# Patient Record
Sex: MALE | Race: BLACK OR AFRICAN AMERICAN | NOT HISPANIC OR LATINO | URBAN - METROPOLITAN AREA
[De-identification: names, ages, dates, MRNs, and addresses within clinical notes are randomized per-mention and may not be internally consistent; named-entity substitution may affect disease eponyms.]

---

## 2018-07-20 ENCOUNTER — INPATIENT (INPATIENT)
Facility: HOSPITAL | Age: 68
LOS: 25 days | Discharge: NURSING, REHAB OR LONG-TERM CARE | DRG: 64 | End: 2018-08-15
Attending: INTERNAL MEDICINE | Admitting: INTERNAL MEDICINE
Payer: MEDICARE

## 2018-07-20 VITALS
SYSTOLIC BLOOD PRESSURE: 132 MMHG | DIASTOLIC BLOOD PRESSURE: 84 MMHG | HEART RATE: 72 BPM | WEIGHT: 160.06 LBS | TEMPERATURE: 98 F | OXYGEN SATURATION: 98 % | RESPIRATION RATE: 18 BRPM

## 2018-07-20 DIAGNOSIS — R74.0 NONSPECIFIC ELEVATION OF LEVELS OF TRANSAMINASE AND LACTIC ACID DEHYDROGENASE [LDH]: ICD-10-CM

## 2018-07-20 DIAGNOSIS — R63.8 OTHER SYMPTOMS AND SIGNS CONCERNING FOOD AND FLUID INTAKE: ICD-10-CM

## 2018-07-20 DIAGNOSIS — Z98.49 CATARACT EXTRACTION STATUS, UNSPECIFIED EYE: Chronic | ICD-10-CM

## 2018-07-20 DIAGNOSIS — E11.9 TYPE 2 DIABETES MELLITUS WITHOUT COMPLICATIONS: ICD-10-CM

## 2018-07-20 DIAGNOSIS — Z91.89 OTHER SPECIFIED PERSONAL RISK FACTORS, NOT ELSEWHERE CLASSIFIED: ICD-10-CM

## 2018-07-20 DIAGNOSIS — I50.9 HEART FAILURE, UNSPECIFIED: ICD-10-CM

## 2018-07-20 DIAGNOSIS — I25.10 ATHEROSCLEROTIC HEART DISEASE OF NATIVE CORONARY ARTERY WITHOUT ANGINA PECTORIS: Chronic | ICD-10-CM

## 2018-07-20 DIAGNOSIS — R41.82 ALTERED MENTAL STATUS, UNSPECIFIED: ICD-10-CM

## 2018-07-20 DIAGNOSIS — N17.9 ACUTE KIDNEY FAILURE, UNSPECIFIED: ICD-10-CM

## 2018-07-20 DIAGNOSIS — I50.23 ACUTE ON CHRONIC SYSTOLIC (CONGESTIVE) HEART FAILURE: ICD-10-CM

## 2018-07-20 DIAGNOSIS — Z29.9 ENCOUNTER FOR PROPHYLACTIC MEASURES, UNSPECIFIED: ICD-10-CM

## 2018-07-20 DIAGNOSIS — E87.2 ACIDOSIS: ICD-10-CM

## 2018-07-20 DIAGNOSIS — I24.9 ACUTE ISCHEMIC HEART DISEASE, UNSPECIFIED: ICD-10-CM

## 2018-07-20 LAB
ALBUMIN SERPL ELPH-MCNC: 3.6 G/DL — SIGNIFICANT CHANGE UP (ref 3.3–5)
ALP SERPL-CCNC: 158 U/L — HIGH (ref 40–120)
ALT FLD-CCNC: 1421 U/L — HIGH (ref 10–45)
AMMONIA BLD-MCNC: 31 UMOL/L — SIGNIFICANT CHANGE UP (ref 11–55)
ANION GAP SERPL CALC-SCNC: 25 MMOL/L — HIGH (ref 5–17)
APPEARANCE UR: CLEAR — SIGNIFICANT CHANGE UP
APTT BLD: 34.1 SEC — SIGNIFICANT CHANGE UP (ref 27.5–37.4)
AST SERPL-CCNC: 164 U/L — HIGH (ref 10–40)
BACTERIA # UR AUTO: PRESENT /HPF
BILIRUB DIRECT SERPL-MCNC: 0.8 MG/DL — HIGH (ref 0–0.2)
BILIRUB INDIRECT FLD-MCNC: 1 MG/DL — SIGNIFICANT CHANGE UP (ref 0.2–1)
BILIRUB SERPL-MCNC: 1.6 MG/DL — HIGH (ref 0.2–1.2)
BILIRUB SERPL-MCNC: 1.8 MG/DL — HIGH (ref 0.2–1.2)
BILIRUB UR-MCNC: NEGATIVE — SIGNIFICANT CHANGE UP
BUN SERPL-MCNC: 145 MG/DL — HIGH (ref 7–23)
CALCIUM SERPL-MCNC: 8.6 MG/DL — SIGNIFICANT CHANGE UP (ref 8.4–10.5)
CHLORIDE SERPL-SCNC: 87 MMOL/L — LOW (ref 96–108)
CHLORIDE UR-SCNC: 52 MMOL/L — SIGNIFICANT CHANGE UP
CHOLEST SERPL-MCNC: 186 MG/DL — SIGNIFICANT CHANGE UP (ref 10–199)
CO2 SERPL-SCNC: 20 MMOL/L — LOW (ref 22–31)
COLOR SPEC: YELLOW — SIGNIFICANT CHANGE UP
COMMENT - URINE: SIGNIFICANT CHANGE UP
CREAT ?TM UR-MCNC: 58 MG/DL — SIGNIFICANT CHANGE UP
CREAT SERPL-MCNC: 9.41 MG/DL — HIGH (ref 0.5–1.3)
DIFF PNL FLD: ABNORMAL
EOSINOPHIL NFR BLD AUTO: 0.5 % — SIGNIFICANT CHANGE UP (ref 0–6)
EPI CELLS # UR: SIGNIFICANT CHANGE UP /HPF (ref 0–5)
EXTRA SST TUBE: SIGNIFICANT CHANGE UP
GAS PNL BLDV: SIGNIFICANT CHANGE UP
GGT SERPL-CCNC: 202 U/L — HIGH (ref 9–50)
GLUCOSE SERPL-MCNC: 165 MG/DL — HIGH (ref 70–99)
GLUCOSE UR QL: NEGATIVE — SIGNIFICANT CHANGE UP
HBA1C BLD-MCNC: 8 % — HIGH (ref 4–5.6)
HCT VFR BLD CALC: 35.2 % — LOW (ref 39–50)
HDLC SERPL-MCNC: 18 MG/DL — LOW (ref 40–125)
HGB BLD-MCNC: 12.3 G/DL — LOW (ref 13–17)
INR BLD: 1.33 — HIGH (ref 0.88–1.16)
KETONES UR-MCNC: NEGATIVE — SIGNIFICANT CHANGE UP
LACTATE SERPL-SCNC: 2.1 MMOL/L — HIGH (ref 0.5–2)
LEUKOCYTE ESTERASE UR-ACNC: NEGATIVE — SIGNIFICANT CHANGE UP
LIPID PNL WITH DIRECT LDL SERPL: 135 MG/DL — HIGH
LYMPHOCYTES # BLD AUTO: 8.3 % — LOW (ref 13–44)
MCHC RBC-ENTMCNC: 28.6 PG — SIGNIFICANT CHANGE UP (ref 27–34)
MCHC RBC-ENTMCNC: 34.9 G/DL — SIGNIFICANT CHANGE UP (ref 32–36)
MCV RBC AUTO: 81.9 FL — SIGNIFICANT CHANGE UP (ref 80–100)
MONOCYTES NFR BLD AUTO: 14.2 % — HIGH (ref 2–14)
NEUTROPHILS NFR BLD AUTO: 77 % — SIGNIFICANT CHANGE UP (ref 43–77)
NITRITE UR-MCNC: NEGATIVE — SIGNIFICANT CHANGE UP
PH UR: 5.5 — SIGNIFICANT CHANGE UP (ref 5–8)
PLATELET # BLD AUTO: 101 K/UL — LOW (ref 150–400)
POTASSIUM SERPL-MCNC: 3.8 MMOL/L — SIGNIFICANT CHANGE UP (ref 3.5–5.3)
POTASSIUM SERPL-SCNC: 3.8 MMOL/L — SIGNIFICANT CHANGE UP (ref 3.5–5.3)
POTASSIUM UR-SCNC: 13 MMOL/L — SIGNIFICANT CHANGE UP
PROT SERPL-MCNC: 7.2 G/DL — SIGNIFICANT CHANGE UP (ref 6–8.3)
PROT UR-MCNC: ABNORMAL MG/DL
PROTHROM AB SERPL-ACNC: 14.9 SEC — HIGH (ref 9.8–12.7)
RBC # BLD: 4.3 M/UL — SIGNIFICANT CHANGE UP (ref 4.2–5.8)
RBC # FLD: 14.1 % — SIGNIFICANT CHANGE UP (ref 10.3–16.9)
RBC CASTS # UR COMP ASSIST: < 5 /HPF — SIGNIFICANT CHANGE UP
SODIUM SERPL-SCNC: 132 MMOL/L — LOW (ref 135–145)
SODIUM UR-SCNC: 74 MMOL/L — SIGNIFICANT CHANGE UP
SP GR SPEC: 1.01 — SIGNIFICANT CHANGE UP (ref 1–1.03)
TOTAL CHOLESTEROL/HDL RATIO MEASUREMENT: 10.3 RATIO — HIGH (ref 3.4–9.6)
TRIGL SERPL-MCNC: 165 MG/DL — HIGH (ref 10–149)
TROPONIN T SERPL-MCNC: 17.33 NG/ML — CRITICAL HIGH (ref 0–0.01)
TROPONIN T SERPL-MCNC: 17.5 NG/ML — CRITICAL HIGH (ref 0–0.01)
UROBILINOGEN FLD QL: 0.2 E.U./DL — SIGNIFICANT CHANGE UP
UUN UR-MCNC: 482 MG/DL — SIGNIFICANT CHANGE UP
WBC # BLD: 4.2 K/UL — SIGNIFICANT CHANGE UP (ref 3.8–10.5)
WBC # FLD AUTO: 4.2 K/UL — SIGNIFICANT CHANGE UP (ref 3.8–10.5)
WBC UR QL: ABNORMAL /HPF

## 2018-07-20 PROCEDURE — 71045 X-RAY EXAM CHEST 1 VIEW: CPT | Mod: 26

## 2018-07-20 PROCEDURE — 99223 1ST HOSP IP/OBS HIGH 75: CPT

## 2018-07-20 PROCEDURE — 93306 TTE W/DOPPLER COMPLETE: CPT | Mod: 26

## 2018-07-20 PROCEDURE — 93010 ELECTROCARDIOGRAM REPORT: CPT

## 2018-07-20 PROCEDURE — 99291 CRITICAL CARE FIRST HOUR: CPT

## 2018-07-20 RX ORDER — DEXTROSE 50 % IN WATER 50 %
12.5 SYRINGE (ML) INTRAVENOUS ONCE
Qty: 0 | Refills: 0 | Status: DISCONTINUED | OUTPATIENT
Start: 2018-07-20 | End: 2018-08-15

## 2018-07-20 RX ORDER — TICAGRELOR 90 MG/1
180 TABLET ORAL ONCE
Qty: 0 | Refills: 0 | Status: COMPLETED | OUTPATIENT
Start: 2018-07-20 | End: 2018-07-20

## 2018-07-20 RX ORDER — BUMETANIDE 0.25 MG/ML
2 INJECTION INTRAMUSCULAR; INTRAVENOUS ONCE
Qty: 0 | Refills: 0 | Status: COMPLETED | OUTPATIENT
Start: 2018-07-20 | End: 2018-07-20

## 2018-07-20 RX ORDER — HEPARIN SODIUM 5000 [USP'U]/ML
850 INJECTION INTRAVENOUS; SUBCUTANEOUS
Qty: 25000 | Refills: 0 | Status: DISCONTINUED | OUTPATIENT
Start: 2018-07-20 | End: 2018-07-21

## 2018-07-20 RX ORDER — TICAGRELOR 90 MG/1
90 TABLET ORAL
Qty: 0 | Refills: 0 | Status: DISCONTINUED | OUTPATIENT
Start: 2018-07-21 | End: 2018-07-22

## 2018-07-20 RX ORDER — ASPIRIN/CALCIUM CARB/MAGNESIUM 324 MG
81 TABLET ORAL DAILY
Qty: 0 | Refills: 0 | Status: DISCONTINUED | OUTPATIENT
Start: 2018-07-21 | End: 2018-07-23

## 2018-07-20 RX ORDER — ASPIRIN/CALCIUM CARB/MAGNESIUM 324 MG
325 TABLET ORAL ONCE
Qty: 0 | Refills: 0 | Status: COMPLETED | OUTPATIENT
Start: 2018-07-20 | End: 2018-07-20

## 2018-07-20 RX ORDER — DEXTROSE 50 % IN WATER 50 %
25 SYRINGE (ML) INTRAVENOUS ONCE
Qty: 0 | Refills: 0 | Status: DISCONTINUED | OUTPATIENT
Start: 2018-07-20 | End: 2018-08-15

## 2018-07-20 RX ORDER — INSULIN LISPRO 100/ML
VIAL (ML) SUBCUTANEOUS AT BEDTIME
Qty: 0 | Refills: 0 | Status: DISCONTINUED | OUTPATIENT
Start: 2018-07-20 | End: 2018-08-15

## 2018-07-20 RX ORDER — BUMETANIDE 0.25 MG/ML
1 INJECTION INTRAMUSCULAR; INTRAVENOUS
Qty: 10 | Refills: 0 | Status: DISCONTINUED | OUTPATIENT
Start: 2018-07-20 | End: 2018-07-22

## 2018-07-20 RX ORDER — SODIUM CHLORIDE 9 MG/ML
1000 INJECTION INTRAMUSCULAR; INTRAVENOUS; SUBCUTANEOUS
Qty: 0 | Refills: 0 | Status: DISCONTINUED | OUTPATIENT
Start: 2018-07-20 | End: 2018-07-20

## 2018-07-20 RX ORDER — GLUCAGON INJECTION, SOLUTION 0.5 MG/.1ML
1 INJECTION, SOLUTION SUBCUTANEOUS ONCE
Qty: 0 | Refills: 0 | Status: DISCONTINUED | OUTPATIENT
Start: 2018-07-20 | End: 2018-08-15

## 2018-07-20 RX ORDER — BUMETANIDE 0.25 MG/ML
2 INJECTION INTRAMUSCULAR; INTRAVENOUS ONCE
Qty: 0 | Refills: 0 | Status: DISCONTINUED | OUTPATIENT
Start: 2018-07-20 | End: 2018-07-20

## 2018-07-20 RX ORDER — DEXTROSE 50 % IN WATER 50 %
15 SYRINGE (ML) INTRAVENOUS ONCE
Qty: 0 | Refills: 0 | Status: DISCONTINUED | OUTPATIENT
Start: 2018-07-20 | End: 2018-08-15

## 2018-07-20 RX ORDER — SODIUM CHLORIDE 9 MG/ML
1000 INJECTION, SOLUTION INTRAVENOUS
Qty: 0 | Refills: 0 | Status: DISCONTINUED | OUTPATIENT
Start: 2018-07-20 | End: 2018-08-15

## 2018-07-20 RX ORDER — INSULIN LISPRO 100/ML
VIAL (ML) SUBCUTANEOUS
Qty: 0 | Refills: 0 | Status: DISCONTINUED | OUTPATIENT
Start: 2018-07-20 | End: 2018-08-15

## 2018-07-20 RX ADMIN — TICAGRELOR 180 MILLIGRAM(S): 90 TABLET ORAL at 15:27

## 2018-07-20 RX ADMIN — BUMETANIDE 2 MILLIGRAM(S): 0.25 INJECTION INTRAMUSCULAR; INTRAVENOUS at 18:58

## 2018-07-20 RX ADMIN — Medication 325 MILLIGRAM(S): at 15:27

## 2018-07-20 RX ADMIN — HEPARIN SODIUM 8.5 UNIT(S)/HR: 5000 INJECTION INTRAVENOUS; SUBCUTANEOUS at 18:59

## 2018-07-20 RX ADMIN — BUMETANIDE 10 MG/HR: 0.25 INJECTION INTRAMUSCULAR; INTRAVENOUS at 18:59

## 2018-07-20 NOTE — H&P ADULT - PROBLEM SELECTOR PLAN 5
Patient's EF was reportedly 25% in the past according to ED report. On echo today, EF is 15-20%. Appears volume overloaded on exam with increased JVP, b/l LE edema. On CXR, there's cardiomegaly and significant pulmonary vascular congestion throughout bilateral lung fields.  - Diuresis with bumex 2mg IVP, followed by bumex gtt

## 2018-07-20 NOTE — H&P ADULT - PROBLEM SELECTOR PLAN 2
Patient has been weak, unsteady, and confused. Earlier today during examination by ED providers, he was reportedly very drowsy. There are no focal neurological deficits on exam.  - Will f/u CT Head for stroke or bleed  - Possibly related to   - Patient has been weak, unsteady, and confused. Earlier today during examination by ED providers, he was reportedly very drowsy. There are no focal neurological deficits on exam. Differential includes toxic-metabolic vs hypoperfusion vs hypercapnia vs stroke/hemorrhage.   - Will f/u CT Head for stroke or bleed  - f/u VBG for ? hypercapnia  - f/u lactate

## 2018-07-20 NOTE — H&P ADULT - NSHPPHYSICALEXAM_GEN_ALL_CORE
.  VITAL SIGNS:  T(C): 37 (07-20-18 @ 16:22), Max: 37 (07-20-18 @ 16:22)  T(F): 98.6 (07-20-18 @ 16:22), Max: 98.6 (07-20-18 @ 16:22)  HR: 71 (07-20-18 @ 16:22) (71 - 72)  BP: 149/85 (07-20-18 @ 16:22) (132/84 - 149/85)  BP(mean): --  RR: 18 (07-20-18 @ 16:22) (18 - 18)  SpO2: 96% (07-20-18 @ 16:22) (96% - 98%)  Wt(kg): --    PHYSICAL EXAM:    Constitutional: WDWN resting comfortably in bed; NAD  Head: NC/AT  Eyes: PERRL, EOMI, anicteric sclera  ENT: no nasal discharge; uvula midline, no oropharyngeal erythema or exudates; MMM  Neck: supple; no JVD or thyromegaly  Respiratory: CTA B/L; no W/R/R, no retractions  Cardiac: +S1/S2; RRR; no M/R/G; PMI non-displaced  Gastrointestinal: abdomen soft, NT/ND; no rebound or guarding; +BSx4  Genitourinary: normal external genitalia  Back: spine midline, no bony tenderness or step-offs; no CVAT B/L  Extremities: WWP, no clubbing or cyanosis; no peripheral edema  Musculoskeletal: NROM x4; no joint swelling, tenderness or erythema  Vascular: 2+ radial, femoral, DP/PT pulses B/L  Dermatologic: skin warm, dry and intact; no rashes, wounds, or scars  Lymphatic: no submandibular or cervical LAD  Neurologic: AAOx3; CNII-XII grossly intact; no focal deficits  Psychiatric: affect and characteristics of appearance, verbalizations, behaviors are appropriate .  VITAL SIGNS:  T(C): 37 (07-20-18 @ 16:22), Max: 37 (07-20-18 @ 16:22)  T(F): 98.6 (07-20-18 @ 16:22), Max: 98.6 (07-20-18 @ 16:22)  HR: 71 (07-20-18 @ 16:22) (71 - 72)  BP: 149/85 (07-20-18 @ 16:22) (132/84 - 149/85)  BP(mean): --  RR: 18 (07-20-18 @ 16:22) (18 - 18)  SpO2: 96% (07-20-18 @ 16:22) (96% - 98%)  Wt(kg): --    PHYSICAL EXAM:    Constitutional: WDWN resting comfortably in bed; NAD  Head: NC/AT  Eyes: PERRL, EOMI, anicteric sclera  ENT: no nasal discharge; uvula midline, no oropharyngeal erythema or exudates; MMM  Neck: supple; no JVD or thyromegaly  Respiratory: CTA B/L; no W/R/R, no retractions  Cardiac: 3/6 WANDER at LUSB, Regular rate, rhythm  Gastrointestinal: abdomen soft, NT, mildly distended; no rebound or guarding; +BSx4  Extremities: WWP, no clubbing or cyanosis; no peripheral edema  Musculoskeletal: NROM x4; no joint swelling, tenderness or erythema  Vascular: 2+ radial, femoral, DP/PT pulses B/L  Lymphatic: no submandibular or cervical LAD  Neurologic: AAOx3; CNII-XII grossly intact; no focal deficits  Psychiatric: Flat affect .  VITAL SIGNS:  T(C): 37 (07-20-18 @ 16:22), Max: 37 (07-20-18 @ 16:22)  T(F): 98.6 (07-20-18 @ 16:22), Max: 98.6 (07-20-18 @ 16:22)  HR: 71 (07-20-18 @ 16:22) (71 - 72)  BP: 149/85 (07-20-18 @ 16:22) (132/84 - 149/85)  BP(mean): --  RR: 18 (07-20-18 @ 16:22) (18 - 18)  SpO2: 96% (07-20-18 @ 16:22) (96% - 98%)  Wt(kg): --    PHYSICAL EXAM:    Constitutional: WDWN resting comfortably in bed; NAD  Head: NC/AT  Eyes: PERRL, EOMI, anicteric sclera  ENT: no nasal discharge; uvula midline, no oropharyngeal erythema or exudates; MMM  Neck: supple; JVP noted at his ear 11cm  Respiratory: CTA B/L; no W/R/R, no retractions  Cardiac: 3/6 WANDER at LUSB, Regular rate, rhythm  Gastrointestinal: abdomen soft, NT, mildly distended; no rebound or guarding; +BSx4  Extremities: 1+ edema at b/l ankles. Feet are cool to the touch. Otherwise WWP.   Vascular: 2+ radial. DP/PT pulses nonpalpable  Neurologic: AAOx3; CNII-XII grossly intact; no focal deficits  Psychiatric: Flat affect

## 2018-07-20 NOTE — CONSULT NOTE ADULT - ATTENDING COMMENTS
RONNIE, ATN or CRS, non oliguric w acute HF EF 20%, no prior h/o renal disease or HF per pt's wife. Responding to diuresis, no urgent req for RRT

## 2018-07-20 NOTE — ED PROVIDER NOTE - MEDICAL DECISION MAKING DETAILS
Dr. Abi Lo MD  [address] [place], [place] [address] [phone]  Phone: (   )    -  Fax: (   )    - MI  renal failure  CHF  echo 20% EF  [facility]  cath monday tues

## 2018-07-20 NOTE — CONSULT NOTE ADULT - PROBLEM SELECTOR RECOMMENDATION 9
- unknown baseline, lst known creatinine a week ago in a hospital at [place] 6.6   - we will try to get more info for the baseline renal function from his PCP dr. Cunningham [phone] or [phone]  - now with creatinine 9.45/ , no urgent indications for HD - most likley worsening of the renal function in the setting of cvardiorenal syndrome   - please obtain UA - has a cherry catheter - not oliguric  - electrolytes acceptable   - has a metabolic acidosis - high anion gap - please obtain lactic acid and beta hydrohybuterate   - please obtain - urine sodium, urine creatinine, urine chloride, urine potasssium, urine urea   - volume status - on the wet side - please start the diuretic therapy  - LAsix 80 mg ivp twice a day   - obtain official renal u/s and bladder  - in and outs   - daily weight   - renal diet   - please obtain quantification of the proteinuria

## 2018-07-20 NOTE — H&P ADULT - ASSESSMENT
RB is a 67M w PMH of dilated cardiomyopathy, HFrEF of 25%, CAD s/p PCI 7 years ago, NIDDM, HTN, and HLD, with altered mental status in the setting of one week of dyspnea and weakness admitted to the CCU for management of ACS, CHF, and RONNIE.

## 2018-07-20 NOTE — H&P ADULT - PROBLEM SELECTOR PLAN 4
Patient's ALT today is >1400, however AST is 164. Alk phos is elevated to 158. Unclear pattern of liver enzymes.   - f/u repeat CMP  - f/u lactate, GGT, bilirubins

## 2018-07-20 NOTE — CONSULT NOTE ADULT - SUBJECTIVE AND OBJECTIVE BOX
This is 67 year old male with PMH for HTN, HLD, Diabetes mellitus type 2. He was  sent to the ED from his doctor today, Prior to that was in [place] since  where a couple of weeks ago had nausea, vomiting and diarrhea attributed to viral infection. Last week on Friday had an episode of acute shortness of breath and went to the hospital - where been told that he had a heart attack and been treated not clear whether he had a cardiac cath was discharge on Wednesday this week and came back to [place] on Thursday night. From the work up in the hospital in [place] creatinine of 6.6. The patient denies any prior h/o of renal failure - never been told for kidney problems. Denies any burning sensation, no heamturia, nocturia 2 times overnight. The renal service is called for creatinine of 9.45 and  today in the Ed. The patient on the stretcher - no shortness of breath, no chest pain, no fever, no nausea, no vomiting, no diarrhea, no urinary symptoms. reports decreased appetite. No recent usage of NSAIDs or herbal medicine   Medsications: Losartan, Glipized and metfromin - but has not beentaking them recently   SH: no alcohol, no drugs, no smoking       PAST MEDICAL & SURGICAL HISTORY:  Coronary artery disease involving native heart without angina pectoris, unspecified vessel or lesion type  Hyperlipidemia, unspecified hyperlipidemia type  Hypertension, unspecified type  Type 2 diabetes mellitus without complication, without long-term current use of insulin  History of cataract extraction, unspecified laterality  CAD S/P percutaneous coronary angioplasty      MEDICATIONS  (STANDING):  buMETAnide Infusion 1 mG/Hr (10 mL/Hr) IV Continuous <Continuous>  buMETAnide Injectable 2 milliGRAM(s) IV Push once  heparin  Infusion 850 Unit(s)/Hr (8.5 mL/Hr) IV Continuous <Continuous>    MEDICATIONS  (PRN):      Allergies    No Known Allergies    Intolerances        SOCIAL HISTORY:    FAMILY HISTORY:  Family history of hypertension in mother (Mother)  Family history of diabetes mellitus in mother (Mother)      T(C): , Max: 37 (18 @ 16:22)  T(F): , Max: 98.6 (18 @ 16:22)  HR: 75 (18 @ 17:42)  BP: 137/79 (18 @ 17:42)  BP(mean): 96 (18 @ 17:42)  RR: 18 (18 @ 17:42)  SpO2: 98% (18 @ 17:42)  Wt(kg): --    Height (cm): 167.64 ( @ 17:42)  Weight (kg): 70.7 ( @ 17:42)  BMI (kg/m2): 25.2 ( @ 17:42)  BSA (m2): 1.8 ( @ 17:42)      Physical exam:   Alert and oriented, responds to all questions properly, does not want to talk much  JVD   Normal air entry into the lungs, no wheezing, no crackles   RRR, normal s1/s2, no murmurs, rubs or gallops   Abdomen - soft, not tender, not distended   Extremities: 1+ edema   No rash   No astertix     LABS:                        12.3   4.2   )-----------( 101      ( 2018 13:49 )             35.2         132<L>  |  87<L>  |  145<H>  ----------------------------<  165<H>  3.8   |  20<L>  |  9.41<H>    Ca    8.6      2018 13:49    TPro  7.2  /  Alb  3.6  /  TBili  1.6<H>  /  DBili  x   /  AST  164<H>  /  ALT  1421<H>  /  AlkPhos  158<H>      Creatine Kinase, Serum: 596 U/L <H> [30 - 200] ( @ 13:49)    PT/INR - ( 2018 13:48 )   PT: 14.9 sec;   INR: 1.33          PTT - ( 2018 13:48 )  PTT:34.1 sec  Urinalysis Basic - ( 2018 17:25 )    Color: Yellow / Appearance: Clear / S.015 / pH: x  Gluc: x / Ketone: NEGATIVE  / Bili: Negative / Urobili: 0.2 E.U./dL   Blood: x / Protein: Trace mg/dL / Nitrite: NEGATIVE   Leuk Esterase: NEGATIVE / RBC: < 5 /HPF / WBC 5-10 /HPF   Sq Epi: x / Non Sq Epi: 0-5 /HPF / Bacteria: Present /HPF            RADIOLOGY & ADDITIONAL STUDIES:

## 2018-07-20 NOTE — H&P ADULT - NSHPLABSRESULTS_GEN_ALL_CORE
.  LABS:                         12.3   4.2   )-----------( 101      ( 20 Jul 2018 13:49 )             35.2     07-20    132<L>  |  87<L>  |  145<H>  ----------------------------<  165<H>  3.8   |  20<L>  |  9.41<H>    Ca    8.6      20 Jul 2018 13:49    TPro  7.2  /  Alb  3.6  /  TBili  1.6<H>  /  DBili  x   /  AST  164<H>  /  ALT  1421<H>  /  AlkPhos  158<H>  07-20    PT/INR - ( 20 Jul 2018 13:48 )   PT: 14.9 sec;   INR: 1.33          PTT - ( 20 Jul 2018 13:48 )  PTT:34.1 sec    CARDIAC MARKERS ( 20 Jul 2018 13:49 )  x     / 17.50 ng/mL / 596 U/L / x     / 5.4 ng/mL      Serum Pro-Brain Natriuretic Peptide: 33092 pg/mL (07-20 @ 13:49)        RADIOLOGY, EKG & ADDITIONAL TESTS: Reviewed.

## 2018-07-20 NOTE — H&P ADULT - PMH
Coronary artery disease involving native heart without angina pectoris, unspecified vessel or lesion type    Hyperlipidemia, unspecified hyperlipidemia type    Hypertension, unspecified type    Type 2 diabetes mellitus without complication, without long-term current use of insulin

## 2018-07-20 NOTE — H&P ADULT - HISTORY OF PRESENT ILLNESS
RB is a 67M w PMH of dilated cardiomyopathy, CAD s/p PCI 7 years ago, NIDDM, HTN, and HLD, who presented to the [facility] ED with altered mental status in the setting of one week of dyspnea and weakness. Patient was in his usual state of health up until last Thursday when, while on a trip to [place], he developed dyspnea, nausea, and vomiting. He was seen at a clinic in [place], who prescribed bactrim and metronidazole to treat his GI symptoms. He continued to feel dyspneic and RB is a 67M w PMH of dilated cardiomyopathy, CAD s/p PCI 7 years ago, NIDDM, HTN, and HLD, who presented to the [facility] ED with altered mental status in the setting of one week of dyspnea and weakness. Patient was in his usual state of health up until last Thursday when, while on a trip to [place], he developed dyspnea, nausea, and vomiting. He was seen at a clinic in [place], who prescribed bactrim and metronidazole to treat his GI symptoms. He continued to feel dyspneic and weakness, which became severe enough to make ambulation difficult. Due to this weakness, he went to a hospital in [place] who noticed a   . His family states that the did not have dyspnea prior to one week prior, RB is a 67M w PMH of dilated cardiomyopathy, CAD s/p PCI 7 years ago, NIDDM, HTN, and HLD, who presented to the [facility] ED with altered mental status in the setting of one week of dyspnea and weakness. Patient was in his usual state of health up until last Thursday when, while on a trip to [place], he developed dyspnea, nausea, and vomiting. He was seen at a clinic in [place], who prescribed bactrim and metronidazole to treat his GI symptoms. He continued to feel dyspneic and weakness, which became severe enough to make ambulation difficult. Due to this weakness, he went to a hospital in [place], where they reportedly noticed an STEMI and a troponin of >2000. They did not send the patient to the cath lab at that time. He continued to feel weak after that admission and returned to the [place] the evening prior to admission. He started becoming confused and drowsy this AM and went to an appointment with his cardiologist, Dr. Haley, who directed him to the ER.     In the ED, his CBC, BMP, . He was loaded with ASA 325mg and Brilinta 180mg. A CXR, CT of the head,   At bedside today, he did not complain of any issues, but his family states that he is still altered from his mental baseline and provided his medical history. They state that he is still weak and unsteady on his feet, drowsy and confused. He is complaining    Patient has not had headache, fevers, chills, chest pain, cough, abdominal pain, MSK pain. He has been making good urine  His family states that he did not have dyspnea prior to one week prior and deny baseline orthopnea, PND,  . RB is a 67M w PMH of dilated cardiomyopathy, CAD s/p PCI 7 years ago, NIDDM, HTN, and HLD, who presented to the [facility] ED with altered mental status in the setting of one week of dyspnea and weakness. Patient was in his usual state of health up until last Thursday when, while on a trip to [place], he developed dyspnea, nausea, and vomiting. He was seen at a clinic in [place], who prescribed bactrim and metronidazole to treat his GI symptoms. He continued to feel dyspneic and weakness, which became severe enough to make ambulation difficult. Due to this weakness, he went to a hospital in [place], where they reportedly noticed an STEMI and a troponin of >2000. They treated the patient with IV fluids, anti-hypertensives, and insulin. They did not send the patient for cardiac catherization at that time and did not initiate standard ACS treatment. He continued to feel weak after that admission and returned to the [place] the evening prior to admission. He started becoming confused and drowsy this AM and went to an appointment with his cardiologist who directed him to the ER.     In the ED, his CBC, CMP, coags, Troponin, BNP. He was loaded with ASA 325mg and Brilinta 180mg. A CXR, CT of the head, and TTE were completed. The echocardiogram showed that the left ventricular ejection fraction was severely reduced. The left ventricular ejection fraction is estimated to be 15-20%. At bedside today, he did not complain of any issues, but his family states that he is still altered from his mental baseline and provided his medical history. They state that he is still weak and unsteady on his feet, drowsy and confused.   Patient has not had headache, fevers, chills, chest pain, cough, abdominal pain, MSK pain. He has been making good urine  His family states that he did not have dyspnea prior to one week prior and deny baseline orthopnea, PND, RB is a 67M w PMH of dilated cardiomyopathy, HFrEF of 25%, CAD s/p PCI 7 years ago, NIDDM, HTN, and HLD, who presented to the [facility] ED with altered mental status in the setting of one week of dyspnea and weakness. Patient was in his usual state of health up until last Thursday when, while on a trip to [place], he developed dyspnea, nausea, and vomiting. He was seen at a clinic in [place], who prescribed bactrim and metronidazole to treat his GI symptoms. He continued to feel dyspneic and weakness, which became severe enough to make ambulation difficult. Due to this weakness, he went to a hospital in [place], where they reportedly noticed an STEMI and a troponin of >2000. They treated the patient with IV fluids, anti-hypertensives, and insulin. They did not send the patient for cardiac catherization at that time and did not initiate standard ACS treatment. He continued to feel weak after that admission and returned to the [place] the evening prior to admission. He started becoming confused and drowsy this AM and went to an appointment with his cardiologist who directed him to the ER. Patient was a poor historian and history was obtained from wife and sons. At bedside today, he did not complain of any issues, but his family states that he is still altered from his mental baseline and provided his medical history. They state that he is still weak and unsteady on his feet. Patient has not had headache, fevers, chills, chest pain, cough, abdominal pain, MSK pain, changes to his speech. He has been making good urine at home, denies urinary symptoms. His family states that he did not have dyspnea prior to this current episode and deny baseline orthopnea, PND, palpitations, peripheral edema.     In the ED, vital signs were T 97.6, HR 72, /84, RR 18, 98% O2 on RA. CBC, CMP, coags, Troponin, BNP were drawn. Labs were most significant for Hgb 12.3, Plt 101, INR 1.3, Na 132, K 3.8, HCO3 20, /Cr 9.41, ALT 1421. Trop was elevated to 17.5 and BNP was >81397.  He was loaded with ASA 325mg and Brilinta 180mg. A CXR and TTE were completed. The echocardiogram showed that the left ventricular ejection fraction was severely reduced. The left ventricular ejection fraction is estimated to be 15-20%. The patient was admitted to the CCU for further management.

## 2018-07-20 NOTE — CONSULT NOTE ADULT - PROBLEM SELECTOR RECOMMENDATION 2
- EF 15-20%   - under treatment for STEMI/NSTEMI - as per primary team   - please continue with Lasix 80 mg twice a day ivp   - I will hold for now losaratan   - rest as per cardiology team   - according to them no urgent cardiac cath

## 2018-07-20 NOTE — ED PROVIDER NOTE - CRITICAL CARE PROVIDED
direct patient care (not related to procedure)/documentation/additional history taking/interpretation of diagnostic studies/consultation with other physicians/conducted a detailed discussion of DNR status

## 2018-07-20 NOTE — ED PROVIDER NOTE - OBJECTIVE STATEMENT
68 yo male with hx of dilated cardiomyopathy CAd  s/p PCI with stents 5 years ago  non compliant with meds lost to follow up x 5 years  prior EF 25 % on losartan and metformin -refused cath at that time- was in [place] over the pat 10 days had JEAN CARLOS  1 week ago STEMI  was not cathed  noted elevated creatinine of 6.0 / trop> 2000  flew back last nite  -  has been sleepy and weak x 1 day ? making urine-   no prior hx of renal failure or uremia

## 2018-07-20 NOTE — H&P ADULT - PROBLEM SELECTOR PLAN 1
Patient reportedly had an acute MI earlier this week in [place] with EKG showing ST elevations in the lateral leads and troponins in excess of 2000. EKG on admission shows q-waves in leads I and aVL with poor R wave progression consistent with a previous lateral wall MI.   - Downtrending troponins  - Patient has hx of PCI w stents placed 7 years prior. Patient reportedly had an acute MI earlier this week in [place] with EKG showing ST elevations in the lateral leads and troponins in excess of 2000. EKG on admission shows q-waves in leads I and aVL with poor R wave progression consistent with a previous lateral wall MI. Outside therapeutic window for PCI.  - Downtrending troponins, will no longer follow  - Loaded with Brilinta, will c/w 90mg q12  - Loaded with ASA, will c/w 81mg qD  - Starting heparin gtt, titrating to aPTT of 60-80  - Holding statin therapy due to transaminitis  - Holding beta blockade due to decompensated heart failure  - Holding ACE/ARB due to severe RONNIE  - Will need diagnostic cath/stress test for ischemic workup  - f/u a1c/Lipids for risk stratification

## 2018-07-20 NOTE — H&P ADULT - NSHPSOCIALHISTORY_GEN_ALL_CORE
.  Tobacco use: None  EtOH use: Social drinker. Heavy use prior to PCI 7 years ago.  Illicit drug use: None    Living situation: With wife and .  Tobacco use: None  EtOH use: Social drinker. Heavy use prior to PCI 7 years ago.  Illicit drug use: None    Living situation: With wife and 2 sons in [place]

## 2018-07-20 NOTE — H&P ADULT - PROBLEM SELECTOR PLAN 3
- Patient's Cr is now 9.4 from unclear baseline. Will need collateral from outpatient providers to ascertain whether CKD component is present. Possible 2/2 to cardiorenal syndrome as cardiac output is poor.   - Renal on board, appreciate recs  - Per renal, no urgent need for dialysis at this time  - f/u urine lytes, UA  - f/u renal US    #Hyponatremia: Likely hypervolemic hyponatremia given volume overload on clinical exam.  - f/u urine lytes, serum osm  - Wooten in place

## 2018-07-20 NOTE — CONSULT NOTE ADULT - PROBLEM SELECTOR RECOMMENDATION 3
- high anion metabolic acidosis (could be from the renal failure)- please obtain lactic acid and beta hydroxybuterate   - metabolic alkalosis along with metabic acidosis   -

## 2018-07-20 NOTE — H&P ADULT - PROBLEM SELECTOR PLAN 9
Will contact outpatient cardiologist Dr. Octavio Haley for collateral information.    Dispo: admit to CCU  Code: Full

## 2018-07-20 NOTE — ED ADULT NURSE NOTE - OBJECTIVE STATEMENT
66 yo male with hx of dilated cardiomyopathy CAd  s/p PCI with stents 5 years ago  non compliant with meds lost to follow up x 5 years  prior EF 25 % on losartan and metformin -refused cath at that time- was in [place] over the past 10 days had JEAN CARLOS  1 week ago STEMI  was not cathed  noted elevated creatinine of 6.0 / trop> 2000  flew back last nite  -  has been sleepy and weak x 1 day ? making urine-   no prior hx of renal failure or uremia 66 yo male with hx of dilated cardiomyopathy CAd  s/p PCI with stents 5 years ago  non compliant with meds lost to follow up x 5 years  prior EF 25 % on losartan and metformin -refused cath at that time- was in [place] over the past 10 days had JEAN CARLOS  1 week ago STEMI  was not cathed  noted elevated creatinine of 6.0 / trop> 2000  flew back last nite  -  has been sleepy and weak x 1 day ? making urine-   no prior hx of renal failure or uremia. pt lethargic and falling asleep on exam.

## 2018-07-20 NOTE — H&P ADULT - PROBLEM SELECTOR PLAN 8
F: None  E: Replete K>4, Mg>2. Will need to be careful with repletion given poor renal function  N: DASH/TLC/CC/Renal/Fluid restricted diet

## 2018-07-20 NOTE — ED PROVIDER NOTE - CARE PLAN
Principal Discharge DX:	Myocardial infarction  Secondary Diagnosis:	Renal failure  Secondary Diagnosis:	CHF (congestive heart failure)

## 2018-07-20 NOTE — ED PROVIDER NOTE - CRANIAL NERVE AND PUPILLARY EXAM
cough reflex intact/corneal reflex intact/cranial nerves 2-12 intact/central vision intact/extra-ocular movements intact/tongue is midline

## 2018-07-21 DIAGNOSIS — I63.9 CEREBRAL INFARCTION, UNSPECIFIED: ICD-10-CM

## 2018-07-21 DIAGNOSIS — N25.0 RENAL OSTEODYSTROPHY: ICD-10-CM

## 2018-07-21 LAB
ALBUMIN SERPL ELPH-MCNC: 3.4 G/DL — SIGNIFICANT CHANGE UP (ref 3.3–5)
ALBUMIN SERPL ELPH-MCNC: 3.5 G/DL — SIGNIFICANT CHANGE UP (ref 3.3–5)
ALBUMIN SERPL ELPH-MCNC: 3.7 G/DL — SIGNIFICANT CHANGE UP (ref 3.3–5)
ALP SERPL-CCNC: 149 U/L — HIGH (ref 40–120)
ALP SERPL-CCNC: 150 U/L — HIGH (ref 40–120)
ALP SERPL-CCNC: 152 U/L — HIGH (ref 40–120)
ALT FLD-CCNC: 1145 U/L — HIGH (ref 10–45)
ALT FLD-CCNC: 1177 U/L — HIGH (ref 10–45)
ALT FLD-CCNC: 1307 U/L — HIGH (ref 10–45)
ANION GAP SERPL CALC-SCNC: 23 MMOL/L — HIGH (ref 5–17)
ANION GAP SERPL CALC-SCNC: 26 MMOL/L — HIGH (ref 5–17)
ANION GAP SERPL CALC-SCNC: 27 MMOL/L — HIGH (ref 5–17)
APTT BLD: 52.4 SEC — HIGH (ref 27.5–37.4)
APTT BLD: 54.4 SEC — HIGH (ref 27.5–37.4)
AST SERPL-CCNC: 105 U/L — HIGH (ref 10–40)
AST SERPL-CCNC: 106 U/L — HIGH (ref 10–40)
AST SERPL-CCNC: 123 U/L — HIGH (ref 10–40)
BASOPHILS NFR BLD AUTO: 0.2 % — SIGNIFICANT CHANGE UP (ref 0–2)
BILIRUB SERPL-MCNC: 1.8 MG/DL — HIGH (ref 0.2–1.2)
BILIRUB SERPL-MCNC: 2 MG/DL — HIGH (ref 0.2–1.2)
BILIRUB SERPL-MCNC: 2 MG/DL — HIGH (ref 0.2–1.2)
BUN SERPL-MCNC: 139 MG/DL — HIGH (ref 7–23)
BUN SERPL-MCNC: 140 MG/DL — HIGH (ref 7–23)
BUN SERPL-MCNC: 142 MG/DL — HIGH (ref 7–23)
CALCIUM SERPL-MCNC: 8.9 MG/DL — SIGNIFICANT CHANGE UP (ref 8.4–10.5)
CALCIUM SERPL-MCNC: 8.9 MG/DL — SIGNIFICANT CHANGE UP (ref 8.4–10.5)
CALCIUM SERPL-MCNC: 9.2 MG/DL — SIGNIFICANT CHANGE UP (ref 8.4–10.5)
CHLORIDE SERPL-SCNC: 88 MMOL/L — LOW (ref 96–108)
CHOLEST SERPL-MCNC: 178 MG/DL — SIGNIFICANT CHANGE UP (ref 10–199)
CO2 SERPL-SCNC: 21 MMOL/L — LOW (ref 22–31)
CO2 SERPL-SCNC: 22 MMOL/L — SIGNIFICANT CHANGE UP (ref 22–31)
CO2 SERPL-SCNC: 25 MMOL/L — SIGNIFICANT CHANGE UP (ref 22–31)
CREAT SERPL-MCNC: 7.54 MG/DL — HIGH (ref 0.5–1.3)
CREAT SERPL-MCNC: 8.32 MG/DL — HIGH (ref 0.5–1.3)
CREAT SERPL-MCNC: 8.83 MG/DL — HIGH (ref 0.5–1.3)
EOSINOPHIL NFR BLD AUTO: 0.2 % — SIGNIFICANT CHANGE UP (ref 0–6)
GAS PNL BLDV: SIGNIFICANT CHANGE UP
GAS PNL BLDV: SIGNIFICANT CHANGE UP
GLUCOSE SERPL-MCNC: 134 MG/DL — HIGH (ref 70–99)
GLUCOSE SERPL-MCNC: 162 MG/DL — HIGH (ref 70–99)
GLUCOSE SERPL-MCNC: 163 MG/DL — HIGH (ref 70–99)
HBA1C BLD-MCNC: 8 % — HIGH (ref 4–5.6)
HCT VFR BLD CALC: 38.2 % — LOW (ref 39–50)
HDLC SERPL-MCNC: 21 MG/DL — LOW (ref 40–125)
HGB BLD-MCNC: 13.2 G/DL — SIGNIFICANT CHANGE UP (ref 13–17)
LACTATE SERPL-SCNC: 1.8 MMOL/L — SIGNIFICANT CHANGE UP (ref 0.5–2)
LACTATE SERPL-SCNC: 1.9 MMOL/L — SIGNIFICANT CHANGE UP (ref 0.5–2)
LIPID PNL WITH DIRECT LDL SERPL: 128 MG/DL — SIGNIFICANT CHANGE UP
LYMPHOCYTES # BLD AUTO: 14.2 % — SIGNIFICANT CHANGE UP (ref 13–44)
MAGNESIUM SERPL-MCNC: 2.9 MG/DL — HIGH (ref 1.6–2.6)
MCHC RBC-ENTMCNC: 29.1 PG — SIGNIFICANT CHANGE UP (ref 27–34)
MCHC RBC-ENTMCNC: 34.6 G/DL — SIGNIFICANT CHANGE UP (ref 32–36)
MCV RBC AUTO: 84.1 FL — SIGNIFICANT CHANGE UP (ref 80–100)
MONOCYTES NFR BLD AUTO: 1.9 % — LOW (ref 2–14)
NEUTROPHILS NFR BLD AUTO: 83.5 % — HIGH (ref 43–77)
PHOSPHATE SERPL-MCNC: 7.4 MG/DL — HIGH (ref 2.5–4.5)
PLATELET # BLD AUTO: 121 K/UL — LOW (ref 150–400)
POTASSIUM SERPL-MCNC: 3.4 MMOL/L — LOW (ref 3.5–5.3)
POTASSIUM SERPL-MCNC: 3.6 MMOL/L — SIGNIFICANT CHANGE UP (ref 3.5–5.3)
POTASSIUM SERPL-MCNC: 3.9 MMOL/L — SIGNIFICANT CHANGE UP (ref 3.5–5.3)
POTASSIUM SERPL-SCNC: 3.4 MMOL/L — LOW (ref 3.5–5.3)
POTASSIUM SERPL-SCNC: 3.6 MMOL/L — SIGNIFICANT CHANGE UP (ref 3.5–5.3)
POTASSIUM SERPL-SCNC: 3.9 MMOL/L — SIGNIFICANT CHANGE UP (ref 3.5–5.3)
PROT SERPL-MCNC: 7.7 G/DL — SIGNIFICANT CHANGE UP (ref 6–8.3)
PROT SERPL-MCNC: 7.7 G/DL — SIGNIFICANT CHANGE UP (ref 6–8.3)
PROT SERPL-MCNC: 8.1 G/DL — SIGNIFICANT CHANGE UP (ref 6–8.3)
RBC # BLD: 4.54 M/UL — SIGNIFICANT CHANGE UP (ref 4.2–5.8)
RBC # FLD: 14.4 % — SIGNIFICANT CHANGE UP (ref 10.3–16.9)
SODIUM SERPL-SCNC: 136 MMOL/L — SIGNIFICANT CHANGE UP (ref 135–145)
TOTAL CHOLESTEROL/HDL RATIO MEASUREMENT: 8.5 RATIO — SIGNIFICANT CHANGE UP (ref 3.4–9.6)
TRIGL SERPL-MCNC: 146 MG/DL — SIGNIFICANT CHANGE UP (ref 10–149)
WBC # BLD: 6.2 K/UL — SIGNIFICANT CHANGE UP (ref 3.8–10.5)
WBC # FLD AUTO: 6.2 K/UL — SIGNIFICANT CHANGE UP (ref 3.8–10.5)

## 2018-07-21 PROCEDURE — 71045 X-RAY EXAM CHEST 1 VIEW: CPT | Mod: 26

## 2018-07-21 PROCEDURE — 70450 CT HEAD/BRAIN W/O DYE: CPT | Mod: 26

## 2018-07-21 PROCEDURE — 70551 MRI BRAIN STEM W/O DYE: CPT | Mod: 26

## 2018-07-21 PROCEDURE — 99291 CRITICAL CARE FIRST HOUR: CPT

## 2018-07-21 PROCEDURE — 99232 SBSQ HOSP IP/OBS MODERATE 35: CPT

## 2018-07-21 PROCEDURE — 70544 MR ANGIOGRAPHY HEAD W/O DYE: CPT | Mod: 26,59

## 2018-07-21 PROCEDURE — 93010 ELECTROCARDIOGRAM REPORT: CPT

## 2018-07-21 RX ORDER — POTASSIUM CHLORIDE 20 MEQ
10 PACKET (EA) ORAL
Qty: 0 | Refills: 0 | Status: COMPLETED | OUTPATIENT
Start: 2018-07-21 | End: 2018-07-21

## 2018-07-21 RX ORDER — SEVELAMER CARBONATE 2400 MG/1
800 POWDER, FOR SUSPENSION ORAL THREE TIMES A DAY
Qty: 0 | Refills: 0 | Status: DISCONTINUED | OUTPATIENT
Start: 2018-07-22 | End: 2018-07-23

## 2018-07-21 RX ORDER — HEPARIN SODIUM 5000 [USP'U]/ML
950 INJECTION INTRAVENOUS; SUBCUTANEOUS
Qty: 25000 | Refills: 0 | Status: DISCONTINUED | OUTPATIENT
Start: 2018-07-21 | End: 2018-07-21

## 2018-07-21 RX ORDER — HEPARIN SODIUM 5000 [USP'U]/ML
5000 INJECTION INTRAVENOUS; SUBCUTANEOUS EVERY 8 HOURS
Qty: 0 | Refills: 0 | Status: DISCONTINUED | OUTPATIENT
Start: 2018-07-21 | End: 2018-07-21

## 2018-07-21 RX ORDER — HEPARIN SODIUM 5000 [USP'U]/ML
9 INJECTION INTRAVENOUS; SUBCUTANEOUS
Qty: 25000 | Refills: 0 | Status: DISCONTINUED | OUTPATIENT
Start: 2018-07-21 | End: 2018-07-21

## 2018-07-21 RX ORDER — HEPARIN SODIUM 5000 [USP'U]/ML
900 INJECTION INTRAVENOUS; SUBCUTANEOUS
Qty: 25000 | Refills: 0 | Status: DISCONTINUED | OUTPATIENT
Start: 2018-07-21 | End: 2018-07-22

## 2018-07-21 RX ORDER — POTASSIUM CHLORIDE 20 MEQ
40 PACKET (EA) ORAL ONCE
Qty: 0 | Refills: 0 | Status: COMPLETED | OUTPATIENT
Start: 2018-07-21 | End: 2018-07-21

## 2018-07-21 RX ADMIN — BUMETANIDE 10 MG/HR: 0.25 INJECTION INTRAMUSCULAR; INTRAVENOUS at 16:44

## 2018-07-21 RX ADMIN — BUMETANIDE 10 MG/HR: 0.25 INJECTION INTRAMUSCULAR; INTRAVENOUS at 04:36

## 2018-07-21 RX ADMIN — Medication 81 MILLIGRAM(S): at 11:53

## 2018-07-21 RX ADMIN — Medication 2: at 07:49

## 2018-07-21 RX ADMIN — Medication 100 MILLIEQUIVALENT(S): at 05:19

## 2018-07-21 RX ADMIN — Medication 100 MILLIEQUIVALENT(S): at 06:19

## 2018-07-21 RX ADMIN — TICAGRELOR 90 MILLIGRAM(S): 90 TABLET ORAL at 07:47

## 2018-07-21 RX ADMIN — Medication 100 MILLIEQUIVALENT(S): at 04:00

## 2018-07-21 RX ADMIN — TICAGRELOR 90 MILLIGRAM(S): 90 TABLET ORAL at 18:48

## 2018-07-21 RX ADMIN — HEPARIN SODIUM 9 UNIT(S)/HR: 5000 INJECTION INTRAVENOUS; SUBCUTANEOUS at 11:37

## 2018-07-21 RX ADMIN — Medication 40 MILLIEQUIVALENT(S): at 22:14

## 2018-07-21 RX ADMIN — BUMETANIDE 10 MG/HR: 0.25 INJECTION INTRAMUSCULAR; INTRAVENOUS at 22:15

## 2018-07-21 RX ADMIN — Medication 40 MILLIEQUIVALENT(S): at 18:56

## 2018-07-21 NOTE — PROGRESS NOTE ADULT - ASSESSMENT
This is 67 year old male without any prior h/o kidney disease. Now admitted for NSTEMI/STEMI - under treatment for that, no cardiac cath planned for now according to the team. Not known renal function. Now with creatinine of 9.45, , fluid status on the wet side -but accepatble, electrolytes wnl, making urine - cherry placed around 250 to 300 ml of urine. No need of urgent indications for HD   We will continue to monitor, most likely worsening of the renal function in the setting of cardiorenal syndrome, EF - 15 to 20 %.   Started on diuretic therapy with good response - slightly improvement of the renal function - creatinine trending down   continue with diuretic therpay for now

## 2018-07-21 NOTE — PROGRESS NOTE ADULT - PROBLEM SELECTOR PLAN 5
Patient's EF was reportedly 25% in the past according to ED report. On echo today, EF is 15-20%. Appears volume overloaded on exam with increased JVP, b/l LE edema. On CXR, there's cardiomegaly and significant pulmonary vascular congestion throughout bilateral lung fields.  - Diuresis with bumex 2mg IVP, followed by bumex gtt Patient's ALT today is >1100, however AST is 105. Alk phos is elevated to 150. Unclear pattern of liver enzymes.   - f/u repeat CMP  - f/u lactate, GGT, bilirubins

## 2018-07-21 NOTE — PROGRESS NOTE ADULT - PROBLEM SELECTOR PLAN 2
- EF 15-20%   - under treatment for STEMI/NSTEMI - as per primary team   - please continue with Bumex 1 mg/h   - I will hold for now losaratan   - rest as per cardiology team   - according to them no urgent cardiac cath.  - please inform the renal team when the cardiac cath is planned

## 2018-07-21 NOTE — PROGRESS NOTE ADULT - PROBLEM SELECTOR PROBLEM 6
Type 2 diabetes mellitus without complication, without long-term current use of insulin Acute on chronic systolic congestive heart failure

## 2018-07-21 NOTE — CONSULT NOTE ADULT - SUBJECTIVE AND OBJECTIVE BOX
Vascular Neurology Consultation    Reason for consult:    HPI:  RB is a 67M w PMH of dilated cardiomyopathy, HFrEF of 25%, CAD s/p PCI 7 years ago, NIDDM, HTN, and HLD, who presented to the [facility] ED with altered mental status in the setting of one week of dyspnea and weakness. Patient was in his usual state of health up until last Thursday when, while on a trip to [place], he developed dyspnea, nausea, and vomiting. He was seen at a clinic in [place], who prescribed bactrim and metronidazole to treat his GI symptoms. He continued to feel dyspneic and weakness, which became severe enough to make ambulation difficult. Due to this weakness, he went to a hospital in [place], where they reportedly noticed an STEMI and a troponin of >2000. They treated the patient with IV fluids, anti-hypertensives, and insulin. They did not send the patient for cardiac catherization at that time and did not initiate standard ACS treatment. He continued to feel weak after that admission and returned to the [place] the evening prior to admission. He started becoming confused and drowsy this AM and went to an appointment with his cardiologist who directed him to the ER. Patient was a poor historian and history was obtained from wife and sons. At bedside today, he did not complain of any issues, but his family states that he is still altered from his mental baseline and provided his medical history. They state that he is still weak and unsteady on his feet. Patient has not had headache, fevers, chills, chest pain, cough, abdominal pain, MSK pain, changes to his speech. He has been making good urine at home, denies urinary symptoms. His family states that he did not have dyspnea prior to this current episode and deny baseline orthopnea, PND, palpitations, peripheral edema.     In the ED, vital signs were T 97.6, HR 72, /84, RR 18, 98% O2 on RA. CBC, CMP, coags, Troponin, BNP were drawn. Labs were most significant for Hgb 12.3, Plt 101, INR 1.3, Na 132, K 3.8, HCO3 20, /Cr 9.41, ALT 1421. Trop was elevated to 17.5 and BNP was >11555.  He was loaded with ASA 325mg and Brilinta 180mg. A CXR and TTE were completed. The echocardiogram showed that the left ventricular ejection fraction was severely reduced. The left ventricular ejection fraction is estimated to be 15-20%. The patient was admitted to the CCU for further management. (20 Jul 2018 16:48)      PAST MEDICAL & SURGICAL HISTORY:  Coronary artery disease involving native heart without angina pectoris, unspecified vessel or lesion type  Hyperlipidemia, unspecified hyperlipidemia type  Hypertension, unspecified type  Type 2 diabetes mellitus without complication, without long-term current use of insulin  History of cataract extraction, unspecified laterality  CAD S/P percutaneous coronary angioplasty      FAMILY HISTORY:  Family history of hypertension in mother (Mother)  Family history of diabetes mellitus in mother (Mother)      Social History:    Review of Systems:  Constitutional: No fever, weight loss or fatigue  Eyes: No eye pain, visual disturbances, or discharge  ENMT:  No difficulty hearing, tinnitus, vertigo; No sinus or throat pain  Neck: No pain or stiffness  Respiratory: No cough, wheezing, chills or hemoptysis  Cardiovascular: No chest pain, palpitations, shortness of breath, dizziness or leg swelling  Gastrointestinal: No abdominal pain. No nausea, vomiting or hematemesis; No diarrhea or constipation. Nohematochezia.  Genitourinary: No dysuria, frequency, hematuria or incontinence  Neurological: As per HPI  Skin: No itching, burning, rashes or lesions   Endocrine: No heat or cold intolerance; No hair loss  Musculoskeletal: No joint pain or swelling; No muscle, back or extremity pain  Psychiatric: No depression, anxiety, mood swings or difficulty sleeping  Heme/Lymph: No easy bruising or bleeding gums    MEDICATIONS  (STANDING):  aspirin  chewable 81 milliGRAM(s) Oral daily  buMETAnide Infusion 1 mG/Hr (10 mL/Hr) IV Continuous <Continuous>  dextrose 5%. 1000 milliLiter(s) (50 mL/Hr) IV Continuous <Continuous>  dextrose 50% Injectable 12.5 Gram(s) IV Push once  dextrose 50% Injectable 25 Gram(s) IV Push once  dextrose 50% Injectable 25 Gram(s) IV Push once  heparin  Infusion 900 Unit(s)/Hr (9 mL/Hr) IV Continuous <Continuous>  insulin lispro (HumaLOG) corrective regimen sliding scale   SubCutaneous three times a day before meals  insulin lispro (HumaLOG) corrective regimen sliding scale   SubCutaneous at bedtime  ticagrelor 90 milliGRAM(s) Oral two times a day    MEDICATIONS  (PRN):  dextrose 40% Gel 15 Gram(s) Oral once PRN Blood Glucose LESS THAN 70 milliGRAM(s)/deciliter  glucagon  Injectable 1 milliGRAM(s) IntraMuscular once PRN Glucose LESS THAN 70 milligrams/deciliter      Allergies    No Known Allergies    Intolerances        Vital Signs Last 24 Hrs  T(C): 36.6 (21 Jul 2018 20:48), Max: 37.1 (21 Jul 2018 06:28)  T(F): 97.9 (21 Jul 2018 20:48), Max: 98.7 (21 Jul 2018 06:28)  HR: 82 (21 Jul 2018 23:00) (72 - 96)  BP: 139/82 (21 Jul 2018 23:00) (111/62 - 142/81)  BP(mean): 106 (21 Jul 2018 23:00) (83 - 119)  RR: 19 (21 Jul 2018 23:00) (13 - 99)  SpO2: 97% (21 Jul 2018 23:00) (95% - 100%)    Neurologic Exam:  Gen: No acute distress, well-nourished  CV: carotid arteries- no bruits, reg rate and rhythm, no murmurs  Neuro:  Mental status: Awake, alert and oriented x3.  slow affect ? abulia. speech slurred . difficulty following commands  left parietal drift   Motor 4/5 bilaterally  NIHSS:    Labs:                        13.2   6.2   )-----------( 121      ( 21 Jul 2018 08:05 )             38.2     07-21    136  |  88<L>  |  140<H>  ----------------------------<  163<H>  3.4<L>   |  25  |  7.54<H>    Ca    9.2      21 Jul 2018 17:43  Phos  7.4     07-21  Mg     2.7     07-21    TPro  8.1  /  Alb  3.7  /  TBili  1.8<H>  /  DBili  x   /  AST  105<H>  /  ALT  1145<H>  /  AlkPhos  150<H>  07-21    PT/INR - ( 20 Jul 2018 13:48 )   PT: 14.9 sec;   INR: 1.33          PTT - ( 21 Jul 2018 17:43 )  PTT:52.4 sec  Cholesterol, Serum: 178 mg/dL (07-21 @ 08:05)  HDL Cholesterol, Serum: 21 mg/dL (07-21 @ 08:05)  Triglycerides, Serum: 146 mg/dL (07-21 @ 08:05)    Hemoglobin A1C, Whole Blood: 8.0 % (07-21 @ 08:05)        Radiology and Additional Studies:      Assessment & Plan:  Multple embolic strokes - in presence of aflutter accountring for strange affect and depressed mood  cont IV heparin with ptt 50-70  considwer ssri

## 2018-07-21 NOTE — PROGRESS NOTE ADULT - PROBLEM SELECTOR PLAN 6
Holding home medications.  - f/u a1c  - c/w ISS, will dose basal insulin based on requirements Patient's EF was reportedly 25% in the past according to ED report. On echo 7/20, EF is 15-20%. Was volume overloaded on exam with increased JVP, b/l LE edema. On CXR, evidence of cardiomegaly and significant pulmonary vascular congestion throughout bilateral lung fields.  - Continue bumex gtt

## 2018-07-21 NOTE — PROGRESS NOTE ADULT - PROBLEM SELECTOR PLAN 4
Patient's ALT today is >1400, however AST is 164. Alk phos is elevated to 158. Unclear pattern of liver enzymes.   - f/u repeat CMP  - f/u lactate, GGT, bilirubins Patient's Cr is now 7.54 from unclear baseline. Will need collateral from outpatient providers to ascertain whether CKD component is present. Possible 2/2 to cardiorenal syndrome as cardiac output is poor.   - Renal on board, appreciate recs  - Per renal, no urgent need for dialysis at this time  - Urine lytes with intrinsic renal disease, UA neg  - f/u renal US    #Hyponatremia: Resolved   - Wooten in place

## 2018-07-21 NOTE — PROGRESS NOTE ADULT - PROBLEM SELECTOR PLAN 8
F: None  E: Replete K>4, Mg>2. Will need to be careful with repletion given poor renal function  N: DASH/TLC/CC/Renal/Fluid restricted diet DVT: On Heparin gtt  GI: None  Activity: OOBAT

## 2018-07-21 NOTE — PROGRESS NOTE ADULT - PROBLEM SELECTOR PLAN 3
Patient's Cr is now 9.4 from unclear baseline. Will need collateral from outpatient providers to ascertain whether CKD component is present. Possible 2/2 to cardiorenal syndrome as cardiac output is poor.   - Renal on board, appreciate recs  - Per renal, no urgent need for dialysis at this time  - f/u urine lytes, UA  - f/u renal US    #Hyponatremia: Likely hypervolemic hyponatremia given volume overload on clinical exam.  - f/u urine lytes, serum osm  - Wooten in place Patient has been weak, unsteady, and confused.   There is ? facial droop on exam. Differential includes toxic-metabolic vs hypoperfusion vs hypercapnia vs stroke/hemorrhage.   - Lactate 1.8 this PM

## 2018-07-21 NOTE — PROGRESS NOTE ADULT - SUBJECTIVE AND OBJECTIVE BOX
OVERNIGHT EVENTS:    SUBJECTIVE / INTERVAL HPI: Patient seen and examined at bedside.     VITAL SIGNS:  Vital Signs Last 24 Hrs  T(C): 37.1 (2018 06:28), Max: 37.1 (2018 06:28)  T(F): 98.7 (2018 06:28), Max: 98.7 (2018 06:28)  HR: 74 (2018 09:00) (71 - 92)  BP: 133/66 (2018 09:00) (111/62 - 149/85)  BP(mean): 95 (2018 09:00) (83 - 119)  RR: 13 (2018 09:00) (13 - 99)  SpO2: 100% (2018 09:00) (96% - 100%)    PHYSICAL EXAM:    General: WDWN  HEENT: NC/AT; PERRL, anicteric sclera; MMM  Neck: supple  Cardiovascular: +S1/S2; RRR  Respiratory: CTA B/L; no W/R/R  Gastrointestinal: soft, NT/ND; +BSx4  Extremities: WWP; no edema, clubbing or cyanosis  Vascular: 2+ radial, DP/PT pulses B/L  Neurological: AAOx3; no focal deficits    MEDICATIONS:  MEDICATIONS  (STANDING):  aspirin  chewable 81 milliGRAM(s) Oral daily  buMETAnide Infusion 1 mG/Hr (10 mL/Hr) IV Continuous <Continuous>  dextrose 5%. 1000 milliLiter(s) (50 mL/Hr) IV Continuous <Continuous>  dextrose 50% Injectable 12.5 Gram(s) IV Push once  dextrose 50% Injectable 25 Gram(s) IV Push once  dextrose 50% Injectable 25 Gram(s) IV Push once  insulin lispro (HumaLOG) corrective regimen sliding scale   SubCutaneous three times a day before meals  insulin lispro (HumaLOG) corrective regimen sliding scale   SubCutaneous at bedtime  ticagrelor 90 milliGRAM(s) Oral two times a day    MEDICATIONS  (PRN):  dextrose 40% Gel 15 Gram(s) Oral once PRN Blood Glucose LESS THAN 70 milliGRAM(s)/deciliter  glucagon  Injectable 1 milliGRAM(s) IntraMuscular once PRN Glucose LESS THAN 70 milligrams/deciliter      ALLERGIES:  Allergies    No Known Allergies    Intolerances        LABS:                        13.2   6.2   )-----------( 121      ( 2018 08:05 )             38.2         136  |  88<L>  |  142<H>  ----------------------------<  162<H>  3.9   |  22  |  8.32<H>    Ca    8.9      2018 08:05  Phos  7.4       Mg     2.9         TPro  7.7  /  Alb  3.5  /  TBili  2.0<H>  /  DBili  x   /  AST  106<H>  /  ALT  1177<H>  /  AlkPhos  152<H>      PT/INR - ( 2018 13:48 )   PT: 14.9 sec;   INR: 1.33          PTT - ( 2018 01:36 )  PTT:54.4 sec  Urinalysis Basic - ( 2018 17:25 )    Color: Yellow / Appearance: Clear / S.015 / pH: x  Gluc: x / Ketone: NEGATIVE  / Bili: Negative / Urobili: 0.2 E.U./dL   Blood: x / Protein: Trace mg/dL / Nitrite: NEGATIVE   Leuk Esterase: NEGATIVE / RBC: < 5 /HPF / WBC 5-10 /HPF   Sq Epi: x / Non Sq Epi: 0-5 /HPF / Bacteria: Present /HPF      CAPILLARY BLOOD GLUCOSE      POCT Blood Glucose.: 153 mg/dL (2018 07:05)      RADIOLOGY & ADDITIONAL TESTS: Reviewed.    ASSESSMENT:    PLAN: OVERNIGHT EVENTS:  Neuro reviewed CT scan and said CTH findings likely due to stroke a couple of days ago.  Recommended MRI brain, MRA head and neck.  patient went for study, but only able to stay still for part of the study.    Did not eat much today.    Had an episode of AFlutter; thus heparin drip was resumed.    SUBJECTIVE / INTERVAL HPI: Patient seen and examined at bedside.     VITAL SIGNS:  Vital Signs Last 24 Hrs  T(C): 37.1 (2018 06:28), Max: 37.1 (2018 06:28)  T(F): 98.7 (2018 06:28), Max: 98.7 (2018 06:28)  HR: 74 (2018 09:00) (71 - 92)  BP: 133/66 (2018 09:00) (111/62 - 149/85)  BP(mean): 95 (2018 09:00) (83 - 119)  RR: 13 (2018 09:00) (13 - 99)  SpO2: 100% (2018 09:00) (96% - 100%)    PHYSICAL EXAM:    Constitutional: WDWN resting comfortably in bed; NAD  Head: NC/AT  Eyes: PERRL, EOMI, anicteric sclera  ENT: no nasal discharge; uvula midline, no oropharyngeal erythema or exudates; MMM  Respiratory: CTA B/L; no W/R/R, no retractions  Cardiac: 3/6 WANDER at LUSB, Regular rate, rhythm  Gastrointestinal: abdomen soft, NT, mildly distended; no rebound or guarding; +BSx4  Extremities: 1+ edema at b/l ankles. Feet are cool to the touch. Otherwise WWP.   Vascular: 2+ radial. DP/PT pulses nonpalpable  Neurologic: AAOx3; slight right facial droop  Psychiatric: Flat affect; limited responses to interviewer/clinician    MEDICATIONS:  MEDICATIONS  (STANDING):  aspirin  chewable 81 milliGRAM(s) Oral daily  buMETAnide Infusion 1 mG/Hr (10 mL/Hr) IV Continuous <Continuous>  dextrose 5%. 1000 milliLiter(s) (50 mL/Hr) IV Continuous <Continuous>  dextrose 50% Injectable 12.5 Gram(s) IV Push once  dextrose 50% Injectable 25 Gram(s) IV Push once  dextrose 50% Injectable 25 Gram(s) IV Push once  insulin lispro (HumaLOG) corrective regimen sliding scale   SubCutaneous three times a day before meals  insulin lispro (HumaLOG) corrective regimen sliding scale   SubCutaneous at bedtime  ticagrelor 90 milliGRAM(s) Oral two times a day    MEDICATIONS  (PRN):  dextrose 40% Gel 15 Gram(s) Oral once PRN Blood Glucose LESS THAN 70 milliGRAM(s)/deciliter  glucagon  Injectable 1 milliGRAM(s) IntraMuscular once PRN Glucose LESS THAN 70 milligrams/deciliter      ALLERGIES:  Allergies    No Known Allergies    Intolerances        LABS:                        13.2   6.2   )-----------( 121      ( 2018 08:05 )             38.2         136  |  88<L>  |  142<H>  ----------------------------<  162<H>  3.9   |  22  |  8.32<H>    Ca    8.9      2018 08:05  Phos  7.4       Mg     2.9         TPro  7.7  /  Alb  3.5  /  TBili  2.0<H>  /  DBili  x   /  AST  106<H>  /  ALT  1177<H>  /  AlkPhos  152<H>      PT/INR - ( 2018 13:48 )   PT: 14.9 sec;   INR: 1.33          PTT - ( 2018 01:36 )  PTT:54.4 sec  Urinalysis Basic - ( 2018 17:25 )    Color: Yellow / Appearance: Clear / S.015 / pH: x  Gluc: x / Ketone: NEGATIVE  / Bili: Negative / Urobili: 0.2 E.U./dL   Blood: x / Protein: Trace mg/dL / Nitrite: NEGATIVE   Leuk Esterase: NEGATIVE / RBC: < 5 /HPF / WBC 5-10 /HPF   Sq Epi: x / Non Sq Epi: 0-5 /HPF / Bacteria: Present /HPF      CAPILLARY BLOOD GLUCOSE      POCT Blood Glucose.: 153 mg/dL (2018 07:05)      RADIOLOGY & ADDITIONAL TESTS: Reviewed.    ASSESSMENT:    PLAN:

## 2018-07-21 NOTE — PROGRESS NOTE ADULT - PROBLEM SELECTOR PLAN 7
DVT: On Heparin gtt  GI: None  Activity: OOBAT Holding home medications.  - Hb A1c was 8  - c/w ISS, will dose basal insulin based on requirements

## 2018-07-21 NOTE — PROGRESS NOTE ADULT - PROBLEM SELECTOR PLAN 4
- please start on sevelamer 800 mg three times a day   - please obtain PTH, vitamin d,25, vitamin d,1,25

## 2018-07-21 NOTE — PROGRESS NOTE ADULT - PROBLEM SELECTOR PLAN 9
Will contact outpatient cardiologist Dr. Octavio Haley for collateral information.    Dispo: admit to CCU  Code: Full F: None  E: Replete K>4, Mg>2. Will need to be careful with repletion given poor renal function  N: DASH/TLC/CC/Renal/Fluid restricted diet

## 2018-07-21 NOTE — PROGRESS NOTE ADULT - PROBLEM SELECTOR PLAN 2
Patient has been weak, unsteady, and confused. Earlier today during examination by ED providers, he was reportedly very drowsy. There are no focal neurological deficits on exam. Differential includes toxic-metabolic vs hypoperfusion vs hypercapnia vs stroke/hemorrhage.   - Will f/u CT Head for stroke or bleed  - f/u VBG for ? hypercapnia  - f/u lactate MRI significant for subacute ischemia involving right parietal area   with numerous scattered areas of acute ischemia within the basal ganglia   and frontal and parietal lobes bilaterally some of which are in a   watershed distribution.  - F/U neuro recs  - Needs complete MRI and MRA study for full neuro workup.  - Will need to be on AC, ASA, antithrombotic therapy, statin (when liver enzymes improve), have good management of BP

## 2018-07-21 NOTE — PROGRESS NOTE ADULT - PROBLEM SELECTOR PROBLEM 2
Altered mental status, unspecified altered mental status type Cerebrovascular accident (CVA), unspecified mechanism

## 2018-07-21 NOTE — PROGRESS NOTE ADULT - PROBLEM SELECTOR PLAN 1
Patient has hx of PCI w stents placed 7 years prior. Patient reportedly had an acute MI earlier this week in [place] with EKG showing ST elevations in the lateral leads and troponins in excess of 2000. EKG on admission shows q-waves in leads I and aVL with poor R wave progression consistent with a previous lateral wall MI. Outside therapeutic window for PCI.  - Downtrending troponins, will no longer follow  - Loaded with Brilinta, will c/w 90mg q12  - Loaded with ASA, will c/w 81mg qD  - Starting heparin gtt, titrating to aPTT of 60-80  - Holding statin therapy due to transaminitis  - Holding beta blockade due to decompensated heart failure  - Holding ACE/ARB due to severe RONNIE  - Will need diagnostic cath/stress test for ischemic workup  - f/u a1c/Lipids for risk stratification Patient has hx of PCI w stents placed 7 years prior. Patient reportedly had an acute MI earlier this week in [place] with EKG showing ST elevations in the lateral leads and troponins in excess of 2000. EKG on admission shows q-waves in leads I and aVL with poor R wave progression consistent with a previous lateral wall MI. Outside therapeutic window for PCI.  - Downtrending troponins, will no longer follow  - Loaded with Brilinta, will c/w 90mg q12  - Loaded with ASA, will c/w 81mg qD  - Starting heparin gtt, titrating to aPTT of 40-60  - Holding statin therapy due to transaminitis  - Holding beta blockade due to decompensated heart failure  - Holding ACE/ARB due to severe RONNIE  - Will need diagnostic cath/stress test for ischemic workup; holding off due to nephro recs and RONNIE  - f/u a1c/Lipids for risk stratification

## 2018-07-21 NOTE — PROGRESS NOTE ADULT - ATTENDING COMMENTS
Please see housestaff note for full details.  I have reviewed the case, examined the patient and agree with plan.  67 M HTN DCM CAD s/p PCI HTN hyperlipidemia. CHF LVEF 15% with renal failure and change in MS.  1. Continue ASA.  2. Heparin held.  3. F/u Renal recs.  4. Continue diuresis.  5. Will discuss with patient goals of care once MS improves.

## 2018-07-21 NOTE — PROGRESS NOTE ADULT - PROBLEM SELECTOR PLAN 1
- unknown baseline, lst known creatinine a week ago in a hospital at [place] 6.6   - now with RONNIE due to cradiorenal syndrome   - on diurertic therapy with good response so far - on bumex 1 mg/h   - creatinine to 8.3   - please obtain UA - noted   - electrolytes acceptable   - has a metabolic acidosis - high anion gap - no lactic acidosis, (most likely from the renal failure)  - has a metabolic alkalosis   - please obtain - urine sodium, urine creatinine, urine chloride, urine potasssium, urine urea - noted   - volume status - on the wet side - please start the diuretic therapy - bumex 1 mg/h   - obtain official renal u/s and bladder  - in and outs   - daily weight   - renal diet

## 2018-07-21 NOTE — CHART NOTE - NSCHARTNOTEFT_GEN_A_CORE
Patient presented to [facility] after STEMI in [place] 1 week ago treated with medical management. Also presented with AMS. Neuro exam difficult given encephalopathy, limited ability to determine focal weakness. Labs notable for significant renal failure w/uremia , thought to be the cause of toxic metabolic encephalopathy.     CT head done, preliminary read showing subacute left parietal infarct.     Patient had been on hep gtt for treatment of STEMI >1 week ago. Stroke consult (Dr. Black) contacted to determine whether heparin gtt safe to continue in the setting of recent stroke. Also to determine whether left parietal infarct could be causing current symptoms.    Per Dr. Black, left parietal infarct not likely to be the cause of current symptoms. Heparin gtt safe if absolutely necessary and PTT kept on low range of therapeutic.     Heparin gtt was discontinued given patient's STEMI was >1 week ago, no new EKG changes, no chest pain, downtrending troponin. Stroke neuro to see patient in AM.

## 2018-07-21 NOTE — PROGRESS NOTE ADULT - SUBJECTIVE AND OBJECTIVE BOX
Seen in the morning, still does not have nay complains, no shorntess of breath, no fever. Started on diuretics - bumex - bolus and drip - now with good response     The renal service is called for worsening of the kidney function, not known baseline, slightly improvement of the renal function   Patient seen and examined at bedside.     aspirin  chewable 81 milliGRAM(s) daily  buMETAnide Infusion 1 mG/Hr <Continuous>  dextrose 40% Gel 15 Gram(s) once PRN  dextrose 5%. 1000 milliLiter(s) <Continuous>  dextrose 50% Injectable 12.5 Gram(s) once  dextrose 50% Injectable 25 Gram(s) once  dextrose 50% Injectable 25 Gram(s) once  glucagon  Injectable 1 milliGRAM(s) once PRN  heparin  Infusion 900 Unit(s)/Hr <Continuous>  insulin lispro (HumaLOG) corrective regimen sliding scale   three times a day before meals  insulin lispro (HumaLOG) corrective regimen sliding scale   at bedtime  ticagrelor 90 milliGRAM(s) two times a day      Allergies    No Known Allergies    Intolerances        T(C): , Max: 37.1 (18 @ 06:28)  T(F): , Max: 98.7 (18 @ 06:28)  HR: 78 (18 @ 14:00)  BP: 126/73 (18 @ 14:00)  BP(mean): 96 (18 @ 14:00)  RR: 19 (18 @ 14:00)  SpO2: 99% (18 @ 14:00)  Wt(kg): --     @ 07:  -   @ 07:00  --------------------------------------------------------  IN:    bumetanide Infusion: 130 mL    heparin Infusion: 69 mL  Total IN: 199 mL    OUT:    Intermittent Catheterization - Urethral: 2450 mL  Total OUT: 2450 mL    Total NET: -2251 mL       @ 07:  -   @ 15:19  --------------------------------------------------------  IN:    bumetanide Infusion: 80 mL    heparin Infusion: 36 mL  Total IN: 116 mL    OUT:    Intermittent Catheterization - Urethral: 1720 mL  Total OUT: 1720 mL    Total NET: -1604 mL    Physical exam:   Alert and oriented  JVD   Normal air entry into the lungs, no wheezing, no crackles   RRR, normal s1/s2, no murmurs, rubs or gallops   Abdomen - soft, not tender, not distended   Extremities: 1+ edema   No rash   No astertix         Height (cm): 167.64 ( @ 17:42)  Weight (kg): 70.7 ( @ 17:42)  BMI (kg/m2): 25.2 ( @ 17:42)  BSA (m2): 1.8 ( @ 17:42)      LABS:                        13.2   6.2   )-----------( 121      ( 2018 08:05 )             38.2         136  |  88<L>  |  142<H>  ----------------------------<  162<H>  3.9   |  22  |  8.32<H>    Ca    8.9      2018 08:05  Phos  7.4       Mg     2.9         TPro  7.7  /  Alb  3.5  /  TBili  2.0<H>  /  DBili  x   /  AST  106<H>  /  ALT  1177<H>  /  AlkPhos  152<H>      Cholesterol, Serum: 178 mg/dL [10 - 199] ( @ 08:05)  Hemoglobin A1C, Whole Blood: 8.0 % <H> [4.0 - 5.6] ( @ 08:05)    PT/INR - ( 2018 13:48 )   PT: 14.9 sec;   INR: 1.33          PTT - ( 2018 01:36 )  PTT:54.4 sec  Urinalysis Basic - ( 2018 17:25 )    Color: Yellow / Appearance: Clear / S.015 / pH: x  Gluc: x / Ketone: NEGATIVE  / Bili: Negative / Urobili: 0.2 E.U./dL   Blood: x / Protein: Trace mg/dL / Nitrite: NEGATIVE   Leuk Esterase: NEGATIVE / RBC: < 5 /HPF / WBC 5-10 /HPF   Sq Epi: x / Non Sq Epi: 0-5 /HPF / Bacteria: Present /HPF      Chloride, Random Urine: 52 mmol/L ( @ 18:48)  Creatinine, Random Urine: 58 mg/dL ( @ 18:48)        RADIOLOGY & ADDITIONAL STUDIES:

## 2018-07-22 LAB
ALBUMIN SERPL ELPH-MCNC: 3.8 G/DL — SIGNIFICANT CHANGE UP (ref 3.3–5)
ALBUMIN SERPL ELPH-MCNC: 3.9 G/DL — SIGNIFICANT CHANGE UP (ref 3.3–5)
ALP SERPL-CCNC: 158 U/L — HIGH (ref 40–120)
ALP SERPL-CCNC: 160 U/L — HIGH (ref 40–120)
ALT FLD-CCNC: 1033 U/L — HIGH (ref 10–45)
ALT FLD-CCNC: 1065 U/L — HIGH (ref 10–45)
ANION GAP SERPL CALC-SCNC: 21 MMOL/L — HIGH (ref 5–17)
ANION GAP SERPL CALC-SCNC: 21 MMOL/L — HIGH (ref 5–17)
ANION GAP SERPL CALC-SCNC: 22 MMOL/L — HIGH (ref 5–17)
APTT BLD: 57.3 SEC — HIGH (ref 27.5–37.4)
APTT BLD: 63.2 SEC — HIGH (ref 27.5–37.4)
APTT BLD: 70.7 SEC — HIGH (ref 27.5–37.4)
AST SERPL-CCNC: 108 U/L — HIGH (ref 10–40)
AST SERPL-CCNC: 109 U/L — HIGH (ref 10–40)
BILIRUB SERPL-MCNC: 1.4 MG/DL — HIGH (ref 0.2–1.2)
BILIRUB SERPL-MCNC: 1.8 MG/DL — HIGH (ref 0.2–1.2)
BUN SERPL-MCNC: 142 MG/DL — HIGH (ref 7–23)
BUN SERPL-MCNC: 147 MG/DL — HIGH (ref 7–23)
BUN SERPL-MCNC: 148 MG/DL — HIGH (ref 7–23)
CALCIUM SERPL-MCNC: 10 MG/DL — SIGNIFICANT CHANGE UP (ref 8.4–10.5)
CALCIUM SERPL-MCNC: 10.7 MG/DL — HIGH (ref 8.4–10.5)
CALCIUM SERPL-MCNC: 9.4 MG/DL — SIGNIFICANT CHANGE UP (ref 8.4–10.5)
CHLORIDE SERPL-SCNC: 86 MMOL/L — LOW (ref 96–108)
CHLORIDE SERPL-SCNC: 89 MMOL/L — LOW (ref 96–108)
CHLORIDE SERPL-SCNC: 91 MMOL/L — LOW (ref 96–108)
CO2 SERPL-SCNC: 27 MMOL/L — SIGNIFICANT CHANGE UP (ref 22–31)
CO2 SERPL-SCNC: 27 MMOL/L — SIGNIFICANT CHANGE UP (ref 22–31)
CO2 SERPL-SCNC: 28 MMOL/L — SIGNIFICANT CHANGE UP (ref 22–31)
CREAT ?TM UR-MCNC: 51 MG/DL — SIGNIFICANT CHANGE UP
CREAT SERPL-MCNC: 6.11 MG/DL — HIGH (ref 0.5–1.3)
CREAT SERPL-MCNC: 7.05 MG/DL — HIGH (ref 0.5–1.3)
CREAT SERPL-MCNC: 7.15 MG/DL — HIGH (ref 0.5–1.3)
CULTURE RESULTS: NO GROWTH — SIGNIFICANT CHANGE UP
GLUCOSE SERPL-MCNC: 192 MG/DL — HIGH (ref 70–99)
GLUCOSE SERPL-MCNC: 207 MG/DL — HIGH (ref 70–99)
GLUCOSE SERPL-MCNC: 241 MG/DL — HIGH (ref 70–99)
HCT VFR BLD CALC: 42.1 % — SIGNIFICANT CHANGE UP (ref 39–50)
HGB BLD-MCNC: 14.2 G/DL — SIGNIFICANT CHANGE UP (ref 13–17)
LACTATE SERPL-SCNC: 1.4 MMOL/L — SIGNIFICANT CHANGE UP (ref 0.5–2)
MAGNESIUM SERPL-MCNC: 2.7 MG/DL — HIGH (ref 1.6–2.6)
MCHC RBC-ENTMCNC: 28.9 PG — SIGNIFICANT CHANGE UP (ref 27–34)
MCHC RBC-ENTMCNC: 33.7 G/DL — SIGNIFICANT CHANGE UP (ref 32–36)
MCV RBC AUTO: 85.7 FL — SIGNIFICANT CHANGE UP (ref 80–100)
PHOSPHATE SERPL-MCNC: 5.7 MG/DL — HIGH (ref 2.5–4.5)
PLATELET # BLD AUTO: 159 K/UL — SIGNIFICANT CHANGE UP (ref 150–400)
POTASSIUM SERPL-MCNC: 3.5 MMOL/L — SIGNIFICANT CHANGE UP (ref 3.5–5.3)
POTASSIUM SERPL-MCNC: 4.1 MMOL/L — SIGNIFICANT CHANGE UP (ref 3.5–5.3)
POTASSIUM SERPL-MCNC: 4.9 MMOL/L — SIGNIFICANT CHANGE UP (ref 3.5–5.3)
POTASSIUM SERPL-SCNC: 3.5 MMOL/L — SIGNIFICANT CHANGE UP (ref 3.5–5.3)
POTASSIUM SERPL-SCNC: 4.1 MMOL/L — SIGNIFICANT CHANGE UP (ref 3.5–5.3)
POTASSIUM SERPL-SCNC: 4.9 MMOL/L — SIGNIFICANT CHANGE UP (ref 3.5–5.3)
PROT ?TM UR-MCNC: 16 MG/DL — HIGH (ref 0–12)
PROT SERPL-MCNC: 8.2 G/DL — SIGNIFICANT CHANGE UP (ref 6–8.3)
PROT SERPL-MCNC: 8.6 G/DL — HIGH (ref 6–8.3)
PROT/CREAT UR-RTO: 0.3 RATIO — HIGH (ref 0–0.2)
RBC # BLD: 4.91 M/UL — SIGNIFICANT CHANGE UP (ref 4.2–5.8)
RBC # FLD: 14.7 % — SIGNIFICANT CHANGE UP (ref 10.3–16.9)
SODIUM SERPL-SCNC: 136 MMOL/L — SIGNIFICANT CHANGE UP (ref 135–145)
SODIUM SERPL-SCNC: 137 MMOL/L — SIGNIFICANT CHANGE UP (ref 135–145)
SODIUM SERPL-SCNC: 139 MMOL/L — SIGNIFICANT CHANGE UP (ref 135–145)
SPECIMEN SOURCE: SIGNIFICANT CHANGE UP
WBC # BLD: 6.9 K/UL — SIGNIFICANT CHANGE UP (ref 3.8–10.5)
WBC # FLD AUTO: 6.9 K/UL — SIGNIFICANT CHANGE UP (ref 3.8–10.5)

## 2018-07-22 PROCEDURE — 99291 CRITICAL CARE FIRST HOUR: CPT

## 2018-07-22 PROCEDURE — 93010 ELECTROCARDIOGRAM REPORT: CPT

## 2018-07-22 PROCEDURE — 99232 SBSQ HOSP IP/OBS MODERATE 35: CPT

## 2018-07-22 PROCEDURE — 71045 X-RAY EXAM CHEST 1 VIEW: CPT | Mod: 26

## 2018-07-22 RX ORDER — ESCITALOPRAM OXALATE 10 MG/1
5 TABLET, FILM COATED ORAL DAILY
Qty: 0 | Refills: 0 | Status: DISCONTINUED | OUTPATIENT
Start: 2018-07-22 | End: 2018-08-15

## 2018-07-22 RX ORDER — POTASSIUM CHLORIDE 20 MEQ
40 PACKET (EA) ORAL ONCE
Qty: 0 | Refills: 0 | Status: COMPLETED | OUTPATIENT
Start: 2018-07-22 | End: 2018-07-22

## 2018-07-22 RX ORDER — BUMETANIDE 0.25 MG/ML
1 INJECTION INTRAMUSCULAR; INTRAVENOUS
Qty: 0 | Refills: 0 | Status: DISCONTINUED | OUTPATIENT
Start: 2018-07-22 | End: 2018-07-23

## 2018-07-22 RX ORDER — HEPARIN SODIUM 5000 [USP'U]/ML
800 INJECTION INTRAVENOUS; SUBCUTANEOUS
Qty: 25000 | Refills: 0 | Status: DISCONTINUED | OUTPATIENT
Start: 2018-07-22 | End: 2018-07-24

## 2018-07-22 RX ORDER — BUMETANIDE 0.25 MG/ML
0.5 INJECTION INTRAMUSCULAR; INTRAVENOUS
Qty: 10 | Refills: 0 | Status: DISCONTINUED | OUTPATIENT
Start: 2018-07-22 | End: 2018-07-22

## 2018-07-22 RX ADMIN — BUMETANIDE 5 MG/HR: 0.25 INJECTION INTRAMUSCULAR; INTRAVENOUS at 01:10

## 2018-07-22 RX ADMIN — BUMETANIDE 1 MILLIGRAM(S): 0.25 INJECTION INTRAMUSCULAR; INTRAVENOUS at 17:12

## 2018-07-22 RX ADMIN — Medication 2: at 11:03

## 2018-07-22 RX ADMIN — SEVELAMER CARBONATE 800 MILLIGRAM(S): 2400 POWDER, FOR SUSPENSION ORAL at 06:46

## 2018-07-22 RX ADMIN — HEPARIN SODIUM 8 UNIT(S)/HR: 5000 INJECTION INTRAVENOUS; SUBCUTANEOUS at 18:23

## 2018-07-22 RX ADMIN — Medication 4: at 16:26

## 2018-07-22 RX ADMIN — Medication 81 MILLIGRAM(S): at 11:03

## 2018-07-22 RX ADMIN — TICAGRELOR 90 MILLIGRAM(S): 90 TABLET ORAL at 06:46

## 2018-07-22 RX ADMIN — Medication 40 MILLIEQUIVALENT(S): at 01:57

## 2018-07-22 RX ADMIN — HEPARIN SODIUM 9 UNIT(S)/HR: 5000 INJECTION INTRAVENOUS; SUBCUTANEOUS at 11:48

## 2018-07-22 RX ADMIN — SEVELAMER CARBONATE 800 MILLIGRAM(S): 2400 POWDER, FOR SUSPENSION ORAL at 21:46

## 2018-07-22 RX ADMIN — SEVELAMER CARBONATE 800 MILLIGRAM(S): 2400 POWDER, FOR SUSPENSION ORAL at 13:08

## 2018-07-22 RX ADMIN — ESCITALOPRAM OXALATE 5 MILLIGRAM(S): 10 TABLET, FILM COATED ORAL at 13:08

## 2018-07-22 NOTE — PROGRESS NOTE ADULT - PROBLEM SELECTOR PLAN 3
Patient has been weak, unsteady, and confused.   There is ? facial droop on exam. Likely toxic-metabolic 2/2 uremia but could have some component of hypoperfusion vs stroke/hemorrhage.   - Lactate 1.8  -

## 2018-07-22 NOTE — PROGRESS NOTE ADULT - PROBLEM SELECTOR PLAN 6
Patient's EF was reportedly 25% in the past according to ED report. On echo 7/20, EF is 15-20%. Was volume overloaded on exam with increased JVP, b/l LE edema. On CXR, evidence of cardiomegaly and significant pulmonary vascular congestion throughout bilateral lung fields.  - d/c bumex gtt, now on IV 1mg Bumex BID

## 2018-07-22 NOTE — PROGRESS NOTE ADULT - PROBLEM SELECTOR PLAN 7
Holding home medications.  - Hb A1c was 8  - c/w ISS, will dose basal insulin based on requirements    #Aflutter: On telemetry last night  - Will need chronic anticoagulation

## 2018-07-22 NOTE — PROGRESS NOTE ADULT - ATTENDING COMMENTS
Please see housestaff note for full details.  I have reviewed the case, examined the patient and agree with plan.  67 M HTN DCM CAD s/p PCI HTN hyperlipidemia. CHF LVEF 15% with renal failure and change in MS, also new AFlutter.  1. Continue ASA. Continue heparin. Hold effient due to risk of bleeding with triple therapy.  2. F/u Renal recs.  3. Continue diuresis.  4. CHECK RUQ US due to elev LFTs.

## 2018-07-22 NOTE — PROGRESS NOTE ADULT - PROBLEM SELECTOR PLAN 1
- unknown baseline, last known creatinine a week ago in a hospital at [place] 6.6   - now with RONNIE due to cardiorenal syndrome   - on diurertic therapy with good response so far - 5 liters diuresis, was on drip in the morning switch to iv pushes in the afternoon   - creatinine trending down with relative high BUN   - please obtain UA - noted  - THE PATIENT NEEDS quantification of the proteinuria, please obtain UPCR   - also need renal u/s and bladder one   - electrolytes acceptable - noted   - has a metabolic hign anion metabolic acidosis and alkalosis   - please obtain - urine sodium, urine creatinine, urine chloride, urine potasssium, urine urea - noted   - volume status - on the wet side - please start the diuretic therapy - bumex 1 mg ivp twice a day   - obtain official renal u/s and bladder  - in and outs   - daily weight   - renal diet

## 2018-07-22 NOTE — PROGRESS NOTE ADULT - PROBLEM SELECTOR PLAN 4
Patient's Cr is now improving to 7 from 9 on admission, unclear baseline. Will need collateral from outpatient providers to ascertain whether CKD component is present. Possible 2/2 to cardiorenal syndrome as cardiac output is poor.   - Renal on board, appreciate recs  - Per renal, no urgent need for dialysis at this time  - Urine lytes with intrinsic renal disease, UA neg  - f/u renal US    #Hyponatremia: Resolved   - Wooten in place

## 2018-07-22 NOTE — PROGRESS NOTE ADULT - SUBJECTIVE AND OBJECTIVE BOX
OVERNIGHT EVENTS: Overnight, Dr. Black called in light of MRI findings showing subacute R parietal infarct as well as scatter basal ganglia acute infarcts. Per Dr. Black ok to continue heparin drip but likely Eliquis is better. UOP high, about 250-300/hr. AM EKG with new ST elevation in V4-V5. Per interventional cardiology, no plans for catheterization. New aFlutter on telemetry.    SUBJECTIVE / INTERVAL HPI: Patient seen and examined at bedside. Patient withdrawn to interview this AM. Denied all symptoms and nodded in affirmation when asked if he felt improved. Responds only in whisper.    VITAL SIGNS:  Vital Signs Last 24 Hrs  T(C): 36.1 (2018 13:00), Max: 37.1 (2018 07:00)  T(F): 97 (2018 13:00), Max: 98.8 (2018 07:00)  HR: 90 (2018 14:00) (80 - 90)  BP: 133/85 (2018 14:00) (109/60 - 145/84)  BP(mean): 104 (2018 14:00) (82 - 116)  RR: 17 (2018 14:00) (10 - 25)  SpO2: 98% (2018 14:00) (95% - 98%)    PHYSICAL EXAM:    Constitutional: WDWN resting comfortably in bed; NAD  Head: NC/AT  Eyes: PERRL, EOMI, anicteric sclera  ENT: no nasal discharge; uvula midline, no oropharyngeal erythema or exudates; MMM.   Neck: JVD appreciated  Respiratory: CTA B/L; no W/R/R, no retractions  Cardiac: 3/6 WANDER at LUSB, Regular rate, rhythm  Gastrointestinal: abdomen soft, NT, mildly distended; no rebound or guarding; +BSx4  Extremities: 1+ edema at b/l ankles. Feet are cool to the touch. Otherwise WWP.   Vascular: 2+ radial. DP/PT pulses nonpalpable. Appreciated on doppler bilaterally.  Neurologic: AAOx3; slight right facial droop  Psychiatric: Flat affect; limited responses to interviewer/clinician    MEDICATIONS:  MEDICATIONS  (STANDING):  aspirin  chewable 81 milliGRAM(s) Oral daily  buMETAnide Injectable 1 milliGRAM(s) IV Push two times a day  dextrose 5%. 1000 milliLiter(s) (50 mL/Hr) IV Continuous <Continuous>  dextrose 50% Injectable 12.5 Gram(s) IV Push once  dextrose 50% Injectable 25 Gram(s) IV Push once  dextrose 50% Injectable 25 Gram(s) IV Push once  escitalopram 5 milliGRAM(s) Oral daily  heparin  Infusion 900 Unit(s)/Hr (9 mL/Hr) IV Continuous <Continuous>  insulin lispro (HumaLOG) corrective regimen sliding scale   SubCutaneous three times a day before meals  insulin lispro (HumaLOG) corrective regimen sliding scale   SubCutaneous at bedtime  sevelamer hydrochloride 800 milliGRAM(s) Oral three times a day    MEDICATIONS  (PRN):  dextrose 40% Gel 15 Gram(s) Oral once PRN Blood Glucose LESS THAN 70 milliGRAM(s)/deciliter  glucagon  Injectable 1 milliGRAM(s) IntraMuscular once PRN Glucose LESS THAN 70 milligrams/deciliter      ALLERGIES:  Allergies    No Known Allergies    Intolerances        LABS:                        14.2   6.9   )-----------( 159      ( 2018 05:58 )             42.1         139  |  91<L>  |  147<H>  ----------------------------<  207<H>  4.9   |  27  |  7.05<H>    Ca    10.0      2018 05:58  Phos  5.7       Mg     2.7         TPro  8.6<H>  /  Alb  3.9  /  TBili  1.4<H>  /  DBili  x   /  AST  109<H>  /  ALT  1033<H>  /  AlkPhos  158<H>      PTT - ( 2018 11:32 )  PTT:70.7 sec  Urinalysis Basic - ( 2018 17:25 )    Color: Yellow / Appearance: Clear / S.015 / pH: x  Gluc: x / Ketone: NEGATIVE  / Bili: Negative / Urobili: 0.2 E.U./dL   Blood: x / Protein: Trace mg/dL / Nitrite: NEGATIVE   Leuk Esterase: NEGATIVE / RBC: < 5 /HPF / WBC 5-10 /HPF   Sq Epi: x / Non Sq Epi: 0-5 /HPF / Bacteria: Present /HPF      CAPILLARY BLOOD GLUCOSE      POCT Blood Glucose.: 200 mg/dL (2018 10:55)      RADIOLOGY & ADDITIONAL TESTS: Reviewed.

## 2018-07-22 NOTE — PROGRESS NOTE ADULT - PROBLEM SELECTOR PLAN 2
- EF 15-20%   - under treatment for STEMI/NSTEMI - as per primary team   - please continue with Bumex 1 mg ivp twice a day   - I will hold for now losaratan   - rest as per cardiology team   - according to them no urgent cardiac cath.  - please inform the renal team when the cardiac cath is planned

## 2018-07-22 NOTE — PROGRESS NOTE ADULT - ASSESSMENT
This is 67 year old male without any prior h/o kidney disease. Now admitted for NSTEMI/STEMI - under treatment for that, no cardiac cath planned for now according to the team. Not known renal function. Now with creatinine of 9.45, , fluid status on the wet side -but accepatble, electrolytes wnl, making urine - cherry placed around 250 to 300 ml of urine. No need of urgent indications for HD   We will continue to monitor, most likely worsening of the renal function in the setting of cardiorenal syndrome, EF - 15 to 20 %.   Started on diuretic therapy with good response - 5.4 liter from the diuretic therapy in the past 24 hours. Renal function with slightly improvement still high BUN, no need for urgent dialysis

## 2018-07-22 NOTE — PROGRESS NOTE ADULT - PROBLEM SELECTOR PLAN 2
MRI significant for subacute ischemia involving right parietal area   with numerous scattered areas of acute ischemia within the basal ganglia   and frontal and parietal lobes bilaterally some of which are in a   watershed distribution.  - F/U neuro recs  - Needs completed MRA study for full neuro workup.  - Will need to be on AC, ASA, antithrombotic therapy, statin (when liver enzymes improve), have good management of BP  - Discontinued brillinta due to risks of triple therapy

## 2018-07-22 NOTE — PROGRESS NOTE ADULT - SUBJECTIVE AND OBJECTIVE BOX
Seen in the morning, still does not have nay complains, no shorntess of breath, no fever. Conitued on Bumex drip with good response in the past 24 hours     The renal service is called for worsening of the kidney function, not known baseline, slightly improvement of the renal function           Patient seen and examined at bedside.     aspirin  chewable 81 milliGRAM(s) daily  buMETAnide Injectable 1 milliGRAM(s) two times a day  dextrose 40% Gel 15 Gram(s) once PRN  dextrose 5%. 1000 milliLiter(s) <Continuous>  dextrose 50% Injectable 12.5 Gram(s) once  dextrose 50% Injectable 25 Gram(s) once  dextrose 50% Injectable 25 Gram(s) once  escitalopram 5 milliGRAM(s) daily  glucagon  Injectable 1 milliGRAM(s) once PRN  heparin  Infusion 900 Unit(s)/Hr <Continuous>  insulin lispro (HumaLOG) corrective regimen sliding scale   three times a day before meals  insulin lispro (HumaLOG) corrective regimen sliding scale   at bedtime  sevelamer hydrochloride 800 milliGRAM(s) three times a day      Allergies    No Known Allergies    Intolerances        T(C): , Max: 37.1 (18 @ 07:00)  T(F): , Max: 98.8 (18 @ 07:00)  HR: 88 (18 @ 16:00)  BP: 148/90 (18 @ 16:00)  BP(mean): 110 (18 @ 16:00)  RR: 16 (18 @ 16:00)  SpO2: 97% (18 @ 16:00)  Wt(kg): --     @ 07:  -   @ 07:00  --------------------------------------------------------  IN:    bumetanide Infusion: 160 mL    bumetanide Infusion: 30 mL    heparin Infusion: 162 mL    Oral Fluid: 500 mL  Total IN: 852 mL    OUT:    Intermittent Catheterization - Urethral: 5450 mL  Total OUT: 5450 mL    Total NET: -4598 mL       @ 07:01  -   @ 16:08  --------------------------------------------------------  IN:    bumetanide Infusion: 15 mL    heparin Infusion: 81 mL  Total IN: 96 mL    OUT:    Intermittent Catheterization - Urethral: 1365 mL  Total OUT: 1365 mL    Total NET: -1269 mL    Physical exam:   Alert and oriented  JVD   Normal air entry into the lungs, no wheezing, no crackles   RRR, normal s1/s2, no murmurs, rubs or gallops   Abdomen - soft, not tender, not distended   Extremities: 1+ edema   No rash   No astertix         LABS:                        14.2   6.9   )-----------( 159      ( 2018 05:58 )             42.1         139  |  91<L>  |  147<H>  ----------------------------<  207<H>  4.9   |  27  |  7.05<H>    Ca    10.0      2018 05:58  Phos  5.7       Mg     2.7         TPro  8.6<H>  /  Alb  3.9  /  TBili  1.4<H>  /  DBili  x   /  AST  109<H>  /  ALT  1033<H>  /  AlkPhos  158<H>        PTT - ( 2018 11:32 )  PTT:70.7 sec  Urinalysis Basic - ( 2018 17:25 )    Color: Yellow / Appearance: Clear / S.015 / pH: x  Gluc: x / Ketone: NEGATIVE  / Bili: Negative / Urobili: 0.2 E.U./dL   Blood: x / Protein: Trace mg/dL / Nitrite: NEGATIVE   Leuk Esterase: NEGATIVE / RBC: < 5 /HPF / WBC 5-10 /HPF   Sq Epi: x / Non Sq Epi: 0-5 /HPF / Bacteria: Present /HPF      Chloride, Random Urine: 52 mmol/L ( @ 18:48)  Creatinine, Random Urine: 58 mg/dL ( @ 18:48)        RADIOLOGY & ADDITIONAL STUDIES:

## 2018-07-22 NOTE — PROGRESS NOTE ADULT - PROBLEM SELECTOR PLAN 5
Patient's ALT today is >1100, however AST is 105. Alk phos is elevated to 150. Unclear pattern of liver enzymes.   - f/u RUQ US  - f/u HIV/Hep panel

## 2018-07-22 NOTE — PROGRESS NOTE ADULT - PROBLEM SELECTOR PLAN 1
Patient has hx of PCI w stents placed 7 years prior. Patient reportedly had an acute MI earlier this week in [place] with EKG showing ST elevations in the lateral leads and troponins in excess of 2000. EKG on admission shows q-waves in leads I and aVL with poor R wave progression consistent with a previous lateral wall MI. Outside therapeutic window for PCI.  - Repeated troponin and CKMB today in setting of new ST changes in 2 precordial leads, both lower, will no longer follow  - Will hold brillinta given risks of hemorrhagic conversion in setting of stroke with triple therapy  - Loaded with ASA, will c/w 81mg qD  - Starting heparin gtt, titrating to aPTT of 40-60  - Holding statin therapy due to transaminitis  - Holding beta blockade due to decompensated heart failure  - Holding ACE/ARB due to severe RONNIE  - Will need diagnostic cath/stress test for ischemic workup; holding off due to renal failure

## 2018-07-23 LAB
24R-OH-CALCIDIOL SERPL-MCNC: 19 NG/ML — LOW (ref 30–80)
24R-OH-CALCIDIOL SERPL-MCNC: 38.9 NG/ML — SIGNIFICANT CHANGE UP (ref 30–80)
ALBUMIN SERPL ELPH-MCNC: 3.8 G/DL — SIGNIFICANT CHANGE UP (ref 3.3–5)
ALP SERPL-CCNC: 144 U/L — HIGH (ref 40–120)
ALT FLD-CCNC: 778 U/L — HIGH (ref 10–45)
ANION GAP SERPL CALC-SCNC: 21 MMOL/L — HIGH (ref 5–17)
ANION GAP SERPL CALC-SCNC: 21 MMOL/L — HIGH (ref 5–17)
APTT BLD: 66.6 SEC — HIGH (ref 27.5–37.4)
APTT BLD: 69.4 SEC — HIGH (ref 27.5–37.4)
APTT BLD: 73.9 SEC — HIGH (ref 27.5–37.4)
APTT BLD: 78.1 SEC — HIGH (ref 27.5–37.4)
AST SERPL-CCNC: 112 U/L — HIGH (ref 10–40)
BILIRUB SERPL-MCNC: 0.9 MG/DL — SIGNIFICANT CHANGE UP (ref 0.2–1.2)
BUN SERPL-MCNC: 146 MG/DL — HIGH (ref 7–23)
BUN SERPL-MCNC: 147 MG/DL — HIGH (ref 7–23)
CALCIUM SERPL-MCNC: 10.3 MG/DL — SIGNIFICANT CHANGE UP (ref 8.4–10.5)
CALCIUM SERPL-MCNC: 10.4 MG/DL — SIGNIFICANT CHANGE UP (ref 8.4–10.5)
CALCIUM SERPL-MCNC: 9.5 MG/DL — SIGNIFICANT CHANGE UP (ref 8.4–10.5)
CALCIUM SERPL-MCNC: 9.5 MG/DL — SIGNIFICANT CHANGE UP (ref 8.4–10.5)
CHLORIDE SERPL-SCNC: 88 MMOL/L — LOW (ref 96–108)
CHLORIDE SERPL-SCNC: 92 MMOL/L — LOW (ref 96–108)
CO2 SERPL-SCNC: 27 MMOL/L — SIGNIFICANT CHANGE UP (ref 22–31)
CO2 SERPL-SCNC: 29 MMOL/L — SIGNIFICANT CHANGE UP (ref 22–31)
CREAT SERPL-MCNC: 5.56 MG/DL — HIGH (ref 0.5–1.3)
CREAT SERPL-MCNC: 6.03 MG/DL — HIGH (ref 0.5–1.3)
GLUCOSE SERPL-MCNC: 209 MG/DL — HIGH (ref 70–99)
GLUCOSE SERPL-MCNC: 232 MG/DL — HIGH (ref 70–99)
HBV SURFACE AB SER-ACNC: SIGNIFICANT CHANGE UP
HBV SURFACE AG SER-ACNC: SIGNIFICANT CHANGE UP
HCT VFR BLD CALC: 45.3 % — SIGNIFICANT CHANGE UP (ref 39–50)
HCV AB S/CO SERPL IA: 0.09 S/CO — SIGNIFICANT CHANGE UP
HCV AB SERPL-IMP: SIGNIFICANT CHANGE UP
HGB BLD-MCNC: 15.2 G/DL — SIGNIFICANT CHANGE UP (ref 13–17)
HIV 1+2 AB+HIV1 P24 AG SERPL QL IA: SIGNIFICANT CHANGE UP
INR BLD: 1.3 — HIGH (ref 0.88–1.16)
MAGNESIUM SERPL-MCNC: 2.4 MG/DL — SIGNIFICANT CHANGE UP (ref 1.6–2.6)
MCHC RBC-ENTMCNC: 29.5 PG — SIGNIFICANT CHANGE UP (ref 27–34)
MCHC RBC-ENTMCNC: 33.6 G/DL — SIGNIFICANT CHANGE UP (ref 32–36)
MCV RBC AUTO: 87.8 FL — SIGNIFICANT CHANGE UP (ref 80–100)
PHOSPHATE SERPL-MCNC: 6.5 MG/DL — HIGH (ref 2.5–4.5)
PLATELET # BLD AUTO: 184 K/UL — SIGNIFICANT CHANGE UP (ref 150–400)
POTASSIUM SERPL-MCNC: 4.3 MMOL/L — SIGNIFICANT CHANGE UP (ref 3.5–5.3)
POTASSIUM SERPL-MCNC: 4.3 MMOL/L — SIGNIFICANT CHANGE UP (ref 3.5–5.3)
POTASSIUM SERPL-SCNC: 4.3 MMOL/L — SIGNIFICANT CHANGE UP (ref 3.5–5.3)
POTASSIUM SERPL-SCNC: 4.3 MMOL/L — SIGNIFICANT CHANGE UP (ref 3.5–5.3)
PROT SERPL-MCNC: 8.8 G/DL — HIGH (ref 6–8.3)
PROTHROM AB SERPL-ACNC: 14.5 SEC — HIGH (ref 9.8–12.7)
PTH-INTACT FLD-MCNC: 396 PG/ML — HIGH (ref 15–65)
PTH-INTACT FLD-MCNC: 503 PG/ML — HIGH (ref 15–65)
RBC # BLD: 5.16 M/UL — SIGNIFICANT CHANGE UP (ref 4.2–5.8)
RBC # FLD: 15 % — SIGNIFICANT CHANGE UP (ref 10.3–16.9)
SODIUM SERPL-SCNC: 138 MMOL/L — SIGNIFICANT CHANGE UP (ref 135–145)
SODIUM SERPL-SCNC: 140 MMOL/L — SIGNIFICANT CHANGE UP (ref 135–145)
VIT D25+D1,25 OH+D1,25 PNL SERPL-MCNC: 34 PG/ML — SIGNIFICANT CHANGE UP (ref 19.9–79.3)
VIT D25+D1,25 OH+D1,25 PNL SERPL-MCNC: 50.1 PG/ML — SIGNIFICANT CHANGE UP (ref 19.9–79.3)
WBC # BLD: 6.9 K/UL — SIGNIFICANT CHANGE UP (ref 3.8–10.5)
WBC # FLD AUTO: 6.9 K/UL — SIGNIFICANT CHANGE UP (ref 3.8–10.5)

## 2018-07-23 PROCEDURE — 99233 SBSQ HOSP IP/OBS HIGH 50: CPT

## 2018-07-23 PROCEDURE — 99291 CRITICAL CARE FIRST HOUR: CPT

## 2018-07-23 PROCEDURE — 93010 ELECTROCARDIOGRAM REPORT: CPT

## 2018-07-23 PROCEDURE — 71045 X-RAY EXAM CHEST 1 VIEW: CPT | Mod: 26

## 2018-07-23 RX ORDER — HYDRALAZINE HCL 50 MG
10 TABLET ORAL ONCE
Qty: 0 | Refills: 0 | Status: COMPLETED | OUTPATIENT
Start: 2018-07-23 | End: 2018-07-23

## 2018-07-23 RX ORDER — CALCIUM ACETATE 667 MG
667 TABLET ORAL
Qty: 0 | Refills: 0 | Status: DISCONTINUED | OUTPATIENT
Start: 2018-07-23 | End: 2018-07-24

## 2018-07-23 RX ORDER — INSULIN GLARGINE 100 [IU]/ML
7 INJECTION, SOLUTION SUBCUTANEOUS AT BEDTIME
Qty: 0 | Refills: 0 | Status: DISCONTINUED | OUTPATIENT
Start: 2018-07-23 | End: 2018-07-24

## 2018-07-23 RX ORDER — TICAGRELOR 90 MG/1
90 TABLET ORAL ONCE
Qty: 0 | Refills: 0 | Status: COMPLETED | OUTPATIENT
Start: 2018-07-23 | End: 2018-07-23

## 2018-07-23 RX ORDER — HYDRALAZINE HCL 50 MG
10 TABLET ORAL THREE TIMES A DAY
Qty: 0 | Refills: 0 | Status: DISCONTINUED | OUTPATIENT
Start: 2018-07-23 | End: 2018-07-26

## 2018-07-23 RX ORDER — TICAGRELOR 90 MG/1
90 TABLET ORAL
Qty: 0 | Refills: 0 | Status: DISCONTINUED | OUTPATIENT
Start: 2018-07-24 | End: 2018-07-26

## 2018-07-23 RX ADMIN — SEVELAMER CARBONATE 800 MILLIGRAM(S): 2400 POWDER, FOR SUSPENSION ORAL at 05:57

## 2018-07-23 RX ADMIN — HEPARIN SODIUM 8 UNIT(S)/HR: 5000 INJECTION INTRAVENOUS; SUBCUTANEOUS at 07:16

## 2018-07-23 RX ADMIN — Medication 2: at 07:16

## 2018-07-23 RX ADMIN — Medication 2: at 16:50

## 2018-07-23 RX ADMIN — Medication 10 MILLIGRAM(S): at 22:12

## 2018-07-23 RX ADMIN — Medication 81 MILLIGRAM(S): at 11:11

## 2018-07-23 RX ADMIN — HEPARIN SODIUM 8 UNIT(S)/HR: 5000 INJECTION INTRAVENOUS; SUBCUTANEOUS at 17:08

## 2018-07-23 RX ADMIN — Medication 10 MILLIGRAM(S): at 11:44

## 2018-07-23 RX ADMIN — INSULIN GLARGINE 7 UNIT(S): 100 INJECTION, SOLUTION SUBCUTANEOUS at 22:12

## 2018-07-23 RX ADMIN — Medication 667 MILLIGRAM(S): at 17:35

## 2018-07-23 RX ADMIN — BUMETANIDE 1 MILLIGRAM(S): 0.25 INJECTION INTRAMUSCULAR; INTRAVENOUS at 05:57

## 2018-07-23 RX ADMIN — TICAGRELOR 90 MILLIGRAM(S): 90 TABLET ORAL at 17:30

## 2018-07-23 RX ADMIN — Medication 6: at 11:11

## 2018-07-23 RX ADMIN — ESCITALOPRAM OXALATE 5 MILLIGRAM(S): 10 TABLET, FILM COATED ORAL at 11:11

## 2018-07-23 RX ADMIN — SEVELAMER CARBONATE 800 MILLIGRAM(S): 2400 POWDER, FOR SUSPENSION ORAL at 13:18

## 2018-07-23 NOTE — PROGRESS NOTE ADULT - PROBLEM SELECTOR PLAN 3
Patient has been weak, unsteady, and confused.   There is ? facial droop on exam. Likely toxic-metabolic 2/2 uremia but could have some component of hypoperfusion vs stroke/hemorrhage.   - Lactate 1.8  - Patient has been weak, unsteady, and confused. There is ? facial droop on exam. Likely toxic-metabolic 2/2 uremia but could have some component of hypoperfusion, stroke/hemorrhage, and depression.   - Lactate 1.8  - On lexapro

## 2018-07-23 NOTE — PROGRESS NOTE ADULT - PROBLEM SELECTOR PLAN 4
Patient's Cr is now improving to 7 from 9 on admission, unclear baseline. Will need collateral from outpatient providers to ascertain whether CKD component is present. Possible 2/2 to cardiorenal syndrome as cardiac output is poor.   - Renal on board, appreciate recs  - Per renal, no urgent need for dialysis at this time  - Urine lytes with intrinsic renal disease, UA neg  - f/u renal US    #Hyponatremia: Resolved   - Wooten in place Patient's Cr is now improving to 5 from 9 on admission, unclear baseline. Will need collateral from outpatient providers to ascertain whether CKD component is present. Possible 2/2 to cardiorenal syndrome as cardiac output is poor.   - Renal on board, appreciate recs  - Per renal, no urgent need for dialysis at this time  - Urine lytes with intrinsic renal disease, UA neg  - f/u renal US    #Hyponatremia: Resolved   - Wooten in place

## 2018-07-23 NOTE — PROGRESS NOTE ADULT - PROBLEM SELECTOR PLAN 5
Patient's ALT today is >1100, however AST is 105. Alk phos is elevated to 150. Unclear pattern of liver enzymes.   - f/u RUQ US  - f/u HIV/Hep panel Patient's ALT over 1000 on admission, however AST is around 100. Alk phos is elevated to 150. Unclear pattern of liver enzymes. HIV/HepC/HepB negative. Likely shock liver.  - f/u RUQ US

## 2018-07-23 NOTE — PROGRESS NOTE ADULT - ATTENDING COMMENTS
66yo M w/ h/o Dilated cardiomyopathy(baseline EF 25%), CAD s/p PCI(5-7yrs ago), lost in f/u, and historically non-compliant w/ meds - was recently on Vacation in [place] where he presented to a hospital after feeling weak and short of breath - there he was found to have had a Lateral wall MI in addition to profound renal failure - Cardiac Cath was deferred in [place] and pt. returned to [place]. He saw his outpatient Cardiologist upon return who sent him to [facility] ED as patient still w/ persistent fatigue and more lethargic - Upon admission he was noted to have persistent ST elevations w/ Q waves(I & AVL; unchanged from EKG in [place]) though w/o complaints of SOB/CP and HD stable. Additionally noted to have significant renal failure(BUN/Cr: 145/9), but still making urine. Lastly, neuroimaging revealed subacute/acute ischemia 2/2 embolic phenomenon, pt. noted to have intermittent Aflutter on Tele likely as the source - Pt. admitted to CCU for further management    -CHF - acute on chronic systolic CHF 2/2 recent MI; Initially, pt. significantly volume up on admission however w/o signs of low output;   -Started on Bumex gtt @ 1mg/hr w/ significant diuresis(Net Negative [id number]) w/ improving Serum Cr. 9->6, but still with elevated BUN(140s); Bumex now tapered down to 1mg IV BID to maintain net negative 1.5-2L daily; Started Hydralazine 25TID for Afterload reduction and will uptitrate as tolerated; Will initiate Beta Blocker once pt. euvolemic  -STEMI - Pt. initially ASA/Brillinta loaded - no plan for PCI at the moment given renal function and late presentation. Currently pt. is HD and Electrically stable, No CP and not in refractory HF. Ultimately, pt. will likely need a viability study prior to determining a strategy for revascularization. Given intermittent AFlutter w/ embolic CVA - pt. will need triple therapy, but d/t risk of hemorrhagic conversion will dc ASA and proceed with Brillinta/Heparin gtt; Will eventually transition Heparin gtt to Eliquis 2.5 BID; Statin to be started once LFTs improve  -CVA - embolic in nature, likely Cardiac source in the setting of intermittent Aflutter; Currently on Brillinta and Heparin gtt which will be transitioned to Eliquis 2.5 BID; c/w Lexapro as per Stroke Team  -Renal - likely acute on chronic renal failure(unclear baseline); Will c/w diuresis, improving Serum Cr. since admission, though persistently elevated BUN; At present, no indication for urgent HD; Renal following  -Transaminitis - likely 2/2 hepatic congestion; Currently improving w/ diuresis  -DM - currently on Lantus and Lispro SS  -Diet - evaluated by Speech & Swallow today - rec'd Puree/Nectar thic diet; Pt. remains high aspiration risk   -DVT PPx - Heparin gtt  -Full code

## 2018-07-23 NOTE — PROGRESS NOTE ADULT - PROBLEM SELECTOR PLAN 7
Holding home medications.  - Hb A1c was 8  - c/w ISS, will dose basal insulin based on requirements    #Aflutter: On telemetry last night  - Will need chronic anticoagulation Holding home medications. Will need closer f/u as outpt.  - Hb A1c was 8  - c/w ISS, 7u lantus qHS    #Aflutter: On telemetry last night  - Will need chronic anticoagulation

## 2018-07-23 NOTE — PHYSICAL THERAPY INITIAL EVALUATION ADULT - MANUAL MUSCLE TESTING RESULTS, REHAB EVAL
B UE and B LE >3+/5 upon functional assessment against gravity. Full MMT assessment limited by impaired command following. Patient demo'd active movement of all 4 extremities throughout session.

## 2018-07-23 NOTE — PROGRESS NOTE ADULT - SUBJECTIVE AND OBJECTIVE BOX
Patient seen and examined  /BN/cr @1  aspirin  chewable 81 milliGRAM(s) daily  buMETAnide Infusion 1 mG/Hr <Continuous>  dextrose 40% Gel 15 Gram(s) once PRN  dextrose 5%. 1000 milliLiter(s) <Continuous>  dextrose 50% Injectable 12.5 Gram(s) once  dextrose 50% Injectable 25 Gram(s) once  dextrose 50% Injectable 25 Gram(s) once  glucagon  Injectable 1 milliGRAM(s) once PRN  heparin  Infusion 900 Unit(s)/Hr <Continuous>  insulin lispro (HumaLOG) corrective regimen sliding scale   three times a day before meals  insulin lispro (HumaLOG) corrective regimen sliding scale   at bedtime  ticagrelor 90 milliGRAM(s) two times a day      Allergies    No Known Allergies    Intolerances        T(C): , Max: 37.1 (18 @ 06:28)  T(F): , Max: 98.7 (18 @ 06:28)  HR: 78 (18 @ 14:00)  BP: 126/73 (18 @ 14:00)  BP(mean): 96 (18 @ 14:00)  RR: 19 (18 @ 14:00)  SpO2: 99% (18 @ 14:00)  Wt(kg): --     @ :  -   @ 07:00  --  on Bumex  1 mg IV q 12hrs. urine output @ 2400 cc    IN:    bumetanide Infusion: 130 mL    heparin Infusion: 69 mL  Total IN: 199 mL    OUT:    Intermittent Catheterization - Urethral: 2450 mL  Total OUT: 2450 mL    Total NET: -2251 mL       @ 07:  -   @ 15:19  --------------------------------------------------------  IN:    bumetanide Infusion: 80 mL    heparin Infusion: 36 mL  Total IN: 116 mL    OUT:    Intermittent Catheterization - Urethral: 1720 mL  Total OUT: 1720 mL    Total NET: -1604 mL    Physical exam:   Alert and oriented  JVD   Normal air entry into the lungs, no wheezing, no crackles   RRR, normal s1/s2, no murmurs, rubs or gallops   Abdomen - soft, not tender, not distended   Extremities: 1+ edema   No rash   No astertix         Height (cm): 167.64 ( @ 17:42)  Weight (kg): 70.7 ( @ 17:42)  BMI (kg/m2): 25.2 ( @ 17:42)  BSA (m2): 1.8 ( @ 17:42)      LABS:                        13.2   6.2   )-----------( 121      ( 2018 08:05 )             38.2         136  |  88<L>  |  142<H>  ----------------------------<  162<H>  3.9   |  22  |  8.32<H>    Ca    8.9      2018 08:05  Phos  7.4       Mg     2.9         TPro  7.7  /  Alb  3.5  /  TBili  2.0<H>  /  DBili  x   /  AST  106<H>  /  ALT  1177<H>  /  AlkPhos  152<H>      Cholesterol, Serum: 178 mg/dL [10 - 199] ( @ 08:05)  Hemoglobin A1C, Whole Blood: 8.0 % <H> [4.0 - 5.6] ( @ 08:05)    PT/INR - ( 2018 13:48 )   PT: 14.9 sec;   INR: 1.33          PTT - ( 2018 01:36 )  PTT:54.4 sec  Urinalysis Basic - ( 2018 17:25 )    Color: Yellow / Appearance: Clear / S.015 / pH: x  Gluc: x / Ketone: NEGATIVE  / Bili: Negative / Urobili: 0.2 E.U./dL   Blood: x / Protein: Trace mg/dL / Nitrite: NEGATIVE   Leuk Esterase: NEGATIVE / RBC: < 5 /HPF / WBC 5-10 /HPF   Sq Epi: x / Non Sq Epi: 0-5 /HPF / Bacteria: Present /HPF      Chloride, Random Urine: 52 mmol/L ( @ 18:48)  Creatinine, Random Urine: 58 mg/dL ( @ 18:48)        RADIOLOGY & ADDITIONAL STUDIES:

## 2018-07-23 NOTE — PROGRESS NOTE ADULT - PROBLEM SELECTOR PLAN 9
F: None  E: Replete K>4, Mg>2. Will need to be careful with repletion given poor renal function  N: DASH/TLC/CC/Renal/Fluid restricted diet F: None  E: Replete K>4, Mg>2. Will need to be careful with repletion given poor renal function  N: DASH/TLC/CC/Renal/Fluid restricted pureed nectar thick diet as per speech

## 2018-07-23 NOTE — PHYSICAL THERAPY INITIAL EVALUATION ADULT - GENERAL OBSERVATIONS, REHAB EVAL
Patient received supine in bed with + IV, tele, cherry catheter, wife at bedside. Patient in no apparent distress.

## 2018-07-23 NOTE — PROGRESS NOTE ADULT - PROBLEM SELECTOR PLAN 1
Patient has hx of PCI w stents placed 7 years prior. Patient reportedly had an acute MI earlier this week in [place] with EKG showing ST elevations in the lateral leads and troponins in excess of 2000. EKG on admission shows q-waves in leads I and aVL with poor R wave progression consistent with a previous lateral wall MI. Outside therapeutic window for PCI.  - Repeated troponin and CKMB today in setting of new ST changes in 2 precordial leads, both lower, will no longer follow  - Will hold brillinta given risks of hemorrhagic conversion in setting of stroke with triple therapy  - Loaded with ASA, will c/w 81mg qD  - Starting heparin gtt, titrating to aPTT of 40-60  - Holding statin therapy due to transaminitis  - Holding beta blockade due to decompensated heart failure  - Holding ACE/ARB due to severe RONNIE  - Will need diagnostic cath/stress test for ischemic workup; holding off due to renal failure Patient has hx of PCI w stents placed 7 years prior. Patient reportedly had an acute MI earlier this week in [place] with EKG showing ST elevations in the lateral leads and troponins in excess of 2000. EKG on admission shows q-waves in leads I and aVL with poor R wave progression consistent with a previous lateral wall MI. Outside therapeutic window for PCI. Troponin and CKMB done 7/22 in setting of new ST changes in 2 precordial leads, both lower, will no longer follow.  - Will hold ASA given risks of hemorrhagic conversion in setting of stroke with triple therapy  - resuming brillinta 90 BID  - On heparin gtt for aFlutter, titrating to aPTT of 40-60  - Holding statin therapy due to transaminitis  - Holding beta blockade due to decompensated heart failure  - Holding ACE/ARB due to severe RONNIE  - Will need diagnostic cath/stress test for ischemic workup; holding off due to renal failure  - On hydralazine 10 TID for afterload reduction

## 2018-07-23 NOTE — PROGRESS NOTE ADULT - ATTENDING COMMENTS
have reviewed status with cardiol and reviewed pt status and labs.  creat decreasing slowly, will start on afterload reduction with hydralazine .    will first attempt to control phosphate with binders, then consider sensipar pending progression of pth levels.

## 2018-07-23 NOTE — PHYSICAL THERAPY INITIAL EVALUATION ADULT - CRITERIA FOR SKILLED THERAPEUTIC INTERVENTIONS
impairments found/rehab potential/therapy frequency/anticipated equipment needs at discharge/anticipated discharge recommendation

## 2018-07-23 NOTE — PROGRESS NOTE ADULT - PROBLEM SELECTOR PLAN 8
DVT: On Heparin gtt  GI: None  Activity: OOBAT DVT: On Heparin gtt  GI: None  Activity: OOBAT, PT following

## 2018-07-23 NOTE — PROGRESS NOTE ADULT - SUBJECTIVE AND OBJECTIVE BOX
OVERNIGHT EVENTS:    SUBJECTIVE / INTERVAL HPI: Patient seen and examined at bedside.     VITAL SIGNS:  Vital Signs Last 24 Hrs  T(C): 36.5 (23 Jul 2018 09:00), Max: 36.5 (23 Jul 2018 09:00)  T(F): 97.7 (23 Jul 2018 09:00), Max: 97.7 (23 Jul 2018 09:00)  HR: 90 (23 Jul 2018 12:00) (84 - 100)  BP: 122/80 (23 Jul 2018 12:00) (95/69 - 150/93)  BP(mean): 97 (23 Jul 2018 12:00) (77 - 115)  RR: 13 (23 Jul 2018 12:00) (10 - 22)  SpO2: 95% (23 Jul 2018 12:00) (93% - 98%)    PHYSICAL EXAM:    General: WDWN  HEENT: NC/AT; PERRL, anicteric sclera; MMM  Neck: supple  Cardiovascular: +S1/S2, RRR  Respiratory: CTA B/L; no W/R/R  Gastrointestinal: soft, NT/ND; +BSx4  Extremities: WWP; no edema, clubbing or cyanosis  Vascular: 2+ radial, DP/PT pulses B/L  Neurological: AAOx3; no focal deficits    MEDICATIONS:  MEDICATIONS  (STANDING):  buMETAnide Injectable 1 milliGRAM(s) IV Push two times a day  dextrose 5%. 1000 milliLiter(s) (50 mL/Hr) IV Continuous <Continuous>  dextrose 50% Injectable 12.5 Gram(s) IV Push once  dextrose 50% Injectable 25 Gram(s) IV Push once  dextrose 50% Injectable 25 Gram(s) IV Push once  escitalopram 5 milliGRAM(s) Oral daily  heparin  Infusion 800 Unit(s)/Hr (8 mL/Hr) IV Continuous <Continuous>  insulin lispro (HumaLOG) corrective regimen sliding scale   SubCutaneous three times a day before meals  insulin lispro (HumaLOG) corrective regimen sliding scale   SubCutaneous at bedtime  sevelamer hydrochloride 800 milliGRAM(s) Oral three times a day  ticagrelor 90 milliGRAM(s) Oral once    MEDICATIONS  (PRN):  dextrose 40% Gel 15 Gram(s) Oral once PRN Blood Glucose LESS THAN 70 milliGRAM(s)/deciliter  glucagon  Injectable 1 milliGRAM(s) IntraMuscular once PRN Glucose LESS THAN 70 milligrams/deciliter      ALLERGIES:  Allergies    No Known Allergies    Intolerances        LABS:                        15.2   6.9   )-----------( 184      ( 23 Jul 2018 05:49 )             45.3     07-23    140  |  92<L>  |  146<H>  ----------------------------<  232<H>  4.3   |  27  |  5.56<H>    Ca    10.4      23 Jul 2018 07:57  Phos  6.5     07-23  Mg     2.4     07-23    TPro  8.8<H>  /  Alb  3.8  /  TBili  0.9  /  DBili  x   /  AST  112<H>  /  ALT  778<H>  /  AlkPhos  144<H>  07-23    PT/INR - ( 23 Jul 2018 05:49 )   PT: 14.5 sec;   INR: 1.30          PTT - ( 23 Jul 2018 05:49 )  PTT:69.4 sec    CAPILLARY BLOOD GLUCOSE      POCT Blood Glucose.: 284 mg/dL (23 Jul 2018 10:50)      RADIOLOGY & ADDITIONAL TESTS: Reviewed. OVERNIGHT EVENTS: Patient had run of NSVT at midnight, asymptomatic.     SUBJECTIVE / INTERVAL HPI: Patient remained withdrawn to interview today, but had no complaints. Nods when asked if he was feeling better.     VITAL SIGNS:  Vital Signs Last 24 Hrs  T(C): 36.5 (23 Jul 2018 09:00), Max: 36.5 (23 Jul 2018 09:00)  T(F): 97.7 (23 Jul 2018 09:00), Max: 97.7 (23 Jul 2018 09:00)  HR: 90 (23 Jul 2018 12:00) (84 - 100)  BP: 122/80 (23 Jul 2018 12:00) (95/69 - 150/93)  BP(mean): 97 (23 Jul 2018 12:00) (77 - 115)  RR: 13 (23 Jul 2018 12:00) (10 - 22)  SpO2: 95% (23 Jul 2018 12:00) (93% - 98%)    PHYSICAL EXAM:    Constitutional: WDWN resting comfortably in bed; NAD  Head: NC/AT  Eyes: PERRL, EOMI, anicteric sclera  ENT: no nasal discharge; uvula midline, no oropharyngeal erythema or exudates; MMM.   Neck: JVD appreciated, HJR  Respiratory: CTA B/L; no W/R/R, no retractions  Cardiac: 3/6 WANDER at LUSB, Regular rate, rhythm  Gastrointestinal: abdomen soft, NT, mildly distended; no rebound or guarding; +BSx4  Extremities: 1+ edema at b/l ankles. Feet are cool to the touch. Otherwise WWP.   Vascular: 2+ radial. DP/PT pulses 1+.   Neurologic: AAOx3; slight right facial droop  Psychiatric: Flat affect; limited responses to interviewer/clinician    MEDICATIONS:  MEDICATIONS  (STANDING):  buMETAnide Injectable 1 milliGRAM(s) IV Push two times a day  dextrose 5%. 1000 milliLiter(s) (50 mL/Hr) IV Continuous <Continuous>  dextrose 50% Injectable 12.5 Gram(s) IV Push once  dextrose 50% Injectable 25 Gram(s) IV Push once  dextrose 50% Injectable 25 Gram(s) IV Push once  escitalopram 5 milliGRAM(s) Oral daily  heparin  Infusion 800 Unit(s)/Hr (8 mL/Hr) IV Continuous <Continuous>  insulin lispro (HumaLOG) corrective regimen sliding scale   SubCutaneous three times a day before meals  insulin lispro (HumaLOG) corrective regimen sliding scale   SubCutaneous at bedtime  sevelamer hydrochloride 800 milliGRAM(s) Oral three times a day  ticagrelor 90 milliGRAM(s) Oral once    MEDICATIONS  (PRN):  dextrose 40% Gel 15 Gram(s) Oral once PRN Blood Glucose LESS THAN 70 milliGRAM(s)/deciliter  glucagon  Injectable 1 milliGRAM(s) IntraMuscular once PRN Glucose LESS THAN 70 milligrams/deciliter      ALLERGIES:  Allergies    No Known Allergies    Intolerances        LABS:                        15.2   6.9   )-----------( 184      ( 23 Jul 2018 05:49 )             45.3     07-23    140  |  92<L>  |  146<H>  ----------------------------<  232<H>  4.3   |  27  |  5.56<H>    Ca    10.4      23 Jul 2018 07:57  Phos  6.5     07-23  Mg     2.4     07-23    TPro  8.8<H>  /  Alb  3.8  /  TBili  0.9  /  DBili  x   /  AST  112<H>  /  ALT  778<H>  /  AlkPhos  144<H>  07-23    PT/INR - ( 23 Jul 2018 05:49 )   PT: 14.5 sec;   INR: 1.30          PTT - ( 23 Jul 2018 05:49 )  PTT:69.4 sec    CAPILLARY BLOOD GLUCOSE      POCT Blood Glucose.: 284 mg/dL (23 Jul 2018 10:50)      RADIOLOGY & ADDITIONAL TESTS: Reviewed.

## 2018-07-23 NOTE — PROGRESS NOTE ADULT - PROBLEM SELECTOR PLAN 2
- please start on sevelamer 800 mg three times a day   pth @ 503, VIT d 25- 19, vIT d 1, 25 @ 50.1, pHOS @5.7 ON 7/22, CALCIYUM @ 9.5---> CANDIDATE FOR SENSISPAR.

## 2018-07-23 NOTE — PROGRESS NOTE ADULT - PROBLEM SELECTOR PLAN 1
- NON OLIGURIC Ronnie-   unknown baseline, lst known creatinine a week ago in a hospital at [place] 6.6   - now with RONNIE due to cradiorenal syndrome   - on diurertic therapy with good response so far - on bumex 1 mg/h   creatinine 6.03  MONITOR i/o  - daily weight   - renal diet

## 2018-07-23 NOTE — PHYSICAL THERAPY INITIAL EVALUATION ADULT - ORIENTATION, REHAB EVAL
Soft spoken. Correctly stated name, year. Nodded yes "yes" when asked about location, however unable to vocalize a location.

## 2018-07-23 NOTE — PHYSICAL THERAPY INITIAL EVALUATION ADULT - ADDITIONAL COMMENTS
Patient's wife at bedside reports that prior to the hospitalization, patient was fully independent with all mobility, no use of assistive device. Reports that they live together in a 2 level house.

## 2018-07-23 NOTE — PROGRESS NOTE ADULT - PROBLEM SELECTOR PLAN 2
MRI significant for subacute ischemia involving right parietal area   with numerous scattered areas of acute ischemia within the basal ganglia   and frontal and parietal lobes bilaterally some of which are in a   watershed distribution.  - F/U neuro recs  - Needs completed MRA study for full neuro workup.  - Will need to be on AC, ASA, antithrombotic therapy, statin (when liver enzymes improve), have good management of BP  - Discontinued brillinta due to risks of triple therapy MRI significant for subacute ischemia involving right parietal area   with numerous scattered areas of acute ischemia within the basal ganglia   and frontal and parietal lobes bilaterally some of which are in a   watershed distribution.  - F/U neuro recs  - Needs completed MRA study for full neuro workup.   - Ordered carotid u/s  - Will need to be on AC, ASA, antithrombotic therapy, statin (when liver enzymes improve), have good management of BP  - Discontinued ASA due to risks of triple therapy

## 2018-07-23 NOTE — PHYSICAL THERAPY INITIAL EVALUATION ADULT - PERTINENT HX OF CURRENT PROBLEM, REHAB EVAL
Patient is a 67 year old male who presented to the [facility] ED with altered mental status in the setting of one week of dyspnea and weakness. Patient was in [place] and found to have a STEMI. Returned to the [place] and went to see his cardiologist who referred him to the ED; admitted to the CCU for management of ACS, CHF, and RONNIE. Also found to have CVA (multiple locations. See MRI report).

## 2018-07-24 LAB
ALBUMIN SERPL ELPH-MCNC: 3.7 G/DL — SIGNIFICANT CHANGE UP (ref 3.3–5)
ALP SERPL-CCNC: 131 U/L — HIGH (ref 40–120)
ALT FLD-CCNC: 554 U/L — HIGH (ref 10–45)
ANION GAP SERPL CALC-SCNC: 18 MMOL/L — HIGH (ref 5–17)
APTT BLD: 30 SEC — SIGNIFICANT CHANGE UP (ref 27.5–37.4)
APTT BLD: 72.4 SEC — HIGH (ref 27.5–37.4)
APTT BLD: 83.2 SEC — HIGH (ref 27.5–37.4)
APTT BLD: 95 SEC — HIGH (ref 27.5–37.4)
AST SERPL-CCNC: 78 U/L — HIGH (ref 10–40)
BILIRUB SERPL-MCNC: 0.8 MG/DL — SIGNIFICANT CHANGE UP (ref 0.2–1.2)
BUN SERPL-MCNC: 160 MG/DL — HIGH (ref 7–23)
CALCIUM SERPL-MCNC: 10.5 MG/DL — SIGNIFICANT CHANGE UP (ref 8.4–10.5)
CHLORIDE SERPL-SCNC: 92 MMOL/L — LOW (ref 96–108)
CO2 SERPL-SCNC: 29 MMOL/L — SIGNIFICANT CHANGE UP (ref 22–31)
CREAT SERPL-MCNC: 5.06 MG/DL — HIGH (ref 0.5–1.3)
GLUCOSE SERPL-MCNC: 197 MG/DL — HIGH (ref 70–99)
HCT VFR BLD CALC: 39 % — SIGNIFICANT CHANGE UP (ref 39–50)
HCT VFR BLD CALC: 40.2 % — SIGNIFICANT CHANGE UP (ref 39–50)
HGB BLD-MCNC: 13 G/DL — SIGNIFICANT CHANGE UP (ref 13–17)
HGB BLD-MCNC: 13.6 G/DL — SIGNIFICANT CHANGE UP (ref 13–17)
MAGNESIUM SERPL-MCNC: 2.4 MG/DL — SIGNIFICANT CHANGE UP (ref 1.6–2.6)
MCHC RBC-ENTMCNC: 28.8 PG — SIGNIFICANT CHANGE UP (ref 27–34)
MCHC RBC-ENTMCNC: 28.8 PG — SIGNIFICANT CHANGE UP (ref 27–34)
MCHC RBC-ENTMCNC: 33.3 G/DL — SIGNIFICANT CHANGE UP (ref 32–36)
MCHC RBC-ENTMCNC: 33.8 G/DL — SIGNIFICANT CHANGE UP (ref 32–36)
MCV RBC AUTO: 85.2 FL — SIGNIFICANT CHANGE UP (ref 80–100)
MCV RBC AUTO: 86.3 FL — SIGNIFICANT CHANGE UP (ref 80–100)
PHOSPHATE SERPL-MCNC: 6.7 MG/DL — HIGH (ref 2.5–4.5)
PLATELET # BLD AUTO: 190 K/UL — SIGNIFICANT CHANGE UP (ref 150–400)
PLATELET # BLD AUTO: 209 K/UL — SIGNIFICANT CHANGE UP (ref 150–400)
POTASSIUM SERPL-MCNC: 4 MMOL/L — SIGNIFICANT CHANGE UP (ref 3.5–5.3)
POTASSIUM SERPL-SCNC: 4 MMOL/L — SIGNIFICANT CHANGE UP (ref 3.5–5.3)
PROT SERPL-MCNC: 8.1 G/DL — SIGNIFICANT CHANGE UP (ref 6–8.3)
RBC # BLD: 4.52 M/UL — SIGNIFICANT CHANGE UP (ref 4.2–5.8)
RBC # BLD: 4.72 M/UL — SIGNIFICANT CHANGE UP (ref 4.2–5.8)
RBC # FLD: 14.8 % — SIGNIFICANT CHANGE UP (ref 10.3–16.9)
RBC # FLD: 14.9 % — SIGNIFICANT CHANGE UP (ref 10.3–16.9)
SODIUM SERPL-SCNC: 139 MMOL/L — SIGNIFICANT CHANGE UP (ref 135–145)
WBC # BLD: 8 K/UL — SIGNIFICANT CHANGE UP (ref 3.8–10.5)
WBC # BLD: 8.1 K/UL — SIGNIFICANT CHANGE UP (ref 3.8–10.5)
WBC # FLD AUTO: 8 K/UL — SIGNIFICANT CHANGE UP (ref 3.8–10.5)
WBC # FLD AUTO: 8.1 K/UL — SIGNIFICANT CHANGE UP (ref 3.8–10.5)

## 2018-07-24 PROCEDURE — 99233 SBSQ HOSP IP/OBS HIGH 50: CPT

## 2018-07-24 PROCEDURE — 76700 US EXAM ABDOM COMPLETE: CPT | Mod: 26

## 2018-07-24 PROCEDURE — 93880 EXTRACRANIAL BILAT STUDY: CPT | Mod: 26

## 2018-07-24 PROCEDURE — 99291 CRITICAL CARE FIRST HOUR: CPT

## 2018-07-24 PROCEDURE — 71045 X-RAY EXAM CHEST 1 VIEW: CPT | Mod: 26

## 2018-07-24 PROCEDURE — 93010 ELECTROCARDIOGRAM REPORT: CPT | Mod: 76

## 2018-07-24 PROCEDURE — 93010 ELECTROCARDIOGRAM REPORT: CPT

## 2018-07-24 RX ORDER — CHLORHEXIDINE GLUCONATE 213 G/1000ML
1 SOLUTION TOPICAL DAILY
Qty: 0 | Refills: 0 | Status: DISCONTINUED | OUTPATIENT
Start: 2018-07-24 | End: 2018-08-07

## 2018-07-24 RX ORDER — APIXABAN 2.5 MG/1
2.5 TABLET, FILM COATED ORAL EVERY 12 HOURS
Qty: 0 | Refills: 0 | Status: DISCONTINUED | OUTPATIENT
Start: 2018-07-24 | End: 2018-07-24

## 2018-07-24 RX ORDER — INSULIN GLARGINE 100 [IU]/ML
10 INJECTION, SOLUTION SUBCUTANEOUS AT BEDTIME
Qty: 0 | Refills: 0 | Status: DISCONTINUED | OUTPATIENT
Start: 2018-07-24 | End: 2018-07-26

## 2018-07-24 RX ORDER — SEVELAMER CARBONATE 2400 MG/1
800 POWDER, FOR SUSPENSION ORAL
Qty: 0 | Refills: 0 | Status: DISCONTINUED | OUTPATIENT
Start: 2018-07-24 | End: 2018-08-04

## 2018-07-24 RX ORDER — APIXABAN 2.5 MG/1
2.5 TABLET, FILM COATED ORAL EVERY 12 HOURS
Qty: 0 | Refills: 0 | Status: DISCONTINUED | OUTPATIENT
Start: 2018-07-24 | End: 2018-07-26

## 2018-07-24 RX ORDER — PANTOPRAZOLE SODIUM 20 MG/1
40 TABLET, DELAYED RELEASE ORAL DAILY
Qty: 0 | Refills: 0 | Status: DISCONTINUED | OUTPATIENT
Start: 2018-07-24 | End: 2018-07-26

## 2018-07-24 RX ORDER — HEPARIN SODIUM 5000 [USP'U]/ML
700 INJECTION INTRAVENOUS; SUBCUTANEOUS
Qty: 25000 | Refills: 0 | Status: DISCONTINUED | OUTPATIENT
Start: 2018-07-24 | End: 2018-07-24

## 2018-07-24 RX ADMIN — TICAGRELOR 90 MILLIGRAM(S): 90 TABLET ORAL at 17:27

## 2018-07-24 RX ADMIN — ESCITALOPRAM OXALATE 5 MILLIGRAM(S): 10 TABLET, FILM COATED ORAL at 11:01

## 2018-07-24 RX ADMIN — SEVELAMER CARBONATE 800 MILLIGRAM(S): 2400 POWDER, FOR SUSPENSION ORAL at 17:27

## 2018-07-24 RX ADMIN — Medication 10 MILLIGRAM(S): at 14:25

## 2018-07-24 RX ADMIN — Medication 6: at 10:58

## 2018-07-24 RX ADMIN — Medication 667 MILLIGRAM(S): at 08:03

## 2018-07-24 RX ADMIN — Medication 10 MILLIGRAM(S): at 22:31

## 2018-07-24 RX ADMIN — CHLORHEXIDINE GLUCONATE 1 APPLICATION(S): 213 SOLUTION TOPICAL at 11:03

## 2018-07-24 RX ADMIN — Medication 4: at 16:29

## 2018-07-24 RX ADMIN — Medication 2: at 08:04

## 2018-07-24 RX ADMIN — INSULIN GLARGINE 10 UNIT(S): 100 INJECTION, SOLUTION SUBCUTANEOUS at 22:31

## 2018-07-24 RX ADMIN — SEVELAMER CARBONATE 800 MILLIGRAM(S): 2400 POWDER, FOR SUSPENSION ORAL at 14:25

## 2018-07-24 RX ADMIN — PANTOPRAZOLE SODIUM 40 MILLIGRAM(S): 20 TABLET, DELAYED RELEASE ORAL at 17:42

## 2018-07-24 RX ADMIN — APIXABAN 2.5 MILLIGRAM(S): 2.5 TABLET, FILM COATED ORAL at 17:27

## 2018-07-24 RX ADMIN — HEPARIN SODIUM 700 UNIT(S)/HR: 5000 INJECTION INTRAVENOUS; SUBCUTANEOUS at 06:15

## 2018-07-24 RX ADMIN — TICAGRELOR 90 MILLIGRAM(S): 90 TABLET ORAL at 06:30

## 2018-07-24 RX ADMIN — Medication 10 MILLIGRAM(S): at 06:30

## 2018-07-24 NOTE — PROGRESS NOTE ADULT - PROBLEM SELECTOR PLAN 8
DVT: On Heparin gtt  GI: None  Activity: OOBAT, PT following DVT: Eliquis  GI: PPI  Activity: KEKE PT following

## 2018-07-24 NOTE — DIETITIAN INITIAL EVALUATION ADULT. - OTHER INFO
67 y.o M from home with PMHx of Dilated Cardiomyopathy, HFrEF of 25%, CAD s/p PCI 7 years ago, DM, HTN, HLD. Pt p/w AMS and admitted for management of ACS and subacute systolic CHF. Wife cooks at home, follows low Na and moderate CHO intake at home, good appetite, NKFA. SLP eval completed 7/23, recommend pureed solids with nectar thick liquids. Pt tolerating current dysphagia 1 pureed with nectar thick liquids (DASH/TLC, CCD no snacks, Renal, FR 1000 mL/d) well, fair (50%) PO intake of meals during lunch meal, feeding assisted by wife. Denies N/V/D/C or pain. Skin intact. F/u SLP for readiness of diet advancement.

## 2018-07-24 NOTE — DIETITIAN INITIAL EVALUATION ADULT. - PERTINENT MEDS FT
Ticagrelor, D5 @ 50 mL/hr (204 kcal from D5), Lantus, Pantoprazole, Sevelamer Carbonate powder, Apresoline, Lexapro, Humalog SS

## 2018-07-24 NOTE — DIETITIAN INITIAL EVALUATION ADULT. - ENERGY NEEDS
Height: 5' 6" Weight: 152 lbs (7/24), 130 lbs (7/23),  lbs+/-10%, %IBW 85%, BMI 25.2  ABW used to calculate EER as per pt's current body weight is within % IBW  Estimated nutrient needs based on [facility] SOC for maintenance in adults

## 2018-07-24 NOTE — DIETITIAN INITIAL EVALUATION ADULT. - PROBLEM SELECTOR PLAN 2
Patient has been weak, unsteady, and confused. Earlier today during examination by ED providers, he was reportedly very drowsy. There are no focal neurological deficits on exam. Differential includes toxic-metabolic vs hypoperfusion vs hypercapnia vs stroke/hemorrhage.   - Will f/u CT Head for stroke or bleed  - f/u VBG for ? hypercapnia  - f/u lactate

## 2018-07-24 NOTE — DIETITIAN INITIAL EVALUATION ADULT. - DIET TYPE
consistent carbohydrate (no snacks)/1000ml/dysphagia 1, pureed, nectar consistency fluid/low sodium/renal replacement pts:no protein restr,no conc K & phos, low sodium/DASH/TLC (sodium and cholesterol restricted diet)

## 2018-07-24 NOTE — PROGRESS NOTE ADULT - PROBLEM SELECTOR PLAN 4
Patient's Cr is now improving to 5 from 9 on admission, unclear baseline. Will need collateral from outpatient providers to ascertain whether CKD component is present. Possible 2/2 to cardiorenal syndrome as cardiac output is poor.   - Renal on board, appreciate recs  - Per renal, no urgent need for dialysis at this time  - Urine lytes with intrinsic renal disease, UA neg  - f/u renal US    #Hyponatremia: Resolved   - Wooten in place Patient's Cr is now improving to 5 from 9 on admission, unclear baseline. Will need collateral from outpatient providers to ascertain whether CKD component is present. Possible 2/2 to cardiorenal syndrome as cardiac output is poor.   - Renal on board, appreciate recs  - Per renal, no urgent need for dialysis at this time  - Urine lytes with intrinsic renal disease, UA neg  - f/u renal US    #Hyponatremia: Resolved   - d/c jo ann, now with condom cath

## 2018-07-24 NOTE — PROGRESS NOTE ADULT - PROBLEM SELECTOR PLAN 1
- NON OLIGURIC Ronnie-   unknown baseline, lst known creatinine a week ago in a hospital at [place] 6.6   - now with RONNIE due to cradiorenal syndrome   off diuretic  creatinine 5.06<-----6.03  MONITOR i/o  - daily weight   - renal diet

## 2018-07-24 NOTE — DIETITIAN INITIAL EVALUATION ADULT. - PROBLEM SELECTOR PLAN 1
Patient has hx of PCI w stents placed 7 years prior. Patient reportedly had an acute MI earlier this week in [place] with EKG showing ST elevations in the lateral leads and troponins in excess of 2000. EKG on admission shows q-waves in leads I and aVL with poor R wave progression consistent with a previous lateral wall MI. Outside therapeutic window for PCI.  - Downtrending troponins, will no longer follow  - Loaded with Brilinta, will c/w 90mg q12  - Loaded with ASA, will c/w 81mg qD  - Starting heparin gtt, titrating to aPTT of 60-80  - Holding statin therapy due to transaminitis  - Holding beta blockade due to decompensated heart failure  - Holding ACE/ARB due to severe RONNIE  - Will need diagnostic cath/stress test for ischemic workup  - f/u a1c/Lipids for risk stratification

## 2018-07-24 NOTE — PROGRESS NOTE ADULT - PROBLEM SELECTOR PLAN 1
Patient has hx of PCI w stents placed 7 years prior. Patient reportedly had an acute MI earlier this week in [place] with EKG showing ST elevations in the lateral leads and troponins in excess of 2000. EKG on admission shows q-waves in leads I and aVL with poor R wave progression consistent with a previous lateral wall MI. Outside therapeutic window for PCI. Troponin and CKMB done 7/22 in setting of new ST changes in 2 precordial leads, both lower, will no longer follow.  - Will hold ASA given risks of hemorrhagic conversion in setting of stroke with triple therapy  - resuming brillinta 90 BID  - On heparin gtt for aFlutter, titrating to aPTT of 40-60  - Holding statin therapy due to transaminitis  - Holding beta blockade due to decompensated heart failure  - Holding ACE/ARB due to severe RONNIE  - Will need diagnostic cath/stress test for ischemic workup; holding off due to renal failure  - On hydralazine 10 TID for afterload reduction Patient has hx of PCI w stents placed 7 years prior. Patient reportedly had an acute MI earlier this week in [place] with EKG showing ST elevations in the lateral leads and troponins in excess of 2000. EKG on admission shows q-waves in leads I and aVL with poor R wave progression consistent with a previous lateral wall MI. Outside therapeutic window for PCI. Troponin and CKMB done 7/22 in setting of new ST changes in 2 precordial leads, both lower, will no longer follow.  - Will hold ASA given risks of hemorrhagic conversion in setting of stroke with triple therapy  - resuming brillinta 90 BID  - Holding statin therapy due to transaminitis  - Holding beta blockade due to decompensated heart failure  - Holding ACE/ARB due to severe RONNIE  - Will need diagnostic cath/stress test for ischemic workup; holding off due to renal failure  - On hydralazine 10 TID for afterload reduction, will not uptitrate as patient was hypotensive

## 2018-07-24 NOTE — PROGRESS NOTE ADULT - PROBLEM SELECTOR PLAN 6
Patient's EF was reportedly 25% in the past according to ED report. On echo 7/20, EF is 15-20%. Was volume overloaded on exam with increased JVP, b/l LE edema. On CXR, evidence of cardiomegaly and significant pulmonary vascular congestion throughout bilateral lung fields.  - d/c bumex gtt, now on IV 1mg Bumex BID Patient's EF was reportedly 25% in the past according to ED report. On echo 7/20, EF is 15-20%. Was volume overloaded on exam with increased JVP, b/l LE edema. On CXR, evidence of cardiomegaly and significant pulmonary vascular congestion throughout bilateral lung fields.  - d/c bumex gtt, holding further diuresis due to orthostasis for now

## 2018-07-24 NOTE — PROGRESS NOTE ADULT - ATTENDING COMMENTS
have reviewed above.  rft's continue to improve slowly.  have reviewed above assessment and agree with plan

## 2018-07-24 NOTE — PROGRESS NOTE ADULT - PROBLEM SELECTOR PLAN 5
Patient's ALT over 1000 on admission, however AST is around 100. Alk phos is elevated to 150. Unclear pattern of liver enzymes. HIV/HepC/HepB negative. Likely shock liver.  - f/u RUQ US Patient's ALT over 1000 on admission, however AST is around 100. Alk phos is elevated to 150. Unclear pattern of liver enzymes. HIV/HepC/HepB negative. Likely shock liver, improving.  - f/u RUQ US

## 2018-07-24 NOTE — PROGRESS NOTE ADULT - PROBLEM SELECTOR PLAN 7
Holding home medications. Will need closer f/u as outpt.  - Hb A1c was 8  - c/w ISS, 7u lantus qHS    #Aflutter: On telemetry last night  - Will need chronic anticoagulation * c/w home meds of singulair, ventolin, flovent Holding home medications. Will need closer f/u as outpt.  - Hb A1c was 8  - c/w ISS, 10u lantus qHS    #Aflutter: On telemetry last night  - Will need chronic anticoagulation, now on eliquis

## 2018-07-24 NOTE — PROGRESS NOTE ADULT - SUBJECTIVE AND OBJECTIVE BOX
OVERNIGHT EVENTS:    SUBJECTIVE / INTERVAL HPI: Patient seen and examined at bedside.     VITAL SIGNS:  Vital Signs Last 24 Hrs  T(C): 36.4 (24 Jul 2018 13:00), Max: 36.9 (24 Jul 2018 01:13)  T(F): 97.6 (24 Jul 2018 13:00), Max: 98.4 (24 Jul 2018 01:13)  HR: 106 (24 Jul 2018 14:50) (86 - 108)  BP: 136/84 (24 Jul 2018 14:50) (86/67 - 136/84)  BP(mean): 107 (24 Jul 2018 14:50) (60 - 107)  RR: 20 (24 Jul 2018 14:50) (10 - 20)  SpO2: 97% (24 Jul 2018 14:50) (93% - 98%)    PHYSICAL EXAM:    General: WDWN  HEENT: NC/AT; PERRL, anicteric sclera; MMM  Neck: supple  Cardiovascular: +S1/S2, RRR  Respiratory: CTA B/L; no W/R/R  Gastrointestinal: soft, NT/ND; +BSx4  Extremities: WWP; no edema, clubbing or cyanosis  Vascular: 2+ radial, DP/PT pulses B/L  Neurological: AAOx3; no focal deficits    MEDICATIONS:  MEDICATIONS  (STANDING):  chlorhexidine 2% Cloths 1 Application(s) Topical daily  dextrose 5%. 1000 milliLiter(s) (50 mL/Hr) IV Continuous <Continuous>  dextrose 50% Injectable 12.5 Gram(s) IV Push once  dextrose 50% Injectable 25 Gram(s) IV Push once  dextrose 50% Injectable 25 Gram(s) IV Push once  escitalopram 5 milliGRAM(s) Oral daily  heparin  Infusion. 700 Unit(s)/Hr (7 mL/Hr) IV Continuous <Continuous>  hydrALAZINE 10 milliGRAM(s) Oral three times a day  insulin glargine Injectable (LANTUS) 10 Unit(s) SubCutaneous at bedtime  insulin lispro (HumaLOG) corrective regimen sliding scale   SubCutaneous three times a day before meals  insulin lispro (HumaLOG) corrective regimen sliding scale   SubCutaneous at bedtime  sevelamer carbonate Powder 800 milliGRAM(s) Oral three times a day with meals  ticagrelor 90 milliGRAM(s) Oral two times a day    MEDICATIONS  (PRN):  dextrose 40% Gel 15 Gram(s) Oral once PRN Blood Glucose LESS THAN 70 milliGRAM(s)/deciliter  glucagon  Injectable 1 milliGRAM(s) IntraMuscular once PRN Glucose LESS THAN 70 milligrams/deciliter      ALLERGIES:  Allergies    No Known Allergies    Intolerances        LABS:                        13.6   8.0   )-----------( 190      ( 24 Jul 2018 05:42 )             40.2     07-24    139  |  92<L>  |  160<H>  ----------------------------<  197<H>  4.0   |  29  |  5.06<H>    Ca    10.5      24 Jul 2018 05:42  Phos  6.7     07-24  Mg     2.4     07-24    TPro  8.1  /  Alb  3.7  /  TBili  0.8  /  DBili  x   /  AST  78<H>  /  ALT  554<H>  /  AlkPhos  131<H>  07-24    PT/INR - ( 23 Jul 2018 05:49 )   PT: 14.5 sec;   INR: 1.30          PTT - ( 24 Jul 2018 11:25 )  PTT:72.4 sec    CAPILLARY BLOOD GLUCOSE      POCT Blood Glucose.: 263 mg/dL (24 Jul 2018 10:45)      RADIOLOGY & ADDITIONAL TESTS: Reviewed. OVERNIGHT EVENTS: No acute overnight events. Heparin titrated to PTT goal    SUBJECTIVE / INTERVAL HPI: Patient seen and examined at bedside. Appears more awake today and lively compared with previous examinations.     VITAL SIGNS:  Vital Signs Last 24 Hrs  T(C): 36.4 (24 Jul 2018 13:00), Max: 36.9 (24 Jul 2018 01:13)  T(F): 97.6 (24 Jul 2018 13:00), Max: 98.4 (24 Jul 2018 01:13)  HR: 106 (24 Jul 2018 14:50) (86 - 108)  BP: 136/84 (24 Jul 2018 14:50) (86/67 - 136/84)  BP(mean): 107 (24 Jul 2018 14:50) (60 - 107)  RR: 20 (24 Jul 2018 14:50) (10 - 20)  SpO2: 97% (24 Jul 2018 14:50) (93% - 98%)    PHYSICAL EXAM:    General: WDWN  HEENT: NC/AT; PERRL, anicteric sclera; MMM  Neck: supple  Cardiovascular: +S1/S2, RRR  Respiratory: CTA B/L; no W/R/R  Gastrointestinal: soft, NT/ND; +BSx4  Extremities: WWP; no edema, clubbing or cyanosis  Vascular: 2+ radial, DP/PT pulses B/L  Neurological: AAOx3; no focal deficits    MEDICATIONS:  MEDICATIONS  (STANDING):  chlorhexidine 2% Cloths 1 Application(s) Topical daily  dextrose 5%. 1000 milliLiter(s) (50 mL/Hr) IV Continuous <Continuous>  dextrose 50% Injectable 12.5 Gram(s) IV Push once  dextrose 50% Injectable 25 Gram(s) IV Push once  dextrose 50% Injectable 25 Gram(s) IV Push once  escitalopram 5 milliGRAM(s) Oral daily  heparin  Infusion. 700 Unit(s)/Hr (7 mL/Hr) IV Continuous <Continuous>  hydrALAZINE 10 milliGRAM(s) Oral three times a day  insulin glargine Injectable (LANTUS) 10 Unit(s) SubCutaneous at bedtime  insulin lispro (HumaLOG) corrective regimen sliding scale   SubCutaneous three times a day before meals  insulin lispro (HumaLOG) corrective regimen sliding scale   SubCutaneous at bedtime  sevelamer carbonate Powder 800 milliGRAM(s) Oral three times a day with meals  ticagrelor 90 milliGRAM(s) Oral two times a day    MEDICATIONS  (PRN):  dextrose 40% Gel 15 Gram(s) Oral once PRN Blood Glucose LESS THAN 70 milliGRAM(s)/deciliter  glucagon  Injectable 1 milliGRAM(s) IntraMuscular once PRN Glucose LESS THAN 70 milligrams/deciliter      ALLERGIES:  Allergies    No Known Allergies    Intolerances        LABS:                        13.6   8.0   )-----------( 190      ( 24 Jul 2018 05:42 )             40.2     07-24    139  |  92<L>  |  160<H>  ----------------------------<  197<H>  4.0   |  29  |  5.06<H>    Ca    10.5      24 Jul 2018 05:42  Phos  6.7     07-24  Mg     2.4     07-24    TPro  8.1  /  Alb  3.7  /  TBili  0.8  /  DBili  x   /  AST  78<H>  /  ALT  554<H>  /  AlkPhos  131<H>  07-24    PT/INR - ( 23 Jul 2018 05:49 )   PT: 14.5 sec;   INR: 1.30          PTT - ( 24 Jul 2018 11:25 )  PTT:72.4 sec    CAPILLARY BLOOD GLUCOSE      POCT Blood Glucose.: 263 mg/dL (24 Jul 2018 10:45)      RADIOLOGY & ADDITIONAL TESTS: Reviewed. OVERNIGHT EVENTS: No acute overnight events. Heparin titrated to PTT goal    SUBJECTIVE / INTERVAL HPI: Patient seen and examined at bedside. Appears more awake today and lively compared with previous examinations.     VITAL SIGNS:  Vital Signs Last 24 Hrs  T(C): 36.4 (24 Jul 2018 13:00), Max: 36.9 (24 Jul 2018 01:13)  T(F): 97.6 (24 Jul 2018 13:00), Max: 98.4 (24 Jul 2018 01:13)  HR: 106 (24 Jul 2018 14:50) (86 - 108)  BP: 136/84 (24 Jul 2018 14:50) (86/67 - 136/84)  BP(mean): 107 (24 Jul 2018 14:50) (60 - 107)  RR: 20 (24 Jul 2018 14:50) (10 - 20)  SpO2: 97% (24 Jul 2018 14:50) (93% - 98%)    PHYSICAL EXAM:    Constitutional: WDWN resting comfortably in bed; NAD  Head: NC/AT  Eyes: PERRL, EOMI, anicteric sclera  ENT: no nasal discharge; uvula midline, no oropharyngeal erythema or exudates; MMM.   Neck: JVD appreciated just above clavicles  Respiratory: CTA B/L; no W/R/R, no retractions  Cardiac: 2/6 WANDER at LUSB, Regular rate, rhythm  Gastrointestinal: abdomen soft, NT, mildly distended; no rebound or guarding; +BSx4  Extremities:  No edema at b/l ankles. Feet are cool to the touch. Otherwise WWP.   Vascular: 2+ radial. DP/PT pulses 1+.   Neurologic: AAOx3; slight right facial droop  Psychiatric: Flat affect; limited responses to interviewer/clinician    MEDICATIONS:  MEDICATIONS  (STANDING):  chlorhexidine 2% Cloths 1 Application(s) Topical daily  dextrose 5%. 1000 milliLiter(s) (50 mL/Hr) IV Continuous <Continuous>  dextrose 50% Injectable 12.5 Gram(s) IV Push once  dextrose 50% Injectable 25 Gram(s) IV Push once  dextrose 50% Injectable 25 Gram(s) IV Push once  escitalopram 5 milliGRAM(s) Oral daily  heparin  Infusion. 700 Unit(s)/Hr (7 mL/Hr) IV Continuous <Continuous>  hydrALAZINE 10 milliGRAM(s) Oral three times a day  insulin glargine Injectable (LANTUS) 10 Unit(s) SubCutaneous at bedtime  insulin lispro (HumaLOG) corrective regimen sliding scale   SubCutaneous three times a day before meals  insulin lispro (HumaLOG) corrective regimen sliding scale   SubCutaneous at bedtime  sevelamer carbonate Powder 800 milliGRAM(s) Oral three times a day with meals  ticagrelor 90 milliGRAM(s) Oral two times a day    MEDICATIONS  (PRN):  dextrose 40% Gel 15 Gram(s) Oral once PRN Blood Glucose LESS THAN 70 milliGRAM(s)/deciliter  glucagon  Injectable 1 milliGRAM(s) IntraMuscular once PRN Glucose LESS THAN 70 milligrams/deciliter      ALLERGIES:  Allergies    No Known Allergies    Intolerances        LABS:                        13.6   8.0   )-----------( 190      ( 24 Jul 2018 05:42 )             40.2     07-24    139  |  92<L>  |  160<H>  ----------------------------<  197<H>  4.0   |  29  |  5.06<H>    Ca    10.5      24 Jul 2018 05:42  Phos  6.7     07-24  Mg     2.4     07-24    TPro  8.1  /  Alb  3.7  /  TBili  0.8  /  DBili  x   /  AST  78<H>  /  ALT  554<H>  /  AlkPhos  131<H>  07-24    PT/INR - ( 23 Jul 2018 05:49 )   PT: 14.5 sec;   INR: 1.30          PTT - ( 24 Jul 2018 11:25 )  PTT:72.4 sec    CAPILLARY BLOOD GLUCOSE      POCT Blood Glucose.: 263 mg/dL (24 Jul 2018 10:45)      RADIOLOGY & ADDITIONAL TESTS: Reviewed.

## 2018-07-24 NOTE — PROGRESS NOTE ADULT - SUBJECTIVE AND OBJECTIVE BOX
Patient seen and examined  no acute events  BUN/cr @ 160/5.06  off diuretic    aspirin  chewable 81 milliGRAM(s) daily  buMETAnide Infusion 1 mG/Hr <Continuous>  dextrose 40% Gel 15 Gram(s) once PRN  dextrose 5%. 1000 milliLiter(s) <Continuous>  dextrose 50% Injectable 12.5 Gram(s) once  dextrose 50% Injectable 25 Gram(s) once  dextrose 50% Injectable 25 Gram(s) once  glucagon  Injectable 1 milliGRAM(s) once PRN  heparin  Infusion 900 Unit(s)/Hr <Continuous>  insulin lispro (HumaLOG) corrective regimen sliding scale   three times a day before meals  insulin lispro (HumaLOG) corrective regimen sliding scale   at bedtime  ticagrelor 90 milliGRAM(s) two times a day      Allergies    No Known Allergies    Intolerances        T(C): , Max: 37.1 (18 @ 06:28)  T(F): , Max: 98.7 (18 @ 06:28)  HR: 78 (18 @ 14:00)  BP: 126/73 (18 @ 14:00)  BP(mean): 96 (18 @ 14:00)  RR: 19 (18 @ 14:00)  SpO2: 99% (18 @ 14:00)  Wt(kg): --     @ 07:  -   @ 07:00  --  on Bumex  1 mg IV q 12hrs. urine output @ 2400 cc    IN:    bumetanide Infusion: 130 mL    heparin Infusion: 69 mL  Total IN: 199 mL    OUT:    Intermittent Catheterization - Urethral: 2450 mL  Total OUT: 2450 mL    Total NET: -2251 mL       @ 07:  -   @ 15:19  --------------------------------------------------------  IN:    bumetanide Infusion: 80 mL    heparin Infusion: 36 mL  Total IN: 116 mL    OUT:    Intermittent Catheterization - Urethral: 1720 mL  Total OUT: 1720 mL    Total NET: -1604 mL    Physical exam:   Alert and oriented  JVD   Normal air entry into the lungs, no wheezing, no crackles   RRR, normal s1/s2, no murmurs, rubs or gallops   Abdomen - soft, not tender, not distended   Extremities: 1+ edema   No rash   No astertix         Height (cm): 167.64 ( @ 17:42)  Weight (kg): 70.7 ( @ 17:42)  BMI (kg/m2): 25.2 ( @ 17:42)  BSA (m2): 1.8 ( @ 17:42)      LABS:                        13.2   6.2   )-----------( 121      ( 2018 08:05 )             38.2         136  |  88<L>  |  142<H>  ----------------------------<  162<H>  3.9   |  22  |  8.32<H>    Ca    8.9      2018 08:05  Phos  7.4       Mg     2.9         TPro  7.7  /  Alb  3.5  /  TBili  2.0<H>  /  DBili  x   /  AST  106<H>  /  ALT  1177<H>  /  AlkPhos  152<H>      Cholesterol, Serum: 178 mg/dL [10 - 199] ( @ 08:05)  Hemoglobin A1C, Whole Blood: 8.0 % <H> [4.0 - 5.6] ( @ 08:05)    PT/INR - ( 2018 13:48 )   PT: 14.9 sec;   INR: 1.33          PTT - ( 2018 01:36 )  PTT:54.4 sec  Urinalysis Basic - ( 2018 17:25 )    Color: Yellow / Appearance: Clear / S.015 / pH: x  Gluc: x / Ketone: NEGATIVE  / Bili: Negative / Urobili: 0.2 E.U./dL   Blood: x / Protein: Trace mg/dL / Nitrite: NEGATIVE   Leuk Esterase: NEGATIVE / RBC: < 5 /HPF / WBC 5-10 /HPF   Sq Epi: x / Non Sq Epi: 0-5 /HPF / Bacteria: Present /HPF      Chloride, Random Urine: 52 mmol/L ( @ 18:48)  Creatinine, Random Urine: 58 mg/dL ( @ 18:48)        RADIOLOGY & ADDITIONAL STUDIES:

## 2018-07-24 NOTE — PROGRESS NOTE ADULT - PROBLEM SELECTOR PLAN 9
F: None  E: Replete K>4, Mg>2. Will need to be careful with repletion given poor renal function  N: DASH/TLC/CC/Renal/Fluid restricted pureed nectar thick diet as per speech

## 2018-07-24 NOTE — PROGRESS NOTE ADULT - PROBLEM SELECTOR PLAN 2
- please start on sevelamer 800 mg three times a day   pth @ 503, VIT d 25- 19, vIT d 1, 25 @ 50.1, pHOS @5.7 ON 7/22, CALCIYUM @ 9.5  currently on sevelamer 800 mg TID

## 2018-07-24 NOTE — PROGRESS NOTE ADULT - ATTENDING COMMENTS
68yo M w/ h/o Dilated cardiomyopathy(baseline EF 25%), CAD s/p PCI(5-7yrs ago), lost in f/u, and historically non-compliant w/ meds - was recently on Vacation in [place] where he presented to a hospital after feeling weak and short of breath - there he was found to have had a Lateral wall MI in addition to profound renal failure - Cardiac Cath was deferred in [place] and pt. returned to [place]. He saw his outpatient Cardiologist upon return who sent him to [facility] ED as patient still w/ persistent fatigue and more lethargic - Upon admission he was noted to have persistent ST elevations w/ Q waves(I & AVL; unchanged from EKG in [place]) though w/o complaints of SOB/CP and HD stable. Additionally noted to have significant renal failure(BUN/Cr: 145/9), but still making urine. Lastly, neuroimaging revealed subacute/acute ischemia 2/2 embolic phenomenon, pt. noted to have intermittent Aflutter on Tele likely as the source - Pt. admitted to CCU for further management    -CHF - acute on chronic systolic CHF 2/2 recent MI; Initially, pt. significantly volume up on admission however w/o signs of low output;   -Bumex 1mg IV held yesterday(night dose) as patient w/ brief episode of lightheadedness/hypotension(80s/50s) while working with PT; Will continue to hold today; Pt. clinically w/ flat neck veins and able to tolerate lying flat; Will likely resume Torsemide PO 10mg daily tmrw for maintenance; Will c/w after load reduction of Hydral 10 PO TID; Likely to start BB tmrw    -STEMI - Pt. initially ASA/Brillinta loaded - no plan for PCI given renal function and late presentation. Pt. remains HD and Electrically stable, No CP and not in refractory HF. Pt. will need a viability study prior to determining a strategy for revascularization. Given intermittent AFlutter w/ embolic CVA - pt. will need triple therapy, but d/t risk of hemorrhagic conversion ASA now dc'd; Currently on Brillinta and Heparin gtt which will now be dc'd and he will be started on Eliquis 2.5 BID starting tonight; Statin to be started once LFTs improve    -CVA - embolic in nature, likely Cardiac source in the setting of intermittent Aflutter; Currently on Brillinta and Heparin gtt which will be transitioned to Eliquis 2.5 BID tonight; c/w Lexapro as per Stroke Team    -Renal - likely acute on chronic renal failure(unclear baseline); Will c/w diuresis, improving Serum Cr(today Cr. is 5). since admission, though BUN remains persistently elevated; At present, no indication for urgent HD; Renal following    -Transaminitis - likely 2/2 hepatic congestion; Currently improving w/ diuresis    -DM - currently on Lantus and Lispro SS    -Diet - Puree/Nectar thick diet; Pt. remains high aspiration risk   -DVT PPx - Heparin gtt; Transition to Eliquis BID  -Full code

## 2018-07-24 NOTE — PROGRESS NOTE ADULT - PROBLEM SELECTOR PLAN 3
Patient has been weak, unsteady, and confused. There is ? facial droop on exam. Likely toxic-metabolic 2/2 uremia but could have some component of hypoperfusion, stroke/hemorrhage, and depression.   - Lactate 1.8  - On lexapro

## 2018-07-24 NOTE — PROGRESS NOTE ADULT - PROBLEM SELECTOR PLAN 2
MRI significant for subacute ischemia involving right parietal area   with numerous scattered areas of acute ischemia within the basal ganglia   and frontal and parietal lobes bilaterally some of which are in a   watershed distribution.  - F/U neuro recs  - Needs completed MRA study for full neuro workup.   - Ordered carotid u/s  - Will need to be on AC, ASA, antithrombotic therapy, statin (when liver enzymes improve), have good management of BP  - Discontinued ASA due to risks of triple therapy MRI significant for subacute ischemia involving right parietal area   with numerous scattered areas of acute ischemia within the basal ganglia   and frontal and parietal lobes bilaterally some of which are in a   watershed distribution.  - F/U neuro recs  - Needs completed MRA study for full neuro workup.   - Ordered carotid u/s  - Will need to be on AC, ASA, antithrombotic therapy, statin (when liver enzymes improve), have good management of BP  - Discontinued ASA due to risks of triple therapy  - d/c heparin gtt, now on eliquis for anticoagulation in setting of aflutter

## 2018-07-25 LAB
ALBUMIN SERPL ELPH-MCNC: 3.6 G/DL — SIGNIFICANT CHANGE UP (ref 3.3–5)
ALP SERPL-CCNC: 124 U/L — HIGH (ref 40–120)
ALT FLD-CCNC: 462 U/L — HIGH (ref 10–45)
ANION GAP SERPL CALC-SCNC: 16 MMOL/L — SIGNIFICANT CHANGE UP (ref 5–17)
AST SERPL-CCNC: 91 U/L — HIGH (ref 10–40)
BILIRUB SERPL-MCNC: 0.8 MG/DL — SIGNIFICANT CHANGE UP (ref 0.2–1.2)
BUN SERPL-MCNC: 162 MG/DL — HIGH (ref 7–23)
CALCIUM SERPL-MCNC: 10.5 MG/DL — SIGNIFICANT CHANGE UP (ref 8.4–10.5)
CHLORIDE SERPL-SCNC: 92 MMOL/L — LOW (ref 96–108)
CO2 SERPL-SCNC: 29 MMOL/L — SIGNIFICANT CHANGE UP (ref 22–31)
CREAT SERPL-MCNC: 4.17 MG/DL — HIGH (ref 0.5–1.3)
GLUCOSE SERPL-MCNC: 166 MG/DL — HIGH (ref 70–99)
HCT VFR BLD CALC: 41.5 % — SIGNIFICANT CHANGE UP (ref 39–50)
HGB BLD-MCNC: 14 G/DL — SIGNIFICANT CHANGE UP (ref 13–17)
MAGNESIUM SERPL-MCNC: 2.4 MG/DL — SIGNIFICANT CHANGE UP (ref 1.6–2.6)
MCHC RBC-ENTMCNC: 28.7 PG — SIGNIFICANT CHANGE UP (ref 27–34)
MCHC RBC-ENTMCNC: 33.7 G/DL — SIGNIFICANT CHANGE UP (ref 32–36)
MCV RBC AUTO: 85 FL — SIGNIFICANT CHANGE UP (ref 80–100)
PLATELET # BLD AUTO: 220 K/UL — SIGNIFICANT CHANGE UP (ref 150–400)
POTASSIUM SERPL-MCNC: 4.3 MMOL/L — SIGNIFICANT CHANGE UP (ref 3.5–5.3)
POTASSIUM SERPL-SCNC: 4.3 MMOL/L — SIGNIFICANT CHANGE UP (ref 3.5–5.3)
PROT SERPL-MCNC: 8 G/DL — SIGNIFICANT CHANGE UP (ref 6–8.3)
RBC # BLD: 4.88 M/UL — SIGNIFICANT CHANGE UP (ref 4.2–5.8)
RBC # FLD: 14.8 % — SIGNIFICANT CHANGE UP (ref 10.3–16.9)
SODIUM SERPL-SCNC: 137 MMOL/L — SIGNIFICANT CHANGE UP (ref 135–145)
WBC # BLD: 8.5 K/UL — SIGNIFICANT CHANGE UP (ref 3.8–10.5)
WBC # FLD AUTO: 8.5 K/UL — SIGNIFICANT CHANGE UP (ref 3.8–10.5)

## 2018-07-25 PROCEDURE — 99233 SBSQ HOSP IP/OBS HIGH 50: CPT

## 2018-07-25 PROCEDURE — 71045 X-RAY EXAM CHEST 1 VIEW: CPT | Mod: 26

## 2018-07-25 PROCEDURE — 93010 ELECTROCARDIOGRAM REPORT: CPT

## 2018-07-25 PROCEDURE — 99291 CRITICAL CARE FIRST HOUR: CPT

## 2018-07-25 RX ORDER — METOPROLOL TARTRATE 50 MG
12.5 TABLET ORAL EVERY 12 HOURS
Qty: 0 | Refills: 0 | Status: DISCONTINUED | OUTPATIENT
Start: 2018-07-25 | End: 2018-07-26

## 2018-07-25 RX ORDER — METOPROLOL TARTRATE 50 MG
12.5 TABLET ORAL ONCE
Qty: 0 | Refills: 0 | Status: COMPLETED | OUTPATIENT
Start: 2018-07-25 | End: 2018-07-25

## 2018-07-25 RX ADMIN — TICAGRELOR 90 MILLIGRAM(S): 90 TABLET ORAL at 17:30

## 2018-07-25 RX ADMIN — ESCITALOPRAM OXALATE 5 MILLIGRAM(S): 10 TABLET, FILM COATED ORAL at 11:42

## 2018-07-25 RX ADMIN — TICAGRELOR 90 MILLIGRAM(S): 90 TABLET ORAL at 06:36

## 2018-07-25 RX ADMIN — SEVELAMER CARBONATE 800 MILLIGRAM(S): 2400 POWDER, FOR SUSPENSION ORAL at 11:41

## 2018-07-25 RX ADMIN — Medication 4: at 11:41

## 2018-07-25 RX ADMIN — APIXABAN 2.5 MILLIGRAM(S): 2.5 TABLET, FILM COATED ORAL at 06:36

## 2018-07-25 RX ADMIN — SEVELAMER CARBONATE 800 MILLIGRAM(S): 2400 POWDER, FOR SUSPENSION ORAL at 08:47

## 2018-07-25 RX ADMIN — PANTOPRAZOLE SODIUM 40 MILLIGRAM(S): 20 TABLET, DELAYED RELEASE ORAL at 11:41

## 2018-07-25 RX ADMIN — Medication 4: at 17:29

## 2018-07-25 RX ADMIN — Medication 10 MILLIGRAM(S): at 15:20

## 2018-07-25 RX ADMIN — Medication 10 MILLIGRAM(S): at 06:36

## 2018-07-25 RX ADMIN — APIXABAN 2.5 MILLIGRAM(S): 2.5 TABLET, FILM COATED ORAL at 17:30

## 2018-07-25 RX ADMIN — Medication 10 MILLIGRAM(S): at 22:42

## 2018-07-25 RX ADMIN — Medication 2: at 22:43

## 2018-07-25 RX ADMIN — Medication 12.5 MILLIGRAM(S): at 15:21

## 2018-07-25 RX ADMIN — Medication 12.5 MILLIGRAM(S): at 01:07

## 2018-07-25 RX ADMIN — INSULIN GLARGINE 10 UNIT(S): 100 INJECTION, SOLUTION SUBCUTANEOUS at 22:42

## 2018-07-25 RX ADMIN — CHLORHEXIDINE GLUCONATE 1 APPLICATION(S): 213 SOLUTION TOPICAL at 11:43

## 2018-07-25 RX ADMIN — SEVELAMER CARBONATE 800 MILLIGRAM(S): 2400 POWDER, FOR SUSPENSION ORAL at 17:29

## 2018-07-25 NOTE — PROGRESS NOTE ADULT - PROBLEM SELECTOR PLAN 4
Patient's Cr is now improving to 5 from 9 on admission, unclear baseline. Will need collateral from outpatient providers to ascertain whether CKD component is present. Possible 2/2 to cardiorenal syndrome as cardiac output is poor.   - Renal on board, appreciate recs  - Per renal, no urgent need for dialysis at this time  - Urine lytes with intrinsic renal disease, UA neg  - f/u renal US    #Hyponatremia: Resolved   - d/c jo ann, now with condom cath Patient's Cr is now improving to 5 from 9 on admission, unclear baseline. Will need collateral from outpatient providers to ascertain whether CKD component is present. Possible 2/2 to cardiorenal syndrome as cardiac output is poor.   - Renal on board, appreciate recs  - Per renal, no urgent need for dialysis at this time  - Urine lytes with intrinsic renal disease, UA neg  - f/u renal US  -Pt dry and euvolemic today; hold further diuretics and reassess starting PO tomorrow  #Hyponatremia: Resolved   - d/c jo ann, now with condom cath

## 2018-07-25 NOTE — PROGRESS NOTE ADULT - PROBLEM SELECTOR PLAN 1
Patient has hx of PCI w stents placed 7 years prior. Patient reportedly had an acute MI earlier this week in [place] with EKG showing ST elevations in the lateral leads and troponins in excess of 2000. EKG on admission shows q-waves in leads I and aVL with poor R wave progression consistent with a previous lateral wall MI. Outside therapeutic window for PCI. Troponin and CKMB done 7/22 in setting of new ST changes in 2 precordial leads, both lower, will no longer follow.  - Will hold ASA given risks of hemorrhagic conversion in setting of stroke with triple therapy  - resuming brillinta 90 BID  - Holding statin therapy due to transaminitis  - Holding beta blockade due to decompensated heart failure  - Holding ACE/ARB due to severe RONNIE  - Will need diagnostic cath/stress test for ischemic workup; holding off due to renal failure  - On hydralazine 10 TID for afterload reduction, will not uptitrate as patient was hypotensive Patient has hx of PCI w stents placed 7 years prior. Patient reportedly had an acute MI earlier this week in [place] with EKG showing ST elevations in the lateral leads and troponins in excess of 2000. EKG on admission shows q-waves in leads I and aVL with poor R wave progression consistent with a previous lateral wall MI. Outside therapeutic window for PCI. Troponin and CKMB done 7/22 in setting of new ST changes in 2 precordial leads, both lower, will no longer follow.  - Will hold ASA given risks of hemorrhagic conversion in setting of stroke with triple therapy  - resuming brillinta 90 BID  - Holding statin therapy due to transaminitis  - Restarted beta blocker (metop 12.5mg q12hr) in setting of new afib/aflutter   - Holding ACE/ARB due to severe RONNIE  - Will need diagnostic cath/stress test for ischemic workup; holding off due to renal failure  - On hydralazine 10 TID for afterload reduction, will monitor and reassess for need to up titrated    #asymptomatic afib/aflutter  Noted with new afib/aflutter overnight,. Started on rate control (goal <110) and will continue to monitor  -metoprolol 12.5 q12hr  -cardiac monitor  -on Eliquis

## 2018-07-25 NOTE — PROGRESS NOTE ADULT - PROBLEM SELECTOR PLAN 3
Patient has been weak, unsteady, and confused. There is ? facial droop on exam. Likely toxic-metabolic 2/2 uremia but could have some component of hypoperfusion, stroke/hemorrhage, and depression.   - Lactate 1.8  - On lexapro Patient has been weak, unsteady, and confused. There is ? facial droop on exam. Likely toxic-metabolic 2/2 uremia but could have some component of hypoperfusion, stroke/hemorrhage, and depression.   - Lactate normalized   - On lexapro

## 2018-07-25 NOTE — OCCUPATIONAL THERAPY INITIAL EVALUATION ADULT - VISUAL ASSESSMENT: TRACKING
unable to fully assess visual tracking 2/2 decreased commands following; noted to track OT in all planes during session

## 2018-07-25 NOTE — PROGRESS NOTE ADULT - ATTENDING COMMENTS
looks clinically improved.   more comfortable   good u.o.      rft's gradually improving.  continue current rx

## 2018-07-25 NOTE — OCCUPATIONAL THERAPY INITIAL EVALUATION ADULT - ADDITIONAL COMMENTS
As per chart, patient was independent with functional mobility and Activities of Daily Living without AD PTA.

## 2018-07-25 NOTE — PROGRESS NOTE ADULT - SUBJECTIVE AND OBJECTIVE BOX
Patient seen and examined  no acute events  BUN/cr @100/59  off diuretic  urine output @ 555    aspirin  chewable 81 milliGRAM(s) daily  buMETAnide Infusion 1 mG/Hr <Continuous>  dextrose 40% Gel 15 Gram(s) once PRN  dextrose 5%. 1000 milliLiter(s) <Continuous>  dextrose 50% Injectable 12.5 Gram(s) once  dextrose 50% Injectable 25 Gram(s) once  dextrose 50% Injectable 25 Gram(s) once  glucagon  Injectable 1 milliGRAM(s) once PRN  heparin  Infusion 900 Unit(s)/Hr <Continuous>  insulin lispro (HumaLOG) corrective regimen sliding scale   three times a day before meals  insulin lispro (HumaLOG) corrective regimen sliding scale   at bedtime  ticagrelor 90 milliGRAM(s) two times a day      Allergies    No Known Allergies    Intolerances        T(C): , Max: 37.1 (18 @ 06:28)  T(F): , Max: 98.7 (18 @ 06:28)  HR: 78 (18 @ 14:00)  BP: 126/73 (18 @ 14:00)  BP(mean): 96 (18 @ 14:00)  RR: 19 (18 @ 14:00)  SpO2: 99% (18 @ 14:00)  Wt(kg): --     @ 07:  -   @ 07:00  --  on Bumex  1 mg IV q 12hrs. urine output @ 2400 cc    IN:    bumetanide Infusion: 130 mL    heparin Infusion: 69 mL  Total IN: 199 mL    OUT:    Intermittent Catheterization - Urethral: 2450 mL  Total OUT: 2450 mL    Total NET: -2251 mL       @ 07:  -   @ 15:19  --------------------------------------------------------  IN:    bumetanide Infusion: 80 mL    heparin Infusion: 36 mL  Total IN: 116 mL    OUT:    Intermittent Catheterization - Urethral: 1720 mL  Total OUT: 1720 mL    Total NET: -1604 mL    Physical exam:   Alert and oriented  JVD   Normal air entry into the lungs, no wheezing, no crackles   RRR, normal s1/s2, no murmurs, rubs or gallops   Abdomen - soft, not tender, not distended   Extremities: 1+ edema   No rash   No astertix         Height (cm): 167.64 ( @ 17:42)  Weight (kg): 70.7 ( @ 17:42)  BMI (kg/m2): 25.2 ( @ 17:42)  BSA (m2): 1.8 ( @ 17:42)      LABS:                        13.2   6.2   )-----------( 121      ( 2018 08:05 )             38.2         136  |  88<L>  |  142<H>  ----------------------------<  162<H>  3.9   |  22  |  8.32<H>    Ca    8.9      2018 08:05  Phos  7.4       Mg     2.9         TPro  7.7  /  Alb  3.5  /  TBili  2.0<H>  /  DBili  x   /  AST  106<H>  /  ALT  1177<H>  /  AlkPhos  152<H>      Cholesterol, Serum: 178 mg/dL [10 - 199] ( @ 08:05)  Hemoglobin A1C, Whole Blood: 8.0 % <H> [4.0 - 5.6] ( @ 08:05)    PT/INR - ( 2018 13:48 )   PT: 14.9 sec;   INR: 1.33          PTT - ( 2018 01:36 )  PTT:54.4 sec  Urinalysis Basic - ( 2018 17:25 )    Color: Yellow / Appearance: Clear / S.015 / pH: x  Gluc: x / Ketone: NEGATIVE  / Bili: Negative / Urobili: 0.2 E.U./dL   Blood: x / Protein: Trace mg/dL / Nitrite: NEGATIVE   Leuk Esterase: NEGATIVE / RBC: < 5 /HPF / WBC 5-10 /HPF   Sq Epi: x / Non Sq Epi: 0-5 /HPF / Bacteria: Present /HPF      Chloride, Random Urine: 52 mmol/L ( @ 18:48)  Creatinine, Random Urine: 58 mg/dL ( @ 18:48)        RADIOLOGY & ADDITIONAL STUDIES:

## 2018-07-25 NOTE — OCCUPATIONAL THERAPY INITIAL EVALUATION ADULT - SITTING BALANCE: STATIC
patient impulsive, moving trunk sideways requiring Bruce, otherwise when sitting with cues to maintain posture patient can hold position with supervision/poor plus

## 2018-07-25 NOTE — OCCUPATIONAL THERAPY INITIAL EVALUATION ADULT - PLANNED THERAPY INTERVENTIONS, OT EVAL
cognitive, visual perceptual/neuromuscular re-education/strengthening/transfer training/IADL retraining/fine motor coordination training/parent/caregiver training.../ADL retraining/balance training/bed mobility training/motor coordination training

## 2018-07-25 NOTE — PROGRESS NOTE ADULT - SUBJECTIVE AND OBJECTIVE BOX
OVERNIGHT EVENTS:    SUBJECTIVE:    Vital Signs Last 12 Hrs  T(F): 97.5 (07-25-18 @ 02:29), Max: 97.5 (07-25-18 @ 02:29)  HR: 92 (07-25-18 @ 06:00) (92 - 124)  BP: 108/69 (07-25-18 @ 06:00) (93/60 - 140/88)  BP(mean): 86 (07-25-18 @ 06:00) (64 - 112)  RR: 11 (07-25-18 @ 06:00) (7 - 18)  SpO2: 98% (07-25-18 @ 06:00) (94% - 99%)  I&O's Summary    23 Jul 2018 07:01  -  24 Jul 2018 07:00  --------------------------------------------------------  IN: 531 mL / OUT: 1375 mL / NET: -844 mL    24 Jul 2018 07:01  -  25 Jul 2018 06:40  --------------------------------------------------------  IN: 733 mL / OUT: 680 mL / NET: 53 mL        PHYSICAL EXAM:  Constitutional: NAD, comfortable in bed.  HEENT: NC/AT, PERRLA, EOMI, no conjunctival pallor or scleral icterus, MMM  Neck: Supple, no JVD  Respiratory: Normal rate, rhythm, depth, effort. CTAB. No w/r/r.   Cardiovascular: RRR, normal S1 and S2, no m/r/g.   Gastrointestinal: +BS, soft NTND, no guarding or rebound tenderness, no palpable masses   Extremities: wwp; no cyanosis, clubbing or edema.   Vascular: Pulses equal and strong throughout.   Neurological: AAOx3, no CN deficits, strength and sensation intact throughout.   Skin: No gross skin abnormalities or rashes        LABS:                        14.0   8.5   )-----------( 220      ( 25 Jul 2018 05:44 )             41.5     07-25    137  |  92<L>  |  162<H>  ----------------------------<  166<H>  4.3   |  29  |  4.17<H>    Ca    10.5      25 Jul 2018 05:44  Phos  6.7     07-24  Mg     2.4     07-25    TPro  8.0  /  Alb  3.6  /  TBili  0.8  /  DBili  x   /  AST  91<H>  /  ALT  462<H>  /  AlkPhos  124<H>  07-25    PTT - ( 24 Jul 2018 18:36 )  PTT:30.0 sec      RADIOLOGY & ADDITIONAL TESTS:    MEDICATIONS  (STANDING):  apixaban 2.5 milliGRAM(s) Oral every 12 hours  chlorhexidine 2% Cloths 1 Application(s) Topical daily  dextrose 5%. 1000 milliLiter(s) (50 mL/Hr) IV Continuous <Continuous>  dextrose 50% Injectable 12.5 Gram(s) IV Push once  dextrose 50% Injectable 25 Gram(s) IV Push once  dextrose 50% Injectable 25 Gram(s) IV Push once  escitalopram 5 milliGRAM(s) Oral daily  hydrALAZINE 10 milliGRAM(s) Oral three times a day  insulin glargine Injectable (LANTUS) 10 Unit(s) SubCutaneous at bedtime  insulin lispro (HumaLOG) corrective regimen sliding scale   SubCutaneous three times a day before meals  insulin lispro (HumaLOG) corrective regimen sliding scale   SubCutaneous at bedtime  metoprolol tartrate 12.5 milliGRAM(s) Oral every 12 hours  pantoprazole   Suspension 40 milliGRAM(s) Oral daily  sevelamer carbonate Powder 800 milliGRAM(s) Oral three times a day with meals  ticagrelor 90 milliGRAM(s) Oral two times a day    MEDICATIONS  (PRN):  dextrose 40% Gel 15 Gram(s) Oral once PRN Blood Glucose LESS THAN 70 milliGRAM(s)/deciliter  glucagon  Injectable 1 milliGRAM(s) IntraMuscular once PRN Glucose LESS THAN 70 milligrams/deciliter OVERNIGHT EVENTS:  runs of afib/aflutter ON---> started on 12.5mg metopx1, it continued   SUBJECTIVE:    Vital Signs Last 12 Hrs  T(F): 97.5 (07-25-18 @ 02:29), Max: 97.5 (07-25-18 @ 02:29)  HR: 92 (07-25-18 @ 06:00) (92 - 124)  BP: 108/69 (07-25-18 @ 06:00) (93/60 - 140/88)  BP(mean): 86 (07-25-18 @ 06:00) (64 - 112)  RR: 11 (07-25-18 @ 06:00) (7 - 18)  SpO2: 98% (07-25-18 @ 06:00) (94% - 99%)  I&O's Summary    23 Jul 2018 07:01  -  24 Jul 2018 07:00  --------------------------------------------------------  IN: 531 mL / OUT: 1375 mL / NET: -844 mL    24 Jul 2018 07:01  -  25 Jul 2018 06:40  --------------------------------------------------------  IN: 733 mL / OUT: 680 mL / NET: 53 mL        PHYSICAL EXAM:  Constitutional: NAD, comfortable in bed.  HEENT: NC/AT, PERRLA, EOMI, no conjunctival pallor or scleral icterus, MMM  Neck: Supple, no JVD  Respiratory: Normal rate, rhythm, depth, effort. CTAB. No w/r/r.   Cardiovascular: RRR, normal S1 and S2, no m/r/g.   Gastrointestinal: +BS, soft NTND, no guarding or rebound tenderness, no palpable masses   Extremities: wwp; no cyanosis, clubbing or edema.   Vascular: Pulses equal and strong throughout.   Neurological: AAOx3, no CN deficits, strength and sensation intact throughout.   Skin: No gross skin abnormalities or rashes        LABS:                        14.0   8.5   )-----------( 220      ( 25 Jul 2018 05:44 )             41.5     07-25    137  |  92<L>  |  162<H>  ----------------------------<  166<H>  4.3   |  29  |  4.17<H>    Ca    10.5      25 Jul 2018 05:44  Phos  6.7     07-24  Mg     2.4     07-25    TPro  8.0  /  Alb  3.6  /  TBili  0.8  /  DBili  x   /  AST  91<H>  /  ALT  462<H>  /  AlkPhos  124<H>  07-25    PTT - ( 24 Jul 2018 18:36 )  PTT:30.0 sec      RADIOLOGY & ADDITIONAL TESTS:    MEDICATIONS  (STANDING):  apixaban 2.5 milliGRAM(s) Oral every 12 hours  chlorhexidine 2% Cloths 1 Application(s) Topical daily  dextrose 5%. 1000 milliLiter(s) (50 mL/Hr) IV Continuous <Continuous>  dextrose 50% Injectable 12.5 Gram(s) IV Push once  dextrose 50% Injectable 25 Gram(s) IV Push once  dextrose 50% Injectable 25 Gram(s) IV Push once  escitalopram 5 milliGRAM(s) Oral daily  hydrALAZINE 10 milliGRAM(s) Oral three times a day  insulin glargine Injectable (LANTUS) 10 Unit(s) SubCutaneous at bedtime  insulin lispro (HumaLOG) corrective regimen sliding scale   SubCutaneous three times a day before meals  insulin lispro (HumaLOG) corrective regimen sliding scale   SubCutaneous at bedtime  metoprolol tartrate 12.5 milliGRAM(s) Oral every 12 hours  pantoprazole   Suspension 40 milliGRAM(s) Oral daily  sevelamer carbonate Powder 800 milliGRAM(s) Oral three times a day with meals  ticagrelor 90 milliGRAM(s) Oral two times a day    MEDICATIONS  (PRN):  dextrose 40% Gel 15 Gram(s) Oral once PRN Blood Glucose LESS THAN 70 milliGRAM(s)/deciliter  glucagon  Injectable 1 milliGRAM(s) IntraMuscular once PRN Glucose LESS THAN 70 milligrams/deciliter OVERNIGHT EVENTS:  runs of afib/aflutter ON---> started on 12.5mg metopx1, it continued   SUBJECTIVE:    Vital Signs Last 12 Hrs  T(F): 97.5 (07-25-18 @ 02:29), Max: 97.5 (07-25-18 @ 02:29)  HR: 92 (07-25-18 @ 06:00) (92 - 124)  BP: 108/69 (07-25-18 @ 06:00) (93/60 - 140/88)  BP(mean): 86 (07-25-18 @ 06:00) (64 - 112)  RR: 11 (07-25-18 @ 06:00) (7 - 18)  SpO2: 98% (07-25-18 @ 06:00) (94% - 99%)  I&O's Summary    23 Jul 2018 07:01  -  24 Jul 2018 07:00  --------------------------------------------------------  IN: 531 mL / OUT: 1375 mL / NET: -844 mL    24 Jul 2018 07:01  -  25 Jul 2018 06:40  --------------------------------------------------------  IN: 733 mL / OUT: 680 mL / NET: 53 mL    Constitutional: WDWN resting comfortably in bed; NAD  Head: NC/AT  Eyes: PERRL, EOMI, anicteric sclera  ENT: no nasal discharge; uvula midline, no oropharyngeal erythema or exudates; MMM.   Neck: JVD appreciated just above clavicles  Respiratory: CTA B/L; no W/R/R, no retractions  Cardiac: 2/6 WANDER at LUSB, Regular rate, rhythm  Gastrointestinal: abdomen soft, NT, mildly distended; no rebound or guarding; +BSx4  Extremities:  No edema at b/l ankles. Feet are cool to the touch. Otherwise WWP.   Vascular: 2+ radial. DP/PT pulses 1+.   Neurologic: AAOx3; slight right facial droop  Psychiatric: Flat affect; limited responses to interviewer/clinician      LABS:                        14.0   8.5   )-----------( 220      ( 25 Jul 2018 05:44 )             41.5     07-25    137  |  92<L>  |  162<H>  ----------------------------<  166<H>  4.3   |  29  |  4.17<H>    Ca    10.5      25 Jul 2018 05:44  Phos  6.7     07-24  Mg     2.4     07-25    TPro  8.0  /  Alb  3.6  /  TBili  0.8  /  DBili  x   /  AST  91<H>  /  ALT  462<H>  /  AlkPhos  124<H>  07-25    PTT - ( 24 Jul 2018 18:36 )  PTT:30.0 sec      RADIOLOGY & ADDITIONAL TESTS:    MEDICATIONS  (STANDING):  apixaban 2.5 milliGRAM(s) Oral every 12 hours  chlorhexidine 2% Cloths 1 Application(s) Topical daily  dextrose 5%. 1000 milliLiter(s) (50 mL/Hr) IV Continuous <Continuous>  dextrose 50% Injectable 12.5 Gram(s) IV Push once  dextrose 50% Injectable 25 Gram(s) IV Push once  dextrose 50% Injectable 25 Gram(s) IV Push once  escitalopram 5 milliGRAM(s) Oral daily  hydrALAZINE 10 milliGRAM(s) Oral three times a day  insulin glargine Injectable (LANTUS) 10 Unit(s) SubCutaneous at bedtime  insulin lispro (HumaLOG) corrective regimen sliding scale   SubCutaneous three times a day before meals  insulin lispro (HumaLOG) corrective regimen sliding scale   SubCutaneous at bedtime  metoprolol tartrate 12.5 milliGRAM(s) Oral every 12 hours  pantoprazole   Suspension 40 milliGRAM(s) Oral daily  sevelamer carbonate Powder 800 milliGRAM(s) Oral three times a day with meals  ticagrelor 90 milliGRAM(s) Oral two times a day    MEDICATIONS  (PRN):  dextrose 40% Gel 15 Gram(s) Oral once PRN Blood Glucose LESS THAN 70 milliGRAM(s)/deciliter  glucagon  Injectable 1 milliGRAM(s) IntraMuscular once PRN Glucose LESS THAN 70 milligrams/deciliter OVERNIGHT EVENTS:  runs of afib/aflutter ON---> started on 12.5mg metopx1, it continued   SUBJECTIVE:    Vital Signs Last 12 Hrs  T(F): 97.5 (07-25-18 @ 02:29), Max: 97.5 (07-25-18 @ 02:29)  HR: 92 (07-25-18 @ 06:00) (92 - 124)  BP: 108/69 (07-25-18 @ 06:00) (93/60 - 140/88)  BP(mean): 86 (07-25-18 @ 06:00) (64 - 112)  RR: 11 (07-25-18 @ 06:00) (7 - 18)  SpO2: 98% (07-25-18 @ 06:00) (94% - 99%)  I&O's Summary    23 Jul 2018 07:01  -  24 Jul 2018 07:00  --------------------------------------------------------  IN: 531 mL / OUT: 1375 mL / NET: -844 mL    24 Jul 2018 07:01  -  25 Jul 2018 06:40  --------------------------------------------------------  IN: 733 mL / OUT: 680 mL / NET: 53 mL    Constitutional: WDWN resting comfortably in bed; NAD  Head: NC/AT  Eyes: PERRL, EOMI, anicteric sclera  ENT: no nasal discharge; uvula midline, no oropharyngeal erythema or exudates; MMM.   Neck: JVD appreciated just above clavicles  Respiratory: CTA B/L; no W/R/R, no retractions  Cardiac: 2/6 WANDER at LUSB, Regular rate, rhythm  Gastrointestinal: abdomen soft, NT, mildly distended; no rebound or guarding; +BSx4  AYDEE: Dry and moist mixed stool; no overt blood noted or melana; no fissures or hemorrhoids  Extremities:  No edema at b/l ankles. Feet are cool to the touch. Otherwise WWP.   Vascular: 2+ radial. DP/PT pulses 1+.   Neurologic: AAOx3; slight right facial droop  Psychiatric: Flat affect; limited responses to interviewer/clinician      LABS:                        14.0   8.5   )-----------( 220      ( 25 Jul 2018 05:44 )             41.5     07-25    137  |  92<L>  |  162<H>  ----------------------------<  166<H>  4.3   |  29  |  4.17<H>    Ca    10.5      25 Jul 2018 05:44  Phos  6.7     07-24  Mg     2.4     07-25    TPro  8.0  /  Alb  3.6  /  TBili  0.8  /  DBili  x   /  AST  91<H>  /  ALT  462<H>  /  AlkPhos  124<H>  07-25    PTT - ( 24 Jul 2018 18:36 )  PTT:30.0 sec      RADIOLOGY & ADDITIONAL TESTS:    MEDICATIONS  (STANDING):  apixaban 2.5 milliGRAM(s) Oral every 12 hours  chlorhexidine 2% Cloths 1 Application(s) Topical daily  dextrose 5%. 1000 milliLiter(s) (50 mL/Hr) IV Continuous <Continuous>  dextrose 50% Injectable 12.5 Gram(s) IV Push once  dextrose 50% Injectable 25 Gram(s) IV Push once  dextrose 50% Injectable 25 Gram(s) IV Push once  escitalopram 5 milliGRAM(s) Oral daily  hydrALAZINE 10 milliGRAM(s) Oral three times a day  insulin glargine Injectable (LANTUS) 10 Unit(s) SubCutaneous at bedtime  insulin lispro (HumaLOG) corrective regimen sliding scale   SubCutaneous three times a day before meals  insulin lispro (HumaLOG) corrective regimen sliding scale   SubCutaneous at bedtime  metoprolol tartrate 12.5 milliGRAM(s) Oral every 12 hours  pantoprazole   Suspension 40 milliGRAM(s) Oral daily  sevelamer carbonate Powder 800 milliGRAM(s) Oral three times a day with meals  ticagrelor 90 milliGRAM(s) Oral two times a day    MEDICATIONS  (PRN):  dextrose 40% Gel 15 Gram(s) Oral once PRN Blood Glucose LESS THAN 70 milliGRAM(s)/deciliter  glucagon  Injectable 1 milliGRAM(s) IntraMuscular once PRN Glucose LESS THAN 70 milligrams/deciliter

## 2018-07-25 NOTE — PROGRESS NOTE ADULT - PROBLEM SELECTOR PLAN 6
Patient's EF was reportedly 25% in the past according to ED report. On echo 7/20, EF is 15-20%. Was volume overloaded on exam with increased JVP, b/l LE edema. On CXR, evidence of cardiomegaly and significant pulmonary vascular congestion throughout bilateral lung fields.  - d/c bumex gtt, holding further diuresis due to orthostasis for now

## 2018-07-25 NOTE — OCCUPATIONAL THERAPY INITIAL EVALUATION ADULT - PERTINENT HX OF CURRENT PROBLEM, REHAB EVAL
7M w PMH of dilated cardiomyopathy, HFrEF of 25%, CAD s/p PCI 7 years ago, NIDDM, HTN, and HLD, with altered mental status in the setting of one week of dyspnea and weakness admitted to the CCU for management of ACS, CHF, and RONNIE. MRI significant for subacute ischemia involving right parietal area with numerous scattered areas of acute ischemia within the basal ganglia and frontal and parietal lobes bilaterally some of which are in a watershed distribution.

## 2018-07-25 NOTE — OCCUPATIONAL THERAPY INITIAL EVALUATION ADULT - COORDINATION ASSESSED, REHAB EVAL
finger to nose/*difficulty following commands; otherwise no dysmetria noted, +difficulty left upper extremity 2/2 weakness *difficulty following commands; otherwise no dysmetria noted, +difficulty left upper extremity 2/2 weakness/finger to nose

## 2018-07-25 NOTE — PROGRESS NOTE ADULT - PROBLEM SELECTOR PLAN 2
MRI significant for subacute ischemia involving right parietal area   with numerous scattered areas of acute ischemia within the basal ganglia   and frontal and parietal lobes bilaterally some of which are in a   watershed distribution.  - F/U neuro recs  - Needs completed MRA study for full neuro workup.   - Ordered carotid u/s  - Will need to be on AC, ASA, antithrombotic therapy, statin (when liver enzymes improve), have good management of BP  - Discontinued ASA due to risks of triple therapy  - d/c heparin gtt, now on eliquis for anticoagulation in setting of aflutter MRI significant for subacute ischemia involving right parietal area   with numerous scattered areas of acute ischemia within the basal ganglia   and frontal and parietal lobes bilaterally some of which are in a   watershed distribution.  - F/U neuro recs  - Needs completed MRA study for full neuro workup.   - carotid u/s negative   - Will need to be on ASA, antithrombotic therapy, statin (when liver enzymes improve), have good management of BP  - Discontinued ASA due to risks of triple therapy  - d/c heparin gtt, now on eliquis for anticoagulation in setting of aflutter

## 2018-07-25 NOTE — OCCUPATIONAL THERAPY INITIAL EVALUATION ADULT - MANUAL MUSCLE TESTING RESULTS, REHAB EVAL
decreased capacity to follow MMT commands; right upper extremity >4+/5 throughout; left upper extremity >4-/5 throughout/grossly assessed due to

## 2018-07-25 NOTE — PROGRESS NOTE ADULT - PROBLEM SELECTOR PLAN 5
Patient's ALT over 1000 on admission, however AST is around 100. Alk phos is elevated to 150. Unclear pattern of liver enzymes. HIV/HepC/HepB negative. Likely shock liver, improving.  - f/u RUQ US

## 2018-07-25 NOTE — PROGRESS NOTE ADULT - PROBLEM SELECTOR PLAN 1
- NON OLIGURIC Ronnie-   unknown baseline, lst known creatinine a week ago in a hospital at [place] 6.6   - now with RONNIE due to cradiorenal syndrome   off diuretic  creatinine improving @ 4.17<---5.06<-----6.03  MONITOR i/o  - daily weight   - renal diet

## 2018-07-25 NOTE — OCCUPATIONAL THERAPY INITIAL EVALUATION ADULT - NS ASR FOLLOW COMMAND OT EVAL
able to follow single-step instructions/difficulty following complex commands; limited attention span affecting commands following; decreased fluency of speech; naming objects 3/4, using objects 4/4/100% of the time

## 2018-07-25 NOTE — PROGRESS NOTE ADULT - PROBLEM SELECTOR PLAN 7
Holding home medications. Will need closer f/u as outpt.  - Hb A1c was 8  - c/w ISS, 10u lantus qHS    #Aflutter: On telemetry last night  - Will need chronic anticoagulation, now on eliquis

## 2018-07-25 NOTE — OCCUPATIONAL THERAPY INITIAL EVALUATION ADULT - GENERAL OBSERVATIONS, REHAB EVAL
Patient cleared for Occupational Therapy by MALCOLM Musa, patient received supine in semi-buckley position in non-acute distress. +Tele, +IV heplock. Patient denies pain throughout, no complaint.

## 2018-07-26 LAB
ALBUMIN SERPL ELPH-MCNC: 2.8 G/DL — LOW (ref 3.3–5)
ALP SERPL-CCNC: 97 U/L — SIGNIFICANT CHANGE UP (ref 40–120)
ALT FLD-CCNC: 349 U/L — HIGH (ref 10–45)
ANION GAP SERPL CALC-SCNC: 20 MMOL/L — HIGH (ref 5–17)
AST SERPL-CCNC: 72 U/L — HIGH (ref 10–40)
BILIRUB SERPL-MCNC: 0.6 MG/DL — SIGNIFICANT CHANGE UP (ref 0.2–1.2)
BLD GP AB SCN SERPL QL: NEGATIVE — SIGNIFICANT CHANGE UP
BUN SERPL-MCNC: 150 MG/DL — HIGH (ref 7–23)
CALCIUM SERPL-MCNC: 9.1 MG/DL — SIGNIFICANT CHANGE UP (ref 8.4–10.5)
CHLORIDE SERPL-SCNC: 90 MMOL/L — LOW (ref 96–108)
CO2 SERPL-SCNC: 25 MMOL/L — SIGNIFICANT CHANGE UP (ref 22–31)
CREAT SERPL-MCNC: 3.64 MG/DL — HIGH (ref 0.5–1.3)
EOSINOPHIL NFR BLD AUTO: 0.1 % — SIGNIFICANT CHANGE UP (ref 0–6)
GLUCOSE SERPL-MCNC: 357 MG/DL — HIGH (ref 70–99)
HAPTOGLOB SERPL-MCNC: 237 MG/DL — HIGH (ref 34–200)
HCT VFR BLD CALC: 29.3 % — LOW (ref 39–50)
HCT VFR BLD CALC: 31.5 % — LOW (ref 39–50)
HGB BLD-MCNC: 10.2 G/DL — LOW (ref 13–17)
HGB BLD-MCNC: 9.9 G/DL — LOW (ref 13–17)
LDH SERPL L TO P-CCNC: 420 U/L — HIGH (ref 50–242)
LYMPHOCYTES # BLD AUTO: 5.3 % — LOW (ref 13–44)
MAGNESIUM SERPL-MCNC: 2.3 MG/DL — SIGNIFICANT CHANGE UP (ref 1.6–2.6)
MCHC RBC-ENTMCNC: 29 PG — SIGNIFICANT CHANGE UP (ref 27–34)
MCHC RBC-ENTMCNC: 29 PG — SIGNIFICANT CHANGE UP (ref 27–34)
MCHC RBC-ENTMCNC: 32.4 G/DL — SIGNIFICANT CHANGE UP (ref 32–36)
MCHC RBC-ENTMCNC: 33.8 G/DL — SIGNIFICANT CHANGE UP (ref 32–36)
MCV RBC AUTO: 85.9 FL — SIGNIFICANT CHANGE UP (ref 80–100)
MCV RBC AUTO: 89.5 FL — SIGNIFICANT CHANGE UP (ref 80–100)
MONOCYTES NFR BLD AUTO: 5.4 % — SIGNIFICANT CHANGE UP (ref 2–14)
NEUTROPHILS NFR BLD AUTO: 89.2 % — HIGH (ref 43–77)
PHOSPHATE SERPL-MCNC: 5.5 MG/DL — HIGH (ref 2.5–4.5)
PLATELET # BLD AUTO: 223 K/UL — SIGNIFICANT CHANGE UP (ref 150–400)
PLATELET # BLD AUTO: 229 K/UL — SIGNIFICANT CHANGE UP (ref 150–400)
POTASSIUM SERPL-MCNC: 4.7 MMOL/L — SIGNIFICANT CHANGE UP (ref 3.5–5.3)
POTASSIUM SERPL-SCNC: 4.7 MMOL/L — SIGNIFICANT CHANGE UP (ref 3.5–5.3)
PROT SERPL-MCNC: 7 G/DL — SIGNIFICANT CHANGE UP (ref 6–8.3)
RBC # BLD: 3.41 M/UL — LOW (ref 4.2–5.8)
RBC # BLD: 3.52 M/UL — LOW (ref 4.2–5.8)
RBC # FLD: 14.2 % — SIGNIFICANT CHANGE UP (ref 10.3–16.9)
RBC # FLD: 14.7 % — SIGNIFICANT CHANGE UP (ref 10.3–16.9)
RH IG SCN BLD-IMP: POSITIVE — SIGNIFICANT CHANGE UP
SODIUM SERPL-SCNC: 135 MMOL/L — SIGNIFICANT CHANGE UP (ref 135–145)
WBC # BLD: 8.5 K/UL — SIGNIFICANT CHANGE UP (ref 3.8–10.5)
WBC # BLD: 9 K/UL — SIGNIFICANT CHANGE UP (ref 3.8–10.5)
WBC # FLD AUTO: 8.5 K/UL — SIGNIFICANT CHANGE UP (ref 3.8–10.5)
WBC # FLD AUTO: 9 K/UL — SIGNIFICANT CHANGE UP (ref 3.8–10.5)

## 2018-07-26 PROCEDURE — 99233 SBSQ HOSP IP/OBS HIGH 50: CPT

## 2018-07-26 PROCEDURE — 74018 RADEX ABDOMEN 1 VIEW: CPT | Mod: 26

## 2018-07-26 PROCEDURE — 71045 X-RAY EXAM CHEST 1 VIEW: CPT | Mod: 26

## 2018-07-26 PROCEDURE — 93010 ELECTROCARDIOGRAM REPORT: CPT

## 2018-07-26 PROCEDURE — 99291 CRITICAL CARE FIRST HOUR: CPT

## 2018-07-26 RX ORDER — HUMAN INSULIN 100 [IU]/ML
10 INJECTION, SUSPENSION SUBCUTANEOUS ONCE
Qty: 0 | Refills: 0 | Status: COMPLETED | OUTPATIENT
Start: 2018-07-26 | End: 2018-07-26

## 2018-07-26 RX ORDER — PANTOPRAZOLE SODIUM 20 MG/1
40 TABLET, DELAYED RELEASE ORAL
Qty: 0 | Refills: 0 | Status: DISCONTINUED | OUTPATIENT
Start: 2018-07-26 | End: 2018-07-31

## 2018-07-26 RX ORDER — INSULIN GLARGINE 100 [IU]/ML
15 INJECTION, SOLUTION SUBCUTANEOUS AT BEDTIME
Qty: 0 | Refills: 0 | Status: DISCONTINUED | OUTPATIENT
Start: 2018-07-26 | End: 2018-08-01

## 2018-07-26 RX ORDER — METOPROLOL TARTRATE 50 MG
12.5 TABLET ORAL THREE TIMES A DAY
Qty: 0 | Refills: 0 | Status: DISCONTINUED | OUTPATIENT
Start: 2018-07-26 | End: 2018-07-26

## 2018-07-26 RX ADMIN — Medication 2: at 21:58

## 2018-07-26 RX ADMIN — CHLORHEXIDINE GLUCONATE 1 APPLICATION(S): 213 SOLUTION TOPICAL at 14:15

## 2018-07-26 RX ADMIN — SEVELAMER CARBONATE 800 MILLIGRAM(S): 2400 POWDER, FOR SUSPENSION ORAL at 12:19

## 2018-07-26 RX ADMIN — PANTOPRAZOLE SODIUM 40 MILLIGRAM(S): 20 TABLET, DELAYED RELEASE ORAL at 12:19

## 2018-07-26 RX ADMIN — PANTOPRAZOLE SODIUM 40 MILLIGRAM(S): 20 TABLET, DELAYED RELEASE ORAL at 17:19

## 2018-07-26 RX ADMIN — Medication 12.5 MILLIGRAM(S): at 01:43

## 2018-07-26 RX ADMIN — Medication 6: at 07:38

## 2018-07-26 RX ADMIN — TICAGRELOR 90 MILLIGRAM(S): 90 TABLET ORAL at 06:41

## 2018-07-26 RX ADMIN — SEVELAMER CARBONATE 800 MILLIGRAM(S): 2400 POWDER, FOR SUSPENSION ORAL at 06:41

## 2018-07-26 RX ADMIN — APIXABAN 2.5 MILLIGRAM(S): 2.5 TABLET, FILM COATED ORAL at 06:40

## 2018-07-26 RX ADMIN — INSULIN GLARGINE 15 UNIT(S): 100 INJECTION, SOLUTION SUBCUTANEOUS at 21:59

## 2018-07-26 RX ADMIN — Medication 10 MILLIGRAM(S): at 06:40

## 2018-07-26 RX ADMIN — Medication 8: at 12:18

## 2018-07-26 RX ADMIN — SEVELAMER CARBONATE 800 MILLIGRAM(S): 2400 POWDER, FOR SUSPENSION ORAL at 18:23

## 2018-07-26 RX ADMIN — ESCITALOPRAM OXALATE 5 MILLIGRAM(S): 10 TABLET, FILM COATED ORAL at 12:19

## 2018-07-26 RX ADMIN — HUMAN INSULIN 10 UNIT(S): 100 INJECTION, SUSPENSION SUBCUTANEOUS at 13:00

## 2018-07-26 RX ADMIN — Medication 6: at 17:19

## 2018-07-26 NOTE — PROGRESS NOTE ADULT - PROBLEM SELECTOR PLAN 3
Patient has been weak, unsteady, and confused. There is ? facial droop on exam. Likely toxic-metabolic 2/2 uremia but could have some component of hypoperfusion, stroke/hemorrhage, and depression.   - Lactate normalized   - On lexapro Patient has been weak, unsteady, and confused. There is ? facial droop on exam. Likely toxic-metabolic 2/2 uremia but could have some component of hypoperfusion, stroke/hemorrhage, and depression.   - Lactate normalized   - On lexapro  -will discuss with renal need for HD, and clarify GOC with family

## 2018-07-26 NOTE — PROGRESS NOTE ADULT - PROBLEM SELECTOR PLAN 6
Patient's EF was reportedly 25% in the past according to ED report. On echo 7/20, EF is 15-20%. Was volume overloaded on exam with increased JVP, b/l LE edema. On CXR, evidence of cardiomegaly and significant pulmonary vascular congestion throughout bilateral lung fields.  - d/c bumex gtt, holding further diuresis due to orthostasis for now Patient's EF was reportedly 25% in the past according to ED report. On echo 7/20, EF is 15-20%. Was volume overloaded on initial exam, now improved. On CXR, evidence of cardiomegaly but significant decrease in pulmonary vascular congestion.   - d/c bumex gtt, holding further diuresis due to orthostasis for now

## 2018-07-26 NOTE — PROGRESS NOTE ADULT - PROBLEM SELECTOR PLAN 2
MRI significant for subacute ischemia involving right parietal area   with numerous scattered areas of acute ischemia within the basal ganglia   and frontal and parietal lobes bilaterally some of which are in a   watershed distribution.  - F/U neuro recs  - Needs completed MRA study for full neuro workup.   - carotid u/s negative   - Will need to be on ASA, antithrombotic therapy, statin (when liver enzymes improve), have good management of BP  - Discontinued ASA due to risks of triple therapy  - d/c heparin gtt, now on eliquis for anticoagulation in setting of aflutter MRI significant for subacute ischemia involving right parietal area   with numerous scattered areas of acute ischemia within the basal ganglia   and frontal and parietal lobes bilaterally some of which are in a   watershed distribution.  - F/U neuro recs  - Needs completed MRA study for full neuro workup.   - carotid u/s negative   - Will need to be on ASA, antithrombotic therapy, statin (when liver enzymes improve), have good management of BP  - Discontinued ASA due to risks of triple therapy  -  eliquis for anticoagulation in setting of aflutter

## 2018-07-26 NOTE — PROGRESS NOTE ADULT - PROBLEM SELECTOR PLAN 5
Patient's ALT over 1000 on admission, however AST is around 100. Alk phos is elevated to 150. Unclear pattern of liver enzymes. HIV/HepC/HepB negative. Likely shock liver, improving.  - f/u RUQ US Patient's ALT over 1000 on admission, however AST is around 100. Alk phos is elevated to 150. Unclear pattern of liver enzymes. HIV/HepC/HepB negative. Likely shock liver, improving.  - trend LFT's

## 2018-07-26 NOTE — PROGRESS NOTE ADULT - ATTENDING COMMENTS
above reviewed and discussed     continues with gradual improvement.,   agree with findings and recommendations.

## 2018-07-26 NOTE — PROGRESS NOTE ADULT - PROBLEM SELECTOR PLAN 1
- NON OLIGURIC Ronnie-   unknown baseline, lst known creatinine a week ago in a hospital at [place] 6.6   - now with RONNIE due to cradiorenal syndrome   off diuretic  am labs pending  MONITOR i/o  - daily weight   - renal diet

## 2018-07-26 NOTE — PROGRESS NOTE ADULT - SUBJECTIVE AND OBJECTIVE BOX
Patient seen and examined  AM labs pending  off diuretic  urine output @ 255    aspirin  chewable 81 milliGRAM(s) daily  buMETAnide Infusion 1 mG/Hr <Continuous>  dextrose 40% Gel 15 Gram(s) once PRN  dextrose 5%. 1000 milliLiter(s) <Continuous>  dextrose 50% Injectable 12.5 Gram(s) once  dextrose 50% Injectable 25 Gram(s) once  dextrose 50% Injectable 25 Gram(s) once  glucagon  Injectable 1 milliGRAM(s) once PRN  heparin  Infusion 900 Unit(s)/Hr <Continuous>  insulin lispro (HumaLOG) corrective regimen sliding scale   three times a day before meals  insulin lispro (HumaLOG) corrective regimen sliding scale   at bedtime  ticagrelor 90 milliGRAM(s) two times a day      Allergies    No Known Allergies    Intolerances        T(C): , Max: 37.1 (18 @ 06:28)  T(F): , Max: 98.7 (18 @ 06:28)  HR: 78 (18 @ 14:00)  BP: 126/73 (18 @ 14:00)  BP(mean): 96 (18 @ 14:00)  RR: 19 (18 @ 14:00)  SpO2: 99% (18 @ 14:00)  Wt(kg): --     @ 07:  -   @ 07:00  --  on Bumex  1 mg IV q 12hrs. urine output @ 2400 cc    IN:    bumetanide Infusion: 130 mL    heparin Infusion: 69 mL  Total IN: 199 mL    OUT:    Intermittent Catheterization - Urethral: 2450 mL  Total OUT: 2450 mL    Total NET: -2251 mL       @ 07:  -   @ 15:19  --------------------------------------------------------  IN:    bumetanide Infusion: 80 mL    heparin Infusion: 36 mL  Total IN: 116 mL    OUT:    Intermittent Catheterization - Urethral: 1720 mL  Total OUT: 1720 mL    Total NET: -1604 mL    Physical exam:   Alert and oriented  JVD   Normal air entry into the lungs, no wheezing, no crackles   RRR, normal s1/s2, no murmurs, rubs or gallops   Abdomen - soft, not tender, not distended   Extremities: 1+ edema   No rash   No astertix         Height (cm): 167.64 ( @ 17:42)  Weight (kg): 70.7 ( @ 17:42)  BMI (kg/m2): 25.2 ( @ 17:42)  BSA (m2): 1.8 ( @ 17:42)      LABS:                        13.2   6.2   )-----------( 121      ( 2018 08:05 )             38.2         136  |  88<L>  |  142<H>  ----------------------------<  162<H>  3.9   |  22  |  8.32<H>    Ca    8.9      2018 08:05  Phos  7.4       Mg     2.9         TPro  7.7  /  Alb  3.5  /  TBili  2.0<H>  /  DBili  x   /  AST  106<H>  /  ALT  1177<H>  /  AlkPhos  152<H>      Cholesterol, Serum: 178 mg/dL [10 - 199] ( @ 08:05)  Hemoglobin A1C, Whole Blood: 8.0 % <H> [4.0 - 5.6] ( @ 08:05)    PT/INR - ( 2018 13:48 )   PT: 14.9 sec;   INR: 1.33          PTT - ( 2018 01:36 )  PTT:54.4 sec  Urinalysis Basic - ( 2018 17:25 )    Color: Yellow / Appearance: Clear / S.015 / pH: x  Gluc: x / Ketone: NEGATIVE  / Bili: Negative / Urobili: 0.2 E.U./dL   Blood: x / Protein: Trace mg/dL / Nitrite: NEGATIVE   Leuk Esterase: NEGATIVE / RBC: < 5 /HPF / WBC 5-10 /HPF   Sq Epi: x / Non Sq Epi: 0-5 /HPF / Bacteria: Present /HPF      Chloride, Random Urine: 52 mmol/L ( @ 18:48)  Creatinine, Random Urine: 58 mg/dL ( @ 18:48)        RADIOLOGY & ADDITIONAL STUDIES:

## 2018-07-26 NOTE — PROGRESS NOTE ADULT - SUBJECTIVE AND OBJECTIVE BOX
OVERNIGHT EVENTS:    SUBJECTIVE:    Vital Signs Last 12 Hrs  T(F): 96.9 (07-26-18 @ 01:00), Max: 97.2 (07-25-18 @ 20:00)  HR: 104 (07-26-18 @ 06:00) (102 - 108)  BP: 102/72 (07-26-18 @ 06:00) (91/52 - 110/70)  BP(mean): 85 (07-26-18 @ 06:00) (69 - 90)  RR: 12 (07-26-18 @ 06:00) (12 - 20)  SpO2: 100% (07-26-18 @ 06:00) (96% - 100%)  I&O's Summary    24 Jul 2018 07:01  -  25 Jul 2018 07:00  --------------------------------------------------------  IN: 733 mL / OUT: 680 mL / NET: 53 mL    25 Jul 2018 07:01  -  26 Jul 2018 06:38  --------------------------------------------------------  IN: 470 mL / OUT: 253 mL / NET: 217 mL      Constitutional: WDWN resting comfortably in bed; NAD  Head: NC/AT  Eyes: PERRL, EOMI, anicteric sclera  ENT: no nasal discharge; uvula midline, no oropharyngeal erythema or exudates; MMM.   Neck: JVD appreciated just above clavicles  Respiratory: CTA B/L; no W/R/R, no retractions  Cardiac: 2/6 WANDER at LUSB, Regular rate, rhythm  Gastrointestinal: abdomen soft, NT, mildly distended; no rebound or guarding; +BSx4  Extremities:  No edema at b/l ankles. Feet are cool to the touch. Otherwise WWP.   Vascular: 2+ radial. DP/PT pulses 1+.   Neurologic: AAOx3; slight right facial droop  Psychiatric: Flat affect; limited responses to interviewer/clinician            LABS:                        14.0   8.5   )-----------( 220      ( 25 Jul 2018 05:44 )             41.5     07-25    137  |  92<L>  |  162<H>  ----------------------------<  166<H>  4.3   |  29  |  4.17<H>    Ca    10.5      25 Jul 2018 05:44  Mg     2.4     07-25    TPro  8.0  /  Alb  3.6  /  TBili  0.8  /  DBili  x   /  AST  91<H>  /  ALT  462<H>  /  AlkPhos  124<H>  07-25    PTT - ( 24 Jul 2018 18:36 )  PTT:30.0 sec      RADIOLOGY & ADDITIONAL TESTS:    MEDICATIONS  (STANDING):  apixaban 2.5 milliGRAM(s) Oral every 12 hours  chlorhexidine 2% Cloths 1 Application(s) Topical daily  dextrose 5%. 1000 milliLiter(s) (50 mL/Hr) IV Continuous <Continuous>  dextrose 50% Injectable 12.5 Gram(s) IV Push once  dextrose 50% Injectable 25 Gram(s) IV Push once  dextrose 50% Injectable 25 Gram(s) IV Push once  escitalopram 5 milliGRAM(s) Oral daily  hydrALAZINE 10 milliGRAM(s) Oral three times a day  insulin glargine Injectable (LANTUS) 10 Unit(s) SubCutaneous at bedtime  insulin lispro (HumaLOG) corrective regimen sliding scale   SubCutaneous three times a day before meals  insulin lispro (HumaLOG) corrective regimen sliding scale   SubCutaneous at bedtime  metoprolol tartrate 12.5 milliGRAM(s) Oral every 12 hours  pantoprazole   Suspension 40 milliGRAM(s) Oral daily  sevelamer carbonate Powder 800 milliGRAM(s) Oral three times a day with meals  ticagrelor 90 milliGRAM(s) Oral two times a day    MEDICATIONS  (PRN):  dextrose 40% Gel 15 Gram(s) Oral once PRN Blood Glucose LESS THAN 70 milliGRAM(s)/deciliter  glucagon  Injectable 1 milliGRAM(s) IntraMuscular once PRN Glucose LESS THAN 70 milligrams/deciliter OVERNIGHT EVENTS:  10 beats NSVT lasting 5sec with  with stable BP. Pt was asymptomatic; sleeping.    SUBJECTIVE:  No new complaints.     Vital Signs Last 12 Hrs  T(F): 96.9 (07-26-18 @ 01:00), Max: 97.2 (07-25-18 @ 20:00)  HR: 104 (07-26-18 @ 06:00) (102 - 108)  BP: 102/72 (07-26-18 @ 06:00) (91/52 - 110/70)  BP(mean): 85 (07-26-18 @ 06:00) (69 - 90)  RR: 12 (07-26-18 @ 06:00) (12 - 20)  SpO2: 100% (07-26-18 @ 06:00) (96% - 100%)  I&O's Summary    24 Jul 2018 07:01  -  25 Jul 2018 07:00  --------------------------------------------------------  IN: 733 mL / OUT: 680 mL / NET: 53 mL    25 Jul 2018 07:01  -  26 Jul 2018 06:38  --------------------------------------------------------  IN: 470 mL / OUT: 253 mL / NET: 217 mL      Constitutional: WDWN resting comfortably in bed; NAD  Head: NC/AT  Eyes: PERRL, EOMI, anicteric sclera  ENT: no nasal discharge; uvula midline, no oropharyngeal erythema or exudates; MMM.   Neck: no JVD   Respiratory: CTA B/L; no W/R/R, no retractions  Cardiac: 2/6 WANDER at LUSB, Regular rate, rhythm  Gastrointestinal: abdomen soft, NT, mildly distended; no rebound or guarding; +BSx4  Extremities:  No edema at b/l ankles. Feet are cool to the touch. Otherwise WWP.   Vascular: 2+ radial. DP/PT pulses 1+.   Neurologic: AAOx3; slight right facial droop  Psychiatric: Flat affect; limited responses to interviewer/clinician            LABS:                        14.0   8.5   )-----------( 220      ( 25 Jul 2018 05:44 )             41.5     07-25    137  |  92<L>  |  162<H>  ----------------------------<  166<H>  4.3   |  29  |  4.17<H>    Ca    10.5      25 Jul 2018 05:44  Mg     2.4     07-25    TPro  8.0  /  Alb  3.6  /  TBili  0.8  /  DBili  x   /  AST  91<H>  /  ALT  462<H>  /  AlkPhos  124<H>  07-25    PTT - ( 24 Jul 2018 18:36 )  PTT:30.0 sec      RADIOLOGY & ADDITIONAL TESTS:    MEDICATIONS  (STANDING):  apixaban 2.5 milliGRAM(s) Oral every 12 hours  chlorhexidine 2% Cloths 1 Application(s) Topical daily  dextrose 5%. 1000 milliLiter(s) (50 mL/Hr) IV Continuous <Continuous>  dextrose 50% Injectable 12.5 Gram(s) IV Push once  dextrose 50% Injectable 25 Gram(s) IV Push once  dextrose 50% Injectable 25 Gram(s) IV Push once  escitalopram 5 milliGRAM(s) Oral daily  hydrALAZINE 10 milliGRAM(s) Oral three times a day  insulin glargine Injectable (LANTUS) 10 Unit(s) SubCutaneous at bedtime  insulin lispro (HumaLOG) corrective regimen sliding scale   SubCutaneous three times a day before meals  insulin lispro (HumaLOG) corrective regimen sliding scale   SubCutaneous at bedtime  metoprolol tartrate 12.5 milliGRAM(s) Oral every 12 hours  pantoprazole   Suspension 40 milliGRAM(s) Oral daily  sevelamer carbonate Powder 800 milliGRAM(s) Oral three times a day with meals  ticagrelor 90 milliGRAM(s) Oral two times a day    MEDICATIONS  (PRN):  dextrose 40% Gel 15 Gram(s) Oral once PRN Blood Glucose LESS THAN 70 milliGRAM(s)/deciliter  glucagon  Injectable 1 milliGRAM(s) IntraMuscular once PRN Glucose LESS THAN 70 milligrams/deciliter

## 2018-07-26 NOTE — PROGRESS NOTE ADULT - PROBLEM SELECTOR PLAN 1
Patient has hx of PCI w stents placed 7 years prior. Patient reportedly had an acute MI earlier this week in [employer] with EKG showing ST elevations in the lateral leads and troponins in excess of 2000. EKG on admission shows q-waves in leads I and aVL with poor R wave progression consistent with a previous lateral wall MI. Outside therapeutic window for PCI. Troponin and CKMB done 7/22 in setting of new ST changes in 2 precordial leads, both lower, will no longer follow.  - Will hold ASA given risks of hemorrhagic conversion in setting of stroke with triple therapy  - resuming brillinta 90 BID  - Holding statin therapy due to transaminitis  - Restarted beta blocker (metop 12.5mg q12hr) in setting of new afib/aflutter   - Holding ACE/ARB due to severe RONNIE  - Will need diagnostic cath/stress test for ischemic workup; holding off due to renal failure  - On hydralazine 10 TID for afterload reduction, will monitor and reassess for need to up titrated    #asymptomatic afib/aflutter  Noted with new afib/aflutter overnight,. Started on rate control (goal <110) and will continue to monitor  -metoprolol 12.5 q12hr  -cardiac monitor  -on Eliquis Patient has hx of PCI w stents placed 7 years prior. Patient reportedly had an acute MI earlier this week in [place] with EKG showing ST elevations in the lateral leads and troponins in excess of 2000. EKG on admission shows q-waves in leads I and aVL with poor R wave progression consistent with a previous lateral wall MI. Outside therapeutic window for PCI. Troponin and CKMB done 7/22 in setting of new ST changes in 2 precordial leads, both lower, will no longer follow.  - Will hold ASA given risks of hemorrhagic conversion in setting of stroke with triple therapy  - resuming brillinta 90 BID  - Holding statin therapy due to transaminitis  - Restarted beta blocker (metop 12.5mg q8hr) in setting of new afib/aflutter   - Holding ACE/ARB due to severe RONNIE  - Will need diagnostic cath/stress test for ischemic workup; holding off due to renal failure  - On hydralazine 10 TID for afterload reduction, will monitor and reassess for need to up titrated    #asymptomatic afib/aflutter  Rapid ventricular beats noted. Continue on rate control (goal <110) and will continue to monitor  -metoprolol 12.5 q8hr  -cardiac monitor  -on Eliquis

## 2018-07-26 NOTE — PROGRESS NOTE ADULT - PROBLEM SELECTOR PLAN 4
Patient's Cr is now improving to 5 from 9 on admission, unclear baseline. Will need collateral from outpatient providers to ascertain whether CKD component is present. Possible 2/2 to cardiorenal syndrome as cardiac output is poor.   - Renal on board, appreciate recs  - Per renal, no urgent need for dialysis at this time  - Urine lytes with intrinsic renal disease, UA neg  - f/u renal US  -Pt dry and euvolemic today; hold further diuretics and reassess starting PO tomorrow  #Hyponatremia: Resolved   - d/c jo ann, now with condom cath Patient's Cr is downtrending, waiting on morning labs. unclear baseline. However, BUN remains elevated. Likely 2/2 to cardiorenal syndrome as cardiac output is poor.   - Renal on board, appreciate recs  - Per renal, no urgent need for dialysis at this time  - Urine lytes with intrinsic renal disease, UA neg  -sevelamer 800 TID to prevent hyperphosphatemia  -Pt dry and euvolemic today; hold further diuretics and reassess starting PO tomorrow  #Hyponatremia: Resolved   - d/c jo ann, now with condom cath

## 2018-07-26 NOTE — PROGRESS NOTE ADULT - PROBLEM SELECTOR PLAN 10
Will contact outpatient cardiologist Dr. Octavio Haley for collateral information.    Dispo: admit to CCU  Code: Full Will contact outpatient cardiologist Dr. Octavio Haley for collateral information.    Dispo: TBD  Code: Full

## 2018-07-27 DIAGNOSIS — D64.9 ANEMIA, UNSPECIFIED: ICD-10-CM

## 2018-07-27 LAB
ALBUMIN SERPL ELPH-MCNC: 3.8 G/DL — SIGNIFICANT CHANGE UP (ref 3.3–5)
ALP SERPL-CCNC: 91 U/L — SIGNIFICANT CHANGE UP (ref 40–120)
ALT FLD-CCNC: 306 U/L — HIGH (ref 10–45)
ANION GAP SERPL CALC-SCNC: 16 MMOL/L — SIGNIFICANT CHANGE UP (ref 5–17)
ANION GAP SERPL CALC-SCNC: 16 MMOL/L — SIGNIFICANT CHANGE UP (ref 5–17)
AST SERPL-CCNC: 55 U/L — HIGH (ref 10–40)
BILIRUB SERPL-MCNC: 0.5 MG/DL — SIGNIFICANT CHANGE UP (ref 0.2–1.2)
BLD GP AB SCN SERPL QL: NEGATIVE — SIGNIFICANT CHANGE UP
BUN SERPL-MCNC: 159 MG/DL — HIGH (ref 7–23)
BUN SERPL-MCNC: 163 MG/DL — HIGH (ref 7–23)
CALCIUM SERPL-MCNC: 9.5 MG/DL — SIGNIFICANT CHANGE UP (ref 8.4–10.5)
CALCIUM SERPL-MCNC: 9.8 MG/DL — SIGNIFICANT CHANGE UP (ref 8.4–10.5)
CHLORIDE SERPL-SCNC: 92 MMOL/L — LOW (ref 96–108)
CHLORIDE SERPL-SCNC: 96 MMOL/L — SIGNIFICANT CHANGE UP (ref 96–108)
CO2 SERPL-SCNC: 29 MMOL/L — SIGNIFICANT CHANGE UP (ref 22–31)
CO2 SERPL-SCNC: 29 MMOL/L — SIGNIFICANT CHANGE UP (ref 22–31)
CREAT SERPL-MCNC: 3.8 MG/DL — HIGH (ref 0.5–1.3)
CREAT SERPL-MCNC: 3.94 MG/DL — HIGH (ref 0.5–1.3)
EOSINOPHIL NFR BLD AUTO: 0.2 % — SIGNIFICANT CHANGE UP (ref 0–6)
GLUCOSE SERPL-MCNC: 182 MG/DL — HIGH (ref 70–99)
GLUCOSE SERPL-MCNC: 211 MG/DL — HIGH (ref 70–99)
HCT VFR BLD CALC: 25.2 % — LOW (ref 39–50)
HCT VFR BLD CALC: 26.5 % — LOW (ref 39–50)
HCT VFR BLD CALC: 26.8 % — LOW (ref 39–50)
HGB BLD-MCNC: 8.5 G/DL — LOW (ref 13–17)
HGB BLD-MCNC: 8.9 G/DL — LOW (ref 13–17)
HGB BLD-MCNC: 9 G/DL — LOW (ref 13–17)
LYMPHOCYTES # BLD AUTO: 4 % — LOW (ref 13–44)
LYMPHOCYTES # BLD AUTO: 7.6 % — LOW (ref 13–44)
MAGNESIUM SERPL-MCNC: 2.6 MG/DL — SIGNIFICANT CHANGE UP (ref 1.6–2.6)
MCHC RBC-ENTMCNC: 28.6 PG — SIGNIFICANT CHANGE UP (ref 27–34)
MCHC RBC-ENTMCNC: 28.8 PG — SIGNIFICANT CHANGE UP (ref 27–34)
MCHC RBC-ENTMCNC: 29.1 PG — SIGNIFICANT CHANGE UP (ref 27–34)
MCHC RBC-ENTMCNC: 33.6 G/DL — SIGNIFICANT CHANGE UP (ref 32–36)
MCHC RBC-ENTMCNC: 33.6 G/DL — SIGNIFICANT CHANGE UP (ref 32–36)
MCHC RBC-ENTMCNC: 33.7 G/DL — SIGNIFICANT CHANGE UP (ref 32–36)
MCV RBC AUTO: 85.2 FL — SIGNIFICANT CHANGE UP (ref 80–100)
MCV RBC AUTO: 85.6 FL — SIGNIFICANT CHANGE UP (ref 80–100)
MCV RBC AUTO: 86.3 FL — SIGNIFICANT CHANGE UP (ref 80–100)
MONOCYTES NFR BLD AUTO: 3.8 % — SIGNIFICANT CHANGE UP (ref 2–14)
MONOCYTES NFR BLD AUTO: 5 % — SIGNIFICANT CHANGE UP (ref 2–14)
NEUTROPHILS NFR BLD AUTO: 88.4 % — HIGH (ref 43–77)
NEUTROPHILS NFR BLD AUTO: 91 % — HIGH (ref 43–77)
PHOSPHATE SERPL-MCNC: 5.4 MG/DL — HIGH (ref 2.5–4.5)
PLATELET # BLD AUTO: 260 K/UL — SIGNIFICANT CHANGE UP (ref 150–400)
PLATELET # BLD AUTO: 262 K/UL — SIGNIFICANT CHANGE UP (ref 150–400)
PLATELET # BLD AUTO: 286 K/UL — SIGNIFICANT CHANGE UP (ref 150–400)
POTASSIUM SERPL-MCNC: 4.1 MMOL/L — SIGNIFICANT CHANGE UP (ref 3.5–5.3)
POTASSIUM SERPL-MCNC: 4.6 MMOL/L — SIGNIFICANT CHANGE UP (ref 3.5–5.3)
POTASSIUM SERPL-SCNC: 4.1 MMOL/L — SIGNIFICANT CHANGE UP (ref 3.5–5.3)
POTASSIUM SERPL-SCNC: 4.6 MMOL/L — SIGNIFICANT CHANGE UP (ref 3.5–5.3)
PROT SERPL-MCNC: 6.9 G/DL — SIGNIFICANT CHANGE UP (ref 6–8.3)
RBC # BLD: 2.92 M/UL — LOW (ref 4.2–5.8)
RBC # BLD: 3.11 M/UL — LOW (ref 4.2–5.8)
RBC # BLD: 3.13 M/UL — LOW (ref 4.2–5.8)
RBC # FLD: 13.8 % — SIGNIFICANT CHANGE UP (ref 10.3–16.9)
RBC # FLD: 14 % — SIGNIFICANT CHANGE UP (ref 10.3–16.9)
RBC # FLD: 14.1 % — SIGNIFICANT CHANGE UP (ref 10.3–16.9)
RH IG SCN BLD-IMP: POSITIVE — SIGNIFICANT CHANGE UP
SODIUM SERPL-SCNC: 137 MMOL/L — SIGNIFICANT CHANGE UP (ref 135–145)
SODIUM SERPL-SCNC: 141 MMOL/L — SIGNIFICANT CHANGE UP (ref 135–145)
WBC # BLD: 10 K/UL — SIGNIFICANT CHANGE UP (ref 3.8–10.5)
WBC # BLD: 11.2 K/UL — HIGH (ref 3.8–10.5)
WBC # BLD: 8.8 K/UL — SIGNIFICANT CHANGE UP (ref 3.8–10.5)
WBC # FLD AUTO: 10 K/UL — SIGNIFICANT CHANGE UP (ref 3.8–10.5)
WBC # FLD AUTO: 11.2 K/UL — HIGH (ref 3.8–10.5)
WBC # FLD AUTO: 8.8 K/UL — SIGNIFICANT CHANGE UP (ref 3.8–10.5)

## 2018-07-27 PROCEDURE — 99291 CRITICAL CARE FIRST HOUR: CPT

## 2018-07-27 PROCEDURE — 99233 SBSQ HOSP IP/OBS HIGH 50: CPT

## 2018-07-27 PROCEDURE — 71045 X-RAY EXAM CHEST 1 VIEW: CPT | Mod: 26

## 2018-07-27 PROCEDURE — 93010 ELECTROCARDIOGRAM REPORT: CPT

## 2018-07-27 RX ORDER — SODIUM CHLORIDE 9 MG/ML
250 INJECTION INTRAMUSCULAR; INTRAVENOUS; SUBCUTANEOUS ONCE
Qty: 0 | Refills: 0 | Status: COMPLETED | OUTPATIENT
Start: 2018-07-27 | End: 2018-07-27

## 2018-07-27 RX ORDER — FENTANYL CITRATE 50 UG/ML
100 INJECTION INTRAVENOUS ONCE
Qty: 0 | Refills: 0 | Status: DISCONTINUED | OUTPATIENT
Start: 2018-07-27 | End: 2018-07-27

## 2018-07-27 RX ORDER — FENTANYL CITRATE 50 UG/ML
25 INJECTION INTRAVENOUS ONCE
Qty: 0 | Refills: 0 | Status: DISCONTINUED | OUTPATIENT
Start: 2018-07-27 | End: 2018-07-27

## 2018-07-27 RX ORDER — MIDAZOLAM HYDROCHLORIDE 1 MG/ML
1.5 INJECTION, SOLUTION INTRAMUSCULAR; INTRAVENOUS ONCE
Qty: 0 | Refills: 0 | Status: DISCONTINUED | OUTPATIENT
Start: 2018-07-27 | End: 2018-07-27

## 2018-07-27 RX ORDER — MIDAZOLAM HYDROCHLORIDE 1 MG/ML
5 INJECTION, SOLUTION INTRAMUSCULAR; INTRAVENOUS ONCE
Qty: 0 | Refills: 0 | Status: DISCONTINUED | OUTPATIENT
Start: 2018-07-27 | End: 2018-07-27

## 2018-07-27 RX ADMIN — INSULIN GLARGINE 15 UNIT(S): 100 INJECTION, SOLUTION SUBCUTANEOUS at 22:55

## 2018-07-27 RX ADMIN — SODIUM CHLORIDE 500 MILLILITER(S): 9 INJECTION INTRAMUSCULAR; INTRAVENOUS; SUBCUTANEOUS at 23:14

## 2018-07-27 RX ADMIN — CHLORHEXIDINE GLUCONATE 1 APPLICATION(S): 213 SOLUTION TOPICAL at 15:15

## 2018-07-27 RX ADMIN — SEVELAMER CARBONATE 800 MILLIGRAM(S): 2400 POWDER, FOR SUSPENSION ORAL at 07:42

## 2018-07-27 RX ADMIN — ESCITALOPRAM OXALATE 5 MILLIGRAM(S): 10 TABLET, FILM COATED ORAL at 22:55

## 2018-07-27 RX ADMIN — PANTOPRAZOLE SODIUM 40 MILLIGRAM(S): 20 TABLET, DELAYED RELEASE ORAL at 05:46

## 2018-07-27 RX ADMIN — FENTANYL CITRATE 25 MICROGRAM(S): 50 INJECTION INTRAVENOUS at 14:05

## 2018-07-27 RX ADMIN — PANTOPRAZOLE SODIUM 40 MILLIGRAM(S): 20 TABLET, DELAYED RELEASE ORAL at 17:39

## 2018-07-27 RX ADMIN — FENTANYL CITRATE 25 MICROGRAM(S): 50 INJECTION INTRAVENOUS at 14:00

## 2018-07-27 RX ADMIN — MIDAZOLAM HYDROCHLORIDE 1.5 MILLIGRAM(S): 1 INJECTION, SOLUTION INTRAMUSCULAR; INTRAVENOUS at 14:00

## 2018-07-27 RX ADMIN — Medication 2: at 07:41

## 2018-07-27 NOTE — CHART NOTE - NSCHARTNOTEFT_GEN_A_CORE
Admitting Diagnosis:   Patient is a 67y old  Male who presents with a chief complaint of Acute Coronary Syndrome (2018 16:48)      PAST MEDICAL & SURGICAL HISTORY:  Coronary artery disease involving native heart without angina pectoris, unspecified vessel or lesion type  Hyperlipidemia, unspecified hyperlipidemia type  Hypertension, unspecified type  Type 2 diabetes mellitus without complication, without long-term current use of insulin  History of cataract extraction, unspecified laterality  CAD S/P percutaneous coronary angioplasty      Current Nutrition Order:  NPO     GI Issues: suspecting GIB noted    Pain:    Skin Integrity:    Labs:       137  |  92<L>  |  163<H>  ----------------------------<  211<H>  4.1   |  29  |  3.80<H>    Ca    9.8      2018 07:09  Phos  5.4       Mg     2.6         TPro  6.9  /  Alb  3.8  /  TBili  0.5  /  DBili  x   /  AST  55<H>  /  ALT  306<H>  /  AlkPhos  91      CAPILLARY BLOOD GLUCOSE      POCT Blood Glucose.: 199 mg/dL (2018 06:08)  POCT Blood Glucose.: 293 mg/dL (2018 21:52)  POCT Blood Glucose.: 285 mg/dL (2018 17:03)  POCT Blood Glucose.: 350 mg/dL (2018 11:55)  POCT Blood Glucose.: 381 mg/dL (2018 11:33)      Medications:  MEDICATIONS  (STANDING):  chlorhexidine 2% Cloths 1 Application(s) Topical daily  dextrose 5%. 1000 milliLiter(s) (50 mL/Hr) IV Continuous <Continuous>  dextrose 50% Injectable 12.5 Gram(s) IV Push once  dextrose 50% Injectable 25 Gram(s) IV Push once  dextrose 50% Injectable 25 Gram(s) IV Push once  escitalopram 5 milliGRAM(s) Oral daily  insulin glargine Injectable (LANTUS) 15 Unit(s) SubCutaneous at bedtime  insulin lispro (HumaLOG) corrective regimen sliding scale   SubCutaneous three times a day before meals  insulin lispro (HumaLOG) corrective regimen sliding scale   SubCutaneous at bedtime  pantoprazole  Injectable 40 milliGRAM(s) IV Push two times a day  sevelamer carbonate Powder 800 milliGRAM(s) Oral three times a day with meals    MEDICATIONS  (PRN):  dextrose 40% Gel 15 Gram(s) Oral once PRN Blood Glucose LESS THAN 70 milliGRAM(s)/deciliter  glucagon  Injectable 1 milliGRAM(s) IntraMuscular once PRN Glucose LESS THAN 70 milligrams/deciliter      Weight:  Daily     Daily Weight in k.2 (2018 06:04)    Weight Change:     Height: 5' 6" Weight: 152 lbs (), 130 lbs (),  lbs+/-10%, %IBW 85%, BMI 25.2  Estimated energy needs:   ABW used to calculate EER as per pt's current body weight is within % IBW  Estimated nutrient needs based on [facility] SOC for maintenance in adults  25-30 kcal/k0905-8334 kcal  1-1.2 g/k-86 gm protein  FR 1L/d per MD    Subjective:   67 y.o M from home with PMHx of Dilated Cardiomyopathy, HFrEF of 25%, CAD s/p PCI 7 years ago, DM, HTN, HLD. Pt p/w AMS and admitted for management of ACS and subacute systolic CHF. Wife cooks at home, follows low Na and moderate CHO intake at home, good appetite, NKFA. SLP eval completed , recommend pureed solids with nectar thick liquids. Pt tolerating current dysphagia 1 pureed with nectar thick liquids (DASH/TLC, CCD no snacks, Renal, FR 1000 mL/d) well, fair (50%) PO intake of meals during lunch meal, feeding assisted by wife. Denies N/V/D/C or pain. Skin intact. F/u SLP for readiness of diet advancement.    Previous Nutrition Diagnosis:  Swallowing difficulty RT currently on dysphagia 1 pureed with nectar thick liquids per SLP AEB SLP eval completed, currently recommending dysphagia 1 pureed with nectar thick liquids    Active [   ]  Resolved [   ]    If resolved, new PES:     Goal:  Diet upgrade per SLP, improve >75% PO intake, No s/s of any GI intolerances    Recommendations:    Education:     Risk Level: High [   ] Moderate [   ] Low [   ] Admitting Diagnosis:   Patient is a 67y old  Male who presents with a chief complaint of Acute Coronary Syndrome (2018 16:48)      PAST MEDICAL & SURGICAL HISTORY:  Coronary artery disease involving native heart without angina pectoris, unspecified vessel or lesion type  Hyperlipidemia, unspecified hyperlipidemia type  Hypertension, unspecified type  Type 2 diabetes mellitus without complication, without long-term current use of insulin  History of cataract extraction, unspecified laterality  CAD S/P percutaneous coronary angioplasty      Current Nutrition Order:  NPO     GI Issues: suspecting GIB noted  Pt c/o nausea, RN made aware  Denies vomiting, diarrhea, constipation    Pain: denies pain    Skin Integrity: intact     Labs:       137  |  92<L>  |  163<H>  ----------------------------<  211<H>  4.1   |  29  |  3.80<H>    Ca    9.8      2018 07:09  Phos  5.4       Mg     2.6         TPro  6.9  /  Alb  3.8  /  TBili  0.5  /  DBili  x   /  AST  55<H>  /  ALT  306<H>  /  AlkPhos  91      CAPILLARY BLOOD GLUCOSE      POCT Blood Glucose.: 199 mg/dL (2018 06:08)  POCT Blood Glucose.: 293 mg/dL (2018 21:52)  POCT Blood Glucose.: 285 mg/dL (2018 17:03)  POCT Blood Glucose.: 350 mg/dL (2018 11:55)  POCT Blood Glucose.: 381 mg/dL (2018 11:33)      Medications:  MEDICATIONS  (STANDING):  chlorhexidine 2% Cloths 1 Application(s) Topical daily  dextrose 5%. 1000 milliLiter(s) (50 mL/Hr) IV Continuous <Continuous>  dextrose 50% Injectable 12.5 Gram(s) IV Push once  dextrose 50% Injectable 25 Gram(s) IV Push once  dextrose 50% Injectable 25 Gram(s) IV Push once  escitalopram 5 milliGRAM(s) Oral daily  insulin glargine Injectable (LANTUS) 15 Unit(s) SubCutaneous at bedtime  insulin lispro (HumaLOG) corrective regimen sliding scale   SubCutaneous three times a day before meals  insulin lispro (HumaLOG) corrective regimen sliding scale   SubCutaneous at bedtime  pantoprazole  Injectable 40 milliGRAM(s) IV Push two times a day  sevelamer carbonate Powder 800 milliGRAM(s) Oral three times a day with meals    MEDICATIONS  (PRN):  dextrose 40% Gel 15 Gram(s) Oral once PRN Blood Glucose LESS THAN 70 milliGRAM(s)/deciliter  glucagon  Injectable 1 milliGRAM(s) IntraMuscular once PRN Glucose LESS THAN 70 milligrams/deciliter      Weight:  Daily     Daily Weight in k.2 (2018 06:04)    Weight Change:     Height: 5' 6" Weight: 152 lbs (), 130 lbs (),  lbs+/-10%, %IBW 85%, BMI 25.2  Estimated energy needs:   ABW used to calculate EER as per pt's current body weight is within % IBW  Estimated nutrient needs based on [facility] SOC for maintenance in adults  25-30 kcal/k2673-6324 kcal  1-1.2 g/k-86 gm protein  FR 1L/d per MD    Subjective:   67 y.o M from home with PMHx of Dilated Cardiomyopathy, HFrEF of 25%, CAD s/p PCI 7 years ago, DM, HTN, HLD. Pt p/w AMS and admitted for management of ACS and subacute systolic CHF. Wife cooks at home, follows low Na and moderate CHO intake at home, good appetite, NKFA. SLP eval completed , recommend pureed solids with nectar thick liquids. Pt tolerating current dysphagia 1 pureed with nectar thick liquids (DASH/TLC, CCD no snacks, Renal, FR 1000 mL/d) well, fair (50%) PO intake of meals during lunch meal, feeding assisted by wife. Denies N/V/D/C or pain. Skin intact. F/u SLP for readiness of diet advancement.    Previous Nutrition Diagnosis:  Swallowing difficulty RT currently on dysphagia 1 pureed with nectar thick liquids per SLP AEB SLP eval completed, currently recommending dysphagia 1 pureed with nectar thick liquids    Active [   ]  Resolved [   ]    If resolved, new PES:     Goal:  Diet upgrade per SLP, improve >75% PO intake, No s/s of any GI intolerances    Recommendations:    Education:     Risk Level: High [   ] Moderate [   ] Low [   ] Admitting Diagnosis:   Patient is a 67y old  Male who presents with a chief complaint of Acute Coronary Syndrome (2018 16:48)      PAST MEDICAL & SURGICAL HISTORY:  Coronary artery disease involving native heart without angina pectoris, unspecified vessel or lesion type  Hyperlipidemia, unspecified hyperlipidemia type  Hypertension, unspecified type  Type 2 diabetes mellitus without complication, without long-term current use of insulin  History of cataract extraction, unspecified laterality  CAD S/P percutaneous coronary angioplasty      Current Nutrition Order:  NPO     GI Issues: suspecting GIB noted  Pt c/o nausea, RN made aware  Denies vomiting, diarrhea, constipation    Pain: denies pain    Skin Integrity: intact     Labs:       137  |  92<L>  |  163<H>  ----------------------------<  211<H>  4.1   |  29  |  3.80<H>    Ca    9.8      2018 07:09  Phos  5.4       Mg     2.6         TPro  6.9  /  Alb  3.8  /  TBili  0.5  /  DBili  x   /  AST  55<H>  /  ALT  306<H>  /  AlkPhos  91      CAPILLARY BLOOD GLUCOSE      POCT Blood Glucose.: 199 mg/dL (2018 06:08)  POCT Blood Glucose.: 293 mg/dL (2018 21:52)  POCT Blood Glucose.: 285 mg/dL (2018 17:03)  POCT Blood Glucose.: 350 mg/dL (2018 11:55)  POCT Blood Glucose.: 381 mg/dL (2018 11:33)      Medications:  MEDICATIONS  (STANDING):  chlorhexidine 2% Cloths 1 Application(s) Topical daily  dextrose 5%. 1000 milliLiter(s) (50 mL/Hr) IV Continuous <Continuous>  dextrose 50% Injectable 12.5 Gram(s) IV Push once  dextrose 50% Injectable 25 Gram(s) IV Push once  dextrose 50% Injectable 25 Gram(s) IV Push once  escitalopram 5 milliGRAM(s) Oral daily  insulin glargine Injectable (LANTUS) 15 Unit(s) SubCutaneous at bedtime  insulin lispro (HumaLOG) corrective regimen sliding scale   SubCutaneous three times a day before meals  insulin lispro (HumaLOG) corrective regimen sliding scale   SubCutaneous at bedtime  pantoprazole  Injectable 40 milliGRAM(s) IV Push two times a day  sevelamer carbonate Powder 800 milliGRAM(s) Oral three times a day with meals    MEDICATIONS  (PRN):  dextrose 40% Gel 15 Gram(s) Oral once PRN Blood Glucose LESS THAN 70 milliGRAM(s)/deciliter  glucagon  Injectable 1 milliGRAM(s) IntraMuscular once PRN Glucose LESS THAN 70 milligrams/deciliter      Weight:  130.5 lbs (),   152 lbs (),   130 lbs ()  Daily Weight in k.2 (2018 06:04)    Weight Change: 152 lbs recorded likely inaccurate. new weight at 130 lbs, stable at this time. Will continue to monitor weight trends.     Height: 5' 6" Weight: 152 lbs (), 130 lbs (),  lbs+/-10%, %IBW 85%, BMI 25.2  Estimated energy needs:   ABW used to calculate EER as per pt's current body weight is within % IBW  Estimated nutrient needs based on [facility] SOC for maintenance in adults  25-30 kcal/k9847-1738 kcal  1-1.2 g/k-86 gm protein  FR 1L/d per MD    Subjective:   67 y.o M from home with PMHx of Dilated Cardiomyopathy, HFrEF of 25%, CAD s/p PCI 7 years ago, DM, HTN, HLD. Pt p/w AMS and admitted for management of ACS and subacute systolic CHF. Wife cooks at home, follows low Na and moderate CHO intake at home, good appetite, NKFA. SLP eval completed , recommend pureed solids with nectar thick liquids. Pt was tolerating current dysphagia 1 pureed with nectar thick liquids (DASH/TLC, CCD no snacks, Renal, FR 1000 mL/d) well, fair (50%) PO intake of meals with feeding assist. Pt is now NPO 2/2 suspicion of GIB. GI consulted.     Previous Nutrition Diagnosis:  Swallowing difficulty RT currently on dysphagia 1 pureed with nectar thick liquids per SLP AEB SLP eval completed, currently recommending dysphagia 1 pureed with nectar thick liquids    Active [ X ]  Resolved [   ]    If resolved, new PES: Inadequate energy intake R/T NPO AEB pt meets 0% estimated nutritional needs     Goal:  Diet progression/initiation   No s/s of any GI intolerances (GIB)    Recommendations:  1) If able to r/o GIB, please resume PO diet (Dysphagia 1 Pureed with Nectar thick liquids and f/u SLP)  2) Monitor H/H and transfuse PRN  3) Daily weight  4) Strict I/O's     Education: Not warranted at this time. RD will f/u per protocol    Risk Level: High [ X ] Moderate [   ] Low [   ]

## 2018-07-27 NOTE — PROGRESS NOTE ADULT - PROBLEM SELECTOR PLAN 3
Patient has been weak, unsteady, and confused. There is ? facial droop on exam. Likely toxic-metabolic 2/2 uremia but could have some component of hypoperfusion, stroke/hemorrhage, and depression.   - Lactate normalized   - On lexapro  -will discuss with renal need for HD, and clarify GOC with family Acute drop in Hb from 14-->9, now 8.9. Transferrin normal. However, he has been tachy and with elevated uremia, suspect potential GI gastritis/bleed. No overt melana/blood from the rectum on AYDEE.   -NPO, PPI BID, active T&S   -GI Consult

## 2018-07-27 NOTE — PROGRESS NOTE ADULT - PROBLEM SELECTOR PLAN 1
Patient has hx of PCI w stents placed 7 years prior. Patient reportedly had an acute MI earlier this week in [place] with EKG showing ST elevations in the lateral leads and troponins in excess of 2000. EKG on admission shows q-waves in leads I and aVL with poor R wave progression consistent with a previous lateral wall MI. Outside therapeutic window for PCI. Troponin and CKMB done 7/22 in setting of new ST changes in 2 precordial leads, both lower, will no longer follow.  - Will hold ASA given risks of hemorrhagic conversion in setting of stroke with triple therapy  - resuming brillinta 90 BID  - Holding statin therapy due to transaminitis  - Restarted beta blocker (metop 12.5mg q8hr) in setting of new afib/aflutter   - Holding ACE/ARB due to severe RONNIE  - Will need diagnostic cath/stress test for ischemic workup; holding off due to renal failure  - On hydralazine 10 TID for afterload reduction, will monitor and reassess for need to up titrated    #asymptomatic afib/aflutter  Rapid ventricular beats noted. Continue on rate control (goal <110) and will continue to monitor  -metoprolol 12.5 q8hr  -cardiac monitor  -on Eliquis Patient has hx of PCI w stents placed 7 years prior. Patient reportedly had an acute MI earlier this week in [place] with EKG showing ST elevations in the lateral leads and troponins in excess of 2000. EKG on admission shows q-waves in leads I and aVL with poor R wave progression consistent with a previous lateral wall MI. Outside therapeutic window for PCI. Troponin and CKMB done 7/22 in setting of new ST changes in 2 precordial leads, both lower, will no longer follow.    In setting of acute anemia and suspicion of GI bleed, will need to STOP all anti-platelet, anti-coag medications, cardiac rate control, and afterload reduction medications.   - HOLD ASA given risks of hemorrhagic conversion in setting of stroke with triple therapy, and GI bleed risk  - HOLD brillinta 90 BID  - Holding statin therapy due to transaminitis  - HOLD beta blocker (metop 12.5mg q8hr) in setting of new afib/aflutter   - Holding ACE/ARB due to severe RONNIE  - HOLD On hydralazine 10 TID for afterload reduction  - Will need diagnostic cath/stress test for ischemic workup; holding off due to renal failure    #asymptomatic afib/aflutter  Rapid ventricular beats noted. Continue on rate control (goal <110) and will continue to monitor  -HOLD metoprolol 12.5 q8hr  -cardiac monitor  -STOP Eliquis

## 2018-07-27 NOTE — CONSULT NOTE ADULT - SUBJECTIVE AND OBJECTIVE BOX
HPI:  67yr old M w PMHx significant for dilated cardiomyopathy, HFrEF of 25%, CAD s/p PCI 7 years ago, NIDDM, HTN, and HLD, who presented to the [facility] ED with altered mental status in the setting of one week of dyspnea and weakness while on a trip to [place]. GI consulted for sudden drop in H&H.  Patient was having some nausea and emesis prior to presentation, associated with weakness, and difficulty ambulating. He was evaluated at a hospital in [place] where he was told he had a STEMI with troponin >2000    Patient was in his usual state of health up until last Thursday when, while on a trip to [place], he developed dyspnea, nausea, and vomiting. He was seen at a clinic in [place], who prescribed bactrim and metronidazole to treat his GI symptoms. He continued to feel dyspneic and weakness, which became severe enough to make ambulation difficult. Due to this weakness, he went to a hospital in [place], where they reportedly noticed an STEMI and a troponin of >2000. They treated the patient with IV fluids, anti-hypertensives, and insulin. They did not send the patient for cardiac catherization at that time and did not initiate standard ACS treatment. He continued to feel weak after that admission and returned to the [place] the evening prior to admission. He started becoming confused and drowsy this AM and went to an appointment with his cardiologist who directed him to the ER. Patient was a poor historian and history was obtained from wife and sons. At bedside today, he did not complain of any issues, but his family states that he is still altered from his mental baseline and provided his medical history. They state that he is still weak and unsteady on his feet. Patient has not had headache, fevers, chills, chest pain, cough, abdominal pain, MSK pain, changes to his speech. He has been making good urine at home, denies urinary symptoms. His family states that he did not have dyspnea prior to this current episode and deny baseline orthopnea, PND, palpitations, peripheral edema.     In the ED, vital signs were T 97.6, HR 72, /84, RR 18, 98% O2 on RA. CBC, CMP, coags, Troponin, BNP were drawn. Labs were most significant for Hgb 12.3, Plt 101, INR 1.3, Na 132, K 3.8, HCO3 20, /Cr 9.41, ALT 1421. Trop was elevated to 17.5 and BNP was >63373.  He was loaded with ASA 325mg and Brilinta 180mg. A CXR and TTE were completed. The echocardiogram showed that the left ventricular ejection fraction was severely reduced. The left ventricular ejection fraction is estimated to be 15-20%. The patient was admitted to the CCU for further management. (20 Jul 2018 16:48)      PAST MEDICAL & SURGICAL HISTORY:  Coronary artery disease involving native heart without angina pectoris, unspecified vessel or lesion type  Hyperlipidemia, unspecified hyperlipidemia type  Hypertension, unspecified type  Type 2 diabetes mellitus without complication, without long-term current use of insulin  History of cataract extraction, unspecified laterality  CAD S/P percutaneous coronary angioplasty      REVIEW OF SYSTEMS      General:	    Skin/Breast:  	  Ophthalmologic:  	  ENMT:	    Respiratory and Thorax:  	  Cardiovascular:	    Gastrointestinal:	    Genitourinary:	    Musculoskeletal:	    Neurological:	    Psychiatric:	    Hematology/Lymphatics:	    Endocrine:	    Allergic/Immunologic:	    MEDICATIONS  (STANDING):  chlorhexidine 2% Cloths 1 Application(s) Topical daily  dextrose 5%. 1000 milliLiter(s) (50 mL/Hr) IV Continuous <Continuous>  dextrose 50% Injectable 12.5 Gram(s) IV Push once  dextrose 50% Injectable 25 Gram(s) IV Push once  dextrose 50% Injectable 25 Gram(s) IV Push once  escitalopram 5 milliGRAM(s) Oral daily  insulin glargine Injectable (LANTUS) 15 Unit(s) SubCutaneous at bedtime  insulin lispro (HumaLOG) corrective regimen sliding scale   SubCutaneous three times a day before meals  insulin lispro (HumaLOG) corrective regimen sliding scale   SubCutaneous at bedtime  pantoprazole  Injectable 40 milliGRAM(s) IV Push two times a day  sevelamer carbonate Powder 800 milliGRAM(s) Oral three times a day with meals    MEDICATIONS  (PRN):  dextrose 40% Gel 15 Gram(s) Oral once PRN Blood Glucose LESS THAN 70 milliGRAM(s)/deciliter  glucagon  Injectable 1 milliGRAM(s) IntraMuscular once PRN Glucose LESS THAN 70 milligrams/deciliter      Allergies  No Known Allergies    Intolerances      SOCIAL HISTORY:  Former alcoholic, now drinks sparingly, denies tobacco or alcohol use    FAMILY HISTORY:  Family history of hypertension in mother (Mother)  Family history of diabetes mellitus in mother (Mother)    Vital Signs Last 24 Hrs  T(C): 36.6 (27 Jul 2018 19:24), Max: 37 (27 Jul 2018 12:15)  T(F): 97.9 (27 Jul 2018 19:24), Max: 98.6 (27 Jul 2018 12:15)  HR: 108 (27 Jul 2018 19:00) (102 - 130)  BP: 129/81 (27 Jul 2018 19:00) (54/28 - 145/86)  BP(mean): 96 (27 Jul 2018 19:00) (41 - 110)  RR: 13 (27 Jul 2018 19:00) (11 - 40)  SpO2: 99% (27 Jul 2018 19:00) (97% - 100%)      PHYSICAL EXAM:      Constitutional:    Eyes:    ENMT:    Neck:    Breasts:    Back:    Respiratory:    Cardiovascular:    Gastrointestinal:    Genitourinary:    Rectal:    Extremities:    Vascular:    Neurological:    Skin:    Lymph Nodes:    Musculoskeletal:    Psychiatric:        LABS:                        8.9    11.2  )-----------( 260      ( 27 Jul 2018 07:09 )             26.5     07-27    137  |  92<L>  |  163<H>  ----------------------------<  211<H>  4.1   |  29  |  3.80<H>    Ca    9.8      27 Jul 2018 07:09  Phos  5.4     07-27  Mg     2.6     07-27    TPro  6.9  /  Alb  3.8  /  TBili  0.5  /  DBili  x   /  AST  55<H>  /  ALT  306<H>  /  AlkPhos  91  07-27          RADIOLOGY & ADDITIONAL STUDIES: HPI:  67yr old M w PMHx significant for dilated cardiomyopathy, HFrEF of 25%, CAD s/p PCI 7 years ago, NIDDM, HTN, and HLD, who presented to the [facility] ED with altered mental status in the setting of one week of dyspnea and weakness while on a trip to [place]. GI consulted for sudden drop in H&H.  Patient was having some nausea and emesis prior to presentation, associated with weakness, and difficulty ambulating. He was evaluated at a hospital in [place] where he was told he had a STEMI with troponin >2000, and he reported that he was Rx medically. He decided to come back to [place] for further Rx, and was admitted to CCU where he was Rx with ASA and brilinta. Patients Hgb was noted to drop significantly yesterday with no documented output. Patient reports that he feels very tired, has some nausea, and leg pain. Otherwise denies bleeding, emesis, abdominal pain, fever, chills, sick contacts.    EGD - never  Colonoscopy - never      PAST MEDICAL & SURGICAL HISTORY:  Coronary artery disease involving native heart without angina pectoris, unspecified vessel or lesion type  Hyperlipidemia, unspecified hyperlipidemia type  Hypertension, unspecified type  Type 2 diabetes mellitus without complication, without long-term current use of insulin  History of cataract extraction, unspecified laterality  CAD S/P percutaneous coronary angioplasty      REVIEW OF SYSTEMS  Apart from items noted in the HPI a 10point ROS was negative  	    MEDICATIONS  (STANDING):  chlorhexidine 2% Cloths 1 Application(s) Topical daily  dextrose 5%. 1000 milliLiter(s) (50 mL/Hr) IV Continuous <Continuous>  dextrose 50% Injectable 12.5 Gram(s) IV Push once  dextrose 50% Injectable 25 Gram(s) IV Push once  dextrose 50% Injectable 25 Gram(s) IV Push once  escitalopram 5 milliGRAM(s) Oral daily  insulin glargine Injectable (LANTUS) 15 Unit(s) SubCutaneous at bedtime  insulin lispro (HumaLOG) corrective regimen sliding scale   SubCutaneous three times a day before meals  insulin lispro (HumaLOG) corrective regimen sliding scale   SubCutaneous at bedtime  pantoprazole  Injectable 40 milliGRAM(s) IV Push two times a day  sevelamer carbonate Powder 800 milliGRAM(s) Oral three times a day with meals    MEDICATIONS  (PRN):  dextrose 40% Gel 15 Gram(s) Oral once PRN Blood Glucose LESS THAN 70 milliGRAM(s)/deciliter  glucagon  Injectable 1 milliGRAM(s) IntraMuscular once PRN Glucose LESS THAN 70 milligrams/deciliter      Allergies  No Known Allergies    Intolerances      SOCIAL HISTORY:  Former alcoholic, now drinks sparingly, denies tobacco or alcohol use    FAMILY HISTORY:  Family history of hypertension in mother (Mother)  Family history of diabetes mellitus in mother (Mother)    Vital Signs Last 24 Hrs  T(C): 36.6 (27 Jul 2018 19:24), Max: 37 (27 Jul 2018 12:15)  T(F): 97.9 (27 Jul 2018 19:24), Max: 98.6 (27 Jul 2018 12:15)  HR: 108 (27 Jul 2018 19:00) (102 - 130)  BP: 129/81 (27 Jul 2018 19:00) (54/28 - 145/86)  BP(mean): 96 (27 Jul 2018 19:00) (41 - 110)  RR: 13 (27 Jul 2018 19:00) (11 - 40)  SpO2: 99% (27 Jul 2018 19:00) (97% - 100%)      PHYSICAL EXAM:  GEN: elderly M lying in bed, in no distress, anicteric, afebrile, not pale  Neck: supple  Respiratory: CTA  Cardiovascular: s1 s2 no M RRR  Gastrointestinal: full, soft, BS+, NT, NR, NG, no masses or organs palpated  Rectal: melenic formed stool in rectal vault  Neurological: AAOx3 non focal  Skin: intact        LABS:                 8.9    11.2  )-----------( 260      ( 27 Jul 2018 07:09 )             26.5     07-27    137  |  92<L>  |  163<H>  ----------------------------<  211<H>  4.1   |  29  |  3.80<H>    Ca    9.8      27 Jul 2018 07:09  Phos  5.4     07-27  Mg     2.6     07-27    TPro  6.9  /  Alb  3.8  /  TBili  0.5  /  DBili  x   /  AST  55<H>  /  ALT  306<H>  /  AlkPhos  91  07-27          RADIOLOGY & ADDITIONAL STUDIES:

## 2018-07-27 NOTE — PROGRESS NOTE ADULT - PROBLEM SELECTOR PLAN 6
Patient's EF was reportedly 25% in the past according to ED report. On echo 7/20, EF is 15-20%. Was volume overloaded on initial exam, now improved. On CXR, evidence of cardiomegaly but significant decrease in pulmonary vascular congestion.   - d/c bumex gtt, holding further diuresis due to orthostasis for now Patient has been weak, unsteady, and confused. There is ? facial droop on exam. Likely toxic-metabolic 2/2 uremia but could have some component of hypoperfusion, stroke/hemorrhage, and depression.   - Lactate normalized   - On lexapro  -will discuss with renal need for HD, and clarify GOC with family

## 2018-07-27 NOTE — PROGRESS NOTE ADULT - PROBLEM SELECTOR PLAN 2
MRI significant for subacute ischemia involving right parietal area   with numerous scattered areas of acute ischemia within the basal ganglia   and frontal and parietal lobes bilaterally some of which are in a   watershed distribution.  - F/U neuro recs  - Needs completed MRA study for full neuro workup.   - carotid u/s negative   - Will need to be on ASA, antithrombotic therapy, statin (when liver enzymes improve), have good management of BP  - Discontinued ASA due to risks of triple therapy  -  eliquis for anticoagulation in setting of aflutter MRI significant for subacute ischemia involving right parietal area   with numerous scattered areas of acute ischemia within the basal ganglia   and frontal and parietal lobes bilaterally some of which are in a   watershed distribution.  - F/U neuro recs  - HOLD MRA study for full neuro workup.   - carotid u/s negative   - HOLD ASA, antithrombotic therapy, statin, as above Patient's EF was reportedly 25% in the past according to ED report. On echo 7/20, EF is 15-20%. Was volume overloaded on initial exam, now improved. On CXR, evidence of cardiomegaly but significant decrease in pulmonary vascular congestion.   - d/c bumex gtt, holding further diuresis due to orthostasis for now

## 2018-07-27 NOTE — PROGRESS NOTE ADULT - PROBLEM SELECTOR PLAN 9
F: None  E: Replete K>4, Mg>2. Will need to be careful with repletion given poor renal function  N: DASH/TLC/CC/Renal/Fluid restricted pureed nectar thick diet as per speech DVT: STOP Eliquis  GI: PPI  Activity: KEKE PT following

## 2018-07-27 NOTE — CONSULT NOTE ADULT - ASSESSMENT
This is 67 year old male without any prior h/o kidney disease. Now admitted for NSTEMI/STEMI - under treatment for that, no cardiac cath planned for now according to the team. Not known renal function. Now with creatinine of 9.45, , fluid status on the wet side -but accepatble, electrolytes wnl, making urine - cherry placed around 250 to 300 ml of urine. No need of urgent indications for HD   We will continue to monitor, most likely worsening of the renal function in the setting of cardiorenal syndrome, EF - 15 to 20 %
67yr old M w PMHx significant for dilated cardiomyopathy, HFrEF of 25%, CAD s/p PCI 7 years ago, NIDDM, HTN, and HLD, who presented to the [facility] ED with altered mental status in the setting of one week of dyspnea and weakness while on a trip to [place]. GI consulted for sudden drop in H&H.    # Anemia -  - likely due to GI source as patient has been hospitalized for a while and was started on dual AP Rx  - sp EGD 7/27/18 - with large ulcer with adherent clot seen in incisura, unable to dislodge clot, sp 3 clips to base of ulcer with no post clipping bleeding reported, smaller 2 clean based ulcer also near large ulcer described above, gastritis, normal esophagus, and duodenum.  - PPI IV BID x72hrs  - clears for now (will advance in 24-48hrs depending on hgb stabiity)  - trend Hbg  - transfuse if Hgb <8 given significant cardiac hx  - if rebleeds - with noticeable drop in Hgb - consider IR evaluation for embolization - can localize ulcer with clips placed    Discussed with attending Dr Alexander SARKAR following

## 2018-07-27 NOTE — PROGRESS NOTE ADULT - ASSESSMENT
RB is a 67M w PMH of dilated cardiomyopathy, HFrEF of 25%, CAD s/p PCI 7 years ago, NIDDM, HTN, and HLD, with altered mental status in the setting of one week of dyspnea and weakness admitted to the CCU for management of ACS, CHF, and RONNIE. RB is a 67M w PMH of HTN, DM, HLD, HFrEF of 25%, CAD s/p PCI 7 years ago, with altered mental status in the setting of one week of dyspnea and weakness admitted to the CCU for management of ACS, acute on chronic CHF, RONNIE, and now with suspicion for GI bleed.

## 2018-07-27 NOTE — PROGRESS NOTE ADULT - PROBLEM SELECTOR PLAN 10
Will contact outpatient cardiologist Dr. Octavio Haley for collateral information.    Dispo: TBD  Code: Full F: None  E: Replete K>4, Mg>2. Will need to be careful with repletion given poor renal function  N: NPO

## 2018-07-27 NOTE — PROGRESS NOTE ADULT - PROBLEM SELECTOR PLAN 1
- NON OLIGURIC Ronnie-   urine output @ 400 cc  unknown baseline, lst known creatinine a week ago in a hospital at [place] 6.6   - now with RONNIE due to cradiorenal syndrome   off diuretic  creatinine appears to have plateaued.   however BUN still trending up likely from upper GI bleed  - renal diet  avoid nephrotoxic agents

## 2018-07-27 NOTE — PROGRESS NOTE ADULT - PROBLEM SELECTOR PLAN 5
Patient's ALT over 1000 on admission, however AST is around 100. Alk phos is elevated to 150. Unclear pattern of liver enzymes. HIV/HepC/HepB negative. Likely shock liver, improving.  - trend LFT's MRI significant for subacute ischemia involving right parietal area   with numerous scattered areas of acute ischemia within the basal ganglia   and frontal and parietal lobes bilaterally some of which are in a   watershed distribution.  - F/U neuro recs  - HOLD MRA study for full neuro workup.   - carotid u/s negative   - HOLD ASA, antithrombotic therapy, statin, as above

## 2018-07-27 NOTE — PROGRESS NOTE ADULT - ATTENDING COMMENTS
pt with acceptable hemodynamics and uo, with creat coninuing improvement.   bun elevation reflects GI bleeding.   GI stafff to eval.

## 2018-07-27 NOTE — PROGRESS NOTE ADULT - PROBLEM SELECTOR PLAN 8
DVT: Eliquis  GI: PPI  Activity: KEKE PT following Holding home medications. Will need closer f/u as outpt. POC >300's this am.  - Hb A1c was 8  -NPH 10Ux1, Lantus 15U qhs  - c/w ISS,

## 2018-07-27 NOTE — PROGRESS NOTE ADULT - PROBLEM SELECTOR PROBLEM 6
Acute on chronic systolic congestive heart failure Altered mental status, unspecified altered mental status type

## 2018-07-27 NOTE — PROGRESS NOTE ADULT - PROBLEM SELECTOR PLAN 7
Holding home medications. Will need closer f/u as outpt. POC >300's this am.  - Hb A1c was 8  -NPH 10Ux1, Lantus 15U qhs  - c/w ISS, Patient's ALT over 1000 on admission, however AST is around 100. Alk phos is elevated to 150. Unclear pattern of liver enzymes. HIV/HepC/HepB negative. Likely shock liver, improving.  - trend LFT's

## 2018-07-27 NOTE — PROGRESS NOTE ADULT - PROBLEM SELECTOR PLAN 4
Patient's Cr is downtrending, waiting on morning labs. unclear baseline. However, BUN remains elevated. Likely 2/2 to cardiorenal syndrome as cardiac output is poor.   - Renal on board, appreciate recs  - Per renal, no urgent need for dialysis at this time  - Urine lytes with intrinsic renal disease, UA neg  -sevelamer 800 TID to prevent hyperphosphatemia  -Pt dry and euvolemic today; hold further diuretics and reassess starting PO tomorrow  #Hyponatremia: Resolved   - d/c jo ann, now with condom cath Patient's BUN/Cr is up trending, in setting of anemia/low perfusion. (unclear baseline). Likely 2/2 to cardiorenal syndrome as cardiac output is poor.   - Renal on board, appreciate recs  - Per renal, no urgent need for dialysis at this time  - Urine lytes with intrinsic renal disease, UA neg  -sevelamer 800 TID to prevent hyperphosphatemia  -Pt dry and euvolemic today; hold further diuretics for now    #Hyponatremia: Resolved   - d/c jo ann, now with condom cath

## 2018-07-27 NOTE — PROGRESS NOTE ADULT - SUBJECTIVE AND OBJECTIVE BOX
Patient seen and examined  BUn still trending up likely from Upper GI bleed  Cr @ 3.80<--- 3.64    aspirin  chewable 81 milliGRAM(s) daily  buMETAnide Infusion 1 mG/Hr <Continuous>  dextrose 40% Gel 15 Gram(s) once PRN  dextrose 5%. 1000 milliLiter(s) <Continuous>  dextrose 50% Injectable 12.5 Gram(s) once  dextrose 50% Injectable 25 Gram(s) once  dextrose 50% Injectable 25 Gram(s) once  glucagon  Injectable 1 milliGRAM(s) once PRN  heparin  Infusion 900 Unit(s)/Hr <Continuous>  insulin lispro (HumaLOG) corrective regimen sliding scale   three times a day before meals  insulin lispro (HumaLOG) corrective regimen sliding scale   at bedtime  ticagrelor 90 milliGRAM(s) two times a day      Allergies    No Known Allergies    Intolerances        T(C): , Max: 37.1 (18 @ 06:28)  T(F): , Max: 98.7 (18 @ 06:28)  HR: 78 (18 @ 14:00)  BP: 126/73 (18 @ 14:00)  BP(mean): 96 (18 @ 14:00)  RR: 19 (18 @ 14:00)  SpO2: 99% (18 @ 14:00)  Wt(kg): --     @ 07:  -   @ 07:00  --  on Bumex  1 mg IV q 12hrs. urine output @ 2400 cc    IN:    bumetanide Infusion: 130 mL    heparin Infusion: 69 mL  Total IN: 199 mL    OUT:    Intermittent Catheterization - Urethral: 2450 mL  Total OUT: 2450 mL    Total NET: -2251 mL       @ 07:  -   @ 15:19  --------------------------------------------------------  IN:    bumetanide Infusion: 80 mL    heparin Infusion: 36 mL  Total IN: 116 mL    OUT:    Intermittent Catheterization - Urethral: 1720 mL  Total OUT: 1720 mL    Total NET: -1604 mL    Physical exam:   Alert and oriented  JVD   Normal air entry into the lungs, no wheezing, no crackles   RRR, normal s1/s2, no murmurs, rubs or gallops   Abdomen - soft, not tender, not distended   Extremities: 1+ edema   No rash   No astertix         Height (cm): 167.64 ( @ 17:42)  Weight (kg): 70.7 ( @ 17:42)  BMI (kg/m2): 25.2 ( @ 17:42)  BSA (m2): 1.8 ( @ 17:42)      LABS:                        13.2   6.2   )-----------( 121      ( 2018 08:05 )             38.2         136  |  88<L>  |  142<H>  ----------------------------<  162<H>  3.9   |  22  |  8.32<H>    Ca    8.9      2018 08:05  Phos  7.4       Mg     2.9         TPro  7.7  /  Alb  3.5  /  TBili  2.0<H>  /  DBili  x   /  AST  106<H>  /  ALT  1177<H>  /  AlkPhos  152<H>      Cholesterol, Serum: 178 mg/dL [10 - 199] ( @ 08:05)  Hemoglobin A1C, Whole Blood: 8.0 % <H> [4.0 - 5.6] ( @ 08:05)    PT/INR - ( 2018 13:48 )   PT: 14.9 sec;   INR: 1.33          PTT - ( 2018 01:36 )  PTT:54.4 sec  Urinalysis Basic - ( 2018 17:25 )    Color: Yellow / Appearance: Clear / S.015 / pH: x  Gluc: x / Ketone: NEGATIVE  / Bili: Negative / Urobili: 0.2 E.U./dL   Blood: x / Protein: Trace mg/dL / Nitrite: NEGATIVE   Leuk Esterase: NEGATIVE / RBC: < 5 /HPF / WBC 5-10 /HPF   Sq Epi: x / Non Sq Epi: 0-5 /HPF / Bacteria: Present /HPF      Chloride, Random Urine: 52 mmol/L ( @ 18:48)  Creatinine, Random Urine: 58 mg/dL ( @ 18:48)        RADIOLOGY & ADDITIONAL STUDIES:

## 2018-07-27 NOTE — PROGRESS NOTE ADULT - PROBLEM SELECTOR PROBLEM 2
Cerebrovascular accident (CVA), unspecified mechanism Acute on chronic systolic congestive heart failure

## 2018-07-27 NOTE — PROGRESS NOTE ADULT - SUBJECTIVE AND OBJECTIVE BOX
OVERNIGHT EVENTS:    SUBJECTIVE:    Vital Signs Last 12 Hrs  T(F): 96.9 (07-27-18 @ 01:16), Max: 97.1 (07-26-18 @ 20:53)  HR: 112 (07-27-18 @ 06:00) (104 - 130)  BP: 119/82 (07-27-18 @ 06:00) (99/54 - 145/86)  BP(mean): 94 (07-27-18 @ 06:00) (74 - 108)  RR: 18 (07-27-18 @ 06:00) (11 - 32)  SpO2: 99% (07-27-18 @ 06:00) (96% - 100%)  I&O's Summary    25 Jul 2018 07:01  -  26 Jul 2018 07:00  --------------------------------------------------------  IN: 470 mL / OUT: 255 mL / NET: 215 mL    26 Jul 2018 07:01  -  27 Jul 2018 06:40  --------------------------------------------------------  IN: 620 mL / OUT: 400 mL / NET: 220 mL    Physical Exam:     Constitutional: WDWN resting comfortably in bed; NAD  Head: NC/AT  Eyes: PERRL, EOMI, anicteric sclera  ENT: no nasal discharge; uvula midline, no oropharyngeal erythema or exudates; MMM.   Neck: no JVD   Respiratory: CTA B/L; no W/R/R, no retractions  Cardiac: 2/6 WANDER at LUSB, Regular rate, rhythm  Gastrointestinal: abdomen soft, NT, mildly distended; no rebound or guarding; +BSx4  Extremities:  No edema at b/l ankles. Feet are cool to the touch. Otherwise WWP.   Vascular: 2+ radial. DP/PT pulses 1+.   Neurologic: AAOx3; slight right facial droop  Psychiatric: Flat affect; limited responses to interviewer/clinician            LABS:                        9.0    8.8   )-----------( 262      ( 27 Jul 2018 01:38 )             26.8     07-26    135  |  90<L>  |  150<H>  ----------------------------<  357<H>  4.7   |  25  |  3.64<H>    Ca    9.1      26 Jul 2018 12:52  Phos  5.5     07-26  Mg     2.3     07-26    TPro  7.0  /  Alb  2.8<L>  /  TBili  0.6  /  DBili  x   /  AST  72<H>  /  ALT  349<H>  /  AlkPhos  97  07-26          RADIOLOGY & ADDITIONAL TESTS:    MEDICATIONS  (STANDING):  chlorhexidine 2% Cloths 1 Application(s) Topical daily  dextrose 5%. 1000 milliLiter(s) (50 mL/Hr) IV Continuous <Continuous>  dextrose 50% Injectable 12.5 Gram(s) IV Push once  dextrose 50% Injectable 25 Gram(s) IV Push once  dextrose 50% Injectable 25 Gram(s) IV Push once  escitalopram 5 milliGRAM(s) Oral daily  insulin glargine Injectable (LANTUS) 15 Unit(s) SubCutaneous at bedtime  insulin lispro (HumaLOG) corrective regimen sliding scale   SubCutaneous three times a day before meals  insulin lispro (HumaLOG) corrective regimen sliding scale   SubCutaneous at bedtime  pantoprazole  Injectable 40 milliGRAM(s) IV Push two times a day  sevelamer carbonate Powder 800 milliGRAM(s) Oral three times a day with meals    MEDICATIONS  (PRN):  dextrose 40% Gel 15 Gram(s) Oral once PRN Blood Glucose LESS THAN 70 milliGRAM(s)/deciliter  glucagon  Injectable 1 milliGRAM(s) IntraMuscular once PRN Glucose LESS THAN 70 milligrams/deciliter OVERNIGHT EVENTS:  Hg dropped to 9; Sinus tachy to 110's.    SUBJECTIVE:  No new complaints.     Vital Signs Last 12 Hrs  T(F): 96.9 (07-27-18 @ 01:16), Max: 97.1 (07-26-18 @ 20:53)  HR: 112 (07-27-18 @ 06:00) (104 - 130)  BP: 119/82 (07-27-18 @ 06:00) (99/54 - 145/86)  BP(mean): 94 (07-27-18 @ 06:00) (74 - 108)  RR: 18 (07-27-18 @ 06:00) (11 - 32)  SpO2: 99% (07-27-18 @ 06:00) (96% - 100%)  I&O's Summary    25 Jul 2018 07:01  -  26 Jul 2018 07:00  --------------------------------------------------------  IN: 470 mL / OUT: 255 mL / NET: 215 mL    26 Jul 2018 07:01  -  27 Jul 2018 06:40  --------------------------------------------------------  IN: 620 mL / OUT: 400 mL / NET: 220 mL    Physical Exam:     Constitutional: WDWN resting comfortably in bed; NAD  Head: NC/AT  Eyes: PERRL, EOMI, anicteric sclera  ENT: no nasal discharge; uvula midline, no oropharyngeal erythema or exudates; MMM.   Neck: no JVD   Respiratory: CTA B/L; no W/R/R, no retractions  Cardiac: 2/6 WANDER at LUSB, Regular rate, rhythm  Gastrointestinal: abdomen soft, NT, mildly distended; no rebound or guarding; +BSx4  Extremities:  No edema at b/l ankles. Hands and feet are cool to the touch.   Vascular: 2+ radial. DP/PT pulses 1+.   Neurologic: AAOx2; slight right facial droop  Psychiatric: Flat affect; limited responses to interviewer/clinician            LABS:                        9.0    8.8   )-----------( 262      ( 27 Jul 2018 01:38 )             26.8     07-26    135  |  90<L>  |  150<H>  ----------------------------<  357<H>  4.7   |  25  |  3.64<H>    Ca    9.1      26 Jul 2018 12:52  Phos  5.5     07-26  Mg     2.3     07-26    TPro  7.0  /  Alb  2.8<L>  /  TBili  0.6  /  DBili  x   /  AST  72<H>  /  ALT  349<H>  /  AlkPhos  97  07-26          RADIOLOGY & ADDITIONAL TESTS:    MEDICATIONS  (STANDING):  chlorhexidine 2% Cloths 1 Application(s) Topical daily  dextrose 5%. 1000 milliLiter(s) (50 mL/Hr) IV Continuous <Continuous>  dextrose 50% Injectable 12.5 Gram(s) IV Push once  dextrose 50% Injectable 25 Gram(s) IV Push once  dextrose 50% Injectable 25 Gram(s) IV Push once  escitalopram 5 milliGRAM(s) Oral daily  insulin glargine Injectable (LANTUS) 15 Unit(s) SubCutaneous at bedtime  insulin lispro (HumaLOG) corrective regimen sliding scale   SubCutaneous three times a day before meals  insulin lispro (HumaLOG) corrective regimen sliding scale   SubCutaneous at bedtime  pantoprazole  Injectable 40 milliGRAM(s) IV Push two times a day  sevelamer carbonate Powder 800 milliGRAM(s) Oral three times a day with meals    MEDICATIONS  (PRN):  dextrose 40% Gel 15 Gram(s) Oral once PRN Blood Glucose LESS THAN 70 milliGRAM(s)/deciliter  glucagon  Injectable 1 milliGRAM(s) IntraMuscular once PRN Glucose LESS THAN 70 milligrams/deciliter OVERNIGHT EVENTS:  Hg dropped to 9; Sinus tachy to 110's.     SUBJECTIVE:  No new complaints.     Vital Signs Last 12 Hrs  T(F): 96.9 (07-27-18 @ 01:16), Max: 97.1 (07-26-18 @ 20:53)  HR: 112 (07-27-18 @ 06:00) (104 - 130)  BP: 119/82 (07-27-18 @ 06:00) (99/54 - 145/86)  BP(mean): 94 (07-27-18 @ 06:00) (74 - 108)  RR: 18 (07-27-18 @ 06:00) (11 - 32)  SpO2: 99% (07-27-18 @ 06:00) (96% - 100%)  I&O's Summary    25 Jul 2018 07:01  -  26 Jul 2018 07:00  --------------------------------------------------------  IN: 470 mL / OUT: 255 mL / NET: 215 mL    26 Jul 2018 07:01  -  27 Jul 2018 06:40  --------------------------------------------------------  IN: 620 mL / OUT: 400 mL / NET: 220 mL    Physical Exam:     Constitutional: WDWN resting comfortably in bed; NAD  Head: NC/AT  Eyes: PERRL, EOMI, anicteric sclera  ENT: no nasal discharge; uvula midline, no oropharyngeal erythema or exudates; MMM.   Neck: no JVD   Respiratory: CTA B/L; no W/R/R, no retractions  Cardiac: 2/6 WANDER at LUSB, Regular rate, rhythm  Gastrointestinal: abdomen soft, NT, mildly distended; no rebound or guarding; +BSx4  Extremities:  No edema at b/l ankles. Hands and feet are cool to the touch.   Vascular: 2+ radial. DP/PT pulses 1+.   Neurologic: AAOx2; slight right facial droop  Psychiatric: Flat affect; limited responses to interviewer/clinician            LABS:                        9.0    8.8   )-----------( 262      ( 27 Jul 2018 01:38 )             26.8     07-26    135  |  90<L>  |  150<H>  ----------------------------<  357<H>  4.7   |  25  |  3.64<H>    Ca    9.1      26 Jul 2018 12:52  Phos  5.5     07-26  Mg     2.3     07-26    TPro  7.0  /  Alb  2.8<L>  /  TBili  0.6  /  DBili  x   /  AST  72<H>  /  ALT  349<H>  /  AlkPhos  97  07-26          RADIOLOGY & ADDITIONAL TESTS:    MEDICATIONS  (STANDING):  chlorhexidine 2% Cloths 1 Application(s) Topical daily  dextrose 5%. 1000 milliLiter(s) (50 mL/Hr) IV Continuous <Continuous>  dextrose 50% Injectable 12.5 Gram(s) IV Push once  dextrose 50% Injectable 25 Gram(s) IV Push once  dextrose 50% Injectable 25 Gram(s) IV Push once  escitalopram 5 milliGRAM(s) Oral daily  insulin glargine Injectable (LANTUS) 15 Unit(s) SubCutaneous at bedtime  insulin lispro (HumaLOG) corrective regimen sliding scale   SubCutaneous three times a day before meals  insulin lispro (HumaLOG) corrective regimen sliding scale   SubCutaneous at bedtime  pantoprazole  Injectable 40 milliGRAM(s) IV Push two times a day  sevelamer carbonate Powder 800 milliGRAM(s) Oral three times a day with meals    MEDICATIONS  (PRN):  dextrose 40% Gel 15 Gram(s) Oral once PRN Blood Glucose LESS THAN 70 milliGRAM(s)/deciliter  glucagon  Injectable 1 milliGRAM(s) IntraMuscular once PRN Glucose LESS THAN 70 milligrams/deciliter

## 2018-07-28 LAB
ANION GAP SERPL CALC-SCNC: 16 MMOL/L — SIGNIFICANT CHANGE UP (ref 5–17)
ANION GAP SERPL CALC-SCNC: 16 MMOL/L — SIGNIFICANT CHANGE UP (ref 5–17)
BUN SERPL-MCNC: 157 MG/DL — HIGH (ref 7–23)
BUN SERPL-MCNC: 171 MG/DL — HIGH (ref 7–23)
CALCIUM SERPL-MCNC: 9.4 MG/DL — SIGNIFICANT CHANGE UP (ref 8.4–10.5)
CALCIUM SERPL-MCNC: 9.5 MG/DL — SIGNIFICANT CHANGE UP (ref 8.4–10.5)
CHLORIDE SERPL-SCNC: 101 MMOL/L — SIGNIFICANT CHANGE UP (ref 96–108)
CHLORIDE SERPL-SCNC: 97 MMOL/L — SIGNIFICANT CHANGE UP (ref 96–108)
CO2 SERPL-SCNC: 28 MMOL/L — SIGNIFICANT CHANGE UP (ref 22–31)
CO2 SERPL-SCNC: 29 MMOL/L — SIGNIFICANT CHANGE UP (ref 22–31)
CREAT ?TM UR-MCNC: 76 MG/DL — SIGNIFICANT CHANGE UP
CREAT SERPL-MCNC: 3.93 MG/DL — HIGH (ref 0.5–1.3)
CREAT SERPL-MCNC: 4.16 MG/DL — HIGH (ref 0.5–1.3)
GLUCOSE SERPL-MCNC: 138 MG/DL — HIGH (ref 70–99)
GLUCOSE SERPL-MCNC: 176 MG/DL — HIGH (ref 70–99)
HCT VFR BLD CALC: 27.4 % — LOW (ref 39–50)
HCT VFR BLD CALC: 27.4 % — LOW (ref 39–50)
HCT VFR BLD CALC: 27.7 % — LOW (ref 39–50)
HGB BLD-MCNC: 8.8 G/DL — LOW (ref 13–17)
HGB BLD-MCNC: 8.8 G/DL — LOW (ref 13–17)
HGB BLD-MCNC: 9 G/DL — LOW (ref 13–17)
MAGNESIUM SERPL-MCNC: 2.7 MG/DL — HIGH (ref 1.6–2.6)
MAGNESIUM SERPL-MCNC: 2.9 MG/DL — HIGH (ref 1.6–2.6)
MCHC RBC-ENTMCNC: 28.8 PG — SIGNIFICANT CHANGE UP (ref 27–34)
MCHC RBC-ENTMCNC: 28.8 PG — SIGNIFICANT CHANGE UP (ref 27–34)
MCHC RBC-ENTMCNC: 28.9 PG — SIGNIFICANT CHANGE UP (ref 27–34)
MCHC RBC-ENTMCNC: 31.8 G/DL — LOW (ref 32–36)
MCHC RBC-ENTMCNC: 32.1 G/DL — SIGNIFICANT CHANGE UP (ref 32–36)
MCHC RBC-ENTMCNC: 32.8 G/DL — SIGNIFICANT CHANGE UP (ref 32–36)
MCV RBC AUTO: 87.8 FL — SIGNIFICANT CHANGE UP (ref 80–100)
MCV RBC AUTO: 89.5 FL — SIGNIFICANT CHANGE UP (ref 80–100)
MCV RBC AUTO: 91.1 FL — SIGNIFICANT CHANGE UP (ref 80–100)
PHOSPHATE SERPL-MCNC: 6.4 MG/DL — HIGH (ref 2.5–4.5)
PLATELET # BLD AUTO: 304 K/UL — SIGNIFICANT CHANGE UP (ref 150–400)
PLATELET # BLD AUTO: 319 K/UL — SIGNIFICANT CHANGE UP (ref 150–400)
PLATELET # BLD AUTO: 351 K/UL — SIGNIFICANT CHANGE UP (ref 150–400)
POTASSIUM SERPL-MCNC: 5 MMOL/L — SIGNIFICANT CHANGE UP (ref 3.5–5.3)
POTASSIUM SERPL-MCNC: 5.2 MMOL/L — SIGNIFICANT CHANGE UP (ref 3.5–5.3)
POTASSIUM SERPL-SCNC: 5 MMOL/L — SIGNIFICANT CHANGE UP (ref 3.5–5.3)
POTASSIUM SERPL-SCNC: 5.2 MMOL/L — SIGNIFICANT CHANGE UP (ref 3.5–5.3)
RBC # BLD: 3.04 M/UL — LOW (ref 4.2–5.8)
RBC # BLD: 3.06 M/UL — LOW (ref 4.2–5.8)
RBC # BLD: 3.12 M/UL — LOW (ref 4.2–5.8)
RBC # FLD: 14.6 % — SIGNIFICANT CHANGE UP (ref 10.3–16.9)
RBC # FLD: 14.8 % — SIGNIFICANT CHANGE UP (ref 10.3–16.9)
RBC # FLD: 15 % — SIGNIFICANT CHANGE UP (ref 10.3–16.9)
SODIUM SERPL-SCNC: 141 MMOL/L — SIGNIFICANT CHANGE UP (ref 135–145)
SODIUM SERPL-SCNC: 146 MMOL/L — HIGH (ref 135–145)
UUN UR-MCNC: 1171 MG/DL — SIGNIFICANT CHANGE UP
WBC # BLD: 7.9 K/UL — SIGNIFICANT CHANGE UP (ref 3.8–10.5)
WBC # BLD: 8.2 K/UL — SIGNIFICANT CHANGE UP (ref 3.8–10.5)
WBC # BLD: 9.7 K/UL — SIGNIFICANT CHANGE UP (ref 3.8–10.5)
WBC # FLD AUTO: 7.9 K/UL — SIGNIFICANT CHANGE UP (ref 3.8–10.5)
WBC # FLD AUTO: 8.2 K/UL — SIGNIFICANT CHANGE UP (ref 3.8–10.5)
WBC # FLD AUTO: 9.7 K/UL — SIGNIFICANT CHANGE UP (ref 3.8–10.5)

## 2018-07-28 PROCEDURE — 99291 CRITICAL CARE FIRST HOUR: CPT

## 2018-07-28 PROCEDURE — 99232 SBSQ HOSP IP/OBS MODERATE 35: CPT | Mod: GC

## 2018-07-28 PROCEDURE — 71045 X-RAY EXAM CHEST 1 VIEW: CPT | Mod: 26

## 2018-07-28 RX ORDER — METOPROLOL TARTRATE 50 MG
5 TABLET ORAL ONCE
Qty: 0 | Refills: 0 | Status: COMPLETED | OUTPATIENT
Start: 2018-07-28 | End: 2018-07-28

## 2018-07-28 RX ORDER — METOPROLOL TARTRATE 50 MG
12.5 TABLET ORAL EVERY 12 HOURS
Qty: 0 | Refills: 0 | Status: DISCONTINUED | OUTPATIENT
Start: 2018-07-28 | End: 2018-07-29

## 2018-07-28 RX ORDER — SODIUM CHLORIDE 9 MG/ML
1000 INJECTION INTRAMUSCULAR; INTRAVENOUS; SUBCUTANEOUS
Qty: 0 | Refills: 0 | Status: DISCONTINUED | OUTPATIENT
Start: 2018-07-28 | End: 2018-07-29

## 2018-07-28 RX ADMIN — Medication 12.5 MILLIGRAM(S): at 13:24

## 2018-07-28 RX ADMIN — Medication 12.5 MILLIGRAM(S): at 23:09

## 2018-07-28 RX ADMIN — PANTOPRAZOLE SODIUM 40 MILLIGRAM(S): 20 TABLET, DELAYED RELEASE ORAL at 05:16

## 2018-07-28 RX ADMIN — INSULIN GLARGINE 15 UNIT(S): 100 INJECTION, SOLUTION SUBCUTANEOUS at 23:09

## 2018-07-28 RX ADMIN — Medication 2: at 10:51

## 2018-07-28 RX ADMIN — PANTOPRAZOLE SODIUM 40 MILLIGRAM(S): 20 TABLET, DELAYED RELEASE ORAL at 17:55

## 2018-07-28 RX ADMIN — SODIUM CHLORIDE 50 MILLILITER(S): 9 INJECTION INTRAMUSCULAR; INTRAVENOUS; SUBCUTANEOUS at 13:16

## 2018-07-28 RX ADMIN — Medication 5 MILLIGRAM(S): at 04:30

## 2018-07-28 RX ADMIN — ESCITALOPRAM OXALATE 5 MILLIGRAM(S): 10 TABLET, FILM COATED ORAL at 23:09

## 2018-07-28 RX ADMIN — CHLORHEXIDINE GLUCONATE 1 APPLICATION(S): 213 SOLUTION TOPICAL at 06:36

## 2018-07-28 NOTE — PROGRESS NOTE ADULT - PROBLEM SELECTOR PLAN 2
Patient's EF was reportedly 25% in the past according to ED report. On echo 7/20, EF is 15-20%. Was volume overloaded on initial exam, now improved. On CXR, evidence of cardiomegaly but significant decrease in pulmonary vascular congestion.   - d/c bumex gtt, holding further diuresis due to orthostasis for now

## 2018-07-28 NOTE — PROGRESS NOTE ADULT - SUBJECTIVE AND OBJECTIVE BOX
OVERNIGHT EVENTS:    SUBJECTIVE:    Vital Signs Last 12 Hrs  T(F): 96.8 (07-28-18 @ 05:23), Max: 98.8 (07-27-18 @ 23:00)  HR: 92 (07-28-18 @ 05:00) (90 - 134)  BP: 117/76 (07-28-18 @ 05:00) (107/69 - 129/81)  BP(mean): 97 (07-28-18 @ 05:00) (74 - 97)  RR: 14 (07-28-18 @ 05:00) (10 - 19)  SpO2: 100% (07-28-18 @ 05:00) (99% - 100%)  I&O's Summary    26 Jul 2018 07:01  -  27 Jul 2018 07:00  --------------------------------------------------------  IN: 620 mL / OUT: 400 mL / NET: 220 mL    27 Jul 2018 07:01  -  28 Jul 2018 06:54  --------------------------------------------------------  IN: 250 mL / OUT: 1210 mL / NET: -960 mL        Physical Exam:     Constitutional: WDWN resting comfortably in bed; NAD  Head: NC/AT  Eyes: PERRL, EOMI, anicteric sclera  ENT: no nasal discharge; uvula midline, no oropharyngeal erythema or exudates; MMM.   Neck: no JVD   Respiratory: CTA B/L; no W/R/R, no retractions  Cardiac: 2/6 WANDER at LUSB, Regular rate, rhythm  Gastrointestinal: abdomen soft, NT, mildly distended; no rebound or guarding; +BSx4  Extremities:  No edema at b/l ankles. Hands and feet are cool to the touch.   Vascular: 2+ radial. DP/PT pulses 1+.   Neurologic: AAOx2; slight right facial droop  Psychiatric: Flat affect; limited responses to interviewer/clinician        LABS:                        9.0    9.7   )-----------( 304      ( 28 Jul 2018 05:23 )             27.4     07-28    141  |  97  |  171<H>  ----------------------------<  176<H>  5.0   |  28  |  4.16<H>    Ca    9.4      28 Jul 2018 05:17  Phos  6.4     07-28  Mg     2.7     07-28    TPro  6.9  /  Alb  3.8  /  TBili  0.5  /  DBili  x   /  AST  55<H>  /  ALT  306<H>  /  AlkPhos  91  07-27          RADIOLOGY & ADDITIONAL TESTS:    MEDICATIONS  (STANDING):  chlorhexidine 2% Cloths 1 Application(s) Topical daily  dextrose 5%. 1000 milliLiter(s) (50 mL/Hr) IV Continuous <Continuous>  dextrose 50% Injectable 12.5 Gram(s) IV Push once  dextrose 50% Injectable 25 Gram(s) IV Push once  dextrose 50% Injectable 25 Gram(s) IV Push once  escitalopram 5 milliGRAM(s) Oral daily  insulin glargine Injectable (LANTUS) 15 Unit(s) SubCutaneous at bedtime  insulin lispro (HumaLOG) corrective regimen sliding scale   SubCutaneous three times a day before meals  insulin lispro (HumaLOG) corrective regimen sliding scale   SubCutaneous at bedtime  pantoprazole  Injectable 40 milliGRAM(s) IV Push two times a day  sevelamer carbonate Powder 800 milliGRAM(s) Oral three times a day with meals    MEDICATIONS  (PRN):  dextrose 40% Gel 15 Gram(s) Oral once PRN Blood Glucose LESS THAN 70 milliGRAM(s)/deciliter  glucagon  Injectable 1 milliGRAM(s) IntraMuscular once PRN Glucose LESS THAN 70 milligrams/deciliter OVERNIGHT EVENTS:  HR 140s; given lopressor 5mgx1 and 250cc NS bolus. Hb stable.    SUBJECTIVE:  No new overt or indications of pain/complaints.     Vital Signs Last 12 Hrs  T(F): 96.8 (07-28-18 @ 05:23), Max: 98.8 (07-27-18 @ 23:00)  HR: 92 (07-28-18 @ 05:00) (90 - 134)  BP: 117/76 (07-28-18 @ 05:00) (107/69 - 129/81)  BP(mean): 97 (07-28-18 @ 05:00) (74 - 97)  RR: 14 (07-28-18 @ 05:00) (10 - 19)  SpO2: 100% (07-28-18 @ 05:00) (99% - 100%)  I&O's Summary    26 Jul 2018 07:01  -  27 Jul 2018 07:00  --------------------------------------------------------  IN: 620 mL / OUT: 400 mL / NET: 220 mL    27 Jul 2018 07:01  -  28 Jul 2018 06:54  --------------------------------------------------------  IN: 250 mL / OUT: 1210 mL / NET: -960 mL        Physical Exam:     Constitutional: WDWN resting comfortably in bed; NAD  Head: NC/AT  Eyes: PERRL, EOMI, anicteric sclera  ENT: no nasal discharge; uvula midline, no oropharyngeal erythema or exudates; MMM.   Neck: no JVD   Respiratory: CTA B/L; no W/R/R, no retractions  Cardiac: S1/S2+, Regular rate, rhythm  Gastrointestinal: abdomen soft, NT, mildly distended; no rebound or guarding; +BSx4  Extremities:  No edema at b/l ankles. Hands and feet are warm to the touch.   Vascular: 2+ radial. DP/PT pulses 1+.   Neurologic: AAOx2; slight right facial droop  Psychiatric: Flat affect; limited responses to interviewer/clinician        LABS:                        9.0    9.7   )-----------( 304      ( 28 Jul 2018 05:23 )             27.4     07-28    141  |  97  |  171<H>  ----------------------------<  176<H>  5.0   |  28  |  4.16<H>    Ca    9.4      28 Jul 2018 05:17  Phos  6.4     07-28  Mg     2.7     07-28    TPro  6.9  /  Alb  3.8  /  TBili  0.5  /  DBili  x   /  AST  55<H>  /  ALT  306<H>  /  AlkPhos  91  07-27          RADIOLOGY & ADDITIONAL TESTS:    MEDICATIONS  (STANDING):  chlorhexidine 2% Cloths 1 Application(s) Topical daily  dextrose 5%. 1000 milliLiter(s) (50 mL/Hr) IV Continuous <Continuous>  dextrose 50% Injectable 12.5 Gram(s) IV Push once  dextrose 50% Injectable 25 Gram(s) IV Push once  dextrose 50% Injectable 25 Gram(s) IV Push once  escitalopram 5 milliGRAM(s) Oral daily  insulin glargine Injectable (LANTUS) 15 Unit(s) SubCutaneous at bedtime  insulin lispro (HumaLOG) corrective regimen sliding scale   SubCutaneous three times a day before meals  insulin lispro (HumaLOG) corrective regimen sliding scale   SubCutaneous at bedtime  pantoprazole  Injectable 40 milliGRAM(s) IV Push two times a day  sevelamer carbonate Powder 800 milliGRAM(s) Oral three times a day with meals    MEDICATIONS  (PRN):  dextrose 40% Gel 15 Gram(s) Oral once PRN Blood Glucose LESS THAN 70 milliGRAM(s)/deciliter  glucagon  Injectable 1 milliGRAM(s) IntraMuscular once PRN Glucose LESS THAN 70 milligrams/deciliter

## 2018-07-28 NOTE — PROGRESS NOTE ADULT - PROBLEM SELECTOR PLAN 10
F: None  E: Replete K>4, Mg>2. Will need to be careful with repletion given poor renal function  N: NPO F: None  E: Replete K>4, Mg>2. Will need to be careful with repletion given poor renal function  N: Clear (nectar thick) liquid diet over weekend

## 2018-07-28 NOTE — PROGRESS NOTE ADULT - PROBLEM SELECTOR PLAN 3
Acute drop in Hb from 14-->9, now 8.9. Transferrin normal. However, he has been tachy and with elevated uremia, suspect potential GI gastritis/bleed. No overt melana/blood from the rectum on AYDEE.   -NPO, PPI BID, active T&S   -GI Consult Acute Upper GI bleed 2/2 two non-bleed ulcers s/p clipping of one. Hb stable at 9 since. (Baseline Hb 14 on 7/27).   -Appreciate GI f/u and rec's.   -PPI BID  -Clear (nectar thick) diet, as tolerated over weekend; adv to solids by Monday  -GI to give guidance on reintroducing anti-platelette/anti-thromb

## 2018-07-28 NOTE — PROGRESS NOTE ADULT - ASSESSMENT
This is 67 year old male without any prior h/o kidney disease. Now admitted for NSTEMI/STEMI - under treatment for that, no cardiac cath planned for now according to the team. Not known renal function. Now with creatinine of 9.45, , fluid status on the wet side -but accepatble, electrolytes wnl, making urine - cherry placed around 250 to 300 ml of urine. No need of urgent indications for HD   We will continue to monitor, most likely worsening of the renal function in the setting of cardiorenal syndrome, EF - 15 to 20 %.   Started on diuretic therapy with good response - slightly improvement of the renal function - creatinine trending down   continue with diuretic therpay for now This is 67 year old male without any prior h/o kidney disease. Now admitted for NSTEMI/STEMI  with acute kidney injury--> unknown baseline creatinine

## 2018-07-28 NOTE — PROGRESS NOTE ADULT - PROBLEM SELECTOR PLAN 5
MRI significant for subacute ischemia involving right parietal area   with numerous scattered areas of acute ischemia within the basal ganglia   and frontal and parietal lobes bilaterally some of which are in a   watershed distribution.  - F/U neuro recs  - HOLD MRA study for full neuro workup.   - carotid u/s negative   - HOLD ASA, antithrombotic therapy, statin, as above MRI significant for subacute ischemia involving right parietal area   with numerous scattered areas of acute ischemia within the basal ganglia   and frontal and parietal lobes bilaterally some of which are in a   watershed distribution.  - F/U neuro recs  - HOLD MRA study for full neuro workup.   - carotid u/s negative   - HOLD ASA, antithrombotic therapy, statin, as above  -PT/OT/ST as tolerated

## 2018-07-28 NOTE — PROGRESS NOTE ADULT - PROBLEM SELECTOR PLAN 1
kidney worsening   cr  from 3.96--->4.16  likely prerenal from blood loss  patient appears to be dry  can check urine lytes: urine na, urine creatinine  recommend gentle hydration with Ns @ 50 cc/hr and close monitoring to prevent hypervolemia  also monitor I/o  follow BMP  avoid nephrotoxic agents    BUn still on the rise likely 2nd to acute Upper GI Bleed  GI on board, embolization as next step if necessary  - renal diet  avoid nephrotoxic agents

## 2018-07-28 NOTE — PROGRESS NOTE ADULT - PROBLEM SELECTOR PLAN 7
Patient's ALT over 1000 on admission, however AST is around 100. Alk phos is elevated to 150. Unclear pattern of liver enzymes. HIV/HepC/HepB negative. Likely shock liver, improving.  - trend LFT's

## 2018-07-28 NOTE — PROGRESS NOTE ADULT - PROBLEM SELECTOR PLAN 1
Patient has hx of PCI w stents placed 7 years prior. Patient reportedly had an acute MI earlier this week in [place] with EKG showing ST elevations in the lateral leads and troponins in excess of 2000. EKG on admission shows q-waves in leads I and aVL with poor R wave progression consistent with a previous lateral wall MI. Outside therapeutic window for PCI. Troponin and CKMB done 7/22 in setting of new ST changes in 2 precordial leads, both lower, will no longer follow.    In setting of acute anemia and suspicion of GI bleed, will need to STOP all anti-platelet, anti-coag medications, cardiac rate control, and afterload reduction medications.   - HOLD ASA given risks of hemorrhagic conversion in setting of stroke with triple therapy, and GI bleed risk  - HOLD brillinta 90 BID  - Holding statin therapy due to transaminitis  - HOLD beta blocker (metop 12.5mg q8hr) in setting of new afib/aflutter   - Holding ACE/ARB due to severe RONNIE  - HOLD On hydralazine 10 TID for afterload reduction  - Will need diagnostic cath/stress test for ischemic workup; holding off due to renal failure    #asymptomatic afib/aflutter  Rapid ventricular beats noted. Continue on rate control (goal <110) and will continue to monitor  -HOLD metoprolol 12.5 q8hr  -cardiac monitor  -STOP Eliquis Patient has hx of PCI w stents placed 7 years prior. Patient reportedly had an acute MI earlier this week in [place] with EKG showing ST elevations in the lateral leads and troponins in excess of 2000. EKG on admission shows q-waves in leads I and aVL with poor R wave progression consistent with a previous lateral wall MI. Outside therapeutic window for PCI. Troponin and CKMB done 7/22 in setting of new ST changes in 2 precordial leads, both lower, will no longer follow.     In setting of recent upper GI bleed, HELD anti-platelet, anti-coag medications, cardiac rate control, and afterload reduction medications. Will need to discuss with GI options for reinitiating anti-platelet and/or AC.   - HOLD ASA given risks of hemorrhagic conversion in setting of stroke with triple therapy, and GI bleed risk  - HOLD brillinta 90 BID  - Holding statin therapy due to transaminitis  - RESTART metop 12.5mg q12hr and monitor hemodynamics  - Holding ACE/ARB due to severe RONNIE  - HOLD On hydralazine 10 TID for afterload reduction  - Eventually would benefit from diagnostic cath/stress test for ischemic workup; holding off due to renal failure and tenuous state    #asymptomatic afib/aflutter  Rapid ventricular beats noted. Continue on rate control (goal <110) and will continue to monitor  -RESTART metoprolol 12.5 q12hr and closely monitor.   -cardiac monitor  -STOP Eliquis

## 2018-07-28 NOTE — PROGRESS NOTE ADULT - SUBJECTIVE AND OBJECTIVE BOX
Patient seen and examined  S/P EGD yesterday-- large ulcer with adherent clot--. unable to be dislodged. S/p 3 clips.   currently on PPI IV and NPO  labs noted---> BUN/ cr @ 171/4.16 <---159 /3.96    aspirin  chewable 81 milliGRAM(s) daily  buMETAnide Infusion 1 mG/Hr <Continuous>  dextrose 40% Gel 15 Gram(s) once PRN  dextrose 5%. 1000 milliLiter(s) <Continuous>  dextrose 50% Injectable 12.5 Gram(s) once  dextrose 50% Injectable 25 Gram(s) once  dextrose 50% Injectable 25 Gram(s) once  glucagon  Injectable 1 milliGRAM(s) once PRN  heparin  Infusion 900 Unit(s)/Hr <Continuous>  insulin lispro (HumaLOG) corrective regimen sliding scale   three times a day before meals  insulin lispro (HumaLOG) corrective regimen sliding scale   at bedtime  ticagrelor 90 milliGRAM(s) two times a day      Allergies    No Known Allergies    Intolerances        T(C): , Max: 37.1 (18 @ 06:28)  T(F): , Max: 98.7 (18 @ 06:28)  HR: 78 (18 @ 14:00)  BP: 126/73 (18 @ 14:00)  BP(mean): 96 (18 @ 14:00)  RR: 19 (18 @ 14:00)  SpO2: 99% (18 @ 14:00)  Wt(kg): --     @ :  -   @ 07:00  --  on Bumex  1 mg IV q 12hrs. urine output @ 2400 cc    IN:    bumetanide Infusion: 130 mL    heparin Infusion: 69 mL  Total IN: 199 mL    OUT:    Intermittent Catheterization - Urethral: 2450 mL  Total OUT: 2450 mL    Total NET: -2251 mL       @ 07:01  -   @ 15:19  --------------------------------------------------------  IN:    bumetanide Infusion: 80 mL    heparin Infusion: 36 mL  Total IN: 116 mL    OUT:    Intermittent Catheterization - Urethral: 1720 mL  Total OUT: 1720 mL    Total NET: -1604 mL    Physical exam:   Alert and oriented  JVD   Normal air entry into the lungs, no wheezing, no crackles   RRR, normal s1/s2, no murmurs, rubs or gallops   Abdomen - soft, not tender, not distended   Extremities: no edema   No rash   No astertix         Height (cm): 167.64 ( @ 17:42)  Weight (kg): 70.7 ( @ 17:42)  BMI (kg/m2): 25.2 ( @ 17:42)  BSA (m2): 1.8 ( @ 17:42)      LABS:                        13.2   6.2   )-----------( 121      ( 2018 08:05 )             38.2         136  |  88<L>  |  142<H>  ----------------------------<  162<H>  3.9   |  22  |  8.32<H>    Ca    8.9      2018 08:05  Phos  7.4       Mg     2.9         TPro  7.7  /  Alb  3.5  /  TBili  2.0<H>  /  DBili  x   /  AST  106<H>  /  ALT  1177<H>  /  AlkPhos  152<H>      Cholesterol, Serum: 178 mg/dL [10 - 199] ( @ 08:05)  Hemoglobin A1C, Whole Blood: 8.0 % <H> [4.0 - 5.6] ( @ 08:05)    PT/INR - ( 2018 13:48 )   PT: 14.9 sec;   INR: 1.33          PTT - ( 2018 01:36 )  PTT:54.4 sec  Urinalysis Basic - ( 2018 17:25 )    Color: Yellow / Appearance: Clear / S.015 / pH: x  Gluc: x / Ketone: NEGATIVE  / Bili: Negative / Urobili: 0.2 E.U./dL   Blood: x / Protein: Trace mg/dL / Nitrite: NEGATIVE   Leuk Esterase: NEGATIVE / RBC: < 5 /HPF / WBC 5-10 /HPF   Sq Epi: x / Non Sq Epi: 0-5 /HPF / Bacteria: Present /HPF      Chloride, Random Urine: 52 mmol/L ( @ 18:48)  Creatinine, Random Urine: 58 mg/dL ( @ 18:48)        RADIOLOGY & ADDITIONAL STUDIES:

## 2018-07-28 NOTE — PROGRESS NOTE ADULT - ATTENDING COMMENTS
Assessment: Patient personally seen and examined myself during rounds with the House Staff/Fellow  ON DATE 7/28/18  House Staff/Fellow note read, including vitals, physical findings, laboratory data, and radiological reports.   Revisions included below.   Direct personal management at bed side and extensive interpretation of the data.    Plan was outlined and discussed in details with the House Staff/Fellow.    Decision making of high complexity   Risk high of complications, morbidity, and/or mortality  Assessment and Action taken for acute disease activity to reflect the level of care provided:  -Hemodynamic evaluation and support  -ACS assessment and treatment as applicable  -Heart failure assessment and treatment as applicable  -Cardiac Telemetry reviewed  -Medication reconciliation  -Review laboratory data  -EKG reviewed   -Echo reviewed  -Interdisciplinary discussion with IC / EP / HF / CTS teams as needed  My plan includes :  close hemodynamic monitoring and management   Monitor for arrhythmias and monitor parameters for organ perfusion  monitor neurologic status  Head of the bed should remain elevated to 45 deg .   chest PT and IS will be encouraged  monitor adequacy of oxygenation and ventilation and attempt to wean oxygen  Nutritional goals will be met using po eventually , ensure adequate caloric intake and montior the same  Stress ulcer and VTE prophylaxis will be achieved    Glycemic control is satisfactory  Electrolytes have been replete as necessary and wound care has been carried out. Pain control has been achieved.   aggressive physical therapy and early mobility and ambulation goals will be met   The family was updated about the course and plan  CRITICAL CARE TIME SPENT in evaluation and management, reassessments, review and interpretation of labs and x-rays, ventilator and hemodynamic management, formulating a plan and coordinating care: ___90____ MIN.  Time does not include procedural time.    Isaias Fountain MD  CCU ATTENDING  Mobile: [phone]

## 2018-07-28 NOTE — PROGRESS NOTE ADULT - ASSESSMENT
67yr old M w PMHx significant for dilated cardiomyopathy, HFrEF of 25%, CAD s/p PCI 7 years ago, NIDDM, HTN, and HLD, who presented to the [facility] ED with altered mental status in the setting of one week of dyspnea and weakness while on a trip to [place]. GI consulted for sudden drop in H&H.    # Acute blood loss anemia 2/2 PUD  - sp EGD 7/27/18 - with large ulcer with adherent clot seen in incisura, unable to dislodge clot, sp 3 clips to base of ulcer with no post clipping bleeding reported, smaller 2 clean based ulcer also near large ulcer described above, gastritis, normal esophagus, and duodenum.  - PPI IV BID x72hrs  - C/w clear liquid diet  - H/H stable, HD stable  - Monitor H/H Q6-8H, transfuse if Hgb <8  - If e/o recurrent GI bleeding (ie. melena, hematochezia, hematemesis) +/- HD instability consider IR evaluation for embolization - can localize ulcer with clips placed 67yr old M w PMHx significant for dilated cardiomyopathy, HFrEF of 25%, CAD s/p PCI 7 years ago, NIDDM, HTN, and HLD, who presented to the [facility] ED with altered mental status in the setting of one week of dyspnea and weakness while on a trip to [place]. GI consulted for sudden drop in H&H.    # Acute blood loss anemia 2/2 PUD  - sp EGD 7/27/18 - with large ulcer with adherent clot seen in incisura, unable to dislodge clot, sp 3 clips to base of ulcer with no post clipping bleeding reported, smaller 2 clean based ulcer also near large ulcer described above, gastritis, normal esophagus, and duodenum.  - PPI IV BID x72hrs  - C/w clear liquid diet  - H/H stable, HD stable  - Monitor H/H Q6-8H, transfuse if Hgb <8  - If e/o recurrent GI bleeding (ie. melena, hematochezia, hematemesis) +/- HD instability consider IR evaluation for embolization - can localize ulcer with clips placed  - If H/H stable, can initiate anti-platelet and anticoagulation therapy tomorrow. Would recommend trial of heparin prior to transitioning to NOAC    Case d/w Dr. Munoz

## 2018-07-28 NOTE — PROGRESS NOTE ADULT - PROBLEM SELECTOR PLAN 8
Holding home medications. Will need closer f/u as outpt. POC >300's this am.  - Hb A1c was 8  -NPH 10Ux1, Lantus 15U qhs  - c/w ISS, Holding home medications.   - Hb A1c was 8  - Lantus 15U qhs, will adjusted PRN  - c/w ISS,

## 2018-07-28 NOTE — PROGRESS NOTE ADULT - PROBLEM SELECTOR PLAN 6
Patient has been weak, unsteady, and confused. There is ? facial droop on exam. Likely toxic-metabolic 2/2 uremia but could have some component of hypoperfusion, stroke/hemorrhage, and depression.   - Lactate normalized   - On lexapro  -will discuss with renal need for HD, and clarify GOC with family

## 2018-07-28 NOTE — PROGRESS NOTE ADULT - PROBLEM SELECTOR PLAN 4
Patient's BUN/Cr is up trending, in setting of anemia/low perfusion. (unclear baseline). Likely 2/2 to cardiorenal syndrome as cardiac output is poor.   - Renal on board, appreciate recs  - Per renal, no urgent need for dialysis at this time  - Urine lytes with intrinsic renal disease, UA neg  -sevelamer 800 TID to prevent hyperphosphatemia  -Pt dry and euvolemic today; hold further diuretics for now    #Hyponatremia: Resolved   - d/c jo ann, now with condom cath Patient's BUN/Cr is up trending, in setting of anemia/low perfusion. (unclear baseline). Likely 2/2 to cardiorenal syndrome as cardiac output is poor.   - Renal on board, appreciate recs  - Per renal, no urgent need for dialysis at this time  - repeat Urine lytes   -sevelamer 800 TID to prevent hyperphosphatemia  -Pt dry and euvolemic today; hold further diuretics for now    #Hyponatremia: Resolved   - d/c jo ann, now with condom cath

## 2018-07-28 NOTE — PROGRESS NOTE ADULT - SUBJECTIVE AND OBJECTIVE BOX
Pt seen and examined at bedside. Overnight, pt tachycardic to 140s and received lopressor. Pt has no current complaints, denies abdominal pain, nausea, vomiting, melena, hematochezia. States he had 1 BM yesterday, non-bloody. Per RN, she has not received report or noted melenic stools or hematochezia during her shift.     Allergies    No Known Allergies    MEDICATIONS:  MEDICATIONS  (STANDING):  chlorhexidine 2% Cloths 1 Application(s) Topical daily  dextrose 5%. 1000 milliLiter(s) (50 mL/Hr) IV Continuous <Continuous>  dextrose 50% Injectable 12.5 Gram(s) IV Push once  dextrose 50% Injectable 25 Gram(s) IV Push once  dextrose 50% Injectable 25 Gram(s) IV Push once  escitalopram 5 milliGRAM(s) Oral daily  insulin glargine Injectable (LANTUS) 15 Unit(s) SubCutaneous at bedtime  insulin lispro (HumaLOG) corrective regimen sliding scale   SubCutaneous three times a day before meals  insulin lispro (HumaLOG) corrective regimen sliding scale   SubCutaneous at bedtime  metoprolol tartrate 12.5 milliGRAM(s) Oral every 12 hours  pantoprazole  Injectable 40 milliGRAM(s) IV Push two times a day  sevelamer carbonate Powder 800 milliGRAM(s) Oral three times a day with meals  sodium chloride 0.9%. 1000 milliLiter(s) (50 mL/Hr) IV Continuous <Continuous>    MEDICATIONS  (PRN):  dextrose 40% Gel 15 Gram(s) Oral once PRN Blood Glucose LESS THAN 70 milliGRAM(s)/deciliter  glucagon  Injectable 1 milliGRAM(s) IntraMuscular once PRN Glucose LESS THAN 70 milligrams/deciliter    Vital Signs Last 24 Hrs  T(C): 35.9 (28 Jul 2018 12:19), Max: 37.1 (27 Jul 2018 23:00)  T(F): 96.7 (28 Jul 2018 12:19), Max: 98.8 (27 Jul 2018 23:00)  HR: 97 (28 Jul 2018 12:00) (90 - 134)  BP: 116/74 (28 Jul 2018 12:00) (94/61 - 129/81)  BP(mean): 92 (28 Jul 2018 12:00) (74 - 101)  RR: 13 (28 Jul 2018 12:00) (10 - 23)  SpO2: 100% (28 Jul 2018 12:00) (90% - 100%)    07-27 @ 07:01  -  07-28 @ 07:00  --------------------------------------------------------  IN: 250 mL / OUT: 1595 mL / NET: -1345 mL    07-28 @ 07:01  -  07-28 @ 13:02  --------------------------------------------------------  IN: 0 mL / OUT: 215 mL / NET: -215 mL    PHYSICAL EXAM:    General: Well developed; well nourished; in no acute distress  HEENT: MMM, conjunctiva and sclera clear  Gastrointestinal: Soft non-tender non-distended;  No rebound or guarding  Skin: Warm and dry. No obvious rash    LABS:                        8.8    7.9   )-----------( 319      ( 28 Jul 2018 12:41 )             27.4     07-28    141  |  97  |  171<H>  ----------------------------<  176<H>  5.0   |  28  |  4.16<H>    Ca    9.4      28 Jul 2018 05:17  Phos  6.4     07-28  Mg     2.7     07-28    TPro  6.9  /  Alb  3.8  /  TBili  0.5  /  DBili  x   /  AST  55<H>  /  ALT  306<H>  /  AlkPhos  91  07-27    RADIOLOGY & ADDITIONAL STUDIES:  Xray Chest 1 View- PORTABLE-Routine (07.28.18 @ 07:15)  Impression: No acute infiltrates

## 2018-07-28 NOTE — PROGRESS NOTE ADULT - ASSESSMENT
RB is a 67M w PMH of HTN, DM, HLD, HFrEF of 25%, CAD s/p PCI 7 years ago, with altered mental status in the setting of one week of dyspnea and weakness admitted to the CCU for management of ACS, acute on chronic CHF, RONNIE, and now with suspicion for GI bleed. RB is a 67M w PMH of HTN, DM, HLD, HFrEF of 25%, CAD s/p PCI 7 years ago, with altered mental status in the setting of one week of dyspnea and weakness admitted to the CCU for management of ACS, acute on chronic CHF, RONNIE, s/p acute upper GI bleed 2/2 two gastric ulcers; s/p clipping of one ulcer. He remains in tenuous status, as he is at a high risk for acute re-bleed and high risk for thrombus given recent STEMI, afib, and CVA.

## 2018-07-28 NOTE — PROGRESS NOTE ADULT - ATTENDING COMMENTS
worsening renal fxn again -- s/p diuresis and appears to be on dry side clinically - no sign CHF   agree with stop diuretics and recommend gentle IVF   follow uo, chem

## 2018-07-29 DIAGNOSIS — E87.0 HYPEROSMOLALITY AND HYPERNATREMIA: ICD-10-CM

## 2018-07-29 LAB
ALBUMIN SERPL ELPH-MCNC: 3.5 G/DL — SIGNIFICANT CHANGE UP (ref 3.3–5)
ALP SERPL-CCNC: 99 U/L — SIGNIFICANT CHANGE UP (ref 40–120)
ALT FLD-CCNC: 300 U/L — HIGH (ref 10–45)
ANION GAP SERPL CALC-SCNC: 13 MMOL/L — SIGNIFICANT CHANGE UP (ref 5–17)
ANION GAP SERPL CALC-SCNC: 15 MMOL/L — SIGNIFICANT CHANGE UP (ref 5–17)
AST SERPL-CCNC: 116 U/L — HIGH (ref 10–40)
BILIRUB SERPL-MCNC: 0.5 MG/DL — SIGNIFICANT CHANGE UP (ref 0.2–1.2)
BLD GP AB SCN SERPL QL: NEGATIVE — SIGNIFICANT CHANGE UP
BUN SERPL-MCNC: 137 MG/DL — HIGH (ref 7–23)
BUN SERPL-MCNC: 144 MG/DL — HIGH (ref 7–23)
CALCIUM SERPL-MCNC: 9.4 MG/DL — SIGNIFICANT CHANGE UP (ref 8.4–10.5)
CALCIUM SERPL-MCNC: 9.4 MG/DL — SIGNIFICANT CHANGE UP (ref 8.4–10.5)
CHLORIDE SERPL-SCNC: 106 MMOL/L — SIGNIFICANT CHANGE UP (ref 96–108)
CHLORIDE SERPL-SCNC: 107 MMOL/L — SIGNIFICANT CHANGE UP (ref 96–108)
CO2 SERPL-SCNC: 29 MMOL/L — SIGNIFICANT CHANGE UP (ref 22–31)
CO2 SERPL-SCNC: 32 MMOL/L — HIGH (ref 22–31)
CREAT SERPL-MCNC: 3.73 MG/DL — HIGH (ref 0.5–1.3)
CREAT SERPL-MCNC: 3.79 MG/DL — HIGH (ref 0.5–1.3)
GLUCOSE SERPL-MCNC: 139 MG/DL — HIGH (ref 70–99)
GLUCOSE SERPL-MCNC: 209 MG/DL — HIGH (ref 70–99)
HCT VFR BLD CALC: 21.9 % — LOW (ref 39–50)
HCT VFR BLD CALC: 27.4 % — LOW (ref 39–50)
HCT VFR BLD CALC: 33 % — LOW (ref 39–50)
HGB BLD-MCNC: 10.9 G/DL — LOW (ref 13–17)
HGB BLD-MCNC: 6.9 G/DL — CRITICAL LOW (ref 13–17)
HGB BLD-MCNC: 8.7 G/DL — LOW (ref 13–17)
MAGNESIUM SERPL-MCNC: 3 MG/DL — HIGH (ref 1.6–2.6)
MAGNESIUM SERPL-MCNC: 3.1 MG/DL — HIGH (ref 1.6–2.6)
MCHC RBC-ENTMCNC: 28.5 PG — SIGNIFICANT CHANGE UP (ref 27–34)
MCHC RBC-ENTMCNC: 29 PG — SIGNIFICANT CHANGE UP (ref 27–34)
MCHC RBC-ENTMCNC: 29.6 PG — SIGNIFICANT CHANGE UP (ref 27–34)
MCHC RBC-ENTMCNC: 31.5 G/DL — LOW (ref 32–36)
MCHC RBC-ENTMCNC: 31.8 G/DL — LOW (ref 32–36)
MCHC RBC-ENTMCNC: 33 G/DL — SIGNIFICANT CHANGE UP (ref 32–36)
MCV RBC AUTO: 89.7 FL — SIGNIFICANT CHANGE UP (ref 80–100)
MCV RBC AUTO: 89.8 FL — SIGNIFICANT CHANGE UP (ref 80–100)
MCV RBC AUTO: 92 FL — SIGNIFICANT CHANGE UP (ref 80–100)
PLATELET # BLD AUTO: 338 K/UL — SIGNIFICANT CHANGE UP (ref 150–400)
PLATELET # BLD AUTO: 343 K/UL — SIGNIFICANT CHANGE UP (ref 150–400)
PLATELET # BLD AUTO: 434 K/UL — HIGH (ref 150–400)
POTASSIUM SERPL-MCNC: 4.6 MMOL/L — SIGNIFICANT CHANGE UP (ref 3.5–5.3)
POTASSIUM SERPL-MCNC: 4.7 MMOL/L — SIGNIFICANT CHANGE UP (ref 3.5–5.3)
POTASSIUM SERPL-SCNC: 4.6 MMOL/L — SIGNIFICANT CHANGE UP (ref 3.5–5.3)
POTASSIUM SERPL-SCNC: 4.7 MMOL/L — SIGNIFICANT CHANGE UP (ref 3.5–5.3)
PROT SERPL-MCNC: 7.3 G/DL — SIGNIFICANT CHANGE UP (ref 6–8.3)
RBC # BLD: 2.38 M/UL — LOW (ref 4.2–5.8)
RBC # BLD: 3.05 M/UL — LOW (ref 4.2–5.8)
RBC # BLD: 3.68 M/UL — LOW (ref 4.2–5.8)
RBC # FLD: 14.9 % — SIGNIFICANT CHANGE UP (ref 10.3–16.9)
RH IG SCN BLD-IMP: POSITIVE — SIGNIFICANT CHANGE UP
SODIUM SERPL-SCNC: 150 MMOL/L — HIGH (ref 135–145)
SODIUM SERPL-SCNC: 152 MMOL/L — HIGH (ref 135–145)
WBC # BLD: 11 K/UL — HIGH (ref 3.8–10.5)
WBC # BLD: 12.7 K/UL — HIGH (ref 3.8–10.5)
WBC # BLD: 8.5 K/UL — SIGNIFICANT CHANGE UP (ref 3.8–10.5)
WBC # FLD AUTO: 11 K/UL — HIGH (ref 3.8–10.5)
WBC # FLD AUTO: 12.7 K/UL — HIGH (ref 3.8–10.5)
WBC # FLD AUTO: 8.5 K/UL — SIGNIFICANT CHANGE UP (ref 3.8–10.5)

## 2018-07-29 PROCEDURE — 93010 ELECTROCARDIOGRAM REPORT: CPT

## 2018-07-29 PROCEDURE — 99291 CRITICAL CARE FIRST HOUR: CPT

## 2018-07-29 PROCEDURE — 71045 X-RAY EXAM CHEST 1 VIEW: CPT | Mod: 26

## 2018-07-29 PROCEDURE — 99232 SBSQ HOSP IP/OBS MODERATE 35: CPT | Mod: GC

## 2018-07-29 RX ORDER — SODIUM CHLORIDE 9 MG/ML
1000 INJECTION, SOLUTION INTRAVENOUS
Qty: 0 | Refills: 0 | Status: DISCONTINUED | OUTPATIENT
Start: 2018-07-29 | End: 2018-07-29

## 2018-07-29 RX ORDER — METOPROLOL TARTRATE 50 MG
12.5 TABLET ORAL ONCE
Qty: 0 | Refills: 0 | Status: COMPLETED | OUTPATIENT
Start: 2018-07-29 | End: 2018-07-29

## 2018-07-29 RX ORDER — SODIUM CHLORIDE 9 MG/ML
1000 INJECTION, SOLUTION INTRAVENOUS
Qty: 0 | Refills: 0 | Status: DISCONTINUED | OUTPATIENT
Start: 2018-07-29 | End: 2018-07-30

## 2018-07-29 RX ORDER — METOPROLOL TARTRATE 50 MG
25 TABLET ORAL
Qty: 0 | Refills: 0 | Status: DISCONTINUED | OUTPATIENT
Start: 2018-07-29 | End: 2018-07-29

## 2018-07-29 RX ADMIN — Medication 12.5 MILLIGRAM(S): at 12:29

## 2018-07-29 RX ADMIN — SODIUM CHLORIDE 50 MILLILITER(S): 9 INJECTION, SOLUTION INTRAVENOUS at 12:30

## 2018-07-29 RX ADMIN — INSULIN GLARGINE 15 UNIT(S): 100 INJECTION, SOLUTION SUBCUTANEOUS at 22:01

## 2018-07-29 RX ADMIN — PANTOPRAZOLE SODIUM 40 MILLIGRAM(S): 20 TABLET, DELAYED RELEASE ORAL at 07:47

## 2018-07-29 RX ADMIN — Medication 4: at 17:10

## 2018-07-29 RX ADMIN — PANTOPRAZOLE SODIUM 40 MILLIGRAM(S): 20 TABLET, DELAYED RELEASE ORAL at 17:32

## 2018-07-29 RX ADMIN — ESCITALOPRAM OXALATE 5 MILLIGRAM(S): 10 TABLET, FILM COATED ORAL at 12:30

## 2018-07-29 NOTE — PROGRESS NOTE ADULT - SUBJECTIVE AND OBJECTIVE BOX
SUBJECTIVE / INTERVAL HPI: Patient seen and examined at bedside.     VITAL SIGNS:  Vital Signs Last 24 Hrs  T(C): 35.9 (29 Jul 2018 10:35), Max: 36.7 (28 Jul 2018 20:56)  T(F): 96.7 (29 Jul 2018 10:35), Max: 98 (28 Jul 2018 20:56)  HR: 104 (29 Jul 2018 12:00) (90 - 106)  BP: 128/75 (29 Jul 2018 12:00) (97/66 - 128/75)  BP(mean): 94 (29 Jul 2018 12:00) (77 - 100)  RR: 12 (29 Jul 2018 12:00) (9 - 18)  SpO2: 100% (29 Jul 2018 12:00) (97% - 100%)    REVIEW OF SYSTEMS:    CONSTITUTIONAL: No weakness, fevers or chills  EYES/ENT: No visual changes;  No vertigo or throat pain   NECK: No pain or stiffness  RESPIRATORY: No cough, wheezing, hemoptysis; No shortness of breath  CARDIOVASCULAR: No chest pain or palpitations  GASTROINTESTINAL: No abdominal or epigastric pain. No nausea, vomiting, or hematemesis; No diarrhea or constipation. No melena or hematochezia.  GENITOURINARY: No dysuria, frequency or hematuria  NEUROLOGICAL: No numbness or weakness  SKIN: No itching, burning, rashes, or lesions   All other review of systems is negative unless indicated above.    PHYSICAL EXAM:  General: WDWN  HEENT: NC/AT; PERRL, clear conjunctiva  Neck: supple  Cardiovascular: +S1/S2; RRR  Respiratory: CTA b/l; no W/R/R  Gastrointestinal: soft, NT/ND; +BSx4  Extremities: WWP; 2+ peripheral pulses; no edema   Neurological: AAOx3; no focal deficits    MEDICATIONS:  MEDICATIONS  (STANDING):  chlorhexidine 2% Cloths 1 Application(s) Topical daily  dextrose 5%. 1000 milliLiter(s) (50 mL/Hr) IV Continuous <Continuous>  dextrose 5%. 1000 milliLiter(s) (50 mL/Hr) IV Continuous <Continuous>  dextrose 50% Injectable 12.5 Gram(s) IV Push once  dextrose 50% Injectable 25 Gram(s) IV Push once  dextrose 50% Injectable 25 Gram(s) IV Push once  escitalopram 5 milliGRAM(s) Oral daily  insulin glargine Injectable (LANTUS) 15 Unit(s) SubCutaneous at bedtime  insulin lispro (HumaLOG) corrective regimen sliding scale   SubCutaneous three times a day before meals  insulin lispro (HumaLOG) corrective regimen sliding scale   SubCutaneous at bedtime  metoprolol tartrate 25 milliGRAM(s) Oral two times a day  pantoprazole  Injectable 40 milliGRAM(s) IV Push two times a day  sevelamer carbonate Powder 800 milliGRAM(s) Oral three times a day with meals    MEDICATIONS  (PRN):  dextrose 40% Gel 15 Gram(s) Oral once PRN Blood Glucose LESS THAN 70 milliGRAM(s)/deciliter  glucagon  Injectable 1 milliGRAM(s) IntraMuscular once PRN Glucose LESS THAN 70 milligrams/deciliter      ALLERGIES:  Allergies    No Known Allergies    Intolerances        LABS:                        8.7    8.5   )-----------( 343      ( 29 Jul 2018 06:26 )             27.4     07-29    150<H>  |  106  |  137<H>  ----------------------------<  139<H>  4.6   |  29  |  3.79<H>    Ca    9.4      29 Jul 2018 06:26  Phos  6.4     07-28  Mg     3.0     07-29    TPro  7.3  /  Alb  3.5  /  TBili  0.5  /  DBili  x   /  AST  116<H>  /  ALT  300<H>  /  AlkPhos  99  07-29        CAPILLARY BLOOD GLUCOSE      POCT Blood Glucose.: 120 mg/dL (29 Jul 2018 12:17)      RADIOLOGY & ADDITIONAL TESTS: Reviewed. SUBJECTIVE / INTERVAL HPI: Patient seen and examined at bedside. Limited history due to nonverbal patient. Stable overnight with good urine output.     VITAL SIGNS:  Vital Signs Last 24 Hrs  T(C): 35.9 (29 Jul 2018 10:35), Max: 36.7 (28 Jul 2018 20:56)  T(F): 96.7 (29 Jul 2018 10:35), Max: 98 (28 Jul 2018 20:56)  HR: 104 (29 Jul 2018 12:00) (90 - 106)  BP: 128/75 (29 Jul 2018 12:00) (97/66 - 128/75)  BP(mean): 94 (29 Jul 2018 12:00) (77 - 100)  RR: 12 (29 Jul 2018 12:00) (9 - 18)  SpO2: 100% (29 Jul 2018 12:00) (97% - 100%)    REVIEW OF SYSTEMS:    CONSTITUTIONAL: No weakness, fevers or chills  EYES/ENT: No visual changes;  No vertigo or throat pain   NECK: No pain or stiffness  RESPIRATORY: No cough, wheezing, hemoptysis; No shortness of breath  CARDIOVASCULAR: No chest pain or palpitations  GASTROINTESTINAL: No abdominal or epigastric pain. No nausea, vomiting, or hematemesis; No diarrhea or constipation. No melena or hematochezia.  GENITOURINARY: No dysuria, frequency or hematuria  NEUROLOGICAL: No numbness or weakness  SKIN: No itching, burning, rashes, or lesions   All other review of systems is negative unless indicated above.    PHYSICAL EXAM:  General: WDWN  HEENT: NC/AT; PERRL, clear conjunctiva  Neck: supple  Cardiovascular: +S1/S2; RRR  Respiratory: CTA b/l; no W/R/R  Gastrointestinal: soft, NT/ND; +BSx4  Extremities: WWP; 2+ peripheral pulses; no edema   Neurological: Nonverbal patient. ; no focal deficits    MEDICATIONS:  MEDICATIONS  (STANDING):  chlorhexidine 2% Cloths 1 Application(s) Topical daily  dextrose 5%. 1000 milliLiter(s) (50 mL/Hr) IV Continuous <Continuous>  dextrose 5%. 1000 milliLiter(s) (50 mL/Hr) IV Continuous <Continuous>  dextrose 50% Injectable 12.5 Gram(s) IV Push once  dextrose 50% Injectable 25 Gram(s) IV Push once  dextrose 50% Injectable 25 Gram(s) IV Push once  escitalopram 5 milliGRAM(s) Oral daily  insulin glargine Injectable (LANTUS) 15 Unit(s) SubCutaneous at bedtime  insulin lispro (HumaLOG) corrective regimen sliding scale   SubCutaneous three times a day before meals  insulin lispro (HumaLOG) corrective regimen sliding scale   SubCutaneous at bedtime  metoprolol tartrate 25 milliGRAM(s) Oral two times a day  pantoprazole  Injectable 40 milliGRAM(s) IV Push two times a day  sevelamer carbonate Powder 800 milliGRAM(s) Oral three times a day with meals    MEDICATIONS  (PRN):  dextrose 40% Gel 15 Gram(s) Oral once PRN Blood Glucose LESS THAN 70 milliGRAM(s)/deciliter  glucagon  Injectable 1 milliGRAM(s) IntraMuscular once PRN Glucose LESS THAN 70 milligrams/deciliter      ALLERGIES:  Allergies    No Known Allergies    Intolerances        LABS:                        8.7    8.5   )-----------( 343      ( 29 Jul 2018 06:26 )             27.4     07-29    150<H>  |  106  |  137<H>  ----------------------------<  139<H>  4.6   |  29  |  3.79<H>    Ca    9.4      29 Jul 2018 06:26  Phos  6.4     07-28  Mg     3.0     07-29    TPro  7.3  /  Alb  3.5  /  TBili  0.5  /  DBili  x   /  AST  116<H>  /  ALT  300<H>  /  AlkPhos  99  07-29        CAPILLARY BLOOD GLUCOSE      POCT Blood Glucose.: 120 mg/dL (29 Jul 2018 12:17)      RADIOLOGY & ADDITIONAL TESTS: Reviewed. SUBJECTIVE / INTERVAL HPI: Patient seen and examined at bedside. Limited history due to nonverbal patient. Stable overnight with good urine output.     VITAL SIGNS:  Vital Signs Last 24 Hrs  T(C): 35.9 (29 Jul 2018 10:35), Max: 36.7 (28 Jul 2018 20:56)  T(F): 96.7 (29 Jul 2018 10:35), Max: 98 (28 Jul 2018 20:56)  HR: 104 (29 Jul 2018 12:00) (90 - 106)  BP: 128/75 (29 Jul 2018 12:00) (97/66 - 128/75)  BP(mean): 94 (29 Jul 2018 12:00) (77 - 100)  RR: 12 (29 Jul 2018 12:00) (9 - 18)  SpO2: 100% (29 Jul 2018 12:00) (97% - 100%)    REVIEW OF SYSTEMS:  Unobtainable due to nonverbal patient.     PHYSICAL EXAM:  General: WDWN.   HEENT: NC/AT; PERRL, clear conjunctiva  Neck: supple  Cardiovascular: +S1/S2; RRR  Respiratory: CTA b/l; no W/R/R  Gastrointestinal: soft, NT/ND; +BSx4  Extremities: WWP; 2+ peripheral pulses; no edema   Neurological: Nonverbal patient. Follows commands and responds with nods. no focal deficits    MEDICATIONS:  MEDICATIONS  (STANDING):  chlorhexidine 2% Cloths 1 Application(s) Topical daily  dextrose 5%. 1000 milliLiter(s) (50 mL/Hr) IV Continuous <Continuous>  dextrose 5%. 1000 milliLiter(s) (50 mL/Hr) IV Continuous <Continuous>  dextrose 50% Injectable 12.5 Gram(s) IV Push once  dextrose 50% Injectable 25 Gram(s) IV Push once  dextrose 50% Injectable 25 Gram(s) IV Push once  escitalopram 5 milliGRAM(s) Oral daily  insulin glargine Injectable (LANTUS) 15 Unit(s) SubCutaneous at bedtime  insulin lispro (HumaLOG) corrective regimen sliding scale   SubCutaneous three times a day before meals  insulin lispro (HumaLOG) corrective regimen sliding scale   SubCutaneous at bedtime  metoprolol tartrate 25 milliGRAM(s) Oral two times a day  pantoprazole  Injectable 40 milliGRAM(s) IV Push two times a day  sevelamer carbonate Powder 800 milliGRAM(s) Oral three times a day with meals    MEDICATIONS  (PRN):  dextrose 40% Gel 15 Gram(s) Oral once PRN Blood Glucose LESS THAN 70 milliGRAM(s)/deciliter  glucagon  Injectable 1 milliGRAM(s) IntraMuscular once PRN Glucose LESS THAN 70 milligrams/deciliter      ALLERGIES:  Allergies    No Known Allergies    Intolerances        LABS:                        8.7    8.5   )-----------( 343      ( 29 Jul 2018 06:26 )             27.4     07-29    150<H>  |  106  |  137<H>  ----------------------------<  139<H>  4.6   |  29  |  3.79<H>    Ca    9.4      29 Jul 2018 06:26  Phos  6.4     07-28  Mg     3.0     07-29    TPro  7.3  /  Alb  3.5  /  TBili  0.5  /  DBili  x   /  AST  116<H>  /  ALT  300<H>  /  AlkPhos  99  07-29        CAPILLARY BLOOD GLUCOSE      POCT Blood Glucose.: 120 mg/dL (29 Jul 2018 12:17)      RADIOLOGY & ADDITIONAL TESTS: Reviewed. SUBJECTIVE / INTERVAL HPI: Patient seen and examined at bedside. Limited history due to nonverbal patient. Stable overnight with good urine output.     VITAL SIGNS:  Vital Signs Last 24 Hrs  T(C): 35.9 (29 Jul 2018 10:35), Max: 36.7 (28 Jul 2018 20:56)  T(F): 96.7 (29 Jul 2018 10:35), Max: 98 (28 Jul 2018 20:56)  HR: 104 (29 Jul 2018 12:00) (90 - 106)  BP: 128/75 (29 Jul 2018 12:00) (97/66 - 128/75)  BP(mean): 94 (29 Jul 2018 12:00) (77 - 100)  RR: 12 (29 Jul 2018 12:00) (9 - 18)  SpO2: 100% (29 Jul 2018 12:00) (97% - 100%)    REVIEW OF SYSTEMS:  Unobtainable due to nonverbal patient.     PHYSICAL EXAM:  General: WDWN.   HEENT: NC/AT; PERRL, clear conjunctiva  Neck: supple  Cardiovascular: +S1/S2; RRR  Respiratory: CTA b/l; no W/R/R  Gastrointestinal: soft, NT/ND; +BSx4  Extremities: WWP; 2+ peripheral pulses; no edema   Neurological: Nonverbal patient. Follows commands and responds with nods. 4/5 strength all extremities.     MEDICATIONS:  MEDICATIONS  (STANDING):  chlorhexidine 2% Cloths 1 Application(s) Topical daily  dextrose 5%. 1000 milliLiter(s) (50 mL/Hr) IV Continuous <Continuous>  dextrose 5%. 1000 milliLiter(s) (50 mL/Hr) IV Continuous <Continuous>  dextrose 50% Injectable 12.5 Gram(s) IV Push once  dextrose 50% Injectable 25 Gram(s) IV Push once  dextrose 50% Injectable 25 Gram(s) IV Push once  escitalopram 5 milliGRAM(s) Oral daily  insulin glargine Injectable (LANTUS) 15 Unit(s) SubCutaneous at bedtime  insulin lispro (HumaLOG) corrective regimen sliding scale   SubCutaneous three times a day before meals  insulin lispro (HumaLOG) corrective regimen sliding scale   SubCutaneous at bedtime  metoprolol tartrate 25 milliGRAM(s) Oral two times a day  pantoprazole  Injectable 40 milliGRAM(s) IV Push two times a day  sevelamer carbonate Powder 800 milliGRAM(s) Oral three times a day with meals    MEDICATIONS  (PRN):  dextrose 40% Gel 15 Gram(s) Oral once PRN Blood Glucose LESS THAN 70 milliGRAM(s)/deciliter  glucagon  Injectable 1 milliGRAM(s) IntraMuscular once PRN Glucose LESS THAN 70 milligrams/deciliter      ALLERGIES:  Allergies    No Known Allergies    Intolerances        LABS:                        8.7    8.5   )-----------( 343      ( 29 Jul 2018 06:26 )             27.4     07-29    150<H>  |  106  |  137<H>  ----------------------------<  139<H>  4.6   |  29  |  3.79<H>    Ca    9.4      29 Jul 2018 06:26  Phos  6.4     07-28  Mg     3.0     07-29    TPro  7.3  /  Alb  3.5  /  TBili  0.5  /  DBili  x   /  AST  116<H>  /  ALT  300<H>  /  AlkPhos  99  07-29        CAPILLARY BLOOD GLUCOSE      POCT Blood Glucose.: 120 mg/dL (29 Jul 2018 12:17)      RADIOLOGY & ADDITIONAL TESTS: Reviewed.

## 2018-07-29 NOTE — CHART NOTE - NSCHARTNOTEFT_GEN_A_CORE
PGY-1 Chart Note:     Hgb result came back at 6.9 at 16:39 down from 8.4 at 06:26 with concern for rebleeding of stomach ulcer. Called GI who recommends making patient NPO, give 1 unit of blood, get post transfusion CBC, monitor for hemodynamic instability. GI also recommends CTA and contacting IR for potential embolization if bleeding continues. As patient has increased creatinine (6.11 earlier in admission, 3.79 today), contacted renal to determine risk/benefit of contrast CTA. Continue IV PPI. PGY-1 Chart Note:     Hgb result came back at 6.9 at 16:39 down from 8.4 at 06:26 with concern for rebleeding of stomach ulcer. Called GI who recommends making patient NPO, give 1 unit of blood, get post transfusion CBC, monitor for hemodynamic instability. GI also recommends CTA and contacting IR for potential embolization if bleeding continues. As patient has increased creatinine (6.11 earlier in admission, 3.79 today), contacted renal to determine risk/benefit of contrast CTA, renal agrees that benefit outweights risks. Continue IV PPI.

## 2018-07-29 NOTE — PROGRESS NOTE ADULT - SUBJECTIVE AND OBJECTIVE BOX
Patient is a 67y Male seen and evaluated at bedside.   Patient has no significant complaints  BUn/cr @ 137/3.79  Na @ 150  urine output @ 2005 cc last 24 hrs      chlorhexidine 2% Cloths 1 daily  dextrose 40% Gel 15 once PRN  dextrose 5%. 1000 <Continuous>  dextrose 5%. 1000 <Continuous>  dextrose 50% Injectable 12.5 once  dextrose 50% Injectable 25 once  dextrose 50% Injectable 25 once  escitalopram 5 daily  glucagon  Injectable 1 once PRN  insulin glargine Injectable (LANTUS) 15 at bedtime  insulin lispro (HumaLOG) corrective regimen sliding scale  three times a day before meals  insulin lispro (HumaLOG) corrective regimen sliding scale  at bedtime  metoprolol tartrate 12.5 once  metoprolol tartrate 25 two times a day  pantoprazole  Injectable 40 two times a day  sevelamer carbonate Powder 800 three times a day with meals      Allergies    No Known Allergies    Intolerances        T(C): , Max: 36.7 (07-28-18 @ 20:56)  T(F): , Max: 98 (07-28-18 @ 20:56)  HR: 104 (07-29-18 @ 11:00)  BP: 102/67 (07-29-18 @ 11:00)  BP(mean): 79 (07-29-18 @ 11:00)  RR: 13 (07-29-18 @ 11:00)  SpO2: 100% (07-29-18 @ 11:00)  Wt(kg): --    07-28 @ 07:01  -  07-29 @ 07:00  --------------------------------------------------------  IN: 600 mL / OUT: 2005 mL / NET: -1405 mL    07-29 @ 07:01  -  07-29 @ 12:14  --------------------------------------------------------  IN: 0 mL / OUT: 195 mL / NET: -195 mL      PHYSICAL EXAM:  GENERAL: NAD, well-developed, well nourished, alert, awake, no acute distress at present  HEAD:  Atraumatic, Normocephalic,   EYES: Bilateral conjuctiva and sclera normal   Oral cavity: Oral mucosa dry and pink  NECK: Neck supple, No JVD  CHEST/LUNG: Clear to auscultation bilaterally; No wheeze, no rales, no crepitations  HEART: irRegularly irregular, No gallop, no rub   ABDOMEN: Soft, Nontender, BS+nt, No flank tenderness.   EXTREMITIES: No clubbing, cyanosis, or edema  Neurology: AAOx3, no focal neurological deficit  SKIN: No rashes or lesions          ACCESS:     LABS:                        8.7    8.5   )-----------( 343      ( 29 Jul 2018 06:26 )             27.4     07-29    150<H>  |  106  |  137<H>  ----------------------------<  139<H>  4.6   |  29  |  3.79<H>    Ca    9.4      29 Jul 2018 06:26  Phos  6.4     07-28  Mg     3.0     07-29    TPro  7.3  /  Alb  3.5  /  TBili  0.5  /  DBili  x   /  AST  116<H>  /  ALT  300<H>  /  AlkPhos  99  07-29          Creatinine, Random Urine: 76 mg/dL (07-28 @ 10:38)        RADIOLOGY & ADDITIONAL STUDIES:

## 2018-07-29 NOTE — PROGRESS NOTE ADULT - PROBLEM SELECTOR PLAN 9
DVT: Restarting AC 7/29  GI: PPI  Activity: OOBAT, PT following DVT: Hep Subq  GI: PPI  Activity: OOBAT, PT following

## 2018-07-29 NOTE — PROGRESS NOTE ADULT - ATTENDING COMMENTS
Assessment: Patient personally seen and examined myself during rounds with the House Staff/Fellow  ON DATE 7/29/18  House Staff/Fellow note read, including vitals, physical findings, laboratory data, and radiological reports.   Revisions included below.   Direct personal management at bed side and extensive interpretation of the data.    Plan was outlined and discussed in details with the House Staff/Fellow.    Decision making of high complexity   Risk high of complications, morbidity, and/or mortality  Assessment and Action taken for acute disease activity to reflect the level of care provided:  -Hemodynamic evaluation and support  -ACS assessment and treatment as applicable  -Heart failure assessment and treatment as applicable  -Cardiac Telemetry reviewed  -Medication reconciliation  -Review laboratory data  -EKG reviewed   -Echo reviewed  -Interdisciplinary discussion with IC / EP / HF / CTS teams as needed  My plan includes :  close hemodynamic monitoring and management   Monitor for arrhythmias and monitor parameters for organ perfusion  monitor neurologic status  Head of the bed should remain elevated to 45 deg .   chest PT and IS will be encouraged  monitor adequacy of oxygenation and ventilation and attempt to wean oxygen  Nutritional goals will be met using po eventually , ensure adequate caloric intake and montior the same  Stress ulcer and VTE prophylaxis will be achieved    Glycemic control is satisfactory  Electrolytes have been replete as necessary and wound care has been carried out. Pain control has been achieved.   aggressive physical therapy and early mobility and ambulation goals will be met   The family was updated about the course and plan  CRITICAL CARE TIME SPENT in evaluation and management, reassessments, review and interpretation of labs and x-rays, ventilator and hemodynamic management, formulating a plan and coordinating care: ___90____ MIN.  Time does not include procedural time.    Isaias Fountain MD  CCU ATTENDING  Mobile: [phone]

## 2018-07-29 NOTE — PROGRESS NOTE ADULT - ASSESSMENT
This is 67 year old male without any prior h/o kidney disease. Now admitted for NSTEMI/STEMI  with acute kidney injury--> unknown baseline creatinine

## 2018-07-29 NOTE — PROGRESS NOTE ADULT - PROBLEM SELECTOR PLAN 5
MRI significant for subacute ischemia involving right parietal area   with numerous scattered areas of acute ischemia within the basal ganglia   and frontal and parietal lobes bilaterally some of which are in a   watershed distribution.  - F/U neuro recs  - HOLD MRA study for full neuro workup.   - carotid u/s negative   - PT/OT/ST as tolerated

## 2018-07-29 NOTE — PROGRESS NOTE ADULT - PROBLEM SELECTOR PLAN 1
kidney today @ 3.79 from 3.93  pt did receive about 250 cc NS total yesterday. However, patient appears to be still dry, especially with the Na @ 150  in light of Hypernatremia, recommend D5W @ 50 cc/hr x 1.5 L   monitor BMP and I/O  avoid nephrotoxic agents    also monitor I/o  follow BMP  avoid nephrotoxic agents    BUn still on the rise likely 2nd to acute Upper GI Bleed  GI on board, embolization as next step if necessary  - renal diet  avoid nephrotoxic agents

## 2018-07-29 NOTE — PROGRESS NOTE ADULT - ASSESSMENT
RB is a 67M w PMH of HTN, DM, HLD, HFrEF of 25%, CAD s/p PCI 7 years ago, with altered mental status in the setting of one week of dyspnea and weakness admitted to the CCU for management of ACS, acute on chronic CHF, RONNIE, s/p acute upper GI bleed 2/2 two gastric ulcers; s/p clipping of one ulcer. He remains in tenuous status, as he is at a high risk for acute re-bleed and high risk for thrombus given recent STEMI, afib, and CVA. On 7/29, re-initiate anticoagulation as well as 1 antiplatelet agent per GI recs, may also start nectar thick liquids per GI/Speech recs. RB is a 67M w PMH of HTN, DM, HLD, HFrEF of 25%, CAD s/p PCI 7 years ago, with altered mental status in the setting of one week of dyspnea and weakness admitted to the CCU for management of ACS, acute on chronic CHF, RONNIE, s/p acute upper GI bleed 2/2 two gastric ulcers; s/p clipping of one ulcer. He remains in tenuous status, as he is at a high risk for acute re-bleed and high risk for thrombus given recent STEMI, afib, and CVA. On 7/29, re-initiate anticoagulation as well as 1 antiplatelet agent per GI recs, may also start nectar thick liquids per GI/Speech recs. Increased metoprolol to 25 mg. RB is a 67M w PMH of HTN, DM, HLD, HFrEF of 25%, CAD s/p PCI 7 years ago, with altered mental status in the setting of one week of dyspnea and weakness admitted to the CCU for management of ACS, acute on chronic CHF, RONNIE, s/p acute upper GI bleed 2/2 two gastric ulcers; s/p clipping of one ulcer. He remains in tenuous status, as he is at a high risk for acute re-bleed and high risk for thrombus given recent STEMI, afib, and CVA. Spoke to GI on 7/29, recommended restarting 1 antiplatelet agent, may also start nectar thick liquids per GI/Speech recs. Increased metoprolol to 25 mg.

## 2018-07-29 NOTE — PROGRESS NOTE ADULT - PROBLEM SELECTOR PLAN 1
Patient has hx of PCI w stents placed 7 years prior. Patient reportedly had an acute MI earlier this week in [place] with EKG showing ST elevations in the lateral leads and troponins in excess of 2000. EKG on admission shows q-waves in leads I and aVL with poor R wave progression consistent with a previous lateral wall MI. Outside therapeutic window for PCI. Troponin and CKMB done 7/22 in setting of new ST changes in 2 precordial leads, both lower, will no longer follow.     In setting of recent upper GI bleed, HELD anti-platelet, anti-coag medications, cardiac rate control, and afterload reduction medications.   - After discussion with GI on 7/29, will restart AC and 1 antiplatelet agent.   - Holding statin therapy due to transaminitis  - Restarted metop 12.5mg q12hr and monitor hemodynamics  - Holding ACE/ARB due to severe RONNIE  - HOLD hydralazine 10 TID for afterload reduction  - Eventually would benefit from diagnostic cath/stress test for ischemic workup; holding off due to renal failure and tenuous state    #asymptomatic afib/aflutter  Rapid ventricular beats noted. Continue on rate control (goal <110) and will continue to monitor  - RESTART metoprolol 12.5 q12hr and closely monitor.   - Cardiac monitor  - Restarting AC 7/29 Patient has hx of PCI w stents placed 7 years prior. Patient reportedly had an acute MI earlier this week in [place] with EKG showing ST elevations in the lateral leads and troponins in excess of 2000. EKG on admission shows q-waves in leads I and aVL with poor R wave progression consistent with a previous lateral wall MI. Outside therapeutic window for PCI. Troponin and CKMB done 7/22 in setting of new ST changes in 2 precordial leads, both lower, will no longer follow.     In setting of recent upper GI bleed, HELD anti-platelet, anti-coag medications, cardiac rate control, and afterload reduction medications.   - After discussion with GI on 7/29, will restart AC and 1 antiplatelet agent.   - Holding statin therapy due to transaminitis  - Restarted metop 25mg q12hr and monitor hemodynamics  - Holding ACE/ARB due to severe RONNIE  - HOLD hydralazine 10 TID for afterload reduction  - Eventually would benefit from diagnostic cath/stress test for ischemic workup; holding off due to renal failure and tenuous state    #asymptomatic afib/aflutter  Rapid ventricular beats noted. Continue on rate control (goal <110) and will continue to monitor  - Restarted metoprolol 12.5mg q12hr, increased to 25 on 7/29. closely monitor.   - Cardiac monitor  - Restarting AC 7/29 Patient has hx of PCI w stents placed 7 years prior. Patient reportedly had an acute MI earlier this week in [place] with EKG showing ST elevations in the lateral leads and troponins in excess of 2000. EKG on admission shows q-waves in leads I and aVL with poor R wave progression consistent with a previous lateral wall MI. Outside therapeutic window for PCI. Troponin and CKMB done 7/22 in setting of new ST changes in 2 precordial leads, both lower, will no longer follow.     In setting of recent upper GI bleed, HELD anti-platelet, anti-coag medications, cardiac rate control, and afterload reduction medications.   - After discussion with GI on 7/29, will restart heparin subq and consider 1 antiplatelet agent tomorrow.   - Holding statin therapy due to transaminitis  - Restarted metop 25mg q12hr and monitor hemodynamics  - Holding ACE/ARB due to severe RONNIE  - HOLD hydralazine 10 TID for afterload reduction  - Eventually would benefit from diagnostic cath/stress test for ischemic workup; holding off due to renal failure and tenuous state    #asymptomatic afib/aflutter  Rapid ventricular beats noted. Continue on rate control (goal <110) and will continue to monitor  - Restarted metoprolol 12.5mg q12hr, increased to 25 on 7/29. closely monitor.   - Cardiac monitor  - Restarting AC 7/29 Patient has hx of PCI w stents placed 7 years prior. Patient reportedly had an acute MI earlier this week in [place] with EKG showing ST elevations in the lateral leads and troponins in excess of 2000. EKG on admission shows q-waves in leads I and aVL with poor R wave progression consistent with a previous lateral wall MI. Outside therapeutic window for PCI. Troponin and CKMB done 7/22 in setting of new ST changes in 2 precordial leads, both lower, will no longer follow.     In setting of recent upper GI bleed, HELD anti-platelet, anti-coag medications, cardiac rate control, and afterload reduction medications.   - After discussion with GI on 7/29, will restart heparin subq and consider 1 antiplatelet agent tomorrow.   - Holding statin therapy due to transaminitis  - Restarted metop 25mg q12hr and monitor hemodynamics  - Holding ACE/ARB due to severe RONNIE  - HOLD hydralazine 10 TID for afterload reduction  - Eventually would benefit from diagnostic cath/stress test for ischemic workup; holding off due to renal failure and tenuous state    #asymptomatic afib/aflutter  Rapid ventricular beats noted. Continue on rate control (goal <110) and will continue to monitor  - Restarted metoprolol 12.5mg q12hr, increased to 25 on 7/29. closely monitor.   - Cardiac monitor  - May start single vs dual antiplatelet 7/30

## 2018-07-29 NOTE — PROGRESS NOTE ADULT - PROBLEM SELECTOR PLAN 3
Acute Upper GI bleed 2/2 two non-bleed ulcers s/p clipping of one. Hb stable at 9 since. (Baseline Hb 14 on 7/27).   -Appreciate GI f/u and rec's.   -PPI BID  -Clear (nectar thick) diet, as tolerated over weekend; adv to solids by Monday  -GI recommends reintroducing 1 anti-platelet drug and AC Acute Upper GI bleed 2/2 two non-bleed ulcers s/p clipping of one. Hb stable at 9 since. (Baseline Hb 14 on 7/27).   -Appreciate GI f/u and rec's.   -PPI BID  -Clear (nectar thick) diet, as tolerated over weekend; adv to solids by Monday  -GI recommends reintroducing 1 antiplatelet drug, f/u GI recs tomorrow concerning bleeding risk.

## 2018-07-29 NOTE — PROGRESS NOTE ADULT - PROBLEM SELECTOR PLAN 7
Patient's ALT over 1000 on admission, now 300. AST is 100. Alk phos is 99. Unclear pattern of liver enzymes. HIV/HepC/HepB negative. Likely shock liver, improving.  - trend LFT's

## 2018-07-29 NOTE — PROGRESS NOTE ADULT - ATTENDING COMMENTS
BUN and cr improved but still seems on dry side and free water depleted (and co thirst)   suggest d5w- 50-60 cc/hr

## 2018-07-29 NOTE — PROGRESS NOTE ADULT - SUBJECTIVE AND OBJECTIVE BOX
Pt seen and examined at bedside. MARKO overnight. No current complaints, denies abdominal pain, nausea, vomiting, melena, hematochezia. Per RN, pt has had no episodes of melena or hematochezia.     Allergies    No Known Allergies    Intolerances      MEDICATIONS:  MEDICATIONS  (STANDING):  chlorhexidine 2% Cloths 1 Application(s) Topical daily  dextrose 5%. 1000 milliLiter(s) (50 mL/Hr) IV Continuous <Continuous>  dextrose 5%. 1000 milliLiter(s) (50 mL/Hr) IV Continuous <Continuous>  dextrose 50% Injectable 12.5 Gram(s) IV Push once  dextrose 50% Injectable 25 Gram(s) IV Push once  dextrose 50% Injectable 25 Gram(s) IV Push once  escitalopram 5 milliGRAM(s) Oral daily  insulin glargine Injectable (LANTUS) 15 Unit(s) SubCutaneous at bedtime  insulin lispro (HumaLOG) corrective regimen sliding scale   SubCutaneous three times a day before meals  insulin lispro (HumaLOG) corrective regimen sliding scale   SubCutaneous at bedtime  metoprolol tartrate 25 milliGRAM(s) Oral two times a day  pantoprazole  Injectable 40 milliGRAM(s) IV Push two times a day  sevelamer carbonate Powder 800 milliGRAM(s) Oral three times a day with meals    MEDICATIONS  (PRN):  dextrose 40% Gel 15 Gram(s) Oral once PRN Blood Glucose LESS THAN 70 milliGRAM(s)/deciliter  glucagon  Injectable 1 milliGRAM(s) IntraMuscular once PRN Glucose LESS THAN 70 milligrams/deciliter    Vital Signs Last 24 Hrs  T(C): 36.2 (29 Jul 2018 14:25), Max: 36.7 (28 Jul 2018 20:56)  T(F): 97.1 (29 Jul 2018 14:25), Max: 98 (28 Jul 2018 20:56)  HR: 90 (29 Jul 2018 14:00) (90 - 108)  BP: 122/73 (29 Jul 2018 14:00) (97/66 - 128/75)  BP(mean): 95 (29 Jul 2018 14:00) (77 - 100)  RR: 14 (29 Jul 2018 14:00) (9 - 18)  SpO2: 100% (29 Jul 2018 14:00) (97% - 100%)    07-28 @ 07:01  -  07-29 @ 07:00  --------------------------------------------------------  IN: 600 mL / OUT: 2005 mL / NET: -1405 mL    07-29 @ 07:01  -  07-29 @ 14:30  --------------------------------------------------------  IN: 150 mL / OUT: 570 mL / NET: -420 mL    PHYSICAL EXAM:    General: Well developed; well nourished; in no acute distress  HEENT: MMM, conjunctiva and sclera clear  Gastrointestinal: Soft non-tender non-distended; No rebound or guarding  Skin: Warm and dry. No obvious rash    LABS:                        8.7    8.5   )-----------( 343      ( 29 Jul 2018 06:26 )             27.4     07-29    150<H>  |  106  |  137<H>  ----------------------------<  139<H>  4.6   |  29  |  3.79<H>    Ca    9.4      29 Jul 2018 06:26  Phos  6.4     07-28  Mg     3.0     07-29    TPro  7.3  /  Alb  3.5  /  TBili  0.5  /  DBili  x   /  AST  116<H>  /  ALT  300<H>  /  AlkPhos  99  07-29    RADIOLOGY & ADDITIONAL STUDIES:  Xray Chest 1 View- PORTABLE-Routine (07.29.18 @ 05:59)  Impression: No acute infiltrates    US Abdomen Complete (07.24.18 @ 17:27)  FINDINGS:     Liver: Coarse and slightly increased hepatic echotexture which may be   seen with hepatic steatosis or chronic hepatitis.. Normal size. No focal   lesions. Smooth surface contour.    Intrahepatic ducts: Not dilated.    Common bile duct: Normal diameter, measuring 0.3 cm.    Gallbladder: No gallstones.  No wall thickening or pericholecystic fluid.    Pancreas: The visualized portions are normal in appearance.      Spleen: Not visualized due to patient's tenderness to palpation with the   ultrasound transducer.    Abdominal aorta: No abdominal aortic aneurysm is seen.    Inferior vena cava: The visualized portions are normal in appearance.    Right kidney: No hydronephrosis. Normal echogenicity. No focal lesions.   Length of 11.0 cm.    Left kidney: No hydronephrosis. Normal echogenicity. No focal lesions.   Length of 10.7 cm.    Ascites: None.    IMPRESSION:  1.  Coarse and slightly increased hepatic echotexture which may be seen   with hepatic steatosis or chronic hepatitis.  2.  No hydronephrosis.  3.  The spleen could not be visualized due to patient's tenderness.

## 2018-07-29 NOTE — PROGRESS NOTE ADULT - ASSESSMENT
67yr old M w PMHx significant for dilated cardiomyopathy, HFrEF of 25%, CAD s/p PCI 7 years ago, NIDDM, HTN, and HLD, who presented to the [facility] ED with altered mental status in the setting of one week of dyspnea and weakness while on a trip to [place]. GI consulted for sudden drop in H&H.    # Acute blood loss anemia 2/2 PUD  - sp EGD 7/27/18 - with large ulcer with adherent clot seen in incisura, unable to dislodge clot, sp 3 clips to base of ulcer with no post clipping bleeding reported, smaller 2 clean based ulcer also near large ulcer described above, gastritis, normal esophagus, and duodenum.  - C/w PPI IVP BID for additional 24 hours and can transition to PO BID x 8 weeks  - Can advance diet  - H/H stable, HD stable  - Monitor H/H Q6-8H, transfuse if Hgb <8  - If e/o recurrent GI bleeding (ie. melena, hematochezia, hematemesis) +/- HD instability consider IR evaluation for embolization - can localize ulcer with clips placed  - Per cardiology considering holding off on initiating of anti-coagulation and starting dual antiplatelet therapy, at this time no GI CI to initiation as his H/H has been stable with no further episodes of bleeding, however, would monitor closely as his ulcer had high risk features of re-bleeding    # Transaminitis 2/2 congestive hepatopathy vs DILI vs hepatic steatosis  - Abdominal U/S consistent with chronic hepatitis or hepatic steatosis  - Viral hepatitis for B and C negative  - Check Hepatitis A IgM, iron studies, LUCY, AMA, ASMA, ceruloplasmin, Immunoglobulin levels, alpha-1-antritrypsin  - Check RUQ doppler  - ECHO with EF of 15-20%, HF management per cardiology  - Monitor daily LFTs and PT/INR  - Avoid hepatotoxic drugs        Case d/w Dr. Munoz

## 2018-07-29 NOTE — PROGRESS NOTE ADULT - PROBLEM SELECTOR PLAN 10
F: None  E: Replete K>4, Mg>2. Will need to be careful with repletion given poor renal function  N: Clear (nectar thick) liquid diet over weekend

## 2018-07-29 NOTE — PROGRESS NOTE ADULT - PROBLEM SELECTOR PLAN 4
Patient's BUN/Cr is up trending, in setting of anemia/low perfusion. (unclear baseline). Likely 2/2 to cardiorenal syndrome as cardiac output is poor.   - Renal on board, appreciate recs  - Per renal, no urgent need for dialysis at this time  - repeat Urine lytes   -sevelamer 800 TID to prevent hyperphosphatemia  -Pt dry and euvolemic today; hold further diuretics for now    #Hyponatremia: Resolved   - d/c jo ann, now with condom cath Patient's BUN/Cr is up trending, in setting of anemia/low perfusion. (unclear baseline). Likely 2/2 to cardiorenal syndrome as cardiac output is poor.   - Renal on board, appreciate recs  - Per renal, no urgent need for dialysis at this time  - repeat Urine lytes   -sevelamer 800 TID to prevent hyperphosphatemia  -Pt dry and euvolemic today; hold further diuretics for now    #Hyponatremia: Resolved   - d/c jo ann, now with condom cath    #Hypernatremia   - Started D5W 50 cc/hr, continue to assess fluid status

## 2018-07-30 LAB
ALBUMIN SERPL ELPH-MCNC: 3.6 G/DL — SIGNIFICANT CHANGE UP (ref 3.3–5)
ALBUMIN SERPL ELPH-MCNC: 3.6 G/DL — SIGNIFICANT CHANGE UP (ref 3.3–5)
ALP SERPL-CCNC: 108 U/L — SIGNIFICANT CHANGE UP (ref 40–120)
ALP SERPL-CCNC: 119 U/L — SIGNIFICANT CHANGE UP (ref 40–120)
ALT FLD-CCNC: 324 U/L — HIGH (ref 10–45)
ALT FLD-CCNC: 338 U/L — HIGH (ref 10–45)
ANION GAP SERPL CALC-SCNC: 13 MMOL/L — SIGNIFICANT CHANGE UP (ref 5–17)
ANION GAP SERPL CALC-SCNC: 13 MMOL/L — SIGNIFICANT CHANGE UP (ref 5–17)
APTT BLD: 25.9 SEC — LOW (ref 27.5–37.4)
AST SERPL-CCNC: 134 U/L — HIGH (ref 10–40)
AST SERPL-CCNC: 148 U/L — HIGH (ref 10–40)
BILIRUB SERPL-MCNC: 0.5 MG/DL — SIGNIFICANT CHANGE UP (ref 0.2–1.2)
BILIRUB SERPL-MCNC: 0.6 MG/DL — SIGNIFICANT CHANGE UP (ref 0.2–1.2)
BUN SERPL-MCNC: 101 MG/DL — HIGH (ref 7–23)
BUN SERPL-MCNC: 118 MG/DL — HIGH (ref 7–23)
CALCIUM SERPL-MCNC: 9.1 MG/DL — SIGNIFICANT CHANGE UP (ref 8.4–10.5)
CALCIUM SERPL-MCNC: 9.2 MG/DL — SIGNIFICANT CHANGE UP (ref 8.4–10.5)
CHLORIDE SERPL-SCNC: 104 MMOL/L — SIGNIFICANT CHANGE UP (ref 96–108)
CHLORIDE SERPL-SCNC: 109 MMOL/L — HIGH (ref 96–108)
CO2 SERPL-SCNC: 30 MMOL/L — SIGNIFICANT CHANGE UP (ref 22–31)
CO2 SERPL-SCNC: 31 MMOL/L — SIGNIFICANT CHANGE UP (ref 22–31)
CREAT SERPL-MCNC: 3.1 MG/DL — HIGH (ref 0.5–1.3)
CREAT SERPL-MCNC: 3.32 MG/DL — HIGH (ref 0.5–1.3)
FERRITIN SERPL-MCNC: 2037 NG/ML — HIGH (ref 30–400)
GLUCOSE SERPL-MCNC: 143 MG/DL — HIGH (ref 70–99)
GLUCOSE SERPL-MCNC: 182 MG/DL — HIGH (ref 70–99)
HCT VFR BLD CALC: 28.2 % — LOW (ref 39–50)
HCT VFR BLD CALC: 31 % — LOW (ref 39–50)
HGB BLD-MCNC: 9.1 G/DL — LOW (ref 13–17)
HGB BLD-MCNC: 9.8 G/DL — LOW (ref 13–17)
INR BLD: 1.22 — HIGH (ref 0.88–1.16)
IRON SATN MFR SERPL: 11 % — LOW (ref 16–55)
IRON SATN MFR SERPL: 31 UG/DL — LOW (ref 45–165)
MAGNESIUM SERPL-MCNC: 3 MG/DL — HIGH (ref 1.6–2.6)
MCHC RBC-ENTMCNC: 29.2 PG — SIGNIFICANT CHANGE UP (ref 27–34)
MCHC RBC-ENTMCNC: 29.4 PG — SIGNIFICANT CHANGE UP (ref 27–34)
MCHC RBC-ENTMCNC: 31.6 G/DL — LOW (ref 32–36)
MCHC RBC-ENTMCNC: 32.3 G/DL — SIGNIFICANT CHANGE UP (ref 32–36)
MCV RBC AUTO: 91.3 FL — SIGNIFICANT CHANGE UP (ref 80–100)
MCV RBC AUTO: 92.3 FL — SIGNIFICANT CHANGE UP (ref 80–100)
PHOSPHATE SERPL-MCNC: 4.3 MG/DL — SIGNIFICANT CHANGE UP (ref 2.5–4.5)
PLATELET # BLD AUTO: 335 K/UL — SIGNIFICANT CHANGE UP (ref 150–400)
PLATELET # BLD AUTO: 390 K/UL — SIGNIFICANT CHANGE UP (ref 150–400)
POTASSIUM SERPL-MCNC: 4.1 MMOL/L — SIGNIFICANT CHANGE UP (ref 3.5–5.3)
POTASSIUM SERPL-MCNC: 4.6 MMOL/L — SIGNIFICANT CHANGE UP (ref 3.5–5.3)
POTASSIUM SERPL-SCNC: 4.1 MMOL/L — SIGNIFICANT CHANGE UP (ref 3.5–5.3)
POTASSIUM SERPL-SCNC: 4.6 MMOL/L — SIGNIFICANT CHANGE UP (ref 3.5–5.3)
PROT SERPL-MCNC: 6.5 G/DL — SIGNIFICANT CHANGE UP (ref 6–8.3)
PROT SERPL-MCNC: 7.4 G/DL — SIGNIFICANT CHANGE UP (ref 6–8.3)
PROTHROM AB SERPL-ACNC: 13.6 SEC — HIGH (ref 9.8–12.7)
RBC # BLD: 3.09 M/UL — LOW (ref 4.2–5.8)
RBC # BLD: 3.36 M/UL — LOW (ref 4.2–5.8)
RBC # FLD: 14.9 % — SIGNIFICANT CHANGE UP (ref 10.3–16.9)
RBC # FLD: 15.2 % — SIGNIFICANT CHANGE UP (ref 10.3–16.9)
SODIUM SERPL-SCNC: 147 MMOL/L — HIGH (ref 135–145)
SODIUM SERPL-SCNC: 153 MMOL/L — HIGH (ref 135–145)
TIBC SERPL-MCNC: 279 UG/DL — SIGNIFICANT CHANGE UP (ref 220–430)
UIBC SERPL-MCNC: 248 UG/DL — SIGNIFICANT CHANGE UP (ref 110–370)
WBC # BLD: 11.4 K/UL — HIGH (ref 3.8–10.5)
WBC # BLD: 12.1 K/UL — HIGH (ref 3.8–10.5)
WBC # FLD AUTO: 11.4 K/UL — HIGH (ref 3.8–10.5)
WBC # FLD AUTO: 12.1 K/UL — HIGH (ref 3.8–10.5)

## 2018-07-30 PROCEDURE — 99291 CRITICAL CARE FIRST HOUR: CPT

## 2018-07-30 PROCEDURE — 99233 SBSQ HOSP IP/OBS HIGH 50: CPT

## 2018-07-30 PROCEDURE — 93010 ELECTROCARDIOGRAM REPORT: CPT

## 2018-07-30 PROCEDURE — 71045 X-RAY EXAM CHEST 1 VIEW: CPT | Mod: 26

## 2018-07-30 RX ORDER — METOPROLOL TARTRATE 50 MG
5 TABLET ORAL ONCE
Qty: 0 | Refills: 0 | Status: DISCONTINUED | OUTPATIENT
Start: 2018-07-30 | End: 2018-07-30

## 2018-07-30 RX ORDER — SODIUM FERRIC GLUCONAT/SUCROSE 62.5MG/5ML
100 AMPUL (ML) INTRAVENOUS ONCE
Qty: 0 | Refills: 0 | Status: DISCONTINUED | OUTPATIENT
Start: 2018-07-30 | End: 2018-07-30

## 2018-07-30 RX ORDER — METOPROLOL TARTRATE 50 MG
5 TABLET ORAL ONCE
Qty: 0 | Refills: 0 | Status: COMPLETED | OUTPATIENT
Start: 2018-07-30 | End: 2018-07-30

## 2018-07-30 RX ORDER — SODIUM CHLORIDE 9 MG/ML
1000 INJECTION, SOLUTION INTRAVENOUS
Qty: 0 | Refills: 0 | Status: DISCONTINUED | OUTPATIENT
Start: 2018-07-30 | End: 2018-07-31

## 2018-07-30 RX ORDER — SODIUM FERRIC GLUCONAT/SUCROSE 62.5MG/5ML
25 AMPUL (ML) INTRAVENOUS ONCE
Qty: 0 | Refills: 0 | Status: DISCONTINUED | OUTPATIENT
Start: 2018-07-30 | End: 2018-07-30

## 2018-07-30 RX ORDER — METOPROLOL TARTRATE 50 MG
12.5 TABLET ORAL EVERY 12 HOURS
Qty: 0 | Refills: 0 | Status: DISCONTINUED | OUTPATIENT
Start: 2018-07-30 | End: 2018-08-01

## 2018-07-30 RX ADMIN — Medication 6: at 16:05

## 2018-07-30 RX ADMIN — ESCITALOPRAM OXALATE 5 MILLIGRAM(S): 10 TABLET, FILM COATED ORAL at 13:35

## 2018-07-30 RX ADMIN — Medication 12.5 MILLIGRAM(S): at 17:47

## 2018-07-30 RX ADMIN — PANTOPRAZOLE SODIUM 40 MILLIGRAM(S): 20 TABLET, DELAYED RELEASE ORAL at 08:16

## 2018-07-30 RX ADMIN — SEVELAMER CARBONATE 800 MILLIGRAM(S): 2400 POWDER, FOR SUSPENSION ORAL at 13:34

## 2018-07-30 RX ADMIN — CHLORHEXIDINE GLUCONATE 1 APPLICATION(S): 213 SOLUTION TOPICAL at 13:55

## 2018-07-30 RX ADMIN — SEVELAMER CARBONATE 800 MILLIGRAM(S): 2400 POWDER, FOR SUSPENSION ORAL at 17:47

## 2018-07-30 RX ADMIN — Medication 5 MILLIGRAM(S): at 11:30

## 2018-07-30 RX ADMIN — INSULIN GLARGINE 15 UNIT(S): 100 INJECTION, SOLUTION SUBCUTANEOUS at 21:58

## 2018-07-30 RX ADMIN — PANTOPRAZOLE SODIUM 40 MILLIGRAM(S): 20 TABLET, DELAYED RELEASE ORAL at 17:50

## 2018-07-30 NOTE — PROGRESS NOTE ADULT - PROBLEM SELECTOR PLAN 1
Acute Upper GI bleed 2/2 two non-bleed ulcers s/p clipping of one. Hb stable at 9 since. (Baseline Hb 14 on 7/27).   -Appreciate GI f/u and rec's.   -PPI BID  -Clear (nectar thick) diet, as tolerated over weekend; adv to solids by Monday  -GI recommends reintroducing 1 antiplatelet drug, f/u GI recs tomorrow concerning bleeding risk. Acute Upper GI bleed 2/2 two non-bleed ulcers s/p clipping of one. Hb dropped to 6.9 on 07/29, pt given 1 unit of PRBCs, no active signs of bleeding.   -Appreciate GI f/u and rec's.   -PPI BID  -Clear liquid diet  - monitor CBCs q12hrs  - Transfuse <7.

## 2018-07-30 NOTE — SPEECH LANGUAGE PATHOLOGY EVALUATION - COMMENTS
Patient received bedside, awake/alert. Patient received bedside, awake/alert. Patient minimally participatory in evaluation, suspect d/t unwillingness to participate vs. inability to participate. Patient notably distracted during evaluation, requiring consistent redirection.

## 2018-07-30 NOTE — PROGRESS NOTE ADULT - PROBLEM SELECTOR PLAN 2
Patient has hx of PCI w stents placed 7 years prior. Patient reportedly had an acute MI earlier this week in [place] with EKG showing ST elevations in the lateral leads and troponins in excess of 2000. EKG on admission shows q-waves in leads I and aVL with poor R wave progression consistent with a previous lateral wall MI. Outside therapeutic window for PCI. Troponin and CKMB done 7/22 in setting of new ST changes in 2 precordial leads, both lower, will no longer follow.     In setting of recent upper GI bleed, HELD anti-platelet, anti-coag medications, cardiac rate control, and afterload reduction medications.   - After discussion with GI on 7/29, will restart heparin subq and consider 1 antiplatelet agent tomorrow.   - Holding statin therapy due to transaminitis  - Restarted metop 25mg q12hr and monitor hemodynamics  - Holding ACE/ARB due to severe RONNIE  - HOLD hydralazine 10 TID for afterload reduction  - Eventually would benefit from diagnostic cath/stress test for ischemic workup; holding off due to renal failure and tenuous state    #asymptomatic afib/aflutter  Rapid ventricular beats noted. Continue on rate control (goal <110) and will continue to monitor  - Restarted metoprolol 12.5mg q12hr, increased to 25 on 7/29. closely monitor.   - Cardiac monitor  - May start single vs dual antiplatelet 7/30 Patient has hx of PCI w stents placed 7 years prior. Patient reportedly had an acute MI earlier this week in [place] with EKG showing ST elevations in the lateral leads and troponins in excess of 2000. EKG on admission shows q-waves in leads I and aVL with poor R wave progression consistent with a previous lateral wall MI. Outside therapeutic window for PCI. Troponin and CKMB done 7/22 in setting of new ST changes in 2 precordial leads, both lower, will no longer follow.     In setting of recent upper GI bleed, HELD anti-platelet, anti-coag medications, cardiac rate control, and afterload reduction medications.   - Holding statin therapy due to transaminitis  - Restarted metop 12.5mg q12hr and monitor hemodynamics  - Holding ACE/ARB due to severe RONNIE  - HOLD hydralazine 10 TID for afterload reduction  - Eventually would benefit from diagnostic cath/stress test for ischemic workup; holding off due to renal failure and tenuous state    #asymptomatic afib/aflutter  Rapid ventricular beats noted. Continue on rate control (goal <110) and will continue to monitor  - Restarted metoprolol 12.5mg q12hr  - Cardiac monitor  - no antiplatelet/anticoag in setting of GI bleeding.

## 2018-07-30 NOTE — PROGRESS NOTE ADULT - ASSESSMENT
RB is a 67M w PMH of HTN, DM, HLD, HFrEF of 25%, CAD s/p PCI 7 years ago, with altered mental status in the setting of one week of dyspnea and weakness admitted to the CCU for management of ACS, acute on chronic CHF, RONNIE, s/p acute upper GI bleed 2/2 two gastric ulcers; s/p clipping of one ulcer. He remains in tenuous status, as he is at a high risk for acute re-bleed and high risk for thrombus given recent STEMI, afib, and CVA. Spoke to GI on 7/29, recommended restarting 1 antiplatelet agent, may also start nectar thick liquids per GI/Speech recs. Increased metoprolol to 25 mg. RB is a 67M w PMH of HTN, DM, HLD, HFrEF of 25%, CAD s/p PCI 7 years ago, with altered mental status in the setting of one week of dyspnea and weakness admitted to the CCU for management of ACS, acute on chronic CHF, RONNIE, s/p acute upper GI bleed 2/2 two gastric ulcers; s/p clipping of one ulcer. He remains in tenuous status, as he is at a high risk for acute re-bleed and high risk for thrombus given recent STEMI, afib, and CVA. Hgb dropped to 6.9 on 07/29, pt given 1 unit of PRBCs, no active signs of bleeding.

## 2018-07-30 NOTE — SPEECH LANGUAGE PATHOLOGY EVALUATION - VOLUME
Patient whispering at baseline, but can produce adequate voicing with verbal cues functional with cues to increase volume

## 2018-07-30 NOTE — PROVIDER CONTACT NOTE (OTHER) - ASSESSMENT
No discomfort noted
team ordered hydralazine 10 mg. Discussed with MD Vieyra pt's BP remains 120s systolic. Decision was made to start hydralazine at 10 PM per usual dose schedule. Will endorse to night RN and continue to monitor closely.
SBP >100mm of hg.  No neuro status change.  No hypotension

## 2018-07-30 NOTE — PROGRESS NOTE ADULT - SUBJECTIVE AND OBJECTIVE BOX
Patient is a 67y Male seen and evaluated at bedside.   Patient seen and examined   cr @ 3.32  Na @ 153<---152  patient did receive D5 W @ 50 cc/hr for 3 hours    chlorhexidine 2% Cloths 1 daily  dextrose 40% Gel 15 once PRN  dextrose 5%. 1000 <Continuous>  dextrose 5%. 1000 <Continuous>  dextrose 50% Injectable 12.5 once  dextrose 50% Injectable 25 once  dextrose 50% Injectable 25 once  escitalopram 5 daily  glucagon  Injectable 1 once PRN  insulin glargine Injectable (LANTUS) 15 at bedtime  insulin lispro (HumaLOG) corrective regimen sliding scale  three times a day before meals  insulin lispro (HumaLOG) corrective regimen sliding scale  at bedtime  pantoprazole  Injectable 40 two times a day  sevelamer carbonate Powder 800 three times a day with meals      Allergies    No Known Allergies    Intolerances        T(C): , Max: 36.7 (07-29-18 @ 22:15)  T(F): , Max: 98.1 (07-29-18 @ 22:15)  HR: 100 (07-30-18 @ 09:00)  BP: 129/80 (07-30-18 @ 09:00)  BP(mean): 100 (07-30-18 @ 09:00)  RR: 13 (07-30-18 @ 09:00)  SpO2: 100% (07-30-18 @ 09:00)  Wt(kg): --    07-29 @ 07:01  -  07-30 @ 07:00  --------------------------------------------------------  IN: 680 mL / OUT: 3190 mL / NET: -2510 mL    07-30 @ 07:01  -  07-30 @ 09:49  --------------------------------------------------------  IN: 150 mL / OUT: 135 mL / NET: 15 mL      PHYSICAL EXAM:  GENERAL: NAD, well-developed, well nourished, alert, awake, no acute distress at present  HEAD:  Atraumatic, Normocephalic,   EYES: Bilateral conjuctiva and sclera normal   Oral cavity: Oral mucosa dry and pink  NECK: Neck supple, No JVD  CHEST/LUNG: Clear to auscultation bilaterally; No wheeze, no rales, no crepitations  HEART: Regular rate and rhythm. WANDER II/VI at LPSB, No gallop, no rub   ABDOMEN: Soft, Nontender, BS+nt, No flank tenderness.   EXTREMITIES: No clubbing, cyanosis, or edema  Neurology: AAOx3, no focal neurological deficit  SKIN: No rashes or lesions          ACCESS:     LABS:                        9.8    12.1  )-----------( 390      ( 30 Jul 2018 08:26 )             31.0     07-30    153<H>  |  109<H>  |  118<H>  ----------------------------<  143<H>  4.6   |  31  |  3.32<H>    Ca    9.2      30 Jul 2018 08:26  Phos  4.3     07-30  Mg     3.0     07-30    TPro  7.4  /  Alb  3.6  /  TBili  0.6  /  DBili  x   /  AST  148<H>  /  ALT  338<H>  /  AlkPhos  119  07-30      PT/INR - ( 30 Jul 2018 08:26 )   PT: 13.6 sec;   INR: 1.22          PTT - ( 30 Jul 2018 08:26 )  PTT:25.9 sec    Creatinine, Random Urine: 76 mg/dL (07-28 @ 10:38)        RADIOLOGY & ADDITIONAL STUDIES: Patient is a 67y Male seen and evaluated at bedside.   Patient seen and examined   cr @ 3.32  Na @ 153<---152  BUn trending down  patient did receive D5 W @ 50 cc/hr for 3 hours    chlorhexidine 2% Cloths 1 daily  dextrose 40% Gel 15 once PRN  dextrose 5%. 1000 <Continuous>  dextrose 5%. 1000 <Continuous>  dextrose 50% Injectable 12.5 once  dextrose 50% Injectable 25 once  dextrose 50% Injectable 25 once  escitalopram 5 daily  glucagon  Injectable 1 once PRN  insulin glargine Injectable (LANTUS) 15 at bedtime  insulin lispro (HumaLOG) corrective regimen sliding scale  three times a day before meals  insulin lispro (HumaLOG) corrective regimen sliding scale  at bedtime  pantoprazole  Injectable 40 two times a day  sevelamer carbonate Powder 800 three times a day with meals      Allergies    No Known Allergies    Intolerances        T(C): , Max: 36.7 (07-29-18 @ 22:15)  T(F): , Max: 98.1 (07-29-18 @ 22:15)  HR: 100 (07-30-18 @ 09:00)  BP: 129/80 (07-30-18 @ 09:00)  BP(mean): 100 (07-30-18 @ 09:00)  RR: 13 (07-30-18 @ 09:00)  SpO2: 100% (07-30-18 @ 09:00)  Wt(kg): --    07-29 @ 07:01  -  07-30 @ 07:00  --------------------------------------------------------  IN: 680 mL / OUT: 3190 mL / NET: -2510 mL    07-30 @ 07:01  -  07-30 @ 09:49  --------------------------------------------------------  IN: 150 mL / OUT: 135 mL / NET: 15 mL      PHYSICAL EXAM:  GENERAL: NAD, well-developed, well nourished, alert, awake, no acute distress at present  HEAD:  Atraumatic, Normocephalic,   EYES: Bilateral conjuctiva and sclera normal   Oral cavity: Oral mucosa dry and pink  NECK: Neck supple, No JVD  CHEST/LUNG: Clear to auscultation bilaterally; No wheeze, no rales, no crepitations  HEART: Regular rate and rhythm. WANDER II/VI at LPSB, No gallop, no rub   ABDOMEN: Soft, Nontender, BS+nt, No flank tenderness.   EXTREMITIES: No clubbing, cyanosis, or edema  Neurology: AAOx3, no focal neurological deficit  SKIN: No rashes or lesions          ACCESS:     LABS:                        9.8    12.1  )-----------( 390      ( 30 Jul 2018 08:26 )             31.0     07-30    153<H>  |  109<H>  |  118<H>  ----------------------------<  143<H>  4.6   |  31  |  3.32<H>    Ca    9.2      30 Jul 2018 08:26  Phos  4.3     07-30  Mg     3.0     07-30    TPro  7.4  /  Alb  3.6  /  TBili  0.6  /  DBili  x   /  AST  148<H>  /  ALT  338<H>  /  AlkPhos  119  07-30      PT/INR - ( 30 Jul 2018 08:26 )   PT: 13.6 sec;   INR: 1.22          PTT - ( 30 Jul 2018 08:26 )  PTT:25.9 sec    Creatinine, Random Urine: 76 mg/dL (07-28 @ 10:38)        RADIOLOGY & ADDITIONAL STUDIES:

## 2018-07-30 NOTE — PROGRESS NOTE ADULT - PROBLEM SELECTOR PLAN 1
creatinine improving  @ 3.32<--- 3.79 from 3.93  avoid nephrotoxic agents    also monitor I/o  follow BMP  avoid nephrotoxic agents    BUn still on the rise likely 2nd to acute Upper GI Bleed  GI on board, embolization as next step if necessary  - renal diet  avoid nephrotoxic agents

## 2018-07-30 NOTE — PROGRESS NOTE ADULT - PROBLEM SELECTOR PLAN 4
Patient's BUN/Cr is up trending, in setting of anemia/low perfusion. (unclear baseline). Likely 2/2 to cardiorenal syndrome as cardiac output is poor.   - Renal on board, appreciate recs  - Per renal, no urgent need for dialysis at this time  - repeat Urine lytes   -sevelamer 800 TID to prevent hyperphosphatemia  -Pt dry and euvolemic today; hold further diuretics for now    #Hyponatremia: Resolved   - d/c jo ann, now with condom cath    #Hypernatremia   - Started D5W 50 cc/hr, continue to assess fluid status

## 2018-07-30 NOTE — PROGRESS NOTE ADULT - PROBLEM SELECTOR PLAN 10
F: None  E: Replete K>4, Mg>2. Will need to be careful with repletion given poor renal function  N: Clear (nectar thick) liquid diet over weekend F: None  E: Replete K>4, Mg>2. Will need to be careful with repletion given poor renal function  N: Clear liquid diet.

## 2018-07-30 NOTE — PROGRESS NOTE ADULT - SUBJECTIVE AND OBJECTIVE BOX
INTERVAL HPI/OVERNIGHT EVENTS:  ICU Vital Signs Last 24 Hrs  T(C): 36.7 (30 Jul 2018 12:00), Max: 36.7 (29 Jul 2018 22:15)  T(F): 98.1 (30 Jul 2018 12:00), Max: 98.1 (29 Jul 2018 22:15)  HR: 88 (30 Jul 2018 13:00) (84 - 144)  BP: 109/65 (30 Jul 2018 13:00) (96/57 - 140/85)  BP(mean): 84 (30 Jul 2018 13:00) (75 - 114)  ABP: --  ABP(mean): --  RR: 13 (30 Jul 2018 13:00) (9 - 18)  SpO2: 99% (30 Jul 2018 13:00) (97% - 100%)    I&O's Summary    29 Jul 2018 07:01  -  30 Jul 2018 07:00  --------------------------------------------------------  IN: 680 mL / OUT: 3190 mL / NET: -2510 mL    30 Jul 2018 07:01  -  30 Jul 2018 13:27  --------------------------------------------------------  IN: 300 mL / OUT: 395 mL / NET: -95 mL          LABS:                        9.8    12.1  )-----------( 390      ( 30 Jul 2018 08:26 )             31.0     07-30    153<H>  |  109<H>  |  118<H>  ----------------------------<  143<H>  4.6   |  31  |  3.32<H>    Ca    9.2      30 Jul 2018 08:26  Phos  4.3     07-30  Mg     3.0     07-30    TPro  7.4  /  Alb  3.6  /  TBili  0.6  /  DBili  x   /  AST  148<H>  /  ALT  338<H>  /  AlkPhos  119  07-30    PT/INR - ( 30 Jul 2018 08:26 )   PT: 13.6 sec;   INR: 1.22          PTT - ( 30 Jul 2018 08:26 )  PTT:25.9 sec    CAPILLARY BLOOD GLUCOSE      POCT Blood Glucose.: 125 mg/dL (30 Jul 2018 10:49)  POCT Blood Glucose.: 128 mg/dL (30 Jul 2018 08:24)  POCT Blood Glucose.: 159 mg/dL (29 Jul 2018 21:33)  POCT Blood Glucose.: 213 mg/dL (29 Jul 2018 16:32)  POCT Blood Glucose.: 209 mg/dL (29 Jul 2018 16:30)        RADIOLOGY & ADDITIONAL TESTS:    Consultant(s) Notes Reviewed:  [x ] YES  [ ] NO    MEDICATIONS  (STANDING):  chlorhexidine 2% Cloths 1 Application(s) Topical daily  dextrose 5%. 1000 milliLiter(s) (50 mL/Hr) IV Continuous <Continuous>  dextrose 5%. 1000 milliLiter(s) (50 mL/Hr) IV Continuous <Continuous>  dextrose 50% Injectable 12.5 Gram(s) IV Push once  dextrose 50% Injectable 25 Gram(s) IV Push once  dextrose 50% Injectable 25 Gram(s) IV Push once  escitalopram 5 milliGRAM(s) Oral daily  insulin glargine Injectable (LANTUS) 15 Unit(s) SubCutaneous at bedtime  insulin lispro (HumaLOG) corrective regimen sliding scale   SubCutaneous three times a day before meals  insulin lispro (HumaLOG) corrective regimen sliding scale   SubCutaneous at bedtime  metoprolol tartrate 12.5 milliGRAM(s) Oral every 12 hours  pantoprazole  Injectable 40 milliGRAM(s) IV Push two times a day  sevelamer carbonate Powder 800 milliGRAM(s) Oral three times a day with meals    MEDICATIONS  (PRN):  dextrose 40% Gel 15 Gram(s) Oral once PRN Blood Glucose LESS THAN 70 milliGRAM(s)/deciliter  glucagon  Injectable 1 milliGRAM(s) IntraMuscular once PRN Glucose LESS THAN 70 milligrams/deciliter      PHYSICAL EXAM:  GENERAL: well built, well nourished  HEAD:  Atraumatic, Normocephalic  EYES: EOMI, PERRLA, conjunctiva and sclera clear  ENT: No tonsillar erythema, exudates, or enlargement; Moist mucous membranes, Good dentition, No lesions  NECK: Supple, No JVD, Normal thyroid, no enlarged nodes  NERVOUS SYSTEM:  Alert & Oriented X3, Good concentration; Motor Strength 5/5 B/L upper and lower extremities; DTRs 2+ intact and symmetric, sensory intact  CHEST/LUNG: B/L good air entry; No rales, rhonchi, or wheezing  HEART: S1S2 normal, no S3, Regular rate and rhythm; No murmurs, rubs, or gallops  ABDOMEN: Soft, Nontender, Nondistended; Bowel sounds present  EXTREMITIES:  2+ Peripheral Pulses, No clubbing, cyanosis, or edema  LYMPH: No lymphadenopathy noted  SKIN: No rashes or lesions    Care Discussed with Consultants/Other Providers [ x] YES  [ ] NO SUBJECTIVE/INTERVAL HPI  Pt examined at bedside. Pt has flat affect at baseline. Pt denies chest pain, SOB, Abdominal pain, nausea/vomiting, hemtesis. Last BM was yesterday and was normal per pt. Denies any blood in stool. 	      INTERVAL HPI/OVERNIGHT EVENTS:  ICU Vital Signs Last 24 Hrs  T(C): 36.7 (30 Jul 2018 12:00), Max: 36.7 (29 Jul 2018 22:15)  T(F): 98.1 (30 Jul 2018 12:00), Max: 98.1 (29 Jul 2018 22:15)  HR: 88 (30 Jul 2018 13:00) (84 - 144)  BP: 109/65 (30 Jul 2018 13:00) (96/57 - 140/85)  BP(mean): 84 (30 Jul 2018 13:00) (75 - 114)  ABP: --  ABP(mean): --  RR: 13 (30 Jul 2018 13:00) (9 - 18)  SpO2: 99% (30 Jul 2018 13:00) (97% - 100%)    I&O's Summary    29 Jul 2018 07:01  -  30 Jul 2018 07:00  --------------------------------------------------------  IN: 680 mL / OUT: 3190 mL / NET: -2510 mL    30 Jul 2018 07:01  -  30 Jul 2018 13:27  --------------------------------------------------------  IN: 300 mL / OUT: 395 mL / NET: -95 mL          LABS:                        9.8    12.1  )-----------( 390      ( 30 Jul 2018 08:26 )             31.0     07-30    153<H>  |  109<H>  |  118<H>  ----------------------------<  143<H>  4.6   |  31  |  3.32<H>    Ca    9.2      30 Jul 2018 08:26  Phos  4.3     07-30  Mg     3.0     07-30    TPro  7.4  /  Alb  3.6  /  TBili  0.6  /  DBili  x   /  AST  148<H>  /  ALT  338<H>  /  AlkPhos  119  07-30    PT/INR - ( 30 Jul 2018 08:26 )   PT: 13.6 sec;   INR: 1.22          PTT - ( 30 Jul 2018 08:26 )  PTT:25.9 sec    CAPILLARY BLOOD GLUCOSE      POCT Blood Glucose.: 125 mg/dL (30 Jul 2018 10:49)  POCT Blood Glucose.: 128 mg/dL (30 Jul 2018 08:24)  POCT Blood Glucose.: 159 mg/dL (29 Jul 2018 21:33)  POCT Blood Glucose.: 213 mg/dL (29 Jul 2018 16:32)  POCT Blood Glucose.: 209 mg/dL (29 Jul 2018 16:30)        RADIOLOGY & ADDITIONAL TESTS:    Consultant(s) Notes Reviewed:  [x ] YES  [ ] NO    MEDICATIONS  (STANDING):  chlorhexidine 2% Cloths 1 Application(s) Topical daily  dextrose 5%. 1000 milliLiter(s) (50 mL/Hr) IV Continuous <Continuous>  dextrose 5%. 1000 milliLiter(s) (50 mL/Hr) IV Continuous <Continuous>  dextrose 50% Injectable 12.5 Gram(s) IV Push once  dextrose 50% Injectable 25 Gram(s) IV Push once  dextrose 50% Injectable 25 Gram(s) IV Push once  escitalopram 5 milliGRAM(s) Oral daily  insulin glargine Injectable (LANTUS) 15 Unit(s) SubCutaneous at bedtime  insulin lispro (HumaLOG) corrective regimen sliding scale   SubCutaneous three times a day before meals  insulin lispro (HumaLOG) corrective regimen sliding scale   SubCutaneous at bedtime  metoprolol tartrate 12.5 milliGRAM(s) Oral every 12 hours  pantoprazole  Injectable 40 milliGRAM(s) IV Push two times a day  sevelamer carbonate Powder 800 milliGRAM(s) Oral three times a day with meals    MEDICATIONS  (PRN):  dextrose 40% Gel 15 Gram(s) Oral once PRN Blood Glucose LESS THAN 70 milliGRAM(s)/deciliter  glucagon  Injectable 1 milliGRAM(s) IntraMuscular once PRN Glucose LESS THAN 70 milligrams/deciliter      PHYSICAL EXAM:  General: WDWN.   HEENT: NC/AT; PERRL, clear conjunctiva  Neck: supple  Cardiovascular: +S1/S2; RRR  Respiratory: CTA b/l; no W/R/R  Gastrointestinal: soft, NT/ND; +BSx4  Extremities: WWP; 2+ peripheral pulses; no edema   Neurological: Nonverbal patient. Follows commands and responds with nods. 4/5 strength all extremities.     Care Discussed with Consultants/Other Providers [ x] YES  [ ] NO

## 2018-07-30 NOTE — PROGRESS NOTE ADULT - ATTENDING COMMENTS
as above-- pt's cardiac status continues to improve, and eval of gi bld loss now to proceed.  pt has free water deficit and not currently drinking.  agree with replacement with iv free water.

## 2018-07-30 NOTE — PROVIDER CONTACT NOTE (OTHER) - BACKGROUND
bed for designated period of time for neuro exam. Discussed with MD Vieyra and CCU resident. This is not a change from his CVA. Will continue to monitor.
upright, but is very weak. Pt assisted back to bed with 3 people. In bed, BP 120s-60s. Discussed with MD Moran that pt had orthostatic episode. Per MD Moran bumex dose held at this time. CCU
h/o paroxsymal afib

## 2018-07-30 NOTE — PROGRESS NOTE ADULT - SUBJECTIVE AND OBJECTIVE BOX
Pt seen and examined at bedside.    PERTINENT REVIEW OF SYSTEMS:  CONSTITUTIONAL: No weakness, fevers or chills  HEENT: No visual changes; No vertigo or throat pain   GASTROINTESTINAL: No abdominal or epigastric pain. No nausea, vomiting, or hematemesis; No diarrhea or constipation. No melena or hematochezia.  NEUROLOGICAL: No numbness or weakness  SKIN: No itching, burning, rashes, or lesions     Allergies    No Known Allergies    Intolerances      MEDICATIONS:  MEDICATIONS  (STANDING):  chlorhexidine 2% Cloths 1 Application(s) Topical daily  dextrose 5%. 1000 milliLiter(s) (50 mL/Hr) IV Continuous <Continuous>  dextrose 5%. 1000 milliLiter(s) (50 mL/Hr) IV Continuous <Continuous>  dextrose 50% Injectable 12.5 Gram(s) IV Push once  dextrose 50% Injectable 25 Gram(s) IV Push once  dextrose 50% Injectable 25 Gram(s) IV Push once  escitalopram 5 milliGRAM(s) Oral daily  insulin glargine Injectable (LANTUS) 15 Unit(s) SubCutaneous at bedtime  insulin lispro (HumaLOG) corrective regimen sliding scale   SubCutaneous three times a day before meals  insulin lispro (HumaLOG) corrective regimen sliding scale   SubCutaneous at bedtime  pantoprazole  Injectable 40 milliGRAM(s) IV Push two times a day  sevelamer carbonate Powder 800 milliGRAM(s) Oral three times a day with meals    MEDICATIONS  (PRN):  dextrose 40% Gel 15 Gram(s) Oral once PRN Blood Glucose LESS THAN 70 milliGRAM(s)/deciliter  glucagon  Injectable 1 milliGRAM(s) IntraMuscular once PRN Glucose LESS THAN 70 milligrams/deciliter    Vital Signs Last 24 Hrs  T(C): 35.8 (30 Jul 2018 06:12), Max: 36.7 (29 Jul 2018 22:15)  T(F): 96.4 (30 Jul 2018 06:12), Max: 98.1 (29 Jul 2018 22:15)  HR: 102 (30 Jul 2018 03:00) (90 - 110)  BP: 96/57 (30 Jul 2018 03:00) (96/57 - 132/75)  BP(mean): 75 (30 Jul 2018 03:00) (75 - 100)  RR: 14 (30 Jul 2018 03:00) (9 - 18)  SpO2: 100% (30 Jul 2018 03:00) (98% - 100%)    07-29 @ 07:01  -  07-30 @ 07:00  --------------------------------------------------------  IN: 430 mL / OUT: 2875 mL / NET: -2445 mL      PHYSICAL EXAM:    General: Well developed; well nourished; in no acute distress  HEENT: MMM, conjunctiva and sclera clear  Gastrointestinal: Soft non-tender non-distended; Normal bowel sounds; No hepatosplenomegaly. No rebound or guarding  Skin: Warm and dry. No obvious rash    LABS:                        10.9   12.7  )-----------( 338      ( 29 Jul 2018 22:22 )             33.0     07-29    152<H>  |  107  |  144<H>  ----------------------------<  209<H>  4.7   |  32<H>  |  3.73<H>    Ca    9.4      29 Jul 2018 16:39  Mg     3.1     07-29    TPro  7.3  /  Alb  3.5  /  TBili  0.5  /  DBili  x   /  AST  116<H>  /  ALT  300<H>  /  AlkPhos  99  07-29                      RADIOLOGY & ADDITIONAL STUDIES: Pt seen and examined at bedside. Denies any complaints, no nausea/vomiting/blood in stool, dark stool    PERTINENT REVIEW OF SYSTEMS:  CONSTITUTIONAL: No weakness, fevers or chills  HEENT: No visual changes; No vertigo or throat pain   GASTROINTESTINAL: No abdominal or epigastric pain. No nausea, vomiting, or hematemesis; No diarrhea or constipation. No melena or hematochezia.  NEUROLOGICAL: No numbness or weakness  SKIN: No itching, burning, rashes, or lesions     Allergies    No Known Allergies    Intolerances      MEDICATIONS:  MEDICATIONS  (STANDING):  chlorhexidine 2% Cloths 1 Application(s) Topical daily  dextrose 5%. 1000 milliLiter(s) (50 mL/Hr) IV Continuous <Continuous>  dextrose 5%. 1000 milliLiter(s) (50 mL/Hr) IV Continuous <Continuous>  dextrose 50% Injectable 12.5 Gram(s) IV Push once  dextrose 50% Injectable 25 Gram(s) IV Push once  dextrose 50% Injectable 25 Gram(s) IV Push once  escitalopram 5 milliGRAM(s) Oral daily  insulin glargine Injectable (LANTUS) 15 Unit(s) SubCutaneous at bedtime  insulin lispro (HumaLOG) corrective regimen sliding scale   SubCutaneous three times a day before meals  insulin lispro (HumaLOG) corrective regimen sliding scale   SubCutaneous at bedtime  pantoprazole  Injectable 40 milliGRAM(s) IV Push two times a day  sevelamer carbonate Powder 800 milliGRAM(s) Oral three times a day with meals    MEDICATIONS  (PRN):  dextrose 40% Gel 15 Gram(s) Oral once PRN Blood Glucose LESS THAN 70 milliGRAM(s)/deciliter  glucagon  Injectable 1 milliGRAM(s) IntraMuscular once PRN Glucose LESS THAN 70 milligrams/deciliter    Vital Signs Last 24 Hrs  T(C): 35.8 (30 Jul 2018 06:12), Max: 36.7 (29 Jul 2018 22:15)  T(F): 96.4 (30 Jul 2018 06:12), Max: 98.1 (29 Jul 2018 22:15)  HR: 102 (30 Jul 2018 03:00) (90 - 110)  BP: 96/57 (30 Jul 2018 03:00) (96/57 - 132/75)  BP(mean): 75 (30 Jul 2018 03:00) (75 - 100)  RR: 14 (30 Jul 2018 03:00) (9 - 18)  SpO2: 100% (30 Jul 2018 03:00) (98% - 100%)    07-29 @ 07:01  -  07-30 @ 07:00  --------------------------------------------------------  IN: 430 mL / OUT: 2875 mL / NET: -2445 mL      PHYSICAL EXAM:    General: Well developed; well nourished; in no acute distress  HEENT: MMM, conjunctiva and sclera clear  Gastrointestinal: Soft non-tender non-distended; Normal bowel sounds; No hepatosplenomegaly. No rebound or guarding  Skin: Warm and dry. No obvious rash    LABS:                        10.9   12.7  )-----------( 338      ( 29 Jul 2018 22:22 )             33.0     07-29    152<H>  |  107  |  144<H>  ----------------------------<  209<H>  4.7   |  32<H>  |  3.73<H>    Ca    9.4      29 Jul 2018 16:39  Mg     3.1     07-29    TPro  7.3  /  Alb  3.5  /  TBili  0.5  /  DBili  x   /  AST  116<H>  /  ALT  300<H>  /  AlkPhos  99  07-29                      RADIOLOGY & ADDITIONAL STUDIES:

## 2018-07-30 NOTE — PROGRESS NOTE ADULT - PROBLEM SELECTOR PLAN 3
likely 2nd to dehydration  na @ 152  total free water deficit @ 3 L  patient did receive  D5 W @ 50 cc/hr x 3 hrs yesterday  recommend giving more D5 W - @ 50 ccc/hr for total of 1.5 L   follow bmp

## 2018-07-30 NOTE — PROGRESS NOTE ADULT - ASSESSMENT
67yr old M w PMHx significant for dilated cardiomyopathy, HFrEF of 25%, CAD s/p PCI 7 years ago, NIDDM, HTN, and HLD, who presented to the [facility] ED with altered mental status in the setting of one week of dyspnea and weakness while on a trip to [place]. GI consulted for sudden drop in H&H.    # Acute blood loss anemia 2/2 PUD  - sp EGD 7/27/18 - with large ulcer with adherent clot seen in incisura, unable to dislodge clot, sp 3 clips to base of ulcer with no post clipping bleeding reported, smaller 2 clean based ulcer also near large ulcer described above, gastritis, normal esophagus, and duodenum.  - C/w PPI IVP BID   - H/H stable, HD stable  - Monitor H/H Q6-8H, transfuse if Hgb <  - If e/o recurrent GI bleeding (ie. melena, hematochezia, hematemesis) +/- HD instability consider IR evaluation for embolization - can localize ulcer with clips placed    # Transaminitis 2/2 congestive hepatopathy vs DILI vs hepatic steatosis  - Abdominal U/S consistent with chronic hepatitis or hepatic steatosis  - Viral hepatitis for B and C negative  - Check Hepatitis A IgM, iron studies, LUCY, AMA, ASMA, ceruloplasmin, Immunoglobulin levels, alpha-1-antritrypsin  - Check RUQ doppler  - ECHO with EF of 15-20%, HF management per cardiology  - Monitor daily LFTs and PT/INR  - Avoid hepatotoxic drugs 67yr old M w PMHx significant for dilated cardiomyopathy, HFrEF of 25%, CAD s/p PCI 7 years ago, NIDDM, HTN, and HLD, who presented to the [facility] ED with altered mental status in the setting of one week of dyspnea and weakness while on a trip to [place]. GI consulted for sudden drop in H&H.    # Acute blood loss anemia 2/2 PUD  - sp EGD 7/27/18 - with large ulcer with adherent clot seen in incisura, unable to dislodge clot, sp 3 clips to base of ulcer with no post clipping bleeding reported, smaller 2 clean based ulcer also near large ulcer described above, gastritis, normal esophagus, and duodenum.  - C/w PPI IVP BID   - H/H stable, HD stable  - Monitor H/H daily, transfuse if Hgb < 7  - If e/o recurrent GI bleeding (ie. melena, hematochezia, hematemesis) +/- HD instability consider IR evaluation for embolization - can localize ulcer with clips placed, also please call GI pager  -advance diet to clears    # Transaminitis 2/2 congestive hepatopathy vs DILI vs hepatic steatosis  - Abdominal U/S consistent with chronic hepatitis or hepatic steatosis  - Viral hepatitis for B and C negative  - Hepatitis A IgM, iron studies, LUCY, AMA, ASMA, ceruloplasmin, Immunoglobulin levels, alpha-1-antritrypsin pending  - Check RUQ doppler pending  - Monitor daily LFTs and PT/INR  - Avoid hepatotoxic drugs

## 2018-07-30 NOTE — SPEECH LANGUAGE PATHOLOGY EVALUATION - SLP PERTINENT HISTORY OF CURRENT PROBLEM
AMS status in the setting of one week of dyspnea and weakness AMS status in the setting of one week of dyspnea and weakness, hx CVA AMS status in the setting of one week of dyspnea and weakness, CVA

## 2018-07-30 NOTE — SPEECH LANGUAGE PATHOLOGY EVALUATION - SLP DIAGNOSIS
Patient presents with functional naming, auditory verbal comprehension, repetition, and motor speech. Full examination of verbal expression could not be completed due to suspected Patient's unwillingness to participate. Patient appeared aphonic at beginning of evaluation, but was able to produce functional voice with verbal cueing. Patient would benefit from additional examination of language abilities when patient is more willing to participate. SLP to follow.

## 2018-07-31 LAB
ALBUMIN SERPL ELPH-MCNC: 3.4 G/DL — SIGNIFICANT CHANGE UP (ref 3.3–5)
ALP SERPL-CCNC: 108 U/L — SIGNIFICANT CHANGE UP (ref 40–120)
ALT FLD-CCNC: 293 U/L — HIGH (ref 10–45)
ANION GAP SERPL CALC-SCNC: 13 MMOL/L — SIGNIFICANT CHANGE UP (ref 5–17)
APTT BLD: 26.4 SEC — LOW (ref 27.5–37.4)
AST SERPL-CCNC: 96 U/L — HIGH (ref 10–40)
BILIRUB SERPL-MCNC: 0.5 MG/DL — SIGNIFICANT CHANGE UP (ref 0.2–1.2)
BUN SERPL-MCNC: 86 MG/DL — HIGH (ref 7–23)
CALCIUM SERPL-MCNC: 9.2 MG/DL — SIGNIFICANT CHANGE UP (ref 8.4–10.5)
CERULOPLASMIN SERPL-MCNC: 30 MG/DL — SIGNIFICANT CHANGE UP (ref 20–60)
CHLORIDE SERPL-SCNC: 102 MMOL/L — SIGNIFICANT CHANGE UP (ref 96–108)
CO2 SERPL-SCNC: 30 MMOL/L — SIGNIFICANT CHANGE UP (ref 22–31)
CREAT SERPL-MCNC: 2.84 MG/DL — HIGH (ref 0.5–1.3)
GLUCOSE SERPL-MCNC: 82 MG/DL — SIGNIFICANT CHANGE UP (ref 70–99)
HAV IGM SER-ACNC: SIGNIFICANT CHANGE UP
HCT VFR BLD CALC: 28.7 % — LOW (ref 39–50)
HGB BLD-MCNC: 9.3 G/DL — LOW (ref 13–17)
IGA FLD-MCNC: 281 MG/DL — SIGNIFICANT CHANGE UP (ref 84–499)
IGG FLD-MCNC: 1291 MG/DL — SIGNIFICANT CHANGE UP (ref 610–1660)
IGM SERPL-MCNC: 60 MG/DL — SIGNIFICANT CHANGE UP (ref 35–242)
INR BLD: 1.19 — HIGH (ref 0.88–1.16)
KAPPA LC SER QL IFE: 4.5 MG/DL — HIGH (ref 0.33–1.94)
KAPPA/LAMBDA FREE LIGHT CHAIN RATIO, SERUM: 2.13 RATIO — HIGH (ref 0.26–1.65)
LAMBDA LC SER QL IFE: 2.11 MG/DL — SIGNIFICANT CHANGE UP (ref 0.57–2.63)
MAGNESIUM SERPL-MCNC: 2.4 MG/DL — SIGNIFICANT CHANGE UP (ref 1.6–2.6)
MCHC RBC-ENTMCNC: 29.2 PG — SIGNIFICANT CHANGE UP (ref 27–34)
MCHC RBC-ENTMCNC: 32.4 G/DL — SIGNIFICANT CHANGE UP (ref 32–36)
MCV RBC AUTO: 90.3 FL — SIGNIFICANT CHANGE UP (ref 80–100)
PHOSPHATE SERPL-MCNC: 3.1 MG/DL — SIGNIFICANT CHANGE UP (ref 2.5–4.5)
PLATELET # BLD AUTO: 335 K/UL — SIGNIFICANT CHANGE UP (ref 150–400)
POTASSIUM SERPL-MCNC: 3.7 MMOL/L — SIGNIFICANT CHANGE UP (ref 3.5–5.3)
POTASSIUM SERPL-SCNC: 3.7 MMOL/L — SIGNIFICANT CHANGE UP (ref 3.5–5.3)
PROT SERPL-MCNC: 6.7 G/DL — SIGNIFICANT CHANGE UP (ref 6–8.3)
PROTHROM AB SERPL-ACNC: 13.3 SEC — HIGH (ref 9.8–12.7)
RBC # BLD: 3.18 M/UL — LOW (ref 4.2–5.8)
RBC # FLD: 14.5 % — SIGNIFICANT CHANGE UP (ref 10.3–16.9)
SODIUM SERPL-SCNC: 145 MMOL/L — SIGNIFICANT CHANGE UP (ref 135–145)
WBC # BLD: 10.1 K/UL — SIGNIFICANT CHANGE UP (ref 3.8–10.5)
WBC # FLD AUTO: 10.1 K/UL — SIGNIFICANT CHANGE UP (ref 3.8–10.5)

## 2018-07-31 PROCEDURE — 71045 X-RAY EXAM CHEST 1 VIEW: CPT | Mod: 26

## 2018-07-31 PROCEDURE — 99233 SBSQ HOSP IP/OBS HIGH 50: CPT

## 2018-07-31 PROCEDURE — 93010 ELECTROCARDIOGRAM REPORT: CPT

## 2018-07-31 RX ORDER — POTASSIUM CHLORIDE 20 MEQ
10 PACKET (EA) ORAL
Qty: 0 | Refills: 0 | Status: COMPLETED | OUTPATIENT
Start: 2018-07-31 | End: 2018-07-31

## 2018-07-31 RX ORDER — LACTULOSE 10 G/15ML
20 SOLUTION ORAL ONCE
Qty: 0 | Refills: 0 | Status: COMPLETED | OUTPATIENT
Start: 2018-07-31 | End: 2018-07-31

## 2018-07-31 RX ORDER — PANTOPRAZOLE SODIUM 20 MG/1
40 TABLET, DELAYED RELEASE ORAL
Qty: 0 | Refills: 0 | Status: DISCONTINUED | OUTPATIENT
Start: 2018-07-31 | End: 2018-08-04

## 2018-07-31 RX ORDER — PANTOPRAZOLE SODIUM 20 MG/1
40 TABLET, DELAYED RELEASE ORAL
Qty: 0 | Refills: 0 | Status: DISCONTINUED | OUTPATIENT
Start: 2018-07-31 | End: 2018-07-31

## 2018-07-31 RX ADMIN — PANTOPRAZOLE SODIUM 40 MILLIGRAM(S): 20 TABLET, DELAYED RELEASE ORAL at 17:55

## 2018-07-31 RX ADMIN — SEVELAMER CARBONATE 800 MILLIGRAM(S): 2400 POWDER, FOR SUSPENSION ORAL at 17:15

## 2018-07-31 RX ADMIN — Medication 4: at 12:16

## 2018-07-31 RX ADMIN — Medication 12.5 MILLIGRAM(S): at 17:55

## 2018-07-31 RX ADMIN — Medication 100 MILLIEQUIVALENT(S): at 08:39

## 2018-07-31 RX ADMIN — CHLORHEXIDINE GLUCONATE 1 APPLICATION(S): 213 SOLUTION TOPICAL at 12:15

## 2018-07-31 RX ADMIN — Medication 100 MILLIEQUIVALENT(S): at 07:26

## 2018-07-31 RX ADMIN — SEVELAMER CARBONATE 800 MILLIGRAM(S): 2400 POWDER, FOR SUSPENSION ORAL at 12:15

## 2018-07-31 RX ADMIN — INSULIN GLARGINE 15 UNIT(S): 100 INJECTION, SOLUTION SUBCUTANEOUS at 21:34

## 2018-07-31 RX ADMIN — SEVELAMER CARBONATE 800 MILLIGRAM(S): 2400 POWDER, FOR SUSPENSION ORAL at 07:31

## 2018-07-31 RX ADMIN — Medication 12.5 MILLIGRAM(S): at 05:38

## 2018-07-31 RX ADMIN — ESCITALOPRAM OXALATE 5 MILLIGRAM(S): 10 TABLET, FILM COATED ORAL at 12:15

## 2018-07-31 RX ADMIN — PANTOPRAZOLE SODIUM 40 MILLIGRAM(S): 20 TABLET, DELAYED RELEASE ORAL at 05:38

## 2018-07-31 RX ADMIN — LACTULOSE 20 GRAM(S): 10 SOLUTION ORAL at 17:15

## 2018-07-31 RX ADMIN — Medication 100 MILLIEQUIVALENT(S): at 06:13

## 2018-07-31 NOTE — SWALLOW BEDSIDE ASSESSMENT ADULT - SWALLOW EVAL: CURRENT DIET
Puree/NTL per sunrise, clear liquid on tray table and recommended per GI d/t bleed
Puree with thin liquids

## 2018-07-31 NOTE — PROGRESS NOTE ADULT - PROBLEM SELECTOR PLAN 4
Patient's BUN/Cr is up trending, in setting of anemia/low perfusion. (unclear baseline). Likely 2/2 to cardiorenal syndrome as cardiac output is poor.   - Renal on board, appreciate recs  - Per renal, no urgent need for dialysis at this time  - repeat Urine lytes   -sevelamer 800 TID to prevent hyperphosphatemia  -Pt dry and euvolemic today; hold further diuretics for now    #Hyponatremia: Resolved   - d/c jo ann, now with condom cath    #Hypernatremia   - Started D5W 50 cc/hr, continue to assess fluid status Patient's BUN/Cr is up trending, in setting of anemia/low perfusion. (unclear baseline). Likely 2/2 to cardiorenal syndrome as cardiac output is poor.   - Renal on board, appreciate recs  - Per renal, no urgent need for dialysis at this time  -sevelamer 800 TID to prevent hyperphosphatemia  -Pt dry and euvolemic today; hold further diuretics for now    #Hyponatremia: Resolved   - d/c jo ann

## 2018-07-31 NOTE — PROGRESS NOTE ADULT - ASSESSMENT
67yr old M w PMHx significant for dilated cardiomyopathy, HFrEF of 25%, CAD s/p PCI 7 years ago, NIDDM, HTN, and HLD, who presented to the [facility] ED with altered mental status in the setting of one week of dyspnea and weakness while on a trip to [place]. GI consulted for sudden drop in H&H.    # Acute blood loss anemia 2/2 PUD  - sp EGD 7/27/18 - with large ulcer with adherent clot seen in incisura, unable to dislodge clot, sp 3 clips to base of ulcer with no post clipping bleeding reported, smaller 2 clean based ulcer also near large ulcer described above, gastritis, normal esophagus, and duodenum.  - C/w PPI BID po   - H/H stable, HD stable  - Monitor H/H daily, transfuse if Hgb < 7  -antiplatelet treatment per primary team  - If e/o recurrent GI bleeding (ie. melena, hematochezia, hematemesis) +/- HD instability consider IR evaluation for embolization - can localize ulcer with clips placed, also please call GI pager  -advance diet to clears    # Transaminitis 2/2 congestive hepatopathy vs DILI vs hepatic steatosis  - Abdominal U/S consistent with chronic hepatitis or hepatic steatosis  - Viral hepatitis for B and C negative  - Hepatitis A IgM, iron studies, LUCY, AMA, ASMA, ceruloplasmin, Immunoglobulin levels, alpha-1-antritrypsin pending  - Check RUQ doppler pending  - Monitor daily LFTs and PT/INR  - Avoid hepatotoxic drugs    GI will sign off 67yr old M w PMHx significant for dilated cardiomyopathy, HFrEF of 25%, CAD s/p PCI 7 years ago, NIDDM, HTN, and HLD, who presented to the [facility] ED with altered mental status in the setting of one week of dyspnea and weakness while on a trip to [place]. GI consulted for sudden drop in H&H.    # Acute blood loss anemia 2/2 PUD  - sp EGD 7/27/18 - with large ulcer with adherent clot seen in incisura, unable to dislodge clot, sp 3 clips to base of ulcer with no post clipping bleeding reported, smaller 2 clean based ulcer also near large ulcer described above, gastritis, normal esophagus, and duodenum.  - C/w PPI BID po x 8weeks  - H/H stable, HD stable  - Monitor H/H daily, transfuse if Hgb < 7  -antiplatelet treatment per primary team  - If e/o recurrent GI bleeding (ie. melena, hematochezia, hematemesis) +/- HD instability consider IR evaluation for embolization - can localize ulcer with clips placed, also please call GI pager  -advance diet as tolerated  -will need outpatient followup with Dr Munoz in 8 weeks for ulcer follow up, will need H pylori testing after PPI treatment    # Transaminitis 2/2 congestive hepatopathy vs hepatic steatosis  - LFT's trending down, no signs and symptoms of decompensated liver disease  - Abdominal U/S consistent with chronic hepatitis or hepatic steatosis  - Viral hepatitis serologies negative  - iron studies shows elevated ferritin may be 2/2 fatty liver or an acute phase reactant, LUCY, AMA, ASMA, ceruloplasmin, Immunoglobulin levels, alpha-1-antritrypsin were recommended at initial consult but pending  - Avoid hepatotoxic drugs  -will need outpatient follow up with Dr Munoz for follow up of his LFT's    GI will sign off 67yr old M w PMHx significant for dilated cardiomyopathy, HFrEF of 25%, CAD s/p PCI 7 years ago, NIDDM, HTN, and HLD, who presented to the [facility] ED with altered mental status in the setting of one week of dyspnea and weakness while on a trip to [place]. GI consulted for sudden drop in H&H.    # Acute blood loss anemia 2/2 PUD  - sp EGD 7/27/18 - with large ulcer with adherent clot seen in incisura, unable to dislodge clot, sp 3 clips to base of ulcer with no post clipping bleeding reported, smaller 2 clean based ulcer also near large ulcer described above, gastritis, normal esophagus, and duodenum.  - C/w PPI BID po x 8weeks  - H/H stable, HD stable  - Monitor H/H daily, transfuse if Hgb < 7  -antiplatelet treatment per primary team  - If e/o recurrent GI bleeding (ie. melena, hematochezia, hematemesis) +/- HD instability consider IR evaluation for embolization - can localize ulcer with clips placed, also please call GI pager  -advance diet as tolerated  -will need outpatient followup with Dr Munoz in 8 weeks for ulcer follow up, will need H pylori testing after PPI treatment    # Transaminitis 2/2 congestive hepatopathy vs hepatic steatosis vs statin induced?  - LFT's trending down, no signs and symptoms of decompensated liver disease  - Abdominal U/S consistent with chronic hepatitis or hepatic steatosis  - Viral hepatitis serologies negative  - iron studies shows elevated ferritin may be 2/2 fatty liver or an acute phase reactant, LUCY, AMA, ASMA, ceruloplasmin, Immunoglobulin levels, alpha-1-antritrypsin were recommended at initial consult but pending  - Avoid hepatotoxic drugs  -will need outpatient follow up with Dr Munoz for follow up of his LFT's    GI will sign off

## 2018-07-31 NOTE — SWALLOW BEDSIDE ASSESSMENT ADULT - NS SPL SWALLOW CLINIC TRIAL FT
Swallow safety improves when Pt is feeding himself d/t increased alertness/independence with feeding. Encourage Pt to feed himself, provide support PRN.
Oral stage is significant for prolonged and inefficient bolus formation, manipulation and transport, which resulted in lingual residue with soft solid texture. Pt with soft signs of airway protection deficits with thin liquids - throat clear, which can indicate potential laryngeal penetration/aspiration. In setting aphonic vocal quality and decreased ability to maintain alertness, Pt would benefit from a modified diet of puree with nectar thick liquids. This service will continue to monitor tolerance and determine need for diet upgrade. Pt will benefit from a full speech and language assessment and post D/C follow up.

## 2018-07-31 NOTE — PROGRESS NOTE ADULT - PROBLEM SELECTOR PLAN 10
F: None  E: Replete K>4, Mg>2. Will need to be careful with repletion given poor renal function  N: Clear liquid diet. F: None  E: Replete K>4, Mg>2. Will need to be careful with repletion given poor renal function  N: puree/thin, per speech    Dispo: TBD

## 2018-07-31 NOTE — PROGRESS NOTE ADULT - PROBLEM SELECTOR PLAN 1
Acute Upper GI bleed 2/2 two non-bleed ulcers s/p clipping of one. Hb dropped to 6.9 on 07/29, pt given 1 unit of PRBCs, no active signs of bleeding.   -Appreciate GI f/u and rec's.   -PPI BID  -Clear liquid diet  - monitor CBCs q12hrs  - Transfuse <7. Patient has hx of PCI w stents placed 7 years prior. Patient reportedly had an acute MI earlier this week in [place] with EKG showing ST elevations in the lateral leads and troponins in excess of 2000. EKG on admission shows q-waves in leads I and aVL with poor R wave progression consistent with a previous lateral wall MI. Outside therapeutic window for PCI. Troponin and CKMB done 7/22 in setting of new ST changes in 2 precordial leads, both lower, will no longer follow.     In setting of recent upper GI bleed, HELD anti-platelet, anti-coag medications, cardiac rate control, and afterload reduction medications.   - Holding statin therapy due to transaminitis  - Restarted metop 12.5mg q12hr and monitor hemodynamics, will consider Toprol XL 25mg tomorrow, if stable  - Holding ACE/ARB due to severe RONNIE  - HOLD hydralazine 10 TID for afterload reduction  - Eventually would benefit from diagnostic cath/stress test for ischemic workup; holding off due to renal failure and tenuous state    #asymptomatic afib/aflutter  Rapid ventricular beats noted. Continue on rate control (goal <110) and will continue to monitor  - Restarted metoprolol 12.5mg q12hr, as above will consider Toprol XL tomororow  - Cardiac monitor  - no antiplatelet/anticoag in setting of GI bleeding; GI cleared to start 1 agent, will consider tomorrow (ASA) if CBC stable

## 2018-07-31 NOTE — PROGRESS NOTE ADULT - SUBJECTIVE AND OBJECTIVE BOX
OVERNIGHT EVENTS:    SUBJECTIVE:    Vital Signs Last 12 Hrs  T(F): 97.5 (07-31-18 @ 06:11), Max: 98 (07-30-18 @ 21:37)  HR: 96 (07-31-18 @ 06:00) (88 - 98)  BP: 100/64 (07-31-18 @ 05:00) (96/48 - 128/75)  BP(mean): 79 (07-31-18 @ 05:00) (65 - 97)  RR: 27 (07-31-18 @ 06:00) (11 - 27)  SpO2: 100% (07-31-18 @ 06:00) (98% - 100%)  I&O's Summary    30 Jul 2018 07:01  -  31 Jul 2018 07:00  --------------------------------------------------------  IN: 1407 mL / OUT: 1770 mL / NET: -363 mL      PHYSICAL EXAM:  General: WDWN.   HEENT: NC/AT; PERRL, clear conjunctiva  Neck: supple  Cardiovascular: +S1/S2; RRR  Respiratory: CTA b/l; no W/R/R  Gastrointestinal: soft, NT/ND; +BSx4  Extremities: WWP; 2+ peripheral pulses; no edema   Neurological: Nonverbal patient. Follows commands and responds with nods. 4/5 strength all extremities.           LABS:                        9.3    10.1  )-----------( 335      ( 31 Jul 2018 05:14 )             28.7     07-31    145  |  102  |  86<H>  ----------------------------<  82  3.7   |  30  |  2.84<H>    Ca    9.2      31 Jul 2018 05:14  Phos  3.1     07-31  Mg     2.4     07-31    TPro  6.7  /  Alb  3.4  /  TBili  0.5  /  DBili  x   /  AST  96<H>  /  ALT  293<H>  /  AlkPhos  108  07-31    PT/INR - ( 31 Jul 2018 05:14 )   PT: 13.3 sec;   INR: 1.19          PTT - ( 31 Jul 2018 05:14 )  PTT:26.4 sec      RADIOLOGY & ADDITIONAL TESTS:    MEDICATIONS  (STANDING):  chlorhexidine 2% Cloths 1 Application(s) Topical daily  dextrose 5%. 1000 milliLiter(s) (50 mL/Hr) IV Continuous <Continuous>  dextrose 50% Injectable 12.5 Gram(s) IV Push once  dextrose 50% Injectable 25 Gram(s) IV Push once  dextrose 50% Injectable 25 Gram(s) IV Push once  escitalopram 5 milliGRAM(s) Oral daily  insulin glargine Injectable (LANTUS) 15 Unit(s) SubCutaneous at bedtime  insulin lispro (HumaLOG) corrective regimen sliding scale   SubCutaneous three times a day before meals  insulin lispro (HumaLOG) corrective regimen sliding scale   SubCutaneous at bedtime  metoprolol tartrate 12.5 milliGRAM(s) Oral every 12 hours  pantoprazole  Injectable 40 milliGRAM(s) IV Push two times a day  potassium chloride  10 mEq/100 mL IVPB 10 milliEquivalent(s) IV Intermittent every 1 hour  sevelamer carbonate Powder 800 milliGRAM(s) Oral three times a day with meals    MEDICATIONS  (PRN):  dextrose 40% Gel 15 Gram(s) Oral once PRN Blood Glucose LESS THAN 70 milliGRAM(s)/deciliter  glucagon  Injectable 1 milliGRAM(s) IntraMuscular once PRN Glucose LESS THAN 70 milligrams/deciliter OVERNIGHT EVENTS:  9 beat NSVT in setting of mildly decreased K+    SUBJECTIVE:  No new complaints.     Vital Signs Last 12 Hrs  T(F): 97.5 (07-31-18 @ 06:11), Max: 98 (07-30-18 @ 21:37)  HR: 96 (07-31-18 @ 06:00) (88 - 98)  BP: 100/64 (07-31-18 @ 05:00) (96/48 - 128/75)  BP(mean): 79 (07-31-18 @ 05:00) (65 - 97)  RR: 27 (07-31-18 @ 06:00) (11 - 27)  SpO2: 100% (07-31-18 @ 06:00) (98% - 100%)  I&O's Summary    30 Jul 2018 07:01  -  31 Jul 2018 07:00  --------------------------------------------------------  IN: 1407 mL / OUT: 1770 mL / NET: -363 mL      PHYSICAL EXAM:  General: WDWN.   HEENT: NC/AT; PERRL, clear conjunctiva  Neck: supple  Cardiovascular: +S1/S2; RRR  Respiratory: CTA b/l; no W/R/R  Gastrointestinal: soft, NT/ND; +BSx4  Extremities: WWP; 2+ peripheral pulses; no edema   Neurological: Alert and verbalizing 1-2 words. Following 1-step commands.       LABS:                        9.3    10.1  )-----------( 335      ( 31 Jul 2018 05:14 )             28.7     07-31    145  |  102  |  86<H>  ----------------------------<  82  3.7   |  30  |  2.84<H>    Ca    9.2      31 Jul 2018 05:14  Phos  3.1     07-31  Mg     2.4     07-31    TPro  6.7  /  Alb  3.4  /  TBili  0.5  /  DBili  x   /  AST  96<H>  /  ALT  293<H>  /  AlkPhos  108  07-31    PT/INR - ( 31 Jul 2018 05:14 )   PT: 13.3 sec;   INR: 1.19          PTT - ( 31 Jul 2018 05:14 )  PTT:26.4 sec      RADIOLOGY & ADDITIONAL TESTS:    MEDICATIONS  (STANDING):  chlorhexidine 2% Cloths 1 Application(s) Topical daily  dextrose 5%. 1000 milliLiter(s) (50 mL/Hr) IV Continuous <Continuous>  dextrose 50% Injectable 12.5 Gram(s) IV Push once  dextrose 50% Injectable 25 Gram(s) IV Push once  dextrose 50% Injectable 25 Gram(s) IV Push once  escitalopram 5 milliGRAM(s) Oral daily  insulin glargine Injectable (LANTUS) 15 Unit(s) SubCutaneous at bedtime  insulin lispro (HumaLOG) corrective regimen sliding scale   SubCutaneous three times a day before meals  insulin lispro (HumaLOG) corrective regimen sliding scale   SubCutaneous at bedtime  metoprolol tartrate 12.5 milliGRAM(s) Oral every 12 hours  pantoprazole  Injectable 40 milliGRAM(s) IV Push two times a day  potassium chloride  10 mEq/100 mL IVPB 10 milliEquivalent(s) IV Intermittent every 1 hour  sevelamer carbonate Powder 800 milliGRAM(s) Oral three times a day with meals    MEDICATIONS  (PRN):  dextrose 40% Gel 15 Gram(s) Oral once PRN Blood Glucose LESS THAN 70 milliGRAM(s)/deciliter  glucagon  Injectable 1 milliGRAM(s) IntraMuscular once PRN Glucose LESS THAN 70 milligrams/deciliter

## 2018-07-31 NOTE — PROGRESS NOTE ADULT - SUBJECTIVE AND OBJECTIVE BOX
Pt seen and examined at bedside.    PERTINENT REVIEW OF SYSTEMS:  CONSTITUTIONAL: No weakness, fevers or chills  HEENT: No visual changes; No vertigo or throat pain   GASTROINTESTINAL: No abdominal or epigastric pain. No nausea, vomiting, or hematemesis; No diarrhea or constipation. No melena or hematochezia.  NEUROLOGICAL: No numbness or weakness  SKIN: No itching, burning, rashes, or lesions     Allergies    No Known Allergies    Intolerances      MEDICATIONS:  MEDICATIONS  (STANDING):  chlorhexidine 2% Cloths 1 Application(s) Topical daily  dextrose 5%. 1000 milliLiter(s) (50 mL/Hr) IV Continuous <Continuous>  dextrose 50% Injectable 12.5 Gram(s) IV Push once  dextrose 50% Injectable 25 Gram(s) IV Push once  dextrose 50% Injectable 25 Gram(s) IV Push once  escitalopram 5 milliGRAM(s) Oral daily  insulin glargine Injectable (LANTUS) 15 Unit(s) SubCutaneous at bedtime  insulin lispro (HumaLOG) corrective regimen sliding scale   SubCutaneous three times a day before meals  insulin lispro (HumaLOG) corrective regimen sliding scale   SubCutaneous at bedtime  metoprolol tartrate 12.5 milliGRAM(s) Oral every 12 hours  pantoprazole  Injectable 40 milliGRAM(s) IV Push two times a day  potassium chloride  10 mEq/100 mL IVPB 10 milliEquivalent(s) IV Intermittent every 1 hour  sevelamer carbonate Powder 800 milliGRAM(s) Oral three times a day with meals    MEDICATIONS  (PRN):  dextrose 40% Gel 15 Gram(s) Oral once PRN Blood Glucose LESS THAN 70 milliGRAM(s)/deciliter  glucagon  Injectable 1 milliGRAM(s) IntraMuscular once PRN Glucose LESS THAN 70 milligrams/deciliter    Vital Signs Last 24 Hrs  T(C): 36.4 (31 Jul 2018 06:11), Max: 36.7 (30 Jul 2018 12:00)  T(F): 97.5 (31 Jul 2018 06:11), Max: 98.1 (30 Jul 2018 12:00)  HR: 86 (31 Jul 2018 07:00) (84 - 144)  BP: 116/64 (31 Jul 2018 07:00) (87/55 - 131/81)  BP(mean): 84 (31 Jul 2018 07:00) (65 - 100)  RR: 7 (31 Jul 2018 07:00) (7 - 27)  SpO2: 94% (31 Jul 2018 07:00) (94% - 100%)    07-30 @ 07:01  -  07-31 @ 07:00  --------------------------------------------------------  IN: 1607 mL / OUT: 1820 mL / NET: -213 mL      PHYSICAL EXAM:    General: Well developed; well nourished; in no acute distress  HEENT: MMM, conjunctiva and sclera clear  Gastrointestinal: Soft non-tender non-distended; Normal bowel sounds; No hepatosplenomegaly. No rebound or guarding  Skin: Warm and dry. No obvious rash    LABS:                        9.3    10.1  )-----------( 335      ( 31 Jul 2018 05:14 )             28.7     07-31    145  |  102  |  86<H>  ----------------------------<  82  3.7   |  30  |  2.84<H>    Ca    9.2      31 Jul 2018 05:14  Phos  3.1     07-31  Mg     2.4     07-31    TPro  6.7  /  Alb  3.4  /  TBili  0.5  /  DBili  x   /  AST  96<H>  /  ALT  293<H>  /  AlkPhos  108  07-31    PT/INR - ( 31 Jul 2018 05:14 )   PT: 13.3 sec;   INR: 1.19          PTT - ( 31 Jul 2018 05:14 )  PTT:26.4 sec                  RADIOLOGY & ADDITIONAL STUDIES: Pt seen and examined at bedside. Denies any nausea/vomiting/pain abdomen. Has not had a BM yet.    PERTINENT REVIEW OF SYSTEMS:  CONSTITUTIONAL: No weakness, fevers or chills  HEENT: No visual changes; No vertigo or throat pain   GASTROINTESTINAL: No abdominal or epigastric pain. No nausea, vomiting, or hematemesis; No diarrhea or constipation. No melena or hematochezia.  NEUROLOGICAL: No numbness or weakness  SKIN: No itching, burning, rashes, or lesions     Allergies    No Known Allergies    Intolerances      MEDICATIONS:  MEDICATIONS  (STANDING):  chlorhexidine 2% Cloths 1 Application(s) Topical daily  dextrose 5%. 1000 milliLiter(s) (50 mL/Hr) IV Continuous <Continuous>  dextrose 50% Injectable 12.5 Gram(s) IV Push once  dextrose 50% Injectable 25 Gram(s) IV Push once  dextrose 50% Injectable 25 Gram(s) IV Push once  escitalopram 5 milliGRAM(s) Oral daily  insulin glargine Injectable (LANTUS) 15 Unit(s) SubCutaneous at bedtime  insulin lispro (HumaLOG) corrective regimen sliding scale   SubCutaneous three times a day before meals  insulin lispro (HumaLOG) corrective regimen sliding scale   SubCutaneous at bedtime  metoprolol tartrate 12.5 milliGRAM(s) Oral every 12 hours  pantoprazole  Injectable 40 milliGRAM(s) IV Push two times a day  potassium chloride  10 mEq/100 mL IVPB 10 milliEquivalent(s) IV Intermittent every 1 hour  sevelamer carbonate Powder 800 milliGRAM(s) Oral three times a day with meals    MEDICATIONS  (PRN):  dextrose 40% Gel 15 Gram(s) Oral once PRN Blood Glucose LESS THAN 70 milliGRAM(s)/deciliter  glucagon  Injectable 1 milliGRAM(s) IntraMuscular once PRN Glucose LESS THAN 70 milligrams/deciliter    Vital Signs Last 24 Hrs  T(C): 36.4 (31 Jul 2018 06:11), Max: 36.7 (30 Jul 2018 12:00)  T(F): 97.5 (31 Jul 2018 06:11), Max: 98.1 (30 Jul 2018 12:00)  HR: 86 (31 Jul 2018 07:00) (84 - 144)  BP: 116/64 (31 Jul 2018 07:00) (87/55 - 131/81)  BP(mean): 84 (31 Jul 2018 07:00) (65 - 100)  RR: 7 (31 Jul 2018 07:00) (7 - 27)  SpO2: 94% (31 Jul 2018 07:00) (94% - 100%)    07-30 @ 07:01  -  07-31 @ 07:00  --------------------------------------------------------  IN: 1607 mL / OUT: 1820 mL / NET: -213 mL      PHYSICAL EXAM:    General: Well developed; well nourished; in no acute distress  HEENT: MMM, conjunctiva and sclera clear  Gastrointestinal: Soft non-tender non-distended; Normal bowel sounds; No hepatosplenomegaly. No rebound or guarding  Skin: Warm and dry. No obvious rash    LABS:                        9.3    10.1  )-----------( 335      ( 31 Jul 2018 05:14 )             28.7     07-31    145  |  102  |  86<H>  ----------------------------<  82  3.7   |  30  |  2.84<H>    Ca    9.2      31 Jul 2018 05:14  Phos  3.1     07-31  Mg     2.4     07-31    TPro  6.7  /  Alb  3.4  /  TBili  0.5  /  DBili  x   /  AST  96<H>  /  ALT  293<H>  /  AlkPhos  108  07-31    PT/INR - ( 31 Jul 2018 05:14 )   PT: 13.3 sec;   INR: 1.19          PTT - ( 31 Jul 2018 05:14 )  PTT:26.4 sec                  RADIOLOGY & ADDITIONAL STUDIES:

## 2018-07-31 NOTE — PROGRESS NOTE ADULT - PROBLEM SELECTOR PLAN 2
Patient has hx of PCI w stents placed 7 years prior. Patient reportedly had an acute MI earlier this week in [place] with EKG showing ST elevations in the lateral leads and troponins in excess of 2000. EKG on admission shows q-waves in leads I and aVL with poor R wave progression consistent with a previous lateral wall MI. Outside therapeutic window for PCI. Troponin and CKMB done 7/22 in setting of new ST changes in 2 precordial leads, both lower, will no longer follow.     In setting of recent upper GI bleed, HELD anti-platelet, anti-coag medications, cardiac rate control, and afterload reduction medications.   - Holding statin therapy due to transaminitis  - Restarted metop 12.5mg q12hr and monitor hemodynamics  - Holding ACE/ARB due to severe RONNIE  - HOLD hydralazine 10 TID for afterload reduction  - Eventually would benefit from diagnostic cath/stress test for ischemic workup; holding off due to renal failure and tenuous state    #asymptomatic afib/aflutter  Rapid ventricular beats noted. Continue on rate control (goal <110) and will continue to monitor  - Restarted metoprolol 12.5mg q12hr  - Cardiac monitor  - no antiplatelet/anticoag in setting of GI bleeding. Patient's EF was reportedly 25% in the past according to ED report. On echo 7/20, EF is 15-20%. Was volume overloaded on initial exam, now improved. On CXR, evidence of cardiomegaly but significant decrease in pulmonary vascular congestion.   - d/c bumex gtt, holding further diuresis due to orthostasis for now  -beta blocker as above  -not monitoring I/O's; d/c jo ann

## 2018-07-31 NOTE — SWALLOW BEDSIDE ASSESSMENT ADULT - SWALLOW EVAL: RECOMMENDED FEEDING/EATING TECHNIQUES
position upright (90 degrees)
crush medication (when feasible)/maintain upright posture during/after eating for 30 mins/position upright (90 degrees)/small sips/bites

## 2018-07-31 NOTE — PROGRESS NOTE ADULT - ATTENDING COMMENTS
have reviewed above and discussed.   continuing to await optimization of renal fct before remaining cardiac w/u.  will discuss..

## 2018-07-31 NOTE — PROGRESS NOTE ADULT - SUBJECTIVE AND OBJECTIVE BOX
Patient is a 67y Male seen and evaluated at bedside.   Patient seen and examined  diet resumed  Labs noted--> cr @ 2.82 <---3.10<--- 3.32  Na @ 145 <--147    chlorhexidine 2% Cloths 1 daily  dextrose 40% Gel 15 once PRN  dextrose 5%. 1000 <Continuous>  dextrose 50% Injectable 12.5 once  dextrose 50% Injectable 25 once  dextrose 50% Injectable 25 once  escitalopram 5 daily  glucagon  Injectable 1 once PRN  insulin glargine Injectable (LANTUS) 15 at bedtime  insulin lispro (HumaLOG) corrective regimen sliding scale  three times a day before meals  insulin lispro (HumaLOG) corrective regimen sliding scale  at bedtime  metoprolol tartrate 12.5 every 12 hours  pantoprazole  Injectable 40 two times a day  potassium chloride  10 mEq/100 mL IVPB 10 every 1 hour  sevelamer carbonate Powder 800 three times a day with meals      Allergies    No Known Allergies    Intolerances        T(C): , Max: 36.7 (07-30-18 @ 12:00)  T(F): , Max: 98.1 (07-30-18 @ 12:00)  HR: 98 (07-31-18 @ 08:00)  BP: 123/66 (07-31-18 @ 08:00)  BP(mean): 88 (07-31-18 @ 08:00)  RR: 21 (07-31-18 @ 08:00)  SpO2: 100% (07-31-18 @ 08:00)  Wt(kg): --    07-30 @ 07:01  -  07-31 @ 07:00  --------------------------------------------------------  IN: 1607 mL / OUT: 1820 mL / NET: -213 mL    07-31 @ 07:01  -  07-31 @ 08:22  --------------------------------------------------------  IN: 457 mL / OUT: 150 mL / NET: 307 mL        PHYSICAL EXAM:  GENERAL: NAD, well-developed, well nourished, alert, awake, no acute distress at present  HEAD:  Atraumatic, Normocephalic,   EYES: Bilateral conjuctiva and sclera normal   Oral cavity: Oral mucosa dry and pink  NECK: Neck supple, No JVD  CHEST/LUNG: Clear to auscultation bilaterally; No wheeze, no rales, no crepitations  HEART: Regular rate and rhythm. WANDER II/VI at LPSB, No gallop, no rub   ABDOMEN: Soft, Nontender, BS+nt, No flank tenderness.   EXTREMITIES: No clubbing, cyanosis, or edema  Neurology: AAOx3, no focal neurological deficit  SKIN: No rashes or lesions  : + cherry catheter         ACCESS:     LABS:                        9.3    10.1  )-----------( 335      ( 31 Jul 2018 05:14 )             28.7     07-31    145  |  102  |  86<H>  ----------------------------<  82  3.7   |  30  |  2.84<H>    Ca    9.2      31 Jul 2018 05:14  Phos  3.1     07-31  Mg     2.4     07-31    TPro  6.7  /  Alb  3.4  /  TBili  0.5  /  DBili  x   /  AST  96<H>  /  ALT  293<H>  /  AlkPhos  108  07-31      PT/INR - ( 31 Jul 2018 05:14 )   PT: 13.3 sec;   INR: 1.19          PTT - ( 31 Jul 2018 05:14 )  PTT:26.4 sec          RADIOLOGY & ADDITIONAL STUDIES:

## 2018-07-31 NOTE — SWALLOW BEDSIDE ASSESSMENT ADULT - ORAL PHASE
Mildly prolonged bolus prep with chewable solid
Lingual stasis/Decreased anterior-posterior movement of the bolus/Delayed oral transit time

## 2018-07-31 NOTE — PROGRESS NOTE ADULT - PROBLEM SELECTOR PLAN 3
Patient's EF was reportedly 25% in the past according to ED report. On echo 7/20, EF is 15-20%. Was volume overloaded on initial exam, now improved. On CXR, evidence of cardiomegaly but significant decrease in pulmonary vascular congestion.   - d/c bumex gtt, holding further diuresis due to orthostasis for now Acute Upper GI bleed 2/2 two non-bleed ulcers s/p clipping of one. Hb dropped to 6.9 on 07/29, pt given 1 unit of PRBCs, no active signs of bleeding. CBC stable at 9.3.   -Appreciate GI f/u and rec's.   -PPI BID transition to PO  -Adv diet as tolerated  -GI cleared to start one anti-platelet medication, will consider (ASA) in 24 hours, if CBC remains stable  - monitor CBCs q12hrs  - Transfuse <7.

## 2018-07-31 NOTE — SWALLOW BEDSIDE ASSESSMENT ADULT - ASR SWALLOW ASPIRATION MONITOR
throat clearing/cough
change of breathing pattern/oral hygiene/fever/throat clearing/upper respiratory infection/position upright (89Y)/cough/gurgly voice/pneumonia

## 2018-07-31 NOTE — SWALLOW BEDSIDE ASSESSMENT ADULT - PHARYNGEAL PHASE
Hyolaryngeal excursion palpated during swallow trigger. No overt signs of aspiration noted./Within functional limits
Throat clear post oral intake

## 2018-07-31 NOTE — PROGRESS NOTE ADULT - PROBLEM SELECTOR PLAN 5
MRI significant for subacute ischemia involving right parietal area   with numerous scattered areas of acute ischemia within the basal ganglia   and frontal and parietal lobes bilaterally some of which are in a   watershed distribution.  - F/U neuro recs  - HOLD MRA study for full neuro workup.   - carotid u/s negative   - PT/OT/ST as tolerated MRI significant for subacute ischemia involving right parietal area   with numerous scattered areas of acute ischemia within the basal ganglia   and frontal and parietal lobes bilaterally some of which are in a   watershed distribution.. Improved mentation in setting of decreased uremia.   - F/U neuro recs  - HOLD MRA study for full neuro workup.   - carotid u/s negative   - PT/OT/ST as tolerated  -dysphagia re-eval

## 2018-07-31 NOTE — SWALLOW BEDSIDE ASSESSMENT ADULT - SLP PERTINENT HISTORY OF CURRENT PROBLEM
h/o CVA, STEMI & GI bleed (this adm) now appears resolved. Pt known to this svc from initial eval 7/23 with rec for puree/NTL d/t concern for aspiration.
CVA

## 2018-07-31 NOTE — PROGRESS NOTE ADULT - PROBLEM SELECTOR PLAN 6
Patient has been weak, unsteady, and confused. There is ? facial droop on exam. Likely toxic-metabolic 2/2 uremia but could have some component of hypoperfusion, stroke/hemorrhage, and depression.   - Lactate normalized   - On lexapro  -will discuss with renal need for HD, and clarify GOC with family Patient has been weak, unsteady, and confused. There is ? facial droop on exam. Likely toxic-metabolic 2/2 uremia but could have some component of hypoperfusion, stroke/hemorrhage, and depression.   - Lactate normalized   - On lexapro  -improved with decreased uremia

## 2018-07-31 NOTE — PROGRESS NOTE ADULT - PROBLEM SELECTOR PLAN 1
creatinine improving  @ 2.82<3.10 <---3.32<--- 3.79 from 3.93  also monitor I/o  avoid nephrotoxic agents  - renal diet

## 2018-07-31 NOTE — SWALLOW BEDSIDE ASSESSMENT ADULT - COMMENTS
Received sleeping. Awakens briefly to verbal and light tactile stim. Participates in assessment with maximal cuing. Language skills appear intact for at least basic conversation, voice remains aphonic at baseline. Pt is able to produce audible voice when asked to repeat a word or phrase, but does not follow command to "say 'ah' with a loud voice."
Pt arousable, however, needs constant prompts to maintain alertness and attention, Pt is aphonic, however, not able to increase respiratory effort on command. Pt oriented to person, family member (wife), not to place. Able to follow simple commands.

## 2018-07-31 NOTE — PROGRESS NOTE ADULT - ASSESSMENT
RB is a 67M w PMH of HTN, DM, HLD, HFrEF of 25%, CAD s/p PCI 7 years ago, with altered mental status in the setting of one week of dyspnea and weakness admitted to the CCU for management of ACS, acute on chronic CHF, RONNIE, s/p acute upper GI bleed 2/2 two gastric ulcers; s/p clipping of one ulcer. He remains in tenuous status, as he is at a high risk for acute re-bleed and high risk for thrombus given recent STEMI, afib, and CVA. Hgb dropped to 6.9 on 07/29, pt given 1 unit of PRBCs, no active signs of bleeding. RB is a 67M w PMH of HTN, DM, HLD, HFrEF of 25%, CAD s/p PCI 7 years ago, with altered mental status in the setting of one week of dyspnea and weakness admitted to the CCU for management of ACS, acute on chronic CHF, RONNIE, s/p acute upper GI bleed 2/2 two gastric ulcers; s/p clipping of one ulcer. He remains in tenuous status, as he is at a high risk for acute re-bleed and high risk for thrombus given recent STEMI, afib, and CVA. Hgb dropped to 6.9 on 07/29, pt given 1 unit of PRBCs, no active signs of bleeding; CBC stable now. Mental status is improved, in the setting of decreased BUN. Will continue to closely monitor.

## 2018-07-31 NOTE — SWALLOW BEDSIDE ASSESSMENT ADULT - SWALLOW EVAL: DIAGNOSIS
Functional oropharyngeal swallow for mechanical soft solids & thin liquids with no overt signs of aspiration. Modified texture solids recommended d/t sleepy-state and not necessarily a mechanical oral dysphagia.
Moderate oral and suspected pharyngeal dysphagia in setting of acute CVA

## 2018-08-01 LAB
ALBUMIN SERPL ELPH-MCNC: 3.2 G/DL — LOW (ref 3.3–5)
ALP SERPL-CCNC: 102 U/L — SIGNIFICANT CHANGE UP (ref 40–120)
ALT FLD-CCNC: 214 U/L — HIGH (ref 10–45)
ANION GAP SERPL CALC-SCNC: 12 MMOL/L — SIGNIFICANT CHANGE UP (ref 5–17)
AST SERPL-CCNC: 56 U/L — HIGH (ref 10–40)
BILIRUB SERPL-MCNC: 0.4 MG/DL — SIGNIFICANT CHANGE UP (ref 0.2–1.2)
BLD GP AB SCN SERPL QL: NEGATIVE — SIGNIFICANT CHANGE UP
BUN SERPL-MCNC: 61 MG/DL — HIGH (ref 7–23)
CALCIUM SERPL-MCNC: 9.3 MG/DL — SIGNIFICANT CHANGE UP (ref 8.4–10.5)
CHLORIDE SERPL-SCNC: 102 MMOL/L — SIGNIFICANT CHANGE UP (ref 96–108)
CO2 SERPL-SCNC: 29 MMOL/L — SIGNIFICANT CHANGE UP (ref 22–31)
CREAT SERPL-MCNC: 2.47 MG/DL — HIGH (ref 0.5–1.3)
GLUCOSE SERPL-MCNC: 68 MG/DL — LOW (ref 70–99)
HCT VFR BLD CALC: 30.3 % — LOW (ref 39–50)
HGB BLD-MCNC: 9.6 G/DL — LOW (ref 13–17)
MAGNESIUM SERPL-MCNC: 2.2 MG/DL — SIGNIFICANT CHANGE UP (ref 1.6–2.6)
MCHC RBC-ENTMCNC: 29.2 PG — SIGNIFICANT CHANGE UP (ref 27–34)
MCHC RBC-ENTMCNC: 31.7 G/DL — LOW (ref 32–36)
MCV RBC AUTO: 92.1 FL — SIGNIFICANT CHANGE UP (ref 80–100)
MITOCHONDRIA AB SER-ACNC: SIGNIFICANT CHANGE UP
PHOSPHATE SERPL-MCNC: 2.6 MG/DL — SIGNIFICANT CHANGE UP (ref 2.5–4.5)
PLATELET # BLD AUTO: 344 K/UL — SIGNIFICANT CHANGE UP (ref 150–400)
POTASSIUM SERPL-MCNC: 3.8 MMOL/L — SIGNIFICANT CHANGE UP (ref 3.5–5.3)
POTASSIUM SERPL-SCNC: 3.8 MMOL/L — SIGNIFICANT CHANGE UP (ref 3.5–5.3)
PROT SERPL-MCNC: 6.5 G/DL — SIGNIFICANT CHANGE UP (ref 6–8.3)
RBC # BLD: 3.29 M/UL — LOW (ref 4.2–5.8)
RBC # FLD: 14.2 % — SIGNIFICANT CHANGE UP (ref 10.3–16.9)
RH IG SCN BLD-IMP: POSITIVE — SIGNIFICANT CHANGE UP
SMOOTH MUSCLE AB SER-ACNC: SIGNIFICANT CHANGE UP
SODIUM SERPL-SCNC: 143 MMOL/L — SIGNIFICANT CHANGE UP (ref 135–145)
WBC # BLD: 9.5 K/UL — SIGNIFICANT CHANGE UP (ref 3.8–10.5)
WBC # FLD AUTO: 9.5 K/UL — SIGNIFICANT CHANGE UP (ref 3.8–10.5)

## 2018-08-01 PROCEDURE — 99233 SBSQ HOSP IP/OBS HIGH 50: CPT

## 2018-08-01 PROCEDURE — 71045 X-RAY EXAM CHEST 1 VIEW: CPT | Mod: 26

## 2018-08-01 PROCEDURE — 93010 ELECTROCARDIOGRAM REPORT: CPT

## 2018-08-01 RX ORDER — DEXTROSE 50 % IN WATER 50 %
50 SYRINGE (ML) INTRAVENOUS ONCE
Qty: 0 | Refills: 0 | Status: COMPLETED | OUTPATIENT
Start: 2018-08-01 | End: 2018-08-01

## 2018-08-01 RX ORDER — ASPIRIN/CALCIUM CARB/MAGNESIUM 324 MG
81 TABLET ORAL DAILY
Qty: 0 | Refills: 0 | Status: DISCONTINUED | OUTPATIENT
Start: 2018-08-01 | End: 2018-08-02

## 2018-08-01 RX ORDER — INSULIN GLARGINE 100 [IU]/ML
7 INJECTION, SOLUTION SUBCUTANEOUS AT BEDTIME
Qty: 0 | Refills: 0 | Status: DISCONTINUED | OUTPATIENT
Start: 2018-08-01 | End: 2018-08-02

## 2018-08-01 RX ORDER — METOPROLOL TARTRATE 50 MG
12.5 TABLET ORAL ONCE
Qty: 0 | Refills: 0 | Status: COMPLETED | OUTPATIENT
Start: 2018-08-01 | End: 2018-08-01

## 2018-08-01 RX ORDER — METOPROLOL TARTRATE 50 MG
25 TABLET ORAL DAILY
Qty: 0 | Refills: 0 | Status: DISCONTINUED | OUTPATIENT
Start: 2018-08-02 | End: 2018-08-05

## 2018-08-01 RX ADMIN — SEVELAMER CARBONATE 800 MILLIGRAM(S): 2400 POWDER, FOR SUSPENSION ORAL at 06:59

## 2018-08-01 RX ADMIN — INSULIN GLARGINE 7 UNIT(S): 100 INJECTION, SOLUTION SUBCUTANEOUS at 21:16

## 2018-08-01 RX ADMIN — CHLORHEXIDINE GLUCONATE 1 APPLICATION(S): 213 SOLUTION TOPICAL at 16:17

## 2018-08-01 RX ADMIN — SEVELAMER CARBONATE 800 MILLIGRAM(S): 2400 POWDER, FOR SUSPENSION ORAL at 11:44

## 2018-08-01 RX ADMIN — PANTOPRAZOLE SODIUM 40 MILLIGRAM(S): 20 TABLET, DELAYED RELEASE ORAL at 18:40

## 2018-08-01 RX ADMIN — Medication 12.5 MILLIGRAM(S): at 18:56

## 2018-08-01 RX ADMIN — Medication 81 MILLIGRAM(S): at 11:44

## 2018-08-01 RX ADMIN — PANTOPRAZOLE SODIUM 40 MILLIGRAM(S): 20 TABLET, DELAYED RELEASE ORAL at 05:40

## 2018-08-01 RX ADMIN — Medication 12.5 MILLIGRAM(S): at 05:39

## 2018-08-01 RX ADMIN — Medication 50 MILLILITER(S): at 06:28

## 2018-08-01 RX ADMIN — SEVELAMER CARBONATE 800 MILLIGRAM(S): 2400 POWDER, FOR SUSPENSION ORAL at 17:08

## 2018-08-01 RX ADMIN — ESCITALOPRAM OXALATE 5 MILLIGRAM(S): 10 TABLET, FILM COATED ORAL at 11:44

## 2018-08-01 NOTE — PROGRESS NOTE ADULT - PROBLEM SELECTOR PLAN 7
Patient's ALT over 1000 on admission, now 300. AST is 100. Alk phos is 99. Unclear pattern of liver enzymes. HIV/HepC/HepB negative. Likely shock liver, improving.  - trend LFT's Patient's ALT over 1000 on admission, now 300. AST is 100. Alk phos is 99. Unclear pattern of liver enzymes. HIV/HepC/HepB negative. Likely shock liver, improving.   - trend LFT's

## 2018-08-01 NOTE — CHART NOTE - NSCHARTNOTEFT_GEN_A_CORE
Admitting Diagnosis:   Patient is a 67y old  Male who presents with a chief complaint of Acute Coronary Syndrome (2018 16:48)      PAST MEDICAL & SURGICAL HISTORY:  Coronary artery disease involving native heart without angina pectoris, unspecified vessel or lesion type  Hyperlipidemia, unspecified hyperlipidemia type  Hypertension, unspecified type  Type 2 diabetes mellitus without complication, without long-term current use of insulin  History of cataract extraction, unspecified laterality  CAD S/P percutaneous coronary angioplasty      Current Nutrition Order:   Dysphagia 1 Pureed, DASH/TLC Sodium & Cholesterol Restricted, Consistent Carbohydrate w/ Evening Snack    PO Intake: Good (%) [   ]  Fair (50-75%) [   ] Poor (<25%) [   ]    GI Issues:   +Last BM      Pain:    Skin Integrity: intact     Labs:       143  |  102  |  61<H>  ----------------------------<  68<L>  3.8   |  29  |  2.47<H>    Ca    9.3      01 Aug 2018 05:45  Phos  2.6       Mg     2.2         TPro  6.5  /  Alb  3.2<L>  /  TBili  0.4  /  DBili  x   /  AST  56<H>  /  ALT  214<H>  /  AlkPhos  102      CAPILLARY BLOOD GLUCOSE      POCT Blood Glucose.: 229 mg/dL (01 Aug 2018 06:57)  POCT Blood Glucose.: 60 mg/dL (01 Aug 2018 06:21)  POCT Blood Glucose.: 112 mg/dL (2018 21:33)  POCT Blood Glucose.: 93 mg/dL (2018 16:56)  POCT Blood Glucose.: 230 mg/dL (2018 11:59)      Medications:  MEDICATIONS  (STANDING):  chlorhexidine 2% Cloths 1 Application(s) Topical daily  dextrose 5%. 1000 milliLiter(s) (50 mL/Hr) IV Continuous <Continuous>  dextrose 50% Injectable 12.5 Gram(s) IV Push once  dextrose 50% Injectable 25 Gram(s) IV Push once  dextrose 50% Injectable 25 Gram(s) IV Push once  escitalopram 5 milliGRAM(s) Oral daily  insulin glargine Injectable (LANTUS) 7 Unit(s) SubCutaneous at bedtime  insulin lispro (HumaLOG) corrective regimen sliding scale   SubCutaneous three times a day before meals  insulin lispro (HumaLOG) corrective regimen sliding scale   SubCutaneous at bedtime  metoprolol tartrate 12.5 milliGRAM(s) Oral every 12 hours  pantoprazole    Tablet 40 milliGRAM(s) Oral two times a day  sevelamer carbonate Powder 800 milliGRAM(s) Oral three times a day with meals    MEDICATIONS  (PRN):  dextrose 40% Gel 15 Gram(s) Oral once PRN Blood Glucose LESS THAN 70 milliGRAM(s)/deciliter  glucagon  Injectable 1 milliGRAM(s) IntraMuscular once PRN Glucose LESS THAN 70 milligrams/deciliter      New Weight: Daily weight order  125.6 lbs (),   131.1 lbs (),   126.9 lbs (),   Weight hx:  130.5 lbs (),   152 lbs (),   130 lbs ()    Weight Change:   Weights recorded indicative to likely fluid shifts. Will continue to monitor weight trends.     Height: 5' 6" Weight: 152 lbs (), 130 lbs (),  lbs+/-10%, %IBW 85%, BMI 25.2  Estimated energy needs:   ABW used to calculate EER as per pt's current body weight is within % IBW  Estimated nutrient needs based on [facility] SOC for maintenance in adults  25-30 kcal/[id number] kcal  1-1.2 g/k-86 gm protein  FR 1L/d per MD    Subjective:   67 y.o M from home with PMHx of Dilated Cardiomyopathy, HFrEF of 25%, CAD s/p PCI 7 years ago, DM, HTN, HLD. Pt p/w AMS and admitted for management of ACS and subacute systolic CHF. Wife cooks at home, follows low Na and moderate CHO intake at home, good appetite, NKFA. s/p EDG 18, findings of large ulcer with adherent clot seen in incisura, uanble to dislodge clot, s/p 3 clips to base of ulcer with no post clipping bleeding reported, smaller 2 clean based ulcer also near large ulcer described above, gastritis, normal esophagus and duodenum. SLP seen pt , recommend mech soft with thin liquids. Pt currently on pureed solids at this time.     Previous Nutrition Diagnosis:  1) Swallowing difficulty RT currently on dysphagia 1 pureed with nectar thick liquids per SLP AEB SLP giorgio completed, currently recommending dysphagia 1 pureed with nectar thick liquids  2) Inadequate energy intake R/T NPO AEB pt meets 0% estimated nutritional needs     Active [   ]  Resolved [   ]    If resolved, new PES:     Goal:  Tolerance to PO diet with diet progression/upgrade  Improve >75% PO intake  Nutrition related labs WNL  No s/s of any GI intolerances (GIB)    Recommendations:  1) Upgrade PO diet to Dysphagia 2 Mechanical Soft with thin liquids, Renal Restriction, Consistent Carbohydrate (No Snacks)  2) Monitor H/H and transfuse PRN  3) Daily weight  4) Strict I/O's     Education:     Risk Level: High [   ] Moderate [   ] Low [   ] Admitting Diagnosis:   Patient is a 67y old  Male who presents with a chief complaint of Acute Coronary Syndrome (2018 16:48)      PAST MEDICAL & SURGICAL HISTORY:  Coronary artery disease involving native heart without angina pectoris, unspecified vessel or lesion type  Hyperlipidemia, unspecified hyperlipidemia type  Hypertension, unspecified type  Type 2 diabetes mellitus without complication, without long-term current use of insulin  History of cataract extraction, unspecified laterality  CAD S/P percutaneous coronary angioplasty      Current Nutrition Order:   Dysphagia 1 Pureed, DASH/TLC Sodium & Cholesterol Restricted, Consistent Carbohydrate w/ Evening Snack    PO Intake: Good (%) [   ]  Fair (50-75%) [ X ] Poor (<25%) [ X ]  Fair appetite per pt endorses.     GI Issues: Denies N/V/D/C   +Last BM      Pain: Denies pain/discomfort     Skin Integrity: intact     Labs:       143  |  102  |  61<H>  ----------------------------<  68<L>  3.8   |  29  |  2.47<H>    Ca    9.3      01 Aug 2018 05:45  Phos  2.6       Mg     2.2         TPro  6.5  /  Alb  3.2<L>  /  TBili  0.4  /  DBili  x   /  AST  56<H>  /  ALT  214<H>  /  AlkPhos  102      CAPILLARY BLOOD GLUCOSE      POCT Blood Glucose.: 229 mg/dL (01 Aug 2018 06:57)  POCT Blood Glucose.: 60 mg/dL (01 Aug 2018 06:21)  POCT Blood Glucose.: 112 mg/dL (2018 21:33)  POCT Blood Glucose.: 93 mg/dL (2018 16:56)  POCT Blood Glucose.: 230 mg/dL (2018 11:59)      Medications:  MEDICATIONS  (STANDING):  chlorhexidine 2% Cloths 1 Application(s) Topical daily  dextrose 5%. 1000 milliLiter(s) (50 mL/Hr) IV Continuous <Continuous>  dextrose 50% Injectable 12.5 Gram(s) IV Push once  dextrose 50% Injectable 25 Gram(s) IV Push once  dextrose 50% Injectable 25 Gram(s) IV Push once  escitalopram 5 milliGRAM(s) Oral daily  insulin glargine Injectable (LANTUS) 7 Unit(s) SubCutaneous at bedtime  insulin lispro (HumaLOG) corrective regimen sliding scale   SubCutaneous three times a day before meals  insulin lispro (HumaLOG) corrective regimen sliding scale   SubCutaneous at bedtime  metoprolol tartrate 12.5 milliGRAM(s) Oral every 12 hours  pantoprazole    Tablet 40 milliGRAM(s) Oral two times a day  sevelamer carbonate Powder 800 milliGRAM(s) Oral three times a day with meals    MEDICATIONS  (PRN):  dextrose 40% Gel 15 Gram(s) Oral once PRN Blood Glucose LESS THAN 70 milliGRAM(s)/deciliter  glucagon  Injectable 1 milliGRAM(s) IntraMuscular once PRN Glucose LESS THAN 70 milligrams/deciliter      New Weight: Daily weight order  125.6 lbs (),   131.1 lbs (),   126.9 lbs (),   Weight hx:  130.5 lbs (),   152 lbs (),   130 lbs ()    Weight Change:   Weights recorded indicative to likely fluid shifts. Will continue to monitor weight trends.     Height: 5' 6" Weight: 152 lbs (), 130 lbs (),  lbs+/-10%, %IBW 85%, BMI 25.2  Estimated energy needs:   ABW used to calculate EER as per pt's current body weight is within % IBW  Estimated nutrient needs based on [facility] SOC for maintenance in adults  25-30 kcal/[id number] kcal  1-1.2 g/k-86 gm protein  FR 1L/d per MD    Subjective:   67 y.o M from home with PMHx of Dilated Cardiomyopathy, HFrEF of 25%, CAD s/p PCI 7 years ago, DM, HTN, HLD. Pt p/w AMS and admitted for management of ACS and subacute systolic CHF. Wife cooks at home, follows low Na and moderate CHO intake at home, good appetite, NKFA. s/p EDG 18, findings of large ulcer with adherent clot seen in incisura, uanble to dislodge clot, s/p 3 clips to base of ulcer with no post clipping bleeding reported, smaller 2 clean based ulcer also near large ulcer described above, gastritis, normal esophagus and duodenum. SLP seen pt , recommend mech soft with thin liquids. Pt currently on pureed solids at this time.     Previous Nutrition Diagnosis:  1) Swallowing difficulty RT currently on dysphagia 1 pureed with nectar thick liquids per SLP AEB SLP eval completed, currently recommending dysphagia 1 pureed with nectar thick liquids (remains active, awaiting PO diet upgrade)  2) Inadequate energy intake R/T NPO AEB pt meets 0% estimated nutritional needs (resolved, PO diet started)    If resolved, new PES: none identified at this time    Goal:  Tolerance to PO diet with diet progression/upgrade  Improve >75% PO intake  Nutrition related labs WNL  No s/s of any GI intolerances (GIB)    Recommendations:  1) Upgrade PO diet to Dysphagia 2 Mechanical Soft with thin liquids, Renal Restriction, Consistent Carbohydrate (No Snacks)  2) Order Nepro 8 oz PO daily (425 kcal, 19 gm protein)  3) Monitor H/H and transfuse PRN  4) Daily weight  5) Strict I/O's     MD made aware of nutrition recommendations    Education: Encourage PO intake to patient and discussed with wife    Risk Level: High [   ] Moderate [ X ] Low [   ]

## 2018-08-01 NOTE — PROGRESS NOTE ADULT - PROBLEM SELECTOR PLAN 2
Patient's EF was reportedly 25% in the past according to ED report. On echo 7/20, EF is 15-20%. Was volume overloaded on initial exam, now improved. On CXR, evidence of cardiomegaly but significant decrease in pulmonary vascular congestion.   - d/c bumex gtt, holding further diuresis due to orthostasis for now  -beta blocker as above  -not monitoring I/O's; d/c jo ann Patient's EF was reportedly 25% in the past according to ED report. On echo 7/20, EF is 15-20%. Was volume overloaded on initial exam, now improved. On CXR, evidence of cardiomegaly but significant decrease in pulmonary vascular congestion.   - d/c bumex gtt, holding further diuresis due to orthostasis for now  -beta blocker as above  -not monitoring I/O's; d/c'd jo ann

## 2018-08-01 NOTE — PROGRESS NOTE ADULT - PROBLEM SELECTOR PLAN 10
F: None  E: Replete K>4, Mg>2. Will need to be careful with repletion given poor renal function  N: puree/thin, per speech    Dispo: TBD F: None  E: Replete K>4, Mg>2. Will need to be careful with repletion given poor renal function  N: soft/thin, DASH/TLC/consistent carbs    Dispo: possible stepdown 1-2 days.

## 2018-08-01 NOTE — PROGRESS NOTE ADULT - ATTENDING COMMENTS
Patient examined, fellow's hx and PE reviewed and confirmed. I find BP stable, RONNIE improved. A/P reviewed and confirmed. Follow SCr. Follow BP on metoprolol. See full note.

## 2018-08-01 NOTE — PROGRESS NOTE ADULT - SUBJECTIVE AND OBJECTIVE BOX
OVERNIGHT EVENTS:    SUBJECTIVE:    Vital Signs Last 12 Hrs  T(F): 97.6 (08-01-18 @ 02:00), Max: 98.2 (07-31-18 @ 21:17)  HR: 90 (08-01-18 @ 06:00) (90 - 106)  BP: 119/69 (08-01-18 @ 06:00) (92/53 - 121/72)  BP(mean): 90 (08-01-18 @ 06:00) (68 - 93)  RR: 31 (08-01-18 @ 06:00) (0 - 31)  SpO2: 99% (08-01-18 @ 06:00) (98% - 100%)  I&O's Summary    30 Jul 2018 07:01  -  31 Jul 2018 07:00  --------------------------------------------------------  IN: 1607 mL / OUT: 1820 mL / NET: -213 mL    31 Jul 2018 07:01  -  01 Aug 2018 06:34  --------------------------------------------------------  IN: 777 mL / OUT: 1545 mL / NET: -768 mL            LABS:                        9.3    10.1  )-----------( 335      ( 31 Jul 2018 05:14 )             28.7     08-01    143  |  102  |  61<H>  ----------------------------<  68<L>  3.8   |  29  |  2.47<H>    Ca    9.3      01 Aug 2018 05:45  Phos  2.6     08-01  Mg     2.2     08-01    TPro  6.5  /  Alb  3.2<L>  /  TBili  0.4  /  DBili  x   /  AST  56<H>  /  ALT  214<H>  /  AlkPhos  102  08-01    PT/INR - ( 31 Jul 2018 05:14 )   PT: 13.3 sec;   INR: 1.19          PTT - ( 31 Jul 2018 05:14 )  PTT:26.4 sec      RADIOLOGY & ADDITIONAL TESTS:    MEDICATIONS  (STANDING):  chlorhexidine 2% Cloths 1 Application(s) Topical daily  dextrose 5%. 1000 milliLiter(s) (50 mL/Hr) IV Continuous <Continuous>  dextrose 50% Injectable 12.5 Gram(s) IV Push once  dextrose 50% Injectable 25 Gram(s) IV Push once  dextrose 50% Injectable 25 Gram(s) IV Push once  escitalopram 5 milliGRAM(s) Oral daily  insulin glargine Injectable (LANTUS) 15 Unit(s) SubCutaneous at bedtime  insulin lispro (HumaLOG) corrective regimen sliding scale   SubCutaneous three times a day before meals  insulin lispro (HumaLOG) corrective regimen sliding scale   SubCutaneous at bedtime  metoprolol tartrate 12.5 milliGRAM(s) Oral every 12 hours  pantoprazole    Tablet 40 milliGRAM(s) Oral two times a day  sevelamer carbonate Powder 800 milliGRAM(s) Oral three times a day with meals    MEDICATIONS  (PRN):  dextrose 40% Gel 15 Gram(s) Oral once PRN Blood Glucose LESS THAN 70 milliGRAM(s)/deciliter  glucagon  Injectable 1 milliGRAM(s) IntraMuscular once PRN Glucose LESS THAN 70 milligrams/deciliter OVERNIGHT EVENTS:  Gluc 60 this morning, given amp of gluc and juice; good response. Lantus halved for this evening.     SUBJECTIVE:  No new complaints.     Vital Signs Last 12 Hrs  T(F): 97.6 (08-01-18 @ 02:00), Max: 98.2 (07-31-18 @ 21:17)  HR: 90 (08-01-18 @ 06:00) (90 - 106)  BP: 119/69 (08-01-18 @ 06:00) (92/53 - 121/72)  BP(mean): 90 (08-01-18 @ 06:00) (68 - 93)  RR: 31 (08-01-18 @ 06:00) (0 - 31)  SpO2: 99% (08-01-18 @ 06:00) (98% - 100%)  I&O's Summary    30 Jul 2018 07:01  -  31 Jul 2018 07:00  --------------------------------------------------------  IN: 1607 mL / OUT: 1820 mL / NET: -213 mL    31 Jul 2018 07:01  -  01 Aug 2018 06:34  --------------------------------------------------------  IN: 777 mL / OUT: 1545 mL / NET: -768 mL    PHYSICAL EXAM:  General: WDWN.   HEENT: NC/AT; PERRL, clear conjunctiva  Neck: supple  Cardiovascular: irregularly irregular HR  Respiratory: CTA b/l; no W/R/R  Gastrointestinal: soft, NT/ND; +BSx4  Extremities: WWP; 2+ peripheral pulses; no edema   Neurological: Alert and verbalizing 1-2 words. Following 1-step commands.       LABS:                        9.3    10.1  )-----------( 335      ( 31 Jul 2018 05:14 )             28.7     08-01    143  |  102  |  61<H>  ----------------------------<  68<L>  3.8   |  29  |  2.47<H>    Ca    9.3      01 Aug 2018 05:45  Phos  2.6     08-01  Mg     2.2     08-01    TPro  6.5  /  Alb  3.2<L>  /  TBili  0.4  /  DBili  x   /  AST  56<H>  /  ALT  214<H>  /  AlkPhos  102  08-01    PT/INR - ( 31 Jul 2018 05:14 )   PT: 13.3 sec;   INR: 1.19          PTT - ( 31 Jul 2018 05:14 )  PTT:26.4 sec      RADIOLOGY & ADDITIONAL TESTS:    MEDICATIONS  (STANDING):  chlorhexidine 2% Cloths 1 Application(s) Topical daily  dextrose 5%. 1000 milliLiter(s) (50 mL/Hr) IV Continuous <Continuous>  dextrose 50% Injectable 12.5 Gram(s) IV Push once  dextrose 50% Injectable 25 Gram(s) IV Push once  dextrose 50% Injectable 25 Gram(s) IV Push once  escitalopram 5 milliGRAM(s) Oral daily  insulin glargine Injectable (LANTUS) 15 Unit(s) SubCutaneous at bedtime  insulin lispro (HumaLOG) corrective regimen sliding scale   SubCutaneous three times a day before meals  insulin lispro (HumaLOG) corrective regimen sliding scale   SubCutaneous at bedtime  metoprolol tartrate 12.5 milliGRAM(s) Oral every 12 hours  pantoprazole    Tablet 40 milliGRAM(s) Oral two times a day  sevelamer carbonate Powder 800 milliGRAM(s) Oral three times a day with meals    MEDICATIONS  (PRN):  dextrose 40% Gel 15 Gram(s) Oral once PRN Blood Glucose LESS THAN 70 milliGRAM(s)/deciliter  glucagon  Injectable 1 milliGRAM(s) IntraMuscular once PRN Glucose LESS THAN 70 milligrams/deciliter OVERNIGHT EVENTS:  Gluc 60 this morning, given amp of gluc and juice; good response. Lantus halved for this evening.   BM x2 after lacutulose; no over blood/melana.     SUBJECTIVE:  No new complaints.     Vital Signs Last 12 Hrs  T(F): 97.6 (08-01-18 @ 02:00), Max: 98.2 (07-31-18 @ 21:17)  HR: 90 (08-01-18 @ 06:00) (90 - 106)  BP: 119/69 (08-01-18 @ 06:00) (92/53 - 121/72)  BP(mean): 90 (08-01-18 @ 06:00) (68 - 93)  RR: 31 (08-01-18 @ 06:00) (0 - 31)  SpO2: 99% (08-01-18 @ 06:00) (98% - 100%)  I&O's Summary    30 Jul 2018 07:01  -  31 Jul 2018 07:00  --------------------------------------------------------  IN: 1607 mL / OUT: 1820 mL / NET: -213 mL    31 Jul 2018 07:01  -  01 Aug 2018 06:34  --------------------------------------------------------  IN: 777 mL / OUT: 1545 mL / NET: -768 mL    PHYSICAL EXAM:  General: WDWN.   HEENT: NC/AT; PERRL, clear conjunctiva  Neck: supple  Cardiovascular: irregularly irregular rhythm HR 90's  Respiratory: CTA b/l; no W/R/R  Gastrointestinal: soft, NT/ND; +BSx4  Extremities: WWP; 2+ peripheral pulses; no edema   Neurological: Alert and verbalizing 1-2 words. Following 1-step commands.       LABS:                        9.3    10.1  )-----------( 335      ( 31 Jul 2018 05:14 )             28.7     08-01    143  |  102  |  61<H>  ----------------------------<  68<L>  3.8   |  29  |  2.47<H>    Ca    9.3      01 Aug 2018 05:45  Phos  2.6     08-01  Mg     2.2     08-01    TPro  6.5  /  Alb  3.2<L>  /  TBili  0.4  /  DBili  x   /  AST  56<H>  /  ALT  214<H>  /  AlkPhos  102  08-01    PT/INR - ( 31 Jul 2018 05:14 )   PT: 13.3 sec;   INR: 1.19          PTT - ( 31 Jul 2018 05:14 )  PTT:26.4 sec      RADIOLOGY & ADDITIONAL TESTS:    MEDICATIONS  (STANDING):  chlorhexidine 2% Cloths 1 Application(s) Topical daily  dextrose 5%. 1000 milliLiter(s) (50 mL/Hr) IV Continuous <Continuous>  dextrose 50% Injectable 12.5 Gram(s) IV Push once  dextrose 50% Injectable 25 Gram(s) IV Push once  dextrose 50% Injectable 25 Gram(s) IV Push once  escitalopram 5 milliGRAM(s) Oral daily  insulin glargine Injectable (LANTUS) 15 Unit(s) SubCutaneous at bedtime  insulin lispro (HumaLOG) corrective regimen sliding scale   SubCutaneous three times a day before meals  insulin lispro (HumaLOG) corrective regimen sliding scale   SubCutaneous at bedtime  metoprolol tartrate 12.5 milliGRAM(s) Oral every 12 hours  pantoprazole    Tablet 40 milliGRAM(s) Oral two times a day  sevelamer carbonate Powder 800 milliGRAM(s) Oral three times a day with meals    MEDICATIONS  (PRN):  dextrose 40% Gel 15 Gram(s) Oral once PRN Blood Glucose LESS THAN 70 milliGRAM(s)/deciliter  glucagon  Injectable 1 milliGRAM(s) IntraMuscular once PRN Glucose LESS THAN 70 milligrams/deciliter

## 2018-08-01 NOTE — PROGRESS NOTE ADULT - PROBLEM SELECTOR PLAN 4
Patient's BUN/Cr is up trending, in setting of anemia/low perfusion. (unclear baseline). Likely 2/2 to cardiorenal syndrome as cardiac output is poor.   - Renal on board, appreciate recs  - Per renal, no urgent need for dialysis at this time  -sevelamer 800 TID to prevent hyperphosphatemia  -Pt dry and euvolemic today; hold further diuretics for now    #Hyponatremia: Resolved   - d/c jo ann Patient's BUN/Cr is up trending, in setting of anemia/low perfusion. (unclear baseline). Likely 2/2 to cardiorenal syndrome as cardiac output is poor. Uremia resolved significantly, with subsequent improved mental status.   - Renal on board, appreciate recs  - Per renal, no urgent need for dialysis at this time  -sevelamer 800 TID to prevent hyperphosphatemia  -Pt dry and euvolemic; hold further diuretics for now    #Hyponatremia: Resolved   - d/c jo ann Patient's BUN/Cr is up trending, in setting of anemia/low perfusion. (unclear baseline). Likely 2/2 to cardiorenal syndrome as cardiac output is poor. Uremia resolved significantly, with subsequent improved mental status.   - Renal on board, appreciate recs  - Per renal, no urgent need for dialysis at this time  -sevelamer 800 TID to prevent hyperphosphatemia  -Pt dry and euvolemic; hold further diuretics for now    #elevated kappa protein  -elevated kappa and kappa/lambda ratio noted. Not likely multiple myeloma as pt not anemic prior to acute GI bleed and without hypercalcemia. Can f/u with PCP as an OP    #Hyponatremia: Resolved   - d/c jo ann

## 2018-08-01 NOTE — PROGRESS NOTE ADULT - PROBLEM SELECTOR PLAN 3
Acute Upper GI bleed 2/2 two non-bleed ulcers s/p clipping of one. Hb dropped to 6.9 on 07/29, pt given 1 unit of PRBCs, no active signs of bleeding. CBC stable at 9.3.   -Appreciate GI f/u and rec's.   -PPI BID transition to PO  -Adv diet as tolerated  -GI cleared to start one anti-platelet medication, will consider (ASA) in 24 hours, if CBC remains stable  - monitor CBCs q12hrs  - Transfuse <7. Acute Upper GI bleed 2/2 two non-bleed ulcers s/p clipping of one. Hb dropped to 6.9 on 07/29, pt given 1 unit of PRBCs, no active signs of bleeding. CBC stable at 9.6.   -Appreciate GI f/u and rec's.   -PPI BID transition to PO  -Adv diet as tolerated  -GI cleared to start one anti-platelet medication, will consider (ASA) in 24 hours, if CBC remains stable  - monitor CBCs q12hrs  - Transfuse <7.

## 2018-08-01 NOTE — PROGRESS NOTE ADULT - PROBLEM SELECTOR PLAN 1
Patient has hx of PCI w stents placed 7 years prior. Patient reportedly had an acute MI earlier this week in [place] with EKG showing ST elevations in the lateral leads and troponins in excess of 2000. EKG on admission shows q-waves in leads I and aVL with poor R wave progression consistent with a previous lateral wall MI. Outside therapeutic window for PCI. Troponin and CKMB done 7/22 in setting of new ST changes in 2 precordial leads, both lower, will no longer follow.     In setting of recent upper GI bleed, HELD anti-platelet, anti-coag medications, cardiac rate control, and afterload reduction medications.   - Holding statin therapy due to transaminitis  - Restarted metop 12.5mg q12hr and monitor hemodynamics, will consider Toprol XL 25mg tomorrow, if stable  - Holding ACE/ARB due to severe RONNIE  - HOLD hydralazine 10 TID for afterload reduction  - Eventually would benefit from diagnostic cath/stress test for ischemic workup; holding off due to renal failure and tenuous state    #asymptomatic afib/aflutter  Rapid ventricular beats noted. Continue on rate control (goal <110) and will continue to monitor  - Restarted metoprolol 12.5mg q12hr, as above will consider Toprol XL tomororow  - Cardiac monitor  - no antiplatelet/anticoag in setting of GI bleeding; GI cleared to start 1 agent, will consider tomorrow (ASA) if CBC stable Patient has hx of PCI w stents placed 7 years prior. Patient reportedly had an acute MI earlier this week in [place] with EKG showing ST elevations in the lateral leads and troponins in excess of 2000. EKG on admission shows q-waves in leads I and aVL with poor R wave progression consistent with a previous lateral wall MI. Outside therapeutic window for PCI. Troponin and CKMB done 7/22 in setting of new ST changes in 2 precordial leads, both lower, will no longer follow.     In setting of recent upper GI bleed, HELD anti-platelet, anti-coag medications, cardiac rate control, and afterload reduction medications.  Now stable, will restart ASA. Likely not able to add further agents long-term as too high of bleed risk.   -Restart ASA 81mg; monitor   - Holding statin therapy due to transaminitis  - tolerating metop 12.5mg q12hr and monitor hemodynamics, Toprol XL 25mg tomorrow, if stable  - Holding ACE/ARB due to severe RONNIE  - HOLD hydralazine 10 TID for afterload reduction  - Eventually would benefit from diagnostic cath/stress test for ischemic workup; holding off due to renal failure and tenuous state    #asymptomatic afib/aflutter  NSVT noted. Continue on rate control (goal <110) and will continue to monitor  - as above, metoprolol 12.5mg q12hr, Toprol XL tomororow  - Cardiac monitor  - 1 antiplatelet agent (ASA). no other anticoag in setting of GI bleeding

## 2018-08-01 NOTE — PROGRESS NOTE ADULT - PROBLEM SELECTOR PLAN 8
Holding home medications.   - Hb A1c was 8  - Lantus 15U qhs, will adjusted PRN  - c/w ISS, Holding home medications.   - Hb A1c was 8  - Lantus 7U qhs, will adjusted PRN  -nutrition consult, as pt now starting to eat PO  - c/w ISS,

## 2018-08-01 NOTE — PROGRESS NOTE ADULT - PROBLEM SELECTOR PLAN 6
Patient has been weak, unsteady, and confused. There is ? facial droop on exam. Likely toxic-metabolic 2/2 uremia but could have some component of hypoperfusion, stroke/hemorrhage, and depression.   - Lactate normalized   - On lexapro  -improved with decreased uremia

## 2018-08-01 NOTE — PROGRESS NOTE ADULT - SUBJECTIVE AND OBJECTIVE BOX
Patient is a 67y Male seen and evaluated at bedside.   labs noted cr @ 2.47, Na @ 143  patient has no complaints      chlorhexidine 2% Cloths 1 daily  dextrose 40% Gel 15 once PRN  dextrose 5%. 1000 <Continuous>  dextrose 50% Injectable 12.5 once  dextrose 50% Injectable 25 once  dextrose 50% Injectable 25 once  escitalopram 5 daily  glucagon  Injectable 1 once PRN  insulin glargine Injectable (LANTUS) 7 at bedtime  insulin lispro (HumaLOG) corrective regimen sliding scale  three times a day before meals  insulin lispro (HumaLOG) corrective regimen sliding scale  at bedtime  metoprolol tartrate 12.5 every 12 hours  pantoprazole    Tablet 40 two times a day  sevelamer carbonate Powder 800 three times a day with meals      Allergies    No Known Allergies    Intolerances        T(C): , Max: 37.4 (07-31-18 @ 16:00)  T(F): , Max: 99.3 (07-31-18 @ 16:00)  HR: 90 (08-01-18 @ 10:00)  BP: 100/58 (08-01-18 @ 10:00)  BP(mean): 75 (08-01-18 @ 10:00)  RR: 14 (08-01-18 @ 10:00)  SpO2: 98% (08-01-18 @ 10:00)  Wt(kg): --    07-31 @ 07:01  -  08-01 @ 07:00  --------------------------------------------------------  IN: 1027 mL / OUT: 1545 mL / NET: -518 mL    08-01 @ 07:01  -  08-01 @ 10:50  --------------------------------------------------------  IN: 250 mL / OUT: 300 mL / NET: -50 mL          Review of Systems:  CONSTITUTIONAL: No fever or chills, No fatigue or tiredness.  EYES: No blurred or double vision.  RESPIRATORY: No shortness of breath, cough, hemoptysis  CARDIOVASCULAR: No Chest pain or shortness of breath  GASTROINTESTINAL: NO abdominal or flank pain, No nausea or vomiting, No diarrhea  GENITOURINARY: No dysuria or urinary burning, No difficulty passing urine, No hematuria  NEUROLOGICAL: No headaches or blurred vision  SKIN: No skin rashes   MUSCULOSKELETAL: No arthralgia, Joint pain, leg edema, No muscle pains      PHYSICAL EXAM:  GENERAL: NAD, well-developed, well nourished, alert, awake, no acute distress at present  HEAD:  Atraumatic, Normocephalic,   EYES: Bilateral conjuctiva and sclera normal   Oral cavity: Oral mucosa dry and pink  NECK: Neck supple, No JVD  CHEST/LUNG: Clear to auscultation bilaterally; No wheeze, no rales, no crepitations  HEART: Regular rate and rhythm. WANDER II/VI at LPSB, No gallop, no rub   ABDOMEN: Soft, Nontender, BS+nt, No flank tenderness.   EXTREMITIES: No clubbing, cyanosis, or edema  Neurology: AAOx3, no focal neurological deficit  SKIN: No rashes or lesions          ACCESS:     LABS:                        9.6    9.5   )-----------( 344      ( 01 Aug 2018 05:45 )             30.3     08-01    143  |  102  |  61<H>  ----------------------------<  68<L>  3.8   |  29  |  2.47<H>    Ca    9.3      01 Aug 2018 05:45  Phos  2.6     08-01  Mg     2.2     08-01    TPro  6.5  /  Alb  3.2<L>  /  TBili  0.4  /  DBili  x   /  AST  56<H>  /  ALT  214<H>  /  AlkPhos  102  08-01    [place]/Lambda Free Light Chain Ratio, Serum: 2.13 Ratio <H> [0.26 - 1.65] (07-31 @ 15:32)    PT/INR - ( 31 Jul 2018 05:14 )   PT: 13.3 sec;   INR: 1.19          PTT - ( 31 Jul 2018 05:14 )  PTT:26.4 sec          RADIOLOGY & ADDITIONAL STUDIES:

## 2018-08-01 NOTE — PROGRESS NOTE ADULT - ASSESSMENT
RB is a 67M w PMH of HTN, DM, HLD, HFrEF of 25%, CAD s/p PCI 7 years ago, with altered mental status in the setting of one week of dyspnea and weakness admitted to the CCU for management of ACS, acute on chronic CHF, RONNIE, s/p acute upper GI bleed 2/2 two gastric ulcers; s/p clipping of one ulcer. He remains in tenuous status, as he is at a high risk for acute re-bleed and high risk for thrombus given recent STEMI, afib, and CVA. Hgb dropped to 6.9 on 07/29, pt given 1 unit of PRBCs, no active signs of bleeding; CBC stable now. Mental status is improved, in the setting of decreased BUN. Will continue to closely monitor. RB is a 67M w PMH of HTN, DM, HLD, HFrEF of 25%, CAD s/p PCI 7 years ago, with altered mental status in the setting of one week of dyspnea and weakness admitted to the CCU for management of ACS, acute on chronic CHF, RONNIE, s/p acute upper GI bleed 2/2 two gastric ulcers; s/p clipping of one ulcer. He remains in tenuous but improving status, as he is at a high risk for acute re-bleed and high risk for thrombus given recent STEMI, afib, and CVA. Hgb dropped to 6.9 on 07/29, pt given 1 unit of PRBCs, no active signs of bleeding; CBC stable now. Mental status is improved, in the setting of decreased BUN. Restarting ASA today. Given GI bleed in context of other cardiac anti-platelet/AC therapy during this stay, likely will only tolerate ASA long term for cardiac optimization. Will continue to closely monitor, and consider step down within 1-2 days.

## 2018-08-01 NOTE — PROGRESS NOTE ADULT - PROBLEM SELECTOR PLAN 5
MRI significant for subacute ischemia involving right parietal area   with numerous scattered areas of acute ischemia within the basal ganglia   and frontal and parietal lobes bilaterally some of which are in a   watershed distribution.. Improved mentation in setting of decreased uremia.   - F/U neuro recs  - HOLD MRA study for full neuro workup.   - carotid u/s negative   - PT/OT/ST as tolerated  -dysphagia re-eval MRI significant for subacute ischemia involving right parietal area   with numerous scattered areas of acute ischemia within the basal ganglia   and frontal and parietal lobes bilaterally some of which are in a   watershed distribution.. Improved mentation in setting of decreased uremia.   - F/U neuro recs  - HOLD MRA study for full neuro workup.   - carotid u/s negative   - PT/OT/ST as tolerated  -ST-cleared for mech/soft diet

## 2018-08-02 LAB
ALBUMIN SERPL ELPH-MCNC: 3 G/DL — LOW (ref 3.3–5)
ALP SERPL-CCNC: 92 U/L — SIGNIFICANT CHANGE UP (ref 40–120)
ALT FLD-CCNC: 150 U/L — HIGH (ref 10–45)
ANA TITR SER: NEGATIVE — SIGNIFICANT CHANGE UP
ANION GAP SERPL CALC-SCNC: 12 MMOL/L — SIGNIFICANT CHANGE UP (ref 5–17)
AST SERPL-CCNC: 39 U/L — SIGNIFICANT CHANGE UP (ref 10–40)
BILIRUB SERPL-MCNC: 0.4 MG/DL — SIGNIFICANT CHANGE UP (ref 0.2–1.2)
BUN SERPL-MCNC: 46 MG/DL — HIGH (ref 7–23)
CALCIUM SERPL-MCNC: 8.8 MG/DL — SIGNIFICANT CHANGE UP (ref 8.4–10.5)
CHLORIDE SERPL-SCNC: 100 MMOL/L — SIGNIFICANT CHANGE UP (ref 96–108)
CO2 SERPL-SCNC: 29 MMOL/L — SIGNIFICANT CHANGE UP (ref 22–31)
CREAT SERPL-MCNC: 2.25 MG/DL — HIGH (ref 0.5–1.3)
GLUCOSE SERPL-MCNC: 70 MG/DL — SIGNIFICANT CHANGE UP (ref 70–99)
HCT VFR BLD CALC: 26.4 % — LOW (ref 39–50)
HCT VFR BLD CALC: 26.7 % — LOW (ref 39–50)
HCT VFR BLD CALC: 26.7 % — LOW (ref 39–50)
HGB BLD-MCNC: 8.4 G/DL — LOW (ref 13–17)
HGB BLD-MCNC: 8.6 G/DL — LOW (ref 13–17)
HGB BLD-MCNC: 8.6 G/DL — LOW (ref 13–17)
MAGNESIUM SERPL-MCNC: 2 MG/DL — SIGNIFICANT CHANGE UP (ref 1.6–2.6)
MCHC RBC-ENTMCNC: 29.1 PG — SIGNIFICANT CHANGE UP (ref 27–34)
MCHC RBC-ENTMCNC: 29.4 PG — SIGNIFICANT CHANGE UP (ref 27–34)
MCHC RBC-ENTMCNC: 30 PG — SIGNIFICANT CHANGE UP (ref 27–34)
MCHC RBC-ENTMCNC: 31.5 G/DL — LOW (ref 32–36)
MCHC RBC-ENTMCNC: 32.2 G/DL — SIGNIFICANT CHANGE UP (ref 32–36)
MCHC RBC-ENTMCNC: 32.6 G/DL — SIGNIFICANT CHANGE UP (ref 32–36)
MCV RBC AUTO: 91.1 FL — SIGNIFICANT CHANGE UP (ref 80–100)
MCV RBC AUTO: 92 FL — SIGNIFICANT CHANGE UP (ref 80–100)
MCV RBC AUTO: 92.4 FL — SIGNIFICANT CHANGE UP (ref 80–100)
PHOSPHATE SERPL-MCNC: 2.4 MG/DL — LOW (ref 2.5–4.5)
PLATELET # BLD AUTO: 303 K/UL — SIGNIFICANT CHANGE UP (ref 150–400)
PLATELET # BLD AUTO: 308 K/UL — SIGNIFICANT CHANGE UP (ref 150–400)
PLATELET # BLD AUTO: 321 K/UL — SIGNIFICANT CHANGE UP (ref 150–400)
POTASSIUM SERPL-MCNC: 3.8 MMOL/L — SIGNIFICANT CHANGE UP (ref 3.5–5.3)
POTASSIUM SERPL-SCNC: 3.8 MMOL/L — SIGNIFICANT CHANGE UP (ref 3.5–5.3)
PROT SERPL-MCNC: 6 G/DL — SIGNIFICANT CHANGE UP (ref 6–8.3)
RBC # BLD: 2.87 M/UL — LOW (ref 4.2–5.8)
RBC # BLD: 2.89 M/UL — LOW (ref 4.2–5.8)
RBC # BLD: 2.93 M/UL — LOW (ref 4.2–5.8)
RBC # FLD: 13.7 % — SIGNIFICANT CHANGE UP (ref 10.3–16.9)
RBC # FLD: 14.1 % — SIGNIFICANT CHANGE UP (ref 10.3–16.9)
RBC # FLD: 14.2 % — SIGNIFICANT CHANGE UP (ref 10.3–16.9)
SODIUM SERPL-SCNC: 141 MMOL/L — SIGNIFICANT CHANGE UP (ref 135–145)
WBC # BLD: 10.8 K/UL — HIGH (ref 3.8–10.5)
WBC # BLD: 9.6 K/UL — SIGNIFICANT CHANGE UP (ref 3.8–10.5)
WBC # BLD: 9.9 K/UL — SIGNIFICANT CHANGE UP (ref 3.8–10.5)
WBC # FLD AUTO: 10.8 K/UL — HIGH (ref 3.8–10.5)
WBC # FLD AUTO: 9.6 K/UL — SIGNIFICANT CHANGE UP (ref 3.8–10.5)
WBC # FLD AUTO: 9.9 K/UL — SIGNIFICANT CHANGE UP (ref 3.8–10.5)

## 2018-08-02 PROCEDURE — 99232 SBSQ HOSP IP/OBS MODERATE 35: CPT

## 2018-08-02 PROCEDURE — 99233 SBSQ HOSP IP/OBS HIGH 50: CPT

## 2018-08-02 RX ORDER — LACTULOSE 10 G/15ML
20 SOLUTION ORAL ONCE
Qty: 0 | Refills: 0 | Status: COMPLETED | OUTPATIENT
Start: 2018-08-02 | End: 2018-08-02

## 2018-08-02 RX ORDER — SUCRALFATE 1 G
1 TABLET ORAL
Qty: 0 | Refills: 0 | Status: DISCONTINUED | OUTPATIENT
Start: 2018-08-02 | End: 2018-08-15

## 2018-08-02 RX ORDER — SODIUM CHLORIDE 9 MG/ML
250 INJECTION INTRAMUSCULAR; INTRAVENOUS; SUBCUTANEOUS ONCE
Qty: 0 | Refills: 0 | Status: COMPLETED | OUTPATIENT
Start: 2018-08-02 | End: 2018-08-02

## 2018-08-02 RX ORDER — POTASSIUM CHLORIDE 20 MEQ
20 PACKET (EA) ORAL ONCE
Qty: 0 | Refills: 0 | Status: COMPLETED | OUTPATIENT
Start: 2018-08-02 | End: 2018-08-02

## 2018-08-02 RX ADMIN — Medication 25 MILLIGRAM(S): at 05:34

## 2018-08-02 RX ADMIN — PANTOPRAZOLE SODIUM 40 MILLIGRAM(S): 20 TABLET, DELAYED RELEASE ORAL at 17:46

## 2018-08-02 RX ADMIN — CHLORHEXIDINE GLUCONATE 1 APPLICATION(S): 213 SOLUTION TOPICAL at 12:25

## 2018-08-02 RX ADMIN — SEVELAMER CARBONATE 800 MILLIGRAM(S): 2400 POWDER, FOR SUSPENSION ORAL at 12:26

## 2018-08-02 RX ADMIN — PANTOPRAZOLE SODIUM 40 MILLIGRAM(S): 20 TABLET, DELAYED RELEASE ORAL at 05:34

## 2018-08-02 RX ADMIN — SEVELAMER CARBONATE 800 MILLIGRAM(S): 2400 POWDER, FOR SUSPENSION ORAL at 07:03

## 2018-08-02 RX ADMIN — ESCITALOPRAM OXALATE 5 MILLIGRAM(S): 10 TABLET, FILM COATED ORAL at 12:26

## 2018-08-02 RX ADMIN — Medication 1 GRAM(S): at 17:46

## 2018-08-02 RX ADMIN — SODIUM CHLORIDE 500 MILLILITER(S): 9 INJECTION INTRAMUSCULAR; INTRAVENOUS; SUBCUTANEOUS at 07:47

## 2018-08-02 RX ADMIN — SEVELAMER CARBONATE 800 MILLIGRAM(S): 2400 POWDER, FOR SUSPENSION ORAL at 17:46

## 2018-08-02 RX ADMIN — Medication 20 MILLIEQUIVALENT(S): at 07:03

## 2018-08-02 NOTE — PROGRESS NOTE ADULT - PROBLEM SELECTOR PLAN 2
Patient's EF was reportedly 25% in the past according to ED report. On echo 7/20, EF is 15-20%. Was volume overloaded on initial exam, now improved. On CXR, evidence of cardiomegaly but significant decrease in pulmonary vascular congestion.   - d/c bumex gtt, holding further diuresis due to orthostasis for now  -beta blocker as above  -not monitoring I/O's; d/c'd jo ann

## 2018-08-02 NOTE — PROGRESS NOTE ADULT - ASSESSMENT
RB is a 67M w PMH of HTN, DM, HLD, HFrEF of 25%, CAD s/p PCI 7 years ago, with altered mental status in the setting of one week of dyspnea and weakness admitted to the CCU for management of ACS, acute on chronic CHF, RONNIE, s/p acute upper GI bleed 2/2 two gastric ulcers; s/p clipping of one ulcer. He remains in tenuous but improving status, as he is at a high risk for acute re-bleed and high risk for thrombus given recent STEMI, afib, and CVA. Hgb dropped to 6.9 on 07/29, pt given 1 unit of PRBCs, no active signs of bleeding; CBC stable now. Mental status is improved, in the setting of decreased BUN. Restarting ASA today. Given GI bleed in context of other cardiac anti-platelet/AC therapy during this stay, likely will only tolerate ASA long term for cardiac optimization. Will continue to closely monitor, and consider step down within 1-2 days. RB is a 67M w PMH of HTN, DM, HLD, HFrEF of 25%, CAD s/p PCI 7 years ago, with altered mental status in the setting of one week of dyspnea and weakness admitted to the CCU for management of ACS, acute on chronic CHF, RONNIE, s/p acute upper GI bleed 2/2 two gastric ulcers; s/p clipping of one ulcer. He remains in tenuous but improving status, as he is at a high risk for acute re-bleed and high risk for thrombus given recent STEMI, afib, and CVA. Hgb dropped to 6.9 on 07/29, pt given 1 unit of PRBCs, no active signs of bleeding; CBC stable now. Mental status is improved, in the setting of decreased BUN. Restarted ASA yesterday at noon. Morning Hb drop to 8.6, concern for upper GI re-bleed. AYDEE without sign for brisk bleed. Repeating CBC in 4 hours/NPO/250ml NS bolus/active/T&S. Need close monitoring on CCU at this time.  Long term, this is concerning for ability to maximize cardiac medical optimization (anti-platelet/AC) and provide secondary prevention for MI/Stroke. Will continue close monitoring on CCU. Pt is a 67M w PMH of HTN, DM, HLD, HFrEF of 25%, CAD s/p PCI 7 years ago, with altered mental status in the setting of one week of dyspnea and weakness admitted to the CCU for management of ACS, acute on chronic CHF, RONNIE, s/p acute upper GI bleed 2/2 two gastric ulcers; s/p clipping of one ulcer. He remains in tenuous but improving status, as he is at a high risk for acute re-bleed and high risk for thrombus given recent STEMI, afib, and CVA. Hgb dropped to 6.9 on 07/29, pt given 1 unit of PRBCs. Mental status is improved, in the setting of decreased BUN. Restarted ASA yesterday at noon. Morning Hb drop to 8.6, concern for upper GI re-bleed. AYDEE without sign for brisk bleed. ASA d/c;d; will monitor. Long term, this is concerning for ability to maximize cardiac medical optimization (anti-platelet/AC) and provide secondary prevention for MI/Stroke,  as risk on these meds for fatal GI Bleed outweighs benefit. Will continue close monitoring on CCU; if Hb stable possible stepdown 1-2 days.

## 2018-08-02 NOTE — PROGRESS NOTE ADULT - PROBLEM SELECTOR PLAN 8
Holding home medications.   - Hb A1c was 8  - Lantus 7U qhs, will adjusted PRN  -nutrition consult, as pt now starting to eat PO  - c/w ISS, Holding home medications. hypoglycemic this morning  - Hb A1c was 8  -d/c'd  Lantus   - c/w ISS,

## 2018-08-02 NOTE — PROGRESS NOTE ADULT - PROBLEM SELECTOR PLAN 1
Patient has hx of PCI w stents placed 7 years prior. Patient reportedly had an acute MI earlier this week in [place] with EKG showing ST elevations in the lateral leads and troponins in excess of 2000. EKG on admission shows q-waves in leads I and aVL with poor R wave progression consistent with a previous lateral wall MI. Outside therapeutic window for PCI. Troponin and CKMB done 7/22 in setting of new ST changes in 2 precordial leads, both lower, will no longer follow.     In setting of recent upper GI bleed, HELD anti-platelet, anti-coag medications, cardiac rate control, and afterload reduction medications.  Now stable, will restart ASA. Likely not able to add further agents long-term as too high of bleed risk.   -Restart ASA 81mg; monitor   - Holding statin therapy due to transaminitis  - tolerating metop 12.5mg q12hr and monitor hemodynamics, Toprol XL 25mg tomorrow, if stable  - Holding ACE/ARB due to severe RONNIE  - HOLD hydralazine 10 TID for afterload reduction  - Eventually would benefit from diagnostic cath/stress test for ischemic workup; holding off due to renal failure and tenuous state    #asymptomatic afib/aflutter  NSVT noted. Continue on rate control (goal <110) and will continue to monitor  - as above, metoprolol 12.5mg q12hr, Toprol XL tomororow  - Cardiac monitor  - 1 antiplatelet agent (ASA). no other anticoag in setting of GI bleeding Patient has hx of PCI w stents placed 7 years prior. Patient reportedly had an acute MI earlier this week in [place] with EKG showing ST elevations in the lateral leads and troponins in excess of 2000. EKG on admission shows q-waves in leads I and aVL with poor R wave progression consistent with a previous lateral wall MI. Outside therapeutic window for PCI. Troponin and CKMB done 7/22 in setting of new ST changes in 2 precordial leads, both lower, will no longer follow.     In setting of recent upper GI bleed, HELD anti-platelet, anti-coag medications, cardiac rate control, and afterload reduction medications.   Likely not able to add further agents long-term as too high of bleed risk. Started on ASA yesterday, now with acute Hb drop. Discontinue ASA and repeat CBC.   -Discontinue ASA 81mg  - Holding statin therapy due to transaminitis  - Toprol XL 25mg will closely monitor  - Holding ACE/ARB due to severe RONNIE  - HOLD hydralazine 10 TID for afterload reduction  - Eventually would benefit from diagnostic cath/stress test for ischemic workup; holding off due to renal failure and tenuous state    #asymptomatic afib/aflutter  NSVT noted. Continue on rate control (goal <110) and will continue to monitor  - as above, Toprol XL monitor  - Cardiac monitor  - no anti-platelet/anticoag in setting of concern for GI bleeding

## 2018-08-02 NOTE — PROGRESS NOTE ADULT - ATTENDING COMMENTS
Patient examined, fellow's hx and PE reviewed and confirmed. I find RONNIE, hypotension, hypokalemia. A/P reviewed and confirmed. Follow SCr. Keep MAP > 65. Hold ACE/ARB. See full note.

## 2018-08-02 NOTE — PROGRESS NOTE ADULT - PROBLEM SELECTOR PLAN 4
Patient's BUN/Cr is up trending, in setting of anemia/low perfusion. (unclear baseline). Likely 2/2 to cardiorenal syndrome as cardiac output is poor. Uremia resolved significantly, with subsequent improved mental status.   - Renal on board, appreciate recs  - Per renal, no urgent need for dialysis at this time  -sevelamer 800 TID to prevent hyperphosphatemia  -Pt dry and euvolemic; hold further diuretics for now    #elevated kappa protein  -elevated kappa and kappa/lambda ratio noted. Not likely multiple myeloma as pt not anemic prior to acute GI bleed and without hypercalcemia. Can f/u with PCP as an OP    #Hyponatremia: Resolved   - d/c jo ann

## 2018-08-02 NOTE — PROGRESS NOTE ADULT - PROBLEM SELECTOR PLAN 1
non oliguric output @ 1650 cc  creatinine improving  @ 2.25  also monitor I/o  avoid nephrotoxic agents  - renal diet

## 2018-08-02 NOTE — PROGRESS NOTE ADULT - PROBLEM SELECTOR PLAN 3
Acute Upper GI bleed 2/2 two non-bleed ulcers s/p clipping of one. Hb dropped to 6.9 on 07/29, pt given 1 unit of PRBCs, no active signs of bleeding. CBC stable at 9.6.   -Appreciate GI f/u and rec's.   -PPI BID transition to PO  -Adv diet as tolerated  -GI cleared to start one anti-platelet medication, will consider (ASA) in 24 hours, if CBC remains stable  - monitor CBCs q12hrs  - Transfuse <7. Acute Upper GI bleed 2/2 two non-bleed ulcers s/p clipping of one. Hb dropped to 6.9 on 07/29, pt given 1 unit of PRBCs, Concern for possible re-bleed; Hb at 8.6. will closely monitor; d/c'd ASA  -Appreciate GI f/u and rec's.   -PPI BID transition to PO  -NPO  - repeat CBCs   - Transfuse <7. Acute Upper GI bleed 2/2 two non-bleed ulcers s/p clipping of one. Hb dropped to 6.9 on 07/29, pt given 1 unit of PRBCs, Concern for possible re-bleed; Hb at 8.6. will closely monitor; d/c'd ASA  -Appreciate GI f/u and rec's.   -PPI BID transition to PO  -NPO  - repeat CBCs   -active T&S  -250cc NS bolus  - Transfuse <7.

## 2018-08-02 NOTE — PROGRESS NOTE ADULT - PROBLEM SELECTOR PLAN 5
MRI significant for subacute ischemia involving right parietal area   with numerous scattered areas of acute ischemia within the basal ganglia   and frontal and parietal lobes bilaterally some of which are in a   watershed distribution.. Improved mentation in setting of decreased uremia.   - F/U neuro recs  - HOLD MRA study for full neuro workup.   - carotid u/s negative   - PT/OT/ST as tolerated  -ST-cleared for mech/soft diet MRI significant for subacute ischemia involving right parietal area   with numerous scattered areas of acute ischemia within the basal ganglia   and frontal and parietal lobes bilaterally some of which are in a   watershed distribution.. Improved mentation in setting of decreased uremia.   - F/U neuro recs  - HOLD MRA study for full neuro workup.   - carotid u/s negative   - PT/OT/ST as tolerated

## 2018-08-02 NOTE — PROGRESS NOTE ADULT - SUBJECTIVE AND OBJECTIVE BOX
Patient is a 67y Male seen and evaluated at bedside.   labs noted--> cr @ 2.25 <---2.47  output @ 1650 cc    chlorhexidine 2% Cloths 1 daily  dextrose 40% Gel 15 once PRN  dextrose 5%. 1000 <Continuous>  dextrose 50% Injectable 12.5 once  dextrose 50% Injectable 25 once  dextrose 50% Injectable 25 once  escitalopram 5 daily  glucagon  Injectable 1 once PRN  insulin lispro (HumaLOG) corrective regimen sliding scale  three times a day before meals  insulin lispro (HumaLOG) corrective regimen sliding scale  at bedtime  metoprolol succinate ER 25 daily  pantoprazole    Tablet 40 two times a day  sevelamer carbonate Powder 800 three times a day with meals      Allergies    No Known Allergies    Intolerances        T(C): , Max: 36.9 (08-02-18 @ 01:30)  T(F): , Max: 98.4 (08-02-18 @ 01:30)  HR: 92 (08-02-18 @ 09:00)  BP: 90/52 (08-02-18 @ 09:00)  BP(mean): 66 (08-02-18 @ 09:00)  RR: 8 (08-02-18 @ 09:00)  SpO2: 99% (08-02-18 @ 09:00)  Wt(kg): --    08-01 @ 07:01  -  08-02 @ 07:00  --------------------------------------------------------  IN: 1718 mL / OUT: 1650 mL / NET: 68 mL    PHYSICAL EXAM:  GENERAL: NAD, well-developed, well nourished, alert, awake, no acute distress at present  HEAD:  Atraumatic, Normocephalic,   EYES: Bilateral conjuctiva and sclera normal   Oral cavity: Oral mucosa dry and pink  NECK: Neck supple, No JVD  CHEST/LUNG: Clear to auscultation bilaterally; No wheeze, no rales, no crepitations  HEART: Regular rate and rhythm. WANDER II/VI at LPSB, No gallop, no rub   ABDOMEN: Soft, Nontender, BS+nt, No flank tenderness.   EXTREMITIES: No clubbing, cyanosis, or edema  Neurology: AAOx3, no focal neurological deficit  SKIN: No rashes or lesions          ACCESS:     LABS:                        8.6    9.9   )-----------( 308      ( 02 Aug 2018 05:43 )             26.4     08-02    141  |  100  |  46<H>  ----------------------------<  70  3.8   |  29  |  2.25<H>    Ca    8.8      02 Aug 2018 05:42  Phos  2.4     08-02  Mg     2.0     08-02    TPro  6.0  /  Alb  3.0<L>  /  TBili  0.4  /  DBili  x   /  AST  39  /  ALT  150<H>  /  AlkPhos  92  08-02                RADIOLOGY & ADDITIONAL STUDIES:

## 2018-08-02 NOTE — PROGRESS NOTE ADULT - PROBLEM SELECTOR PLAN 10
F: None  E: Replete K>4, Mg>2. Will need to be careful with repletion given poor renal function  N: soft/thin, DASH/TLC/consistent carbs    Dispo: possible stepdown 1-2 days. F: None  E: Replete K>4, Mg>2. Will need to be careful with repletion given poor renal function  N: NPO     Dispo: CCU

## 2018-08-02 NOTE — PROGRESS NOTE ADULT - SUBJECTIVE AND OBJECTIVE BOX
OVERNIGHT EVENTS:    SUBJECTIVE:    Vital Signs Last 12 Hrs  T(F): 98.4 (08-02-18 @ 01:30), Max: 98.4 (08-02-18 @ 01:30)  HR: 94 (08-02-18 @ 05:00) (84 - 96)  BP: 115/57 (08-02-18 @ 05:00) (91/57 - 115/57)  BP(mean): 73 (08-02-18 @ 05:00) (62 - 83)  RR: 13 (08-02-18 @ 05:00) (9 - 18)  SpO2: 99% (08-02-18 @ 05:00) (97% - 100%)  I&O's Summary    31 Jul 2018 07:01  -  01 Aug 2018 07:00  --------------------------------------------------------  IN: 1027 mL / OUT: 1545 mL / NET: -518 mL    01 Aug 2018 07:01  -  02 Aug 2018 06:47  --------------------------------------------------------  IN: 1218 mL / OUT: 1350 mL / NET: -132 mL        PHYSICAL EXAM:          LABS:                        8.6    9.9   )-----------( 308      ( 02 Aug 2018 05:43 )             26.4     08-02    141  |  100  |  46<H>  ----------------------------<  70  3.8   |  29  |  2.25<H>    Ca    8.8      02 Aug 2018 05:42  Phos  2.4     08-02  Mg     2.0     08-02    TPro  6.0  /  Alb  3.0<L>  /  TBili  0.4  /  DBili  x   /  AST  39  /  ALT  150<H>  /  AlkPhos  92  08-02          RADIOLOGY & ADDITIONAL TESTS:    MEDICATIONS  (STANDING):  aspirin enteric coated 81 milliGRAM(s) Oral daily  chlorhexidine 2% Cloths 1 Application(s) Topical daily  dextrose 5%. 1000 milliLiter(s) (50 mL/Hr) IV Continuous <Continuous>  dextrose 50% Injectable 12.5 Gram(s) IV Push once  dextrose 50% Injectable 25 Gram(s) IV Push once  dextrose 50% Injectable 25 Gram(s) IV Push once  escitalopram 5 milliGRAM(s) Oral daily  insulin glargine Injectable (LANTUS) 7 Unit(s) SubCutaneous at bedtime  insulin lispro (HumaLOG) corrective regimen sliding scale   SubCutaneous three times a day before meals  insulin lispro (HumaLOG) corrective regimen sliding scale   SubCutaneous at bedtime  metoprolol succinate ER 25 milliGRAM(s) Oral daily  pantoprazole    Tablet 40 milliGRAM(s) Oral two times a day  sevelamer carbonate Powder 800 milliGRAM(s) Oral three times a day with meals    MEDICATIONS  (PRN):  dextrose 40% Gel 15 Gram(s) Oral once PRN Blood Glucose LESS THAN 70 milliGRAM(s)/deciliter  glucagon  Injectable 1 milliGRAM(s) IntraMuscular once PRN Glucose LESS THAN 70 milligrams/deciliter OVERNIGHT EVENTS:  No acute events.     SUBJECTIVE:  No new complaints.     Vital Signs Last 12 Hrs  T(F): 98.4 (08-02-18 @ 01:30), Max: 98.4 (08-02-18 @ 01:30)  HR: 94 (08-02-18 @ 05:00) (84 - 96)  BP: 115/57 (08-02-18 @ 05:00) (91/57 - 115/57)  BP(mean): 73 (08-02-18 @ 05:00) (62 - 83)  RR: 13 (08-02-18 @ 05:00) (9 - 18)  SpO2: 99% (08-02-18 @ 05:00) (97% - 100%)  I&O's Summary    31 Jul 2018 07:01  -  01 Aug 2018 07:00  --------------------------------------------------------  IN: 1027 mL / OUT: 1545 mL / NET: -518 mL    01 Aug 2018 07:01  -  02 Aug 2018 06:47  --------------------------------------------------------  IN: 1218 mL / OUT: 1350 mL / NET: -132 mL    PHYSICAL EXAM:  General: WDWN.   HEENT: NC/AT; PERRL, clear conjunctiva  Neck: supple  Cardiovascular: S1 S2+ RRR no g/r/m  Respiratory: CTA b/l; no W/R/R  Gastrointestinal: soft, NT/ND; +BSx4  AYDEE: no hemorrhoids; no overt blood or stool in rectal vault. Trace stool was dark in color but no melana.   Extremities: WWP; 2+ peripheral pulses; no edema   Neurological: Alert and verbalizing phrases. Following 1-step commands. Appropriate pragmatics with varied affect.         LABS:                        8.6    9.9   )-----------( 308      ( 02 Aug 2018 05:43 )             26.4     08-02    141  |  100  |  46<H>  ----------------------------<  70  3.8   |  29  |  2.25<H>    Ca    8.8      02 Aug 2018 05:42  Phos  2.4     08-02  Mg     2.0     08-02    TPro  6.0  /  Alb  3.0<L>  /  TBili  0.4  /  DBili  x   /  AST  39  /  ALT  150<H>  /  AlkPhos  92  08-02      RADIOLOGY & ADDITIONAL TESTS:    MEDICATIONS  (STANDING):  aspirin enteric coated 81 milliGRAM(s) Oral daily  chlorhexidine 2% Cloths 1 Application(s) Topical daily  dextrose 5%. 1000 milliLiter(s) (50 mL/Hr) IV Continuous <Continuous>  dextrose 50% Injectable 12.5 Gram(s) IV Push once  dextrose 50% Injectable 25 Gram(s) IV Push once  dextrose 50% Injectable 25 Gram(s) IV Push once  escitalopram 5 milliGRAM(s) Oral daily  insulin glargine Injectable (LANTUS) 7 Unit(s) SubCutaneous at bedtime  insulin lispro (HumaLOG) corrective regimen sliding scale   SubCutaneous three times a day before meals  insulin lispro (HumaLOG) corrective regimen sliding scale   SubCutaneous at bedtime  metoprolol succinate ER 25 milliGRAM(s) Oral daily  pantoprazole    Tablet 40 milliGRAM(s) Oral two times a day  sevelamer carbonate Powder 800 milliGRAM(s) Oral three times a day with meals    MEDICATIONS  (PRN):  dextrose 40% Gel 15 Gram(s) Oral once PRN Blood Glucose LESS THAN 70 milliGRAM(s)/deciliter  glucagon  Injectable 1 milliGRAM(s) IntraMuscular once PRN Glucose LESS THAN 70 milligrams/deciliter

## 2018-08-03 LAB
ALBUMIN SERPL ELPH-MCNC: 3.2 G/DL — LOW (ref 3.3–5)
ALP SERPL-CCNC: 103 U/L — SIGNIFICANT CHANGE UP (ref 40–120)
ALT FLD-CCNC: 117 U/L — HIGH (ref 10–45)
ANION GAP SERPL CALC-SCNC: 12 MMOL/L — SIGNIFICANT CHANGE UP (ref 5–17)
APPEARANCE UR: ABNORMAL
AST SERPL-CCNC: 32 U/L — SIGNIFICANT CHANGE UP (ref 10–40)
BILIRUB SERPL-MCNC: 0.6 MG/DL — SIGNIFICANT CHANGE UP (ref 0.2–1.2)
BILIRUB UR-MCNC: NEGATIVE — SIGNIFICANT CHANGE UP
BLD GP AB SCN SERPL QL: NEGATIVE — SIGNIFICANT CHANGE UP
BUN SERPL-MCNC: 32 MG/DL — HIGH (ref 7–23)
CALCIUM SERPL-MCNC: 9 MG/DL — SIGNIFICANT CHANGE UP (ref 8.4–10.5)
CHLORIDE SERPL-SCNC: 96 MMOL/L — SIGNIFICANT CHANGE UP (ref 96–108)
CO2 SERPL-SCNC: 28 MMOL/L — SIGNIFICANT CHANGE UP (ref 22–31)
COLOR SPEC: YELLOW — SIGNIFICANT CHANGE UP
CREAT SERPL-MCNC: 1.92 MG/DL — HIGH (ref 0.5–1.3)
DIFF PNL FLD: ABNORMAL
GLUCOSE SERPL-MCNC: 115 MG/DL — HIGH (ref 70–99)
GLUCOSE UR QL: 250
HCT VFR BLD CALC: 28.4 % — LOW (ref 39–50)
HGB BLD-MCNC: 9.1 G/DL — LOW (ref 13–17)
KETONES UR-MCNC: NEGATIVE — SIGNIFICANT CHANGE UP
LEUKOCYTE ESTERASE UR-ACNC: ABNORMAL
MAGNESIUM SERPL-MCNC: 1.9 MG/DL — SIGNIFICANT CHANGE UP (ref 1.6–2.6)
MCHC RBC-ENTMCNC: 29.4 PG — SIGNIFICANT CHANGE UP (ref 27–34)
MCHC RBC-ENTMCNC: 32 G/DL — SIGNIFICANT CHANGE UP (ref 32–36)
MCV RBC AUTO: 91.6 FL — SIGNIFICANT CHANGE UP (ref 80–100)
NITRITE UR-MCNC: NEGATIVE — SIGNIFICANT CHANGE UP
PH UR: 6 — SIGNIFICANT CHANGE UP (ref 5–8)
PHOSPHATE SERPL-MCNC: 1.8 MG/DL — LOW (ref 2.5–4.5)
PLATELET # BLD AUTO: 300 K/UL — SIGNIFICANT CHANGE UP (ref 150–400)
POTASSIUM SERPL-MCNC: 4 MMOL/L — SIGNIFICANT CHANGE UP (ref 3.5–5.3)
POTASSIUM SERPL-SCNC: 4 MMOL/L — SIGNIFICANT CHANGE UP (ref 3.5–5.3)
PROT SERPL-MCNC: 6.9 G/DL — SIGNIFICANT CHANGE UP (ref 6–8.3)
PROT UR-MCNC: ABNORMAL MG/DL
RBC # BLD: 3.1 M/UL — LOW (ref 4.2–5.8)
RBC # FLD: 14 % — SIGNIFICANT CHANGE UP (ref 10.3–16.9)
RH IG SCN BLD-IMP: POSITIVE — SIGNIFICANT CHANGE UP
SODIUM SERPL-SCNC: 136 MMOL/L — SIGNIFICANT CHANGE UP (ref 135–145)
SP GR SPEC: 1.01 — SIGNIFICANT CHANGE UP (ref 1–1.03)
UROBILINOGEN FLD QL: 0.2 E.U./DL — SIGNIFICANT CHANGE UP
WBC # BLD: 11.6 K/UL — HIGH (ref 3.8–10.5)
WBC # FLD AUTO: 11.6 K/UL — HIGH (ref 3.8–10.5)

## 2018-08-03 PROCEDURE — 99232 SBSQ HOSP IP/OBS MODERATE 35: CPT

## 2018-08-03 PROCEDURE — 71045 X-RAY EXAM CHEST 1 VIEW: CPT | Mod: 26

## 2018-08-03 PROCEDURE — 99233 SBSQ HOSP IP/OBS HIGH 50: CPT

## 2018-08-03 RX ORDER — ATORVASTATIN CALCIUM 80 MG/1
80 TABLET, FILM COATED ORAL AT BEDTIME
Qty: 0 | Refills: 0 | Status: DISCONTINUED | OUTPATIENT
Start: 2018-08-03 | End: 2018-08-15

## 2018-08-03 RX ORDER — SODIUM CHLORIDE 9 MG/ML
1000 INJECTION INTRAMUSCULAR; INTRAVENOUS; SUBCUTANEOUS ONCE
Qty: 0 | Refills: 0 | Status: DISCONTINUED | OUTPATIENT
Start: 2018-08-03 | End: 2018-08-03

## 2018-08-03 RX ORDER — SODIUM CHLORIDE 9 MG/ML
1000 INJECTION INTRAMUSCULAR; INTRAVENOUS; SUBCUTANEOUS
Qty: 0 | Refills: 0 | Status: DISCONTINUED | OUTPATIENT
Start: 2018-08-03 | End: 2018-08-03

## 2018-08-03 RX ORDER — CEFTRIAXONE 500 MG/1
1 INJECTION, POWDER, FOR SOLUTION INTRAMUSCULAR; INTRAVENOUS EVERY 24 HOURS
Qty: 0 | Refills: 0 | Status: DISCONTINUED | OUTPATIENT
Start: 2018-08-03 | End: 2018-08-04

## 2018-08-03 RX ORDER — SODIUM CHLORIDE 9 MG/ML
500 INJECTION INTRAMUSCULAR; INTRAVENOUS; SUBCUTANEOUS ONCE
Qty: 0 | Refills: 0 | Status: COMPLETED | OUTPATIENT
Start: 2018-08-03 | End: 2018-08-03

## 2018-08-03 RX ORDER — MAGNESIUM SULFATE 500 MG/ML
1 VIAL (ML) INJECTION ONCE
Qty: 0 | Refills: 0 | Status: COMPLETED | OUTPATIENT
Start: 2018-08-03 | End: 2018-08-03

## 2018-08-03 RX ORDER — ACETAMINOPHEN 500 MG
650 TABLET ORAL ONCE
Qty: 0 | Refills: 0 | Status: COMPLETED | OUTPATIENT
Start: 2018-08-03 | End: 2018-08-03

## 2018-08-03 RX ORDER — SODIUM,POTASSIUM PHOSPHATES 278-250MG
1 POWDER IN PACKET (EA) ORAL ONCE
Qty: 0 | Refills: 0 | Status: COMPLETED | OUTPATIENT
Start: 2018-08-03 | End: 2018-08-03

## 2018-08-03 RX ADMIN — Medication 1 GRAM(S): at 18:24

## 2018-08-03 RX ADMIN — SODIUM CHLORIDE 1000 MILLILITER(S): 9 INJECTION INTRAMUSCULAR; INTRAVENOUS; SUBCUTANEOUS at 16:50

## 2018-08-03 RX ADMIN — Medication 25 MILLIGRAM(S): at 06:36

## 2018-08-03 RX ADMIN — SEVELAMER CARBONATE 800 MILLIGRAM(S): 2400 POWDER, FOR SUSPENSION ORAL at 07:37

## 2018-08-03 RX ADMIN — Medication 1 PACKET(S): at 09:04

## 2018-08-03 RX ADMIN — ATORVASTATIN CALCIUM 80 MILLIGRAM(S): 80 TABLET, FILM COATED ORAL at 23:37

## 2018-08-03 RX ADMIN — ESCITALOPRAM OXALATE 5 MILLIGRAM(S): 10 TABLET, FILM COATED ORAL at 11:52

## 2018-08-03 RX ADMIN — PANTOPRAZOLE SODIUM 40 MILLIGRAM(S): 20 TABLET, DELAYED RELEASE ORAL at 18:24

## 2018-08-03 RX ADMIN — Medication 650 MILLIGRAM(S): at 17:19

## 2018-08-03 RX ADMIN — Medication 1 GRAM(S): at 06:36

## 2018-08-03 RX ADMIN — Medication 100 GRAM(S): at 09:04

## 2018-08-03 RX ADMIN — CHLORHEXIDINE GLUCONATE 1 APPLICATION(S): 213 SOLUTION TOPICAL at 18:26

## 2018-08-03 RX ADMIN — Medication 2: at 11:52

## 2018-08-03 RX ADMIN — SODIUM CHLORIDE 100 MILLILITER(S): 9 INJECTION INTRAMUSCULAR; INTRAVENOUS; SUBCUTANEOUS at 08:54

## 2018-08-03 RX ADMIN — CEFTRIAXONE 100 GRAM(S): 500 INJECTION, POWDER, FOR SOLUTION INTRAMUSCULAR; INTRAVENOUS at 23:37

## 2018-08-03 RX ADMIN — PANTOPRAZOLE SODIUM 40 MILLIGRAM(S): 20 TABLET, DELAYED RELEASE ORAL at 06:36

## 2018-08-03 RX ADMIN — SEVELAMER CARBONATE 800 MILLIGRAM(S): 2400 POWDER, FOR SUSPENSION ORAL at 11:52

## 2018-08-03 RX ADMIN — SEVELAMER CARBONATE 800 MILLIGRAM(S): 2400 POWDER, FOR SUSPENSION ORAL at 17:14

## 2018-08-03 NOTE — PROGRESS NOTE ADULT - SUBJECTIVE AND OBJECTIVE BOX
OVERNIGHT EVENTS:    SUBJECTIVE:    Vital Signs Last 12 Hrs  T(F): 97.5 (08-03-18 @ 06:31), Max: 98.7 (08-02-18 @ 19:43)  HR: 98 (08-03-18 @ 05:00) (98 - 110)  BP: 86/54 (08-03-18 @ 05:00) (75/45 - 105/61)  BP(mean): 67 (08-03-18 @ 05:00) (57 - 80)  RR: 16 (08-03-18 @ 05:00) (12 - 21)  SpO2: 100% (08-03-18 @ 05:00) (96% - 100%)  I&O's Summary    01 Aug 2018 07:01  -  02 Aug 2018 07:00  --------------------------------------------------------  IN: 1718 mL / OUT: 1650 mL / NET: 68 mL    02 Aug 2018 07:01  -  03 Aug 2018 06:58  --------------------------------------------------------  IN: 1000 mL / OUT: 1300 mL / NET: -300 mL        PHYSICAL EXAM:  General: Resting on his R side; NAD.   HEENT: NC/AT; PERRL, clear conjunctiva  Neck: supple  Cardiovascular: S1 S2+ RRR no g/r/m  Respiratory: CTA b/l; no W/R/R  Gastrointestinal: soft, NT/ND; +BSx4  Extremities: WWP; 2+ peripheral pulses; no edema   Neurological: Alert and verbalizing 1-2 words. Following 1-step commands.         LABS:                        8.6    10.8  )-----------( 321      ( 02 Aug 2018 22:16 )             26.7     08-02    141  |  100  |  46<H>  ----------------------------<  70  3.8   |  29  |  2.25<H>    Ca    8.8      02 Aug 2018 05:42  Phos  2.4     08-02  Mg     2.0     08-02    TPro  6.0  /  Alb  3.0<L>  /  TBili  0.4  /  DBili  x   /  AST  39  /  ALT  150<H>  /  AlkPhos  92  08-02          RADIOLOGY & ADDITIONAL TESTS:    MEDICATIONS  (STANDING):  chlorhexidine 2% Cloths 1 Application(s) Topical daily  dextrose 5%. 1000 milliLiter(s) (50 mL/Hr) IV Continuous <Continuous>  dextrose 50% Injectable 12.5 Gram(s) IV Push once  dextrose 50% Injectable 25 Gram(s) IV Push once  dextrose 50% Injectable 25 Gram(s) IV Push once  escitalopram 5 milliGRAM(s) Oral daily  insulin lispro (HumaLOG) corrective regimen sliding scale   SubCutaneous three times a day before meals  insulin lispro (HumaLOG) corrective regimen sliding scale   SubCutaneous at bedtime  metoprolol succinate ER 25 milliGRAM(s) Oral daily  pantoprazole    Tablet 40 milliGRAM(s) Oral two times a day  sevelamer carbonate Powder 800 milliGRAM(s) Oral three times a day with meals  sucralfate 1 Gram(s) Oral two times a day    MEDICATIONS  (PRN):  dextrose 40% Gel 15 Gram(s) Oral once PRN Blood Glucose LESS THAN 70 milliGRAM(s)/deciliter  glucagon  Injectable 1 milliGRAM(s) IntraMuscular once PRN Glucose LESS THAN 70 milligrams/deciliter OVERNIGHT EVENTS:  No acute signs of bleeding. Repeat CBC with Hb 8.6 (stable).    SUBJECTIVE:  No new complaints.     Vital Signs Last 12 Hrs  T(F): 97.5 (08-03-18 @ 06:31), Max: 98.7 (08-02-18 @ 19:43)  HR: 98 (08-03-18 @ 05:00) (98 - 110)  BP: 86/54 (08-03-18 @ 05:00) (75/45 - 105/61)  BP(mean): 67 (08-03-18 @ 05:00) (57 - 80)  RR: 16 (08-03-18 @ 05:00) (12 - 21)  SpO2: 100% (08-03-18 @ 05:00) (96% - 100%)  I&O's Summary    01 Aug 2018 07:01  -  02 Aug 2018 07:00  --------------------------------------------------------  IN: 1718 mL / OUT: 1650 mL / NET: 68 mL    02 Aug 2018 07:01  -  03 Aug 2018 06:58  --------------------------------------------------------  IN: 1000 mL / OUT: 1300 mL / NET: -300 mL        PHYSICAL EXAM:  General: Resting on his R side; NAD.   HEENT: NC/AT; PERRL, clear conjunctiva  Neck: supple  Cardiovascular: S1 S2+ RRR no g/r/m  Respiratory: CTA b/l; no W/R/R  Gastrointestinal: soft, NT/ND; +BSx4  Extremities: WWP; 2+ peripheral pulses; no edema   Neurological: Alert and verbalizing 1-2 words. Following 1-step commands.         LABS:                        8.6    10.8  )-----------( 321      ( 02 Aug 2018 22:16 )             26.7     08-02    141  |  100  |  46<H>  ----------------------------<  70  3.8   |  29  |  2.25<H>    Ca    8.8      02 Aug 2018 05:42  Phos  2.4     08-02  Mg     2.0     08-02    TPro  6.0  /  Alb  3.0<L>  /  TBili  0.4  /  DBili  x   /  AST  39  /  ALT  150<H>  /  AlkPhos  92  08-02          RADIOLOGY & ADDITIONAL TESTS:    MEDICATIONS  (STANDING):  chlorhexidine 2% Cloths 1 Application(s) Topical daily  dextrose 5%. 1000 milliLiter(s) (50 mL/Hr) IV Continuous <Continuous>  dextrose 50% Injectable 12.5 Gram(s) IV Push once  dextrose 50% Injectable 25 Gram(s) IV Push once  dextrose 50% Injectable 25 Gram(s) IV Push once  escitalopram 5 milliGRAM(s) Oral daily  insulin lispro (HumaLOG) corrective regimen sliding scale   SubCutaneous three times a day before meals  insulin lispro (HumaLOG) corrective regimen sliding scale   SubCutaneous at bedtime  metoprolol succinate ER 25 milliGRAM(s) Oral daily  pantoprazole    Tablet 40 milliGRAM(s) Oral two times a day  sevelamer carbonate Powder 800 milliGRAM(s) Oral three times a day with meals  sucralfate 1 Gram(s) Oral two times a day    MEDICATIONS  (PRN):  dextrose 40% Gel 15 Gram(s) Oral once PRN Blood Glucose LESS THAN 70 milliGRAM(s)/deciliter  glucagon  Injectable 1 milliGRAM(s) IntraMuscular once PRN Glucose LESS THAN 70 milligrams/deciliter PGY-1 Transfer Summary Note    67M w PMH of HTN, DM, HLD, HFrEF of 25%, CAD s/p PCI 7 years ago, with altered mental status in the setting of one week of dyspnea and weakness admitted to the CCU for management of ACS, acute on chronic CHF, RONNIE, Also found to have cortical and subcortical CVAs,   Outside of therapeutic window for cath intervention. Treated for acute on chronic HF initially with bumex drip and transitioned to IV Bumex 1mg BID with good UOP response to euvolemic state; no further diuresis. BUN continued to elevate out of proportion to Cr, in addition to acute drop in his Hb 14 to 9. Hb continued to drop; s/p 1UPRBC. Found to have acute upper GI bleed in context of dual antiplatelet and AC therapy, with subsequent gastric ulcers (x2); s/p clipping of one ulcer. After ulcer clipping, BUN trended down with improved mental status (affect, verbalizations and ability to follow commands). Attempted to resume 1 cardio/neuro preventative med (ASA). However, within one day Hb dropped from new baseline of 9.6 to 8.6, demonstrating inability to tolerate anti-platelet agent. Discontinued ASA and Hb 9.1; stabilized. Statin initially held in context of elevated LFT's, but this improved and he was restarted on Lipitor 80mg PO QD today. During his stay, he was noted to have Afib and initially on metop and eliquis. Eliquis stopped in context of GI bleed and can not be resumed as risk for fatal bleed too high. Currently tolerating Toprol XL 25mg PO QD with systolic pressures .  RONNIE improving; remains on sevelamer; renal following.  Mental status improved with resolution of uremia. Started on lexapro and continued on same with good affect. Of note, GI assessed for other etiologies of LFT elevation, found to have elevated kappa and kappa/lambda ratio noted. Not likely multiple myeloma as pt not anemic prior to acute GI bleed and without hypercalcemia. Can f/u with PCP as an OP.  Provided 1L NS bolus this morning to support pressures. Diet advanced to mech soft/thin  (cleared by speech service) DASH/TLC; SCD VTE ppx only as risk for bleed too high. Pt remained hemodynamically stable and has started to tolerate PT. His case was discussed on pt care rounds and he is appropriate for stepdown today.     OVERNIGHT EVENTS:  No acute signs of bleeding. Repeat CBC with Hb 8.6 (stable).    SUBJECTIVE:  No new complaints.     Vital Signs Last 12 Hrs  T(F): 97.5 (08-03-18 @ 06:31), Max: 98.7 (08-02-18 @ 19:43)  HR: 98 (08-03-18 @ 05:00) (98 - 110)  BP: 86/54 (08-03-18 @ 05:00) (75/45 - 105/61)  BP(mean): 67 (08-03-18 @ 05:00) (57 - 80)  RR: 16 (08-03-18 @ 05:00) (12 - 21)  SpO2: 100% (08-03-18 @ 05:00) (96% - 100%)  I&O's Summary    01 Aug 2018 07:01  -  02 Aug 2018 07:00  --------------------------------------------------------  IN: 1718 mL / OUT: 1650 mL / NET: 68 mL    02 Aug 2018 07:01  -  03 Aug 2018 06:58  --------------------------------------------------------  IN: 1000 mL / OUT: 1300 mL / NET: -300 mL      PHYSICAL EXAM:  General: Resting on his R side; NAD.   HEENT: NC/AT; PERRL, clear conjunctiva  Neck: supple  Cardiovascular: S1 S2+ RRR no g/r/m  Respiratory: CTA b/l; no W/R/R  Gastrointestinal: soft, NT/ND; +BSx4  Extremities: WWP; 2+ peripheral pulses; no edema   Neurological: Alert and verbalizing 1-2 words. Following 1-step commands.         LABS:                        8.6    10.8  )-----------( 321      ( 02 Aug 2018 22:16 )             26.7     08-02    141  |  100  |  46<H>  ----------------------------<  70  3.8   |  29  |  2.25<H>    Ca    8.8      02 Aug 2018 05:42  Phos  2.4     08-02  Mg     2.0     08-02    TPro  6.0  /  Alb  3.0<L>  /  TBili  0.4  /  DBili  x   /  AST  39  /  ALT  150<H>  /  AlkPhos  92  08-02          RADIOLOGY & ADDITIONAL TESTS:    MEDICATIONS  (STANDING):  chlorhexidine 2% Cloths 1 Application(s) Topical daily  dextrose 5%. 1000 milliLiter(s) (50 mL/Hr) IV Continuous <Continuous>  dextrose 50% Injectable 12.5 Gram(s) IV Push once  dextrose 50% Injectable 25 Gram(s) IV Push once  dextrose 50% Injectable 25 Gram(s) IV Push once  escitalopram 5 milliGRAM(s) Oral daily  insulin lispro (HumaLOG) corrective regimen sliding scale   SubCutaneous three times a day before meals  insulin lispro (HumaLOG) corrective regimen sliding scale   SubCutaneous at bedtime  metoprolol succinate ER 25 milliGRAM(s) Oral daily  pantoprazole    Tablet 40 milliGRAM(s) Oral two times a day  sevelamer carbonate Powder 800 milliGRAM(s) Oral three times a day with meals  sucralfate 1 Gram(s) Oral two times a day    MEDICATIONS  (PRN):  dextrose 40% Gel 15 Gram(s) Oral once PRN Blood Glucose LESS THAN 70 milliGRAM(s)/deciliter  glucagon  Injectable 1 milliGRAM(s) IntraMuscular once PRN Glucose LESS THAN 70 milligrams/deciliter PGY-1 Transfer Summary Note    67M w PMH of HTN, DM, HLD, HFrEF of 25%, CAD s/p PCI 7 years ago, with altered mental status in the setting of one week of dyspnea and weakness admitted to the CCU for management of ACS, acute on chronic CHF, RONNIE, Also found to have cortical and subcortical CVAs,   Outside of therapeutic window for cath intervention. Treated for acute on chronic HF initially with bumex drip and transitioned to IV Bumex 1mg BID with good UOP response to euvolemic state; no further diuresis. BUN continued to elevate out of proportion to Cr, in addition to acute drop in his Hb 14 to 9. Hb continued to drop; s/p 1UPRBC. Found to have acute upper GI bleed in context of dual antiplatelet and AC therapy, with subsequent gastric ulcers (x2); s/p clipping of one ulcer. After ulcer clipping, BUN trended down with improved mental status (affect, verbalizations and ability to follow commands). Attempted to resume 1 cardio/neuro preventative med (ASA). However, within one day Hb dropped from new baseline of 9.6 to 8.6, demonstrating inability to tolerate anti-platelet agent. Discontinued ASA and Hb 9.1; stabilized. Statin initially held in context of elevated LFT's, but this improved and he was restarted on Lipitor 80mg PO QD today. During his stay, he was noted to have Afib and initially on metop and eliquis. Eliquis stopped in context of GI bleed and can not be resumed as risk for fatal bleed too high. Currently tolerating Toprol XL 25mg PO QD with systolic pressures .  RONNIE improving; remains on sevelamer; renal following.  Mental status improved with resolution of uremia. Started on lexapro and continued on same with good affect. Of note, GI assessed for other etiologies of LFT elevation, found to have elevated kappa and kappa/lambda ratio noted. Not likely multiple myeloma as pt not anemic prior to acute GI bleed and without hypercalcemia. Can f/u with PCP as an OP.  His diabetes has been managed with SSI and attempted lantus. However, with fluctuating PO intake, hypoglycemia noted in morning x2. Stopped lantus and continues on SSI, Provided 1L NS bolus this morning to support pressures. Diet advanced to mech soft/thin  (cleared by speech service) DASH/TLC; SCD VTE ppx only as risk for bleed too high. Pt remained hemodynamically stable and has started to tolerate PT. His case was discussed on pt care rounds and he is appropriate for stepdown today.     OVERNIGHT EVENTS:  No acute signs of bleeding. Repeat CBC with Hb 8.6 (stable).    SUBJECTIVE:  No new complaints.     Vital Signs Last 12 Hrs  T(F): 97.5 (08-03-18 @ 06:31), Max: 98.7 (08-02-18 @ 19:43)  HR: 98 (08-03-18 @ 05:00) (98 - 110)  BP: 86/54 (08-03-18 @ 05:00) (75/45 - 105/61)  BP(mean): 67 (08-03-18 @ 05:00) (57 - 80)  RR: 16 (08-03-18 @ 05:00) (12 - 21)  SpO2: 100% (08-03-18 @ 05:00) (96% - 100%)  I&O's Summary    01 Aug 2018 07:01  -  02 Aug 2018 07:00  --------------------------------------------------------  IN: 1718 mL / OUT: 1650 mL / NET: 68 mL    02 Aug 2018 07:01  -  03 Aug 2018 06:58  --------------------------------------------------------  IN: 1000 mL / OUT: 1300 mL / NET: -300 mL      PHYSICAL EXAM:  General: Resting on his R side; NAD.   HEENT: NC/AT; PERRL, clear conjunctiva  Neck: supple  Cardiovascular: S1 S2+ RRR no g/r/m  Respiratory: CTA b/l; no W/R/R  Gastrointestinal: soft, NT/ND; +BSx4  Extremities: WWP; 2+ peripheral pulses; no edema   Neurological: Alert and verbalizing 1-2 words. Following 1-step commands.         LABS:                        8.6    10.8  )-----------( 321      ( 02 Aug 2018 22:16 )             26.7     08-02    141  |  100  |  46<H>  ----------------------------<  70  3.8   |  29  |  2.25<H>    Ca    8.8      02 Aug 2018 05:42  Phos  2.4     08-02  Mg     2.0     08-02    TPro  6.0  /  Alb  3.0<L>  /  TBili  0.4  /  DBili  x   /  AST  39  /  ALT  150<H>  /  AlkPhos  92  08-02          RADIOLOGY & ADDITIONAL TESTS:    MEDICATIONS  (STANDING):  chlorhexidine 2% Cloths 1 Application(s) Topical daily  dextrose 5%. 1000 milliLiter(s) (50 mL/Hr) IV Continuous <Continuous>  dextrose 50% Injectable 12.5 Gram(s) IV Push once  dextrose 50% Injectable 25 Gram(s) IV Push once  dextrose 50% Injectable 25 Gram(s) IV Push once  escitalopram 5 milliGRAM(s) Oral daily  insulin lispro (HumaLOG) corrective regimen sliding scale   SubCutaneous three times a day before meals  insulin lispro (HumaLOG) corrective regimen sliding scale   SubCutaneous at bedtime  metoprolol succinate ER 25 milliGRAM(s) Oral daily  pantoprazole    Tablet 40 milliGRAM(s) Oral two times a day  sevelamer carbonate Powder 800 milliGRAM(s) Oral three times a day with meals  sucralfate 1 Gram(s) Oral two times a day    MEDICATIONS  (PRN):  dextrose 40% Gel 15 Gram(s) Oral once PRN Blood Glucose LESS THAN 70 milliGRAM(s)/deciliter  glucagon  Injectable 1 milliGRAM(s) IntraMuscular once PRN Glucose LESS THAN 70 milligrams/deciliter PGY-1 Transfer Summary Note    67M w PMH of HTN, DM, HLD, HFrEF of 25%, CAD s/p PCI 7 years ago, with altered mental status in the setting of one week of dyspnea and weakness admitted to the CCU for management of ACS, acute on chronic CHF, RONNIE, Also found to have cortical and subcortical CVAs,   Outside of therapeutic window for cath intervention. Treated for acute on chronic HF initially with bumex drip and transitioned to IV Bumex 1mg BID with good UOP response to euvolemic state; no further diuresis. BUN continued to elevate out of proportion to Cr, in addition to acute drop in his Hb 14 to 9. Hb continued to drop; s/p 1UPRBC. Found to have acute upper GI bleed in context of dual antiplatelet and AC therapy, with subsequent gastric ulcers (x2); s/p clipping of one ulcer. After ulcer clipping, BUN trended down with improved mental status (affect, verbalizations and ability to follow commands). Attempted to resume 1 cardio/neuro preventative med (ASA). However, within one day Hb dropped from new baseline of 9.6 to 8.6, demonstrating inability to tolerate anti-platelet agent. Discontinued ASA and Hb 9.1; stabilized. Statin initially held in context of elevated LFT's, but this improved and he was restarted on Lipitor 80mg PO QD today. During his stay, he was noted to have Afib and initially on metop and eliquis. Eliquis stopped in context of GI bleed and can not be resumed as risk for fatal bleed too high. Currently tolerating Toprol XL 25mg PO QD with systolic pressures .  RONNIE improving; remains on sevelamer; renal following.  Mental status improved with resolution of uremia. Started on lexapro and continued on same with good affect. Of note, GI assessed for other etiologies of LFT elevation, found to have elevated kappa and kappa/lambda ratio noted. Not likely multiple myeloma as pt not anemic prior to acute GI bleed and without hypercalcemia. Can f/u with PCP as an OP.  His diabetes has been managed with SSI and attempted lantus. However, with fluctuating PO intake, hypoglycemia noted in morning x2. Stopped lantus and continues on SSI, Provided 1L NS bolus this morning to support pressures. Diet advanced to [facility] soft/thin  (cleared by speech service) DASH/TLC; PPx for VTE with SCD only as risk for bleed too high. Will remain on PPI for GI ppx, Pt remained hemodynamically stable and has started to tolerate PT. His case was discussed on pt care rounds and he is appropriate for stepdown today.     OVERNIGHT EVENTS:  No acute signs of bleeding. Repeat CBC with Hb 8.6 (stable).    SUBJECTIVE:  No new complaints.     Vital Signs Last 12 Hrs  T(F): 97.5 (08-03-18 @ 06:31), Max: 98.7 (08-02-18 @ 19:43)  HR: 98 (08-03-18 @ 05:00) (98 - 110)  BP: 86/54 (08-03-18 @ 05:00) (75/45 - 105/61)  BP(mean): 67 (08-03-18 @ 05:00) (57 - 80)  RR: 16 (08-03-18 @ 05:00) (12 - 21)  SpO2: 100% (08-03-18 @ 05:00) (96% - 100%)  I&O's Summary    01 Aug 2018 07:01  -  02 Aug 2018 07:00  --------------------------------------------------------  IN: 1718 mL / OUT: 1650 mL / NET: 68 mL    02 Aug 2018 07:01  -  03 Aug 2018 06:58  --------------------------------------------------------  IN: 1000 mL / OUT: 1300 mL / NET: -300 mL      PHYSICAL EXAM:  General: Resting on his R side; NAD.   HEENT: NC/AT; PERRL, clear conjunctiva  Neck: supple  Cardiovascular: S1 S2+ RRR no g/r/m  Respiratory: CTA b/l; no W/R/R  Gastrointestinal: soft, NT/ND; +BSx4  Extremities: WWP; 2+ peripheral pulses; no edema   Neurological: Alert and verbalizing 1-2 words. Following 1-step commands.         LABS:                        8.6    10.8  )-----------( 321      ( 02 Aug 2018 22:16 )             26.7     08-02    141  |  100  |  46<H>  ----------------------------<  70  3.8   |  29  |  2.25<H>    Ca    8.8      02 Aug 2018 05:42  Phos  2.4     08-02  Mg     2.0     08-02    TPro  6.0  /  Alb  3.0<L>  /  TBili  0.4  /  DBili  x   /  AST  39  /  ALT  150<H>  /  AlkPhos  92  08-02          RADIOLOGY & ADDITIONAL TESTS:    MEDICATIONS  (STANDING):  chlorhexidine 2% Cloths 1 Application(s) Topical daily  dextrose 5%. 1000 milliLiter(s) (50 mL/Hr) IV Continuous <Continuous>  dextrose 50% Injectable 12.5 Gram(s) IV Push once  dextrose 50% Injectable 25 Gram(s) IV Push once  dextrose 50% Injectable 25 Gram(s) IV Push once  escitalopram 5 milliGRAM(s) Oral daily  insulin lispro (HumaLOG) corrective regimen sliding scale   SubCutaneous three times a day before meals  insulin lispro (HumaLOG) corrective regimen sliding scale   SubCutaneous at bedtime  metoprolol succinate ER 25 milliGRAM(s) Oral daily  pantoprazole    Tablet 40 milliGRAM(s) Oral two times a day  sevelamer carbonate Powder 800 milliGRAM(s) Oral three times a day with meals  sucralfate 1 Gram(s) Oral two times a day    MEDICATIONS  (PRN):  dextrose 40% Gel 15 Gram(s) Oral once PRN Blood Glucose LESS THAN 70 milliGRAM(s)/deciliter  glucagon  Injectable 1 milliGRAM(s) IntraMuscular once PRN Glucose LESS THAN 70 milligrams/deciliter OVERNIGHT EVENTS:  No acute signs of bleeding. Repeat CBC with Hb 8.6 (stable).    SUBJECTIVE:  No new complaints.     Vital Signs Last 12 Hrs  T(F): 97.5 (08-03-18 @ 06:31), Max: 98.7 (08-02-18 @ 19:43)  HR: 98 (08-03-18 @ 05:00) (98 - 110)  BP: 86/54 (08-03-18 @ 05:00) (75/45 - 105/61)  BP(mean): 67 (08-03-18 @ 05:00) (57 - 80)  RR: 16 (08-03-18 @ 05:00) (12 - 21)  SpO2: 100% (08-03-18 @ 05:00) (96% - 100%)  I&O's Summary    01 Aug 2018 07:01  -  02 Aug 2018 07:00  --------------------------------------------------------  IN: 1718 mL / OUT: 1650 mL / NET: 68 mL    02 Aug 2018 07:01  -  03 Aug 2018 06:58  --------------------------------------------------------  IN: 1000 mL / OUT: 1300 mL / NET: -300 mL      PHYSICAL EXAM:  General: Resting on his R side; NAD.   HEENT: NC/AT; PERRL, clear conjunctiva  Neck: supple  Cardiovascular: S1 S2+ RRR no g/r/m  Respiratory: CTA b/l; no W/R/R  Gastrointestinal: soft, NT/ND; +BSx4  Extremities: WWP; 2+ peripheral pulses; no edema   Neurological: Alert and verbalizing 1-2 words. Following 1-step commands.         LABS:                        8.6    10.8  )-----------( 321      ( 02 Aug 2018 22:16 )             26.7     08-02    141  |  100  |  46<H>  ----------------------------<  70  3.8   |  29  |  2.25<H>    Ca    8.8      02 Aug 2018 05:42  Phos  2.4     08-02  Mg     2.0     08-02    TPro  6.0  /  Alb  3.0<L>  /  TBili  0.4  /  DBili  x   /  AST  39  /  ALT  150<H>  /  AlkPhos  92  08-02          RADIOLOGY & ADDITIONAL TESTS:    MEDICATIONS  (STANDING):  chlorhexidine 2% Cloths 1 Application(s) Topical daily  dextrose 5%. 1000 milliLiter(s) (50 mL/Hr) IV Continuous <Continuous>  dextrose 50% Injectable 12.5 Gram(s) IV Push once  dextrose 50% Injectable 25 Gram(s) IV Push once  dextrose 50% Injectable 25 Gram(s) IV Push once  escitalopram 5 milliGRAM(s) Oral daily  insulin lispro (HumaLOG) corrective regimen sliding scale   SubCutaneous three times a day before meals  insulin lispro (HumaLOG) corrective regimen sliding scale   SubCutaneous at bedtime  metoprolol succinate ER 25 milliGRAM(s) Oral daily  pantoprazole    Tablet 40 milliGRAM(s) Oral two times a day  sevelamer carbonate Powder 800 milliGRAM(s) Oral three times a day with meals  sucralfate 1 Gram(s) Oral two times a day    MEDICATIONS  (PRN):  dextrose 40% Gel 15 Gram(s) Oral once PRN Blood Glucose LESS THAN 70 milliGRAM(s)/deciliter  glucagon  Injectable 1 milliGRAM(s) IntraMuscular once PRN Glucose LESS THAN 70 milligrams/deciliter

## 2018-08-03 NOTE — PROGRESS NOTE ADULT - PROBLEM SELECTOR PLAN 10
F: None  E: Replete K>4, Mg>2. Will need to be careful with repletion given poor renal function  N: Clear, adv as tolerated     Dispo: CCU F: IVF 1L NS bolus  E: Replete K>4, Mg>2. Will need to be careful with repletion given poor renal function  N: Mech soft/thin DASH/TLC     Dispo: CCU

## 2018-08-03 NOTE — PROGRESS NOTE ADULT - SUBJECTIVE AND OBJECTIVE BOX
Patient is a 67y Male seen and evaluated at bedside.   No significant complaints  patient started on IV NS @ 100 cc/hr  due to afib in setting of hypotension. BP @ 96/55  labs noted: Cr @ 1.92<----2.25      chlorhexidine 2% Cloths 1 daily  dextrose 40% Gel 15 once PRN  dextrose 5%. 1000 <Continuous>  dextrose 50% Injectable 12.5 once  dextrose 50% Injectable 25 once  dextrose 50% Injectable 25 once  escitalopram 5 daily  glucagon  Injectable 1 once PRN  insulin lispro (HumaLOG) corrective regimen sliding scale  three times a day before meals  insulin lispro (HumaLOG) corrective regimen sliding scale  at bedtime  metoprolol succinate ER 25 daily  pantoprazole    Tablet 40 two times a day  sevelamer carbonate Powder 800 three times a day with meals  sodium chloride 0.9%. 1000 <Continuous>  sucralfate 1 two times a day      Allergies    No Known Allergies    Intolerances        T(C): , Max: 37.2 (08-02-18 @ 16:34)  T(F): , Max: 98.9 (08-02-18 @ 16:34)  HR: 108 (08-03-18 @ 09:00)  BP: 82/42 (08-03-18 @ 09:00)  BP(mean): 58 (08-03-18 @ 09:00)  RR: 22 (08-03-18 @ 09:00)  SpO2: 97% (08-03-18 @ 09:00)  Wt(kg): --    08-02 @ 07:01  -  08-03 @ 07:00  --------------------------------------------------------  IN: 1000 mL / OUT: 1300 mL / NET: -300 mL    08-03 @ 07:01  -  08-03 @ 09:49  --------------------------------------------------------  IN: 200 mL / OUT: 0 mL / NET: 200 mL      PHYSICAL EXAM:  GENERAL: NAD, well-developed, well nourished, alert, awake, no acute distress at present  HEAD:  Atraumatic, Normocephalic,   EYES: Bilateral conjunctiva and sclera normal   Oral cavity: Oral mucosa dry and pink  NECK: Neck supple, No JVD  CHEST/LUNG: Clear to auscultation bilaterally; No wheeze, no rales, no crepitations  HEART: irregu;ar;y irregular  ABDOMEN: Soft, Nontender, BS+nt, No flank tenderness.   EXTREMITIES: No clubbing, cyanosis, or edema  Neurology: AAOx3, no focal neurological deficit  SKIN: No rashes or lesions          ACCESS:     LABS:                        9.1    11.6  )-----------( 300      ( 03 Aug 2018 07:01 )             28.4     08-03    136  |  96  |  32<H>  ----------------------------<  115<H>  4.0   |  28  |  1.92<H>    Ca    9.0      03 Aug 2018 07:01  Phos  1.8     08-03  Mg     1.9     08-03    TPro  6.9  /  Alb  3.2<L>  /  TBili  0.6  /  DBili  x   /  AST  32  /  ALT  117<H>  /  AlkPhos  103  08-03                RADIOLOGY & ADDITIONAL STUDIES:

## 2018-08-03 NOTE — PROGRESS NOTE ADULT - PROBLEM SELECTOR PLAN 1
non oliguric output @ 1650 cc  creatinine improving  @1.92  also monitor I/o  avoid nephrotoxic agents  - renal diet

## 2018-08-03 NOTE — PROGRESS NOTE ADULT - PROBLEM SELECTOR PLAN 1
Patient has hx of PCI w stents placed 7 years prior. Patient reportedly had an acute MI earlier this week in [place] with EKG showing ST elevations in the lateral leads and troponins in excess of 2000. EKG on admission shows q-waves in leads I and aVL with poor R wave progression consistent with a previous lateral wall MI. Outside therapeutic window for PCI. Troponin and CKMB done 7/22 in setting of new ST changes in 2 precordial leads, both lower, will no longer follow.     In setting of recent upper GI bleed, HELD anti-platelet, anti-coag medications, cardiac rate control, and afterload reduction medications.   Likely not able to add further agents long-term as too high of bleed risk. Started on ASA yesterday, now with acute Hb drop. Discontinue ASA and repeat CBC.   -Discontinue ASA 81mg  - Holding statin therapy due to transaminitis  - Toprol XL 25mg will closely monitor  - Holding ACE/ARB due to severe RONNIE  - HOLD hydralazine 10 TID for afterload reduction  - Eventually would benefit from diagnostic cath/stress test for ischemic workup; holding off due to renal failure and tenuous state    #asymptomatic afib/aflutter  NSVT noted. Continue on rate control (goal <110) and will continue to monitor  - as above, Toprol XL monitor  - Cardiac monitor  - no anti-platelet/anticoag in setting of concern for GI bleeding Patient has hx of PCI w stents placed 7 years prior. Patient reportedly had an acute MI earlier this week in [place] with EKG showing ST elevations in the lateral leads and troponins in excess of 2000. EKG on admission shows q-waves in leads I and aVL with poor R wave progression consistent with a previous lateral wall MI. Outside therapeutic window for PCI. Troponin and CKMB done 7/22 in setting of new ST changes in 2 precordial leads, both lower, will no longer follow.     In setting of recent upper GI bleed, HELD anti-platelet, anti-coag medications, cardiac rate control, and afterload reduction medications.   Likely not able to add further agents long-term as too high of bleed risk. Started on ASA yesterday, now with acute Hb drop. Discontinue ASA and repeat CBC.   -Discontinue ASA 81mg  - restarted Lipitor 80mg PO QD  - Toprol XL 25mg, tolerating  - Holding ACE/ARB due to RONNIE  - HOLD hydralazine 10 TID for afterload reduction    #asymptomatic afib/aflutter  NSVT noted. Continue on rate control (goal <110) and will continue to monitor  - as above, Toprol XL tolerated  - no anti-platelet/anticoag in setting of concern for GI bleeding

## 2018-08-03 NOTE — PROGRESS NOTE ADULT - PROBLEM SELECTOR PLAN 3
Acute Upper GI bleed 2/2 two non-bleed ulcers s/p clipping of one. Hb dropped to 6.9 on 07/29, pt given 1 unit of PRBCs, Concern for possible re-bleed; Hb stable ON, now improving to 9.1; d/c'd ASA and demonstrated inability to tolerate same.   -Appreciate GI f/u and rec's.   -PPI BID transition to PO  -Clear liquids, will try to advance as tolerated  - repeat CBCs   -active T&S  - Transfuse <7. Acute Upper GI bleed 2/2 two non-bleed ulcers s/p clipping of one. Hb dropped to 6.9 on 07/29, pt given 1 unit of PRBCs, Concern for possible re-bleed; Hb stable ON, now improving to 9.1; d/c'd ASA and demonstrated inability to tolerate same.   -Appreciate GI f/u and rec's.   -PPI BID transition to PO  [facility]  - repeat CBCs   -active T&S  - Transfuse <7.

## 2018-08-03 NOTE — PROGRESS NOTE ADULT - PROBLEM SELECTOR PLAN 5
MRI significant for subacute ischemia involving right parietal area   with numerous scattered areas of acute ischemia within the basal ganglia   and frontal and parietal lobes bilaterally some of which are in a   watershed distribution.. Improved mentation in setting of decreased uremia.   - F/U neuro recs  - HOLD MRA study for full neuro workup.   - carotid u/s negative   - PT/OT/ST as tolerated

## 2018-08-03 NOTE — PROGRESS NOTE ADULT - ASSESSMENT
Pt is a 67M w PMH of HTN, DM, HLD, HFrEF of 25%, CAD s/p PCI 7 years ago, with altered mental status in the setting of one week of dyspnea and weakness admitted to the CCU for management of ACS, acute on chronic CHF, RONNIE, s/p acute upper GI bleed 2/2 two gastric ulcers; s/p clipping of one ulcer. He remains in tenuous but improving status, as he is at a high risk for acute re-bleed and high risk for thrombus given recent STEMI, afib, and CVA. Hgb dropped to 6.9 on 07/29, pt given 1 unit of PRBCs. Mental status is improved, in the setting of decreased BUN. Restarted ASA 8/1/18, did not tolerate as Hb drop to 8.6, concern for upper GI re-bleed. No acute s/s of bleed; ASA d/c;d and Hb improved this morning. Long term, this is concerning for ability to maximize cardiac medical optimization (anti-platelet/AC) and provide secondary prevention for MI/Stroke,  as risk on these meds for fatal GI Bleed outweighs benefit. Will continue close monitoring on CCU; if Hb stable possible stepdown 1-2 days. Pt is a 67M w PMH of HTN, DM, HLD, HFrEF of 25%, CAD s/p PCI 7 years ago, with altered mental status in the setting of one week of dyspnea and weakness admitted to the CCU for management of ACS, acute on chronic CHF, RONNIE, s/p acute upper GI bleed 2/2 two gastric ulcers; s/p clipping of one ulcer. He remains in tenuous but improving status, as he is at a high risk for acute re-bleed and high risk for thrombus given recent STEMI, afib, and CVA. Hgb dropped to 6.9 on 07/29, pt given 1 unit of PRBCs. Mental status is improved, in the setting of decreased BUN. Restarted ASA 8/1/18, did not tolerate as Hb drop to 8.6, concern for upper GI re-bleed. No acute s/s of bleed; ASA d/c;d and Hb improved this morning to 9.1. Long term, this is concerning for ability to maximize cardiac medical optimization (anti-platelet/AC) and provide secondary prevention for MI/Stroke,  as risk on these meds for fatal GI Bleed outweighs benefit. LFT's and BUN/Cr improving. He is hemodynamically stable for stepdown today.

## 2018-08-03 NOTE — PROGRESS NOTE ADULT - PROBLEM SELECTOR PLAN 2
Patient's EF was reportedly 25% in the past according to ED report. On echo 7/20, EF is 15-20%. Was volume overloaded on initial exam, now improved. On CXR, evidence of cardiomegaly but significant decrease in pulmonary vascular congestion.   - d/c bumex gtt, holding further diuresis as appears euvolemic. stood yesterday with PT with stable BP's  -beta blocker as above  -not monitoring I/O's; d/c'd jo ann

## 2018-08-03 NOTE — PROGRESS NOTE ADULT - ATTENDING COMMENTS
Patient examined, fellow's hx and PE reviewed and confirmed. I find RONNIE stable, hypernatremia improved. A/P reviewed and confirmed. Follow SCr, SNa. See full note.

## 2018-08-04 DIAGNOSIS — R78.81 BACTEREMIA: ICD-10-CM

## 2018-08-04 DIAGNOSIS — D64.9 ANEMIA, UNSPECIFIED: ICD-10-CM

## 2018-08-04 LAB
-  K. PNEUMONIAE GROUP: SIGNIFICANT CHANGE UP
-  KPC RESISTANCE GENE: SIGNIFICANT CHANGE UP
ANION GAP SERPL CALC-SCNC: 13 MMOL/L — SIGNIFICANT CHANGE UP (ref 5–17)
BASOPHILS NFR BLD AUTO: 0.1 % — SIGNIFICANT CHANGE UP (ref 0–2)
BUN SERPL-MCNC: 27 MG/DL — HIGH (ref 7–23)
CALCIUM SERPL-MCNC: 8 MG/DL — LOW (ref 8.4–10.5)
CHLORIDE SERPL-SCNC: 97 MMOL/L — SIGNIFICANT CHANGE UP (ref 96–108)
CO2 SERPL-SCNC: 26 MMOL/L — SIGNIFICANT CHANGE UP (ref 22–31)
CREAT SERPL-MCNC: 1.85 MG/DL — HIGH (ref 0.5–1.3)
EOSINOPHIL NFR BLD AUTO: 1.7 % — SIGNIFICANT CHANGE UP (ref 0–6)
GLUCOSE SERPL-MCNC: 110 MG/DL — HIGH (ref 70–99)
GRAM STN FLD: SIGNIFICANT CHANGE UP
GRAM STN FLD: SIGNIFICANT CHANGE UP
HCT VFR BLD CALC: 22.9 % — LOW (ref 39–50)
HCT VFR BLD CALC: 25.8 % — LOW (ref 39–50)
HGB BLD-MCNC: 7.3 G/DL — LOW (ref 13–17)
HGB BLD-MCNC: 8.5 G/DL — LOW (ref 13–17)
LYMPHOCYTES # BLD AUTO: 10.9 % — LOW (ref 13–44)
MAGNESIUM SERPL-MCNC: 2 MG/DL — SIGNIFICANT CHANGE UP (ref 1.6–2.6)
MCHC RBC-ENTMCNC: 29.1 PG — SIGNIFICANT CHANGE UP (ref 27–34)
MCHC RBC-ENTMCNC: 30.1 PG — SIGNIFICANT CHANGE UP (ref 27–34)
MCHC RBC-ENTMCNC: 31.9 G/DL — LOW (ref 32–36)
MCHC RBC-ENTMCNC: 32.9 G/DL — SIGNIFICANT CHANGE UP (ref 32–36)
MCV RBC AUTO: 91.2 FL — SIGNIFICANT CHANGE UP (ref 80–100)
MCV RBC AUTO: 91.5 FL — SIGNIFICANT CHANGE UP (ref 80–100)
METHOD TYPE: SIGNIFICANT CHANGE UP
MONOCYTES NFR BLD AUTO: 6.2 % — SIGNIFICANT CHANGE UP (ref 2–14)
NEUTROPHILS NFR BLD AUTO: 81.1 % — HIGH (ref 43–77)
PLATELET # BLD AUTO: 190 K/UL — SIGNIFICANT CHANGE UP (ref 150–400)
PLATELET # BLD AUTO: 231 K/UL — SIGNIFICANT CHANGE UP (ref 150–400)
POTASSIUM SERPL-MCNC: 3.9 MMOL/L — SIGNIFICANT CHANGE UP (ref 3.5–5.3)
POTASSIUM SERPL-SCNC: 3.9 MMOL/L — SIGNIFICANT CHANGE UP (ref 3.5–5.3)
RBC # BLD: 2.51 M/UL — LOW (ref 4.2–5.8)
RBC # BLD: 2.82 M/UL — LOW (ref 4.2–5.8)
RBC # FLD: 13.5 % — SIGNIFICANT CHANGE UP (ref 10.3–16.9)
RBC # FLD: 14.2 % — SIGNIFICANT CHANGE UP (ref 10.3–16.9)
SODIUM SERPL-SCNC: 136 MMOL/L — SIGNIFICANT CHANGE UP (ref 135–145)
WBC # BLD: 8.2 K/UL — SIGNIFICANT CHANGE UP (ref 3.8–10.5)
WBC # BLD: 8.9 K/UL — SIGNIFICANT CHANGE UP (ref 3.8–10.5)
WBC # FLD AUTO: 8.2 K/UL — SIGNIFICANT CHANGE UP (ref 3.8–10.5)
WBC # FLD AUTO: 8.9 K/UL — SIGNIFICANT CHANGE UP (ref 3.8–10.5)

## 2018-08-04 PROCEDURE — 99291 CRITICAL CARE FIRST HOUR: CPT

## 2018-08-04 PROCEDURE — 71045 X-RAY EXAM CHEST 1 VIEW: CPT | Mod: 26

## 2018-08-04 PROCEDURE — 93010 ELECTROCARDIOGRAM REPORT: CPT

## 2018-08-04 PROCEDURE — 99233 SBSQ HOSP IP/OBS HIGH 50: CPT

## 2018-08-04 RX ORDER — PANTOPRAZOLE SODIUM 20 MG/1
40 TABLET, DELAYED RELEASE ORAL EVERY 12 HOURS
Qty: 0 | Refills: 0 | Status: DISCONTINUED | OUTPATIENT
Start: 2018-08-04 | End: 2018-08-07

## 2018-08-04 RX ORDER — POTASSIUM PHOSPHATE, MONOBASIC POTASSIUM PHOSPHATE, DIBASIC 236; 224 MG/ML; MG/ML
15 INJECTION, SOLUTION INTRAVENOUS ONCE
Qty: 0 | Refills: 0 | Status: COMPLETED | OUTPATIENT
Start: 2018-08-04 | End: 2018-08-04

## 2018-08-04 RX ORDER — ACETAMINOPHEN 500 MG
650 TABLET ORAL ONCE
Qty: 0 | Refills: 0 | Status: COMPLETED | OUTPATIENT
Start: 2018-08-04 | End: 2018-08-04

## 2018-08-04 RX ORDER — PIPERACILLIN AND TAZOBACTAM 4; .5 G/20ML; G/20ML
3.38 INJECTION, POWDER, LYOPHILIZED, FOR SOLUTION INTRAVENOUS EVERY 6 HOURS
Qty: 0 | Refills: 0 | Status: DISCONTINUED | OUTPATIENT
Start: 2018-08-04 | End: 2018-08-06

## 2018-08-04 RX ADMIN — POTASSIUM PHOSPHATE, MONOBASIC POTASSIUM PHOSPHATE, DIBASIC 62.5 MILLIMOLE(S): 236; 224 INJECTION, SOLUTION INTRAVENOUS at 10:15

## 2018-08-04 RX ADMIN — CHLORHEXIDINE GLUCONATE 1 APPLICATION(S): 213 SOLUTION TOPICAL at 16:37

## 2018-08-04 RX ADMIN — PIPERACILLIN AND TAZOBACTAM 200 GRAM(S): 4; .5 INJECTION, POWDER, LYOPHILIZED, FOR SOLUTION INTRAVENOUS at 18:16

## 2018-08-04 RX ADMIN — PIPERACILLIN AND TAZOBACTAM 200 GRAM(S): 4; .5 INJECTION, POWDER, LYOPHILIZED, FOR SOLUTION INTRAVENOUS at 10:21

## 2018-08-04 RX ADMIN — ATORVASTATIN CALCIUM 80 MILLIGRAM(S): 80 TABLET, FILM COATED ORAL at 23:22

## 2018-08-04 RX ADMIN — ESCITALOPRAM OXALATE 5 MILLIGRAM(S): 10 TABLET, FILM COATED ORAL at 12:14

## 2018-08-04 RX ADMIN — Medication 1 GRAM(S): at 18:23

## 2018-08-04 RX ADMIN — Medication 25 MILLIGRAM(S): at 07:47

## 2018-08-04 RX ADMIN — PANTOPRAZOLE SODIUM 40 MILLIGRAM(S): 20 TABLET, DELAYED RELEASE ORAL at 18:23

## 2018-08-04 RX ADMIN — Medication 650 MILLIGRAM(S): at 09:01

## 2018-08-04 RX ADMIN — Medication 1 GRAM(S): at 07:47

## 2018-08-04 RX ADMIN — PANTOPRAZOLE SODIUM 40 MILLIGRAM(S): 20 TABLET, DELAYED RELEASE ORAL at 07:46

## 2018-08-04 RX ADMIN — SEVELAMER CARBONATE 800 MILLIGRAM(S): 2400 POWDER, FOR SUSPENSION ORAL at 07:46

## 2018-08-04 NOTE — PROGRESS NOTE ADULT - PROBLEM SELECTOR PLAN 3
Acute Upper GI bleed 2/2 two non-bleed ulcers s/p clipping of one. Hb dropped to 6.9 on 07/29, pt given 1 unit of PRBCs, Concern for possible re-bleed; Hb continued to fluctuate, dc'd ASA, Hb decreased again on 8/4 to 7.3 with black stool on exam, ordered another unit blood, will repeat CBC, called GI.    -Appreciate GI f/u and rec's.   -PPI BID transition to PO  -Adv to mech soft/thin DASH/TLC  - repeat CBCs   - Active T&S  - Transfuse <7. Acute Upper GI bleed 2/2 two non-bleed ulcers s/p clipping of one. Hb dropped to 6.9 on 07/29, pt given 1 unit of PRBCs, Concern for possible re-bleed; Hb continued to fluctuate, dc'd ASA, Hb decreased again on 8/4 to 7.3 with black stool on exam, ordered another unit blood, will repeat CBC, called GI who recs repeat CBC+call IR for embolization if low.  -Appreciate GI f/u and rec's.   -PPI BID transition to PO  [facility]  - repeat CBCs   - Active T&S  - Transfuse <7. Acute Upper GI bleed 2/2 two non-bleed ulcers s/p clipping of one. Hb dropped to 6.9 on 07/29, pt given 1 unit of PRBCs, Concern for possible re-bleed; Hb continued to fluctuate, dc'd ASA, Hb decreased again on 8/4 to 7.3 with black stool on exam, ordered another unit blood, will repeat CBC, called GI who recs repeat CBC+call IR for embolization if low.  -Appreciate GI f/u and rec's.   -PPI BID transition to PO  -Adv to [facility] soft/thin DASH/TLC  - repeat CBCs   - Active T&S  - Transfusion goal >8 for patient with significant CV disease. Acute Upper GI bleed 2/2 two non-bleed ulcers s/p clipping of one. Hb dropped to 6.9 on 07/29, pt given 1 unit of PRBCs, Concern for possible re-bleed; Hb continued to fluctuate, dc'd ASA, Hb decreased again on 8/4 to 7.3 with dark stool on exam, ordered another unit blood, will repeat CBC, called GI who recs repeat CBC+call IR for embolization if low.  -Appreciate GI f/u and rec's.   -PPI BID transition to PO  -Adv to [facility] soft/thin DASH/TLC  - repeat CBCs   - Active T&S  - Transfusion goal >8 for patient with significant CV disease.

## 2018-08-04 NOTE — PROGRESS NOTE ADULT - ATTENDING COMMENTS
Patient examined, fellow's hx and PE reviewed and confirmed. I find RONNIE improved, anemia.  A/P reviewed and confirmed. Follow SCr. Continue anti-hypertensives. See full note.

## 2018-08-04 NOTE — PROGRESS NOTE ADULT - ASSESSMENT
Pt is a 67M w PMH of HTN, DM, HLD, HFrEF of 25%, CAD s/p PCI 7 years ago, with altered mental status in the setting of one week of dyspnea and weakness admitted to the CCU for management of ACS, acute on chronic CHF, RONNIE, s/p acute upper GI bleed 2/2 two gastric ulcers; s/p clipping of one ulcer. He remains in tenuous but improving status, as he is at a high risk for acute re-bleed and high risk for thrombus given recent STEMI, afib, and CVA. Hgb dropped to 6.9 on 07/29, pt given 1 unit of PRBCs. Mental status is improved, in the setting of decreased BUN. Restarted ASA 8/1/18, did not tolerate as Hb drop to 8.6, concern for upper GI re-bleed. No acute s/s of bleed; ASA d/c;d and Hb improved this morning to 9.1. Long term, this is concerning for ability to maximize cardiac medical optimization (anti-platelet/AC) and provide secondary prevention for MI/Stroke,  as risk on these meds for fatal GI Bleed outweighs benefit. LFT's and BUN/Cr improving. On 8/4 Hb dropped again from 9.1 to 7.3, called GI, will give 1 unit of blood. Pt is a 67M w PMH of HTN, DM, HLD, HFrEF of 25%, CAD s/p PCI 7 years ago, with altered mental status in the setting of one week of dyspnea and weakness admitted to the CCU for management of ACS, acute on chronic CHF, RONNIE, s/p acute upper GI bleed 2/2 two gastric ulcers; s/p clipping of one ulcer. He remains in tenuous but improving status, as he is at a high risk for acute re-bleed and high risk for thrombus given recent STEMI, afib, and CVA. Hgb dropped to 6.9 on 07/29, pt given 1 unit of PRBCs. Mental status is improved, in the setting of decreased BUN. Restarted ASA 8/1/18, did not tolerate as Hb drop to 8.6, concern for upper GI re-bleed. No acute s/s of bleed; ASA d/c;d and Hb improved this morning to 9.1. Long term, this is concerning for ability to maximize cardiac medical optimization (anti-platelet/AC) and provide secondary prevention for MI/Stroke,  as risk on these meds for fatal GI Bleed outweighs benefit. LFT's and BUN/Cr improving. On 8/4 Hb dropped again from 9.1 to 7.3, called GI (recs repeat CBC+call IR for embolization if low), will give 1 unit of blood.

## 2018-08-04 NOTE — PROGRESS NOTE ADULT - PROBLEM SELECTOR PLAN 1
- now with improving of the renal function, stable  - volume status accepatble   - electrolytes noted   - receiving blood transfusion - possible GI bleed  avoid nephrotoxic agents  - renal diet  - hypophosphatemia - please replace - 15 meq of po KPhO4 and follow up

## 2018-08-04 NOTE — PROGRESS NOTE ADULT - PROBLEM SELECTOR PLAN 10
F: IVF 1L NS bolus  E: Replete K>4, Mg>2. Will need to be careful with repletion given poor renal function  N: Mech soft/thin DASH/TLC     Dispo: CCU F: IVF 1L NS bolus  E: Replete K>4, Mg>2. Will need to be careful with repletion given poor renal function  N: NPO due to potential GI bleeding    Dispo: CCU

## 2018-08-04 NOTE — PROGRESS NOTE ADULT - PROBLEM SELECTOR PLAN 8
Holding home medications. hypoglycemic this morning  - Hb A1c was 8  -d/c'd  Lantus   - c/w ISS, Holding home medications. hypoglycemic this morning  - Hb A1c was 8  - d/c'd  Lantus   - c/w ISS,

## 2018-08-04 NOTE — PROGRESS NOTE ADULT - PROBLEM SELECTOR PLAN 7
Patient's ALT over 1000 on admission, now 300. AST is 100. Alk phos is 99. Unclear pattern of liver enzymes. HIV/HepC/HepB negative. Likely shock liver, improving.   - trend LFT's Patient has been weak, unsteady, and confused. Improved significantly with decrease in BUN. Likely toxic-metabolic 2/2 uremia.  - On lexapro

## 2018-08-04 NOTE — PROGRESS NOTE ADULT - SUBJECTIVE AND OBJECTIVE BOX
Seen in the morning, no shortness of breath, no chest pain, no fever, getting blood transfusion     Patient seen and examined at bedside.     atorvastatin 80 milliGRAM(s) at bedtime  chlorhexidine 2% Cloths 1 Application(s) daily  dextrose 40% Gel 15 Gram(s) once PRN  dextrose 5%. 1000 milliLiter(s) <Continuous>  dextrose 50% Injectable 12.5 Gram(s) once  dextrose 50% Injectable 25 Gram(s) once  dextrose 50% Injectable 25 Gram(s) once  escitalopram 5 milliGRAM(s) daily  glucagon  Injectable 1 milliGRAM(s) once PRN  insulin lispro (HumaLOG) corrective regimen sliding scale   three times a day before meals  insulin lispro (HumaLOG) corrective regimen sliding scale   at bedtime  metoprolol succinate ER 25 milliGRAM(s) daily  pantoprazole  Injectable 40 milliGRAM(s) every 12 hours  piperacillin/tazobactam IVPB. 3.375 Gram(s) every 6 hours  sucralfate 1 Gram(s) two times a day      Allergies    No Known Allergies    Intolerances        T(C): , Max: 38.1 (18 @ 16:33)  T(F): , Max: 100.6 (18 @ 16:33)  HR: 90 (18 @ 15:00)  BP: 87/46 (18 @ 15:00)  BP(mean): 62 (18 @ 15:00)  RR: 66 (18 @ 15:00)  SpO2: 98% (18 @ 15:00)  Wt(kg): --     @ 07:01  -   @ 07:00  --------------------------------------------------------  IN:    Oral Fluid: 990 mL    sodium chloride 0.9%: 300 mL    sodium chloride 0.9%: 200 mL    Solution: 200 mL  Total IN: 1690 mL    OUT:    Intermittent Catheterization - Urethral: 360 mL    Voided: 1350 mL  Total OUT: 1710 mL    Total NET: -20 mL       @ 07:01  -  08-04 @ 15:35  --------------------------------------------------------  IN:    Packed Red Blood Cells: 350 mL    Solution: 412.5 mL  Total IN: 762.5 mL    OUT:    Intermittent Catheterization - Urethral: 420 mL  Total OUT: 420 mL    Total NET: 342.5 mL      Physical exam:   Alert and oriented   No JVD   Normal air entry into the lungs, no crackles   RRR, normal s1/s2, no murmurs, rubs or gallops   Abdomen - soft, not tender, not distended   extremities; no edema           LABS:                        7.4    9.7   )-----------( 229      ( 04 Aug 2018 08:27 )             23.0     08-04    136  |  97  |  27<H>  ----------------------------<  110<H>  3.9   |  26  |  1.85<H>    Ca    8.0<L>      04 Aug 2018 06:38  Phos  1.8     08-04  Mg     2.0     08-04    TPro  6.9  /  Alb  3.2<L>  /  TBili  0.6  /  DBili  x   /  AST  32  /  ALT  117<H>  /  AlkPhos  103  08-03        Urinalysis Basic - ( 03 Aug 2018 19:42 )    Color: Yellow / Appearance: Cloudy / S.010 / pH: x  Gluc: x / Ketone: NEGATIVE  / Bili: Negative / Urobili: 0.2 E.U./dL   Blood: x / Protein: Trace mg/dL / Nitrite: NEGATIVE   Leuk Esterase: Large / RBC: < 5 /HPF / WBC Many /HPF   Sq Epi: x / Non Sq Epi: x / Bacteria: Many /HPF            RADIOLOGY & ADDITIONAL STUDIES:

## 2018-08-04 NOTE — PROGRESS NOTE ADULT - PROBLEM SELECTOR PLAN 6
Patient has been weak, unsteady, and confused. There is ? facial droop on exam. Likely toxic-metabolic 2/2 uremia but could have some component of hypoperfusion, stroke/hemorrhage, and depression.   - Lactate normalized   - On lexapro  -improved with decreased uremia MRI significant for subacute ischemia involving right parietal area   with numerous scattered areas of acute ischemia within the basal ganglia   and frontal and parietal lobes bilaterally some of which are in a   watershed distribution.. Improved mentation in setting of decreased uremia.   - F/U neuro recs  - HOLD MRA study for full neuro workup.   - carotid u/s negative   - PT/OT/ST as tolerated

## 2018-08-04 NOTE — PROGRESS NOTE ADULT - ASSESSMENT
This is 67 year old male without any prior h/o kidney disease. Now admitted for NSTEMI/STEMI  with acute kidney injury--> unknown baseline creatinine. Now with improving and stable renal function

## 2018-08-04 NOTE — PROGRESS NOTE ADULT - PROBLEM SELECTOR PLAN 1
Patient has hx of PCI w stents placed 7 years prior. Patient reportedly had an acute MI earlier this week in [place] with EKG showing ST elevations in the lateral leads and troponins in excess of 2000. EKG on admission shows q-waves in leads I and aVL with poor R wave progression consistent with a previous lateral wall MI. Outside therapeutic window for PCI. Troponin and CKMB done 7/22 in setting of new ST changes in 2 precordial leads, both lower, will no longer follow.     In setting of recent upper GI bleed, HELD anti-platelet, anti-coag medications, cardiac rate control, and afterload reduction medications.   Likely not able to add further agents long-term as too high of bleed risk. Started on ASA yesterday, now with acute Hb drop. Discontinue ASA and repeat CBC.   -Discontinue ASA 81mg  - restarted Lipitor 80mg PO QD  - Toprol XL 25mg, tolerating  - Holding ACE/ARB due to RONNIE  - HOLD hydralazine 10 TID for afterload reduction    #asymptomatic afib/aflutter  NSVT noted. Continue on rate control (goal <110) and will continue to monitor  - as above, Toprol XL tolerated  - no anti-platelet/anticoag in setting of concern for GI bleeding

## 2018-08-04 NOTE — PROGRESS NOTE ADULT - PROBLEM SELECTOR PLAN 5
MRI significant for subacute ischemia involving right parietal area   with numerous scattered areas of acute ischemia within the basal ganglia   and frontal and parietal lobes bilaterally some of which are in a   watershed distribution.. Improved mentation in setting of decreased uremia.   - F/U neuro recs  - HOLD MRA study for full neuro workup.   - carotid u/s negative   - PT/OT/ST as tolerated Patient's BUN/Cr is up trending, in setting of anemia/low perfusion. (unclear baseline). Likely 2/2 to cardiorenal syndrome as cardiac output is poor. Uremia resolved significantly, with subsequent improved mental status.   - Renal on board, appreciate recs  - Per renal, no urgent need for dialysis at this time  -sevelamer 800 TID to prevent hyperphosphatemia  -Pt dry and euvolemic; hold further diuretics for now    #elevated kappa protein  -elevated kappa and kappa/lambda ratio noted. Not likely multiple myeloma as pt not anemic prior to acute GI bleed and without hypercalcemia. Can f/u with PCP as an OP    #Hyponatremia: Resolved   - d/c jo ann

## 2018-08-04 NOTE — PROGRESS NOTE ADULT - PROBLEM SELECTOR PLAN 2
Patient's EF was reportedly 25% in the past according to ED report. On echo 7/20, EF is 15-20%. Was volume overloaded on initial exam, now improved. On CXR, evidence of cardiomegaly but significant decrease in pulmonary vascular congestion.   - d/c bumex gtt, holding further diuresis as appears euvolemic. stood yesterday with PT with stable BP's  -beta blocker as above  -not monitoring I/O's; d/c'd jo ann Patient's EF was reportedly 25% in the past according to ED report. On echo 7/20, EF is 15-20%. Was volume overloaded on initial exam, now improved. On CXR, evidence of cardiomegaly but significant decrease in pulmonary vascular congestion.   - d/c bumex gtt, holding further diuresis as appears euvolemic. stood yesterday with PT with stable BP's  -beta blocker as above  -not monitoring I/O's; d/c'd cherry  - Will monitor lungs and fluid status s/p fluids and blood transfusion.

## 2018-08-04 NOTE — PROGRESS NOTE ADULT - PROBLEM SELECTOR PLAN 4
Patient's BUN/Cr is up trending, in setting of anemia/low perfusion. (unclear baseline). Likely 2/2 to cardiorenal syndrome as cardiac output is poor. Uremia resolved significantly, with subsequent improved mental status.   - Renal on board, appreciate recs  - Per renal, no urgent need for dialysis at this time  -sevelamer 800 TID to prevent hyperphosphatemia  -Pt dry and euvolemic; hold further diuretics for now    #elevated kappa protein  -elevated kappa and kappa/lambda ratio noted. Not likely multiple myeloma as pt not anemic prior to acute GI bleed and without hypercalcemia. Can f/u with PCP as an OP    #Hyponatremia: Resolved   - d/c jo ann - Patient with positive blood cultures for gram-negative rods on evening of 8/3, low-grade fevers.  - UA is positive, awaiting culture. CXR negative. Will repeat blood cultures.   - Patient initially receiving ceftriaxone, changed to zosyn. Await final cultures and sensitivities.

## 2018-08-04 NOTE — PROGRESS NOTE ADULT - SUBJECTIVE AND OBJECTIVE BOX
SUBJECTIVE / INTERVAL HPI: Patient seen and examined at bedside. No events overnight, patient was stable. This morning no new complaints, no abdominal pain, fevers, patient is resting comfortably.     VITAL SIGNS:  Vital Signs Last 24 Hrs  T(C): 37.9 (04 Aug 2018 05:00), Max: 38.1 (03 Aug 2018 16:33)  T(F): 100.2 (04 Aug 2018 05:00), Max: 100.6 (03 Aug 2018 16:33)  HR: 102 (04 Aug 2018 04:00) (96 - 112)  BP: 87/54 (04 Aug 2018 04:00) (69/43 - 107/61)  BP(mean): 68 (04 Aug 2018 04:00) (54 - 87)  RR: 18 (04 Aug 2018 04:00) (11 - 62)  SpO2: 99% (04 Aug 2018 04:00) (86% - 100%)    REVIEW OF SYSTEMS:    CONSTITUTIONAL: No weakness, fevers or chills  EYES/ENT: No visual changes;  No vertigo or throat pain   NECK: No pain or stiffness  RESPIRATORY: No cough, wheezing, hemoptysis; No shortness of breath  CARDIOVASCULAR: No chest pain or palpitations  GASTROINTESTINAL: No abdominal or epigastric pain. No nausea, vomiting, or hematemesis; No diarrhea or constipation. No melena or hematochezia.  GENITOURINARY: No dysuria, frequency or hematuria  NEUROLOGICAL: No numbness or weakness  SKIN: No itching, burning, rashes, or lesions   All other review of systems is negative unless indicated above.    PHYSICAL EXAM:  General: WDWN  HEENT: NC/AT; PERRL, clear conjunctiva  Neck: supple  Cardiovascular: +S1/S2; RRR  Respiratory: CTA b/l; no W/R/R  Gastrointestinal: soft, NT/ND; +BSx4  Rectal: Black stool seen on rectal exam. No bright red blood.   Extremities: WWP; 2+ peripheral pulses; no edema   Neurological: AAOx3; no focal deficits    MEDICATIONS:  MEDICATIONS  (STANDING):  atorvastatin 80 milliGRAM(s) Oral at bedtime  cefTRIAXone   IVPB 1 Gram(s) IV Intermittent every 24 hours  chlorhexidine 2% Cloths 1 Application(s) Topical daily  dextrose 5%. 1000 milliLiter(s) (50 mL/Hr) IV Continuous <Continuous>  dextrose 50% Injectable 12.5 Gram(s) IV Push once  dextrose 50% Injectable 25 Gram(s) IV Push once  dextrose 50% Injectable 25 Gram(s) IV Push once  escitalopram 5 milliGRAM(s) Oral daily  insulin lispro (HumaLOG) corrective regimen sliding scale   SubCutaneous three times a day before meals  insulin lispro (HumaLOG) corrective regimen sliding scale   SubCutaneous at bedtime  metoprolol succinate ER 25 milliGRAM(s) Oral daily  pantoprazole    Tablet 40 milliGRAM(s) Oral two times a day  sevelamer carbonate Powder 800 milliGRAM(s) Oral three times a day with meals  sucralfate 1 Gram(s) Oral two times a day    MEDICATIONS  (PRN):  dextrose 40% Gel 15 Gram(s) Oral once PRN Blood Glucose LESS THAN 70 milliGRAM(s)/deciliter  glucagon  Injectable 1 milliGRAM(s) IntraMuscular once PRN Glucose LESS THAN 70 milligrams/deciliter      ALLERGIES:  Allergies    No Known Allergies    Intolerances        LABS:                        7.3    8.9   )-----------( 231      ( 04 Aug 2018 06:38 )             22.9     08-04    136  |  97  |  27<H>  ----------------------------<  110<H>  3.9   |  26  |  1.85<H>    Ca    8.0<L>      04 Aug 2018 06:38  Phos  1.8     08-03  Mg     2.0     08-    TPro  6.9  /  Alb  3.2<L>  /  TBili  0.6  /  DBili  x   /  AST  32  /  ALT  117<H>  /  AlkPhos  103  08-03      Urinalysis Basic - ( 03 Aug 2018 19:42 )    Color: Yellow / Appearance: Cloudy / S.010 / pH: x  Gluc: x / Ketone: NEGATIVE  / Bili: Negative / Urobili: 0.2 E.U./dL   Blood: x / Protein: Trace mg/dL / Nitrite: NEGATIVE   Leuk Esterase: Large / RBC: < 5 /HPF / WBC Many /HPF   Sq Epi: x / Non Sq Epi: x / Bacteria: Many /HPF      CAPILLARY BLOOD GLUCOSE      POCT Blood Glucose.: 116 mg/dL (04 Aug 2018 05:54)      RADIOLOGY & ADDITIONAL TESTS: Reviewed. SUBJECTIVE / INTERVAL HPI: Patient seen and examined at bedside. No events overnight, patient was stable. This morning no new complaints, no abdominal pain, fevers, patient is resting comfortably.     VITAL SIGNS:  Vital Signs Last 24 Hrs  T(C): 37.9 (04 Aug 2018 05:00), Max: 38.1 (03 Aug 2018 16:33)  T(F): 100.2 (04 Aug 2018 05:00), Max: 100.6 (03 Aug 2018 16:33)  HR: 102 (04 Aug 2018 04:00) (96 - 112)  BP: 87/54 (04 Aug 2018 04:00) (69/43 - 107/61)  BP(mean): 68 (04 Aug 2018 04:00) (54 - 87)  RR: 18 (04 Aug 2018 04:00) (11 - 62)  SpO2: 99% (04 Aug 2018 04:00) (86% - 100%)    REVIEW OF SYSTEMS:    CONSTITUTIONAL: No weakness, fevers or chills  EYES/ENT: No visual changes;  No vertigo or throat pain   NECK: No pain or stiffness  RESPIRATORY: No cough, wheezing, hemoptysis; No shortness of breath  CARDIOVASCULAR: No chest pain or palpitations  GASTROINTESTINAL: No abdominal or epigastric pain. No nausea, vomiting, or hematemesis; No diarrhea or constipation. No melena or hematochezia.  GENITOURINARY: No dysuria, frequency or hematuria  NEUROLOGICAL: No numbness or weakness  SKIN: No itching, burning, rashes, or lesions   All other review of systems is negative unless indicated above.    PHYSICAL EXAM:  General: WDWN  HEENT: NC/AT; PERRL, clear conjunctiva  Neck: supple  Cardiovascular: +S1/S2; RRR. 2/6 systolic ejection murmur.   Respiratory: CTA b/l; no W/R/R  Gastrointestinal: soft, NT/ND; +BSx4  Rectal: Black stool seen on rectal exam. No bright red blood.   Extremities: WWP; 2+ peripheral pulses; no edema   Neurological: AAOx3; no focal deficits    MEDICATIONS:  MEDICATIONS  (STANDING):  atorvastatin 80 milliGRAM(s) Oral at bedtime  cefTRIAXone   IVPB 1 Gram(s) IV Intermittent every 24 hours  chlorhexidine 2% Cloths 1 Application(s) Topical daily  dextrose 5%. 1000 milliLiter(s) (50 mL/Hr) IV Continuous <Continuous>  dextrose 50% Injectable 12.5 Gram(s) IV Push once  dextrose 50% Injectable 25 Gram(s) IV Push once  dextrose 50% Injectable 25 Gram(s) IV Push once  escitalopram 5 milliGRAM(s) Oral daily  insulin lispro (HumaLOG) corrective regimen sliding scale   SubCutaneous three times a day before meals  insulin lispro (HumaLOG) corrective regimen sliding scale   SubCutaneous at bedtime  metoprolol succinate ER 25 milliGRAM(s) Oral daily  pantoprazole    Tablet 40 milliGRAM(s) Oral two times a day  sevelamer carbonate Powder 800 milliGRAM(s) Oral three times a day with meals  sucralfate 1 Gram(s) Oral two times a day    MEDICATIONS  (PRN):  dextrose 40% Gel 15 Gram(s) Oral once PRN Blood Glucose LESS THAN 70 milliGRAM(s)/deciliter  glucagon  Injectable 1 milliGRAM(s) IntraMuscular once PRN Glucose LESS THAN 70 milligrams/deciliter      ALLERGIES:  Allergies    No Known Allergies    Intolerances        LABS:                        7.3    8.9   )-----------( 231      ( 04 Aug 2018 06:38 )             22.9     08-04    136  |  97  |  27<H>  ----------------------------<  110<H>  3.9   |  26  |  1.85<H>    Ca    8.0<L>      04 Aug 2018 06:38  Phos  1.8     08-03  Mg     2.0     08-    TPro  6.9  /  Alb  3.2<L>  /  TBili  0.6  /  DBili  x   /  AST  32  /  ALT  117<H>  /  AlkPhos  103  08-03      Urinalysis Basic - ( 03 Aug 2018 19:42 )    Color: Yellow / Appearance: Cloudy / S.010 / pH: x  Gluc: x / Ketone: NEGATIVE  / Bili: Negative / Urobili: 0.2 E.U./dL   Blood: x / Protein: Trace mg/dL / Nitrite: NEGATIVE   Leuk Esterase: Large / RBC: < 5 /HPF / WBC Many /HPF   Sq Epi: x / Non Sq Epi: x / Bacteria: Many /HPF      CAPILLARY BLOOD GLUCOSE      POCT Blood Glucose.: 116 mg/dL (04 Aug 2018 05:54)      RADIOLOGY & ADDITIONAL TESTS: Reviewed. SUBJECTIVE / INTERVAL HPI: Patient seen and examined at bedside. No events overnight, patient was stable. This morning no new complaints, no abdominal pain, fevers, patient is resting comfortably.     VITAL SIGNS:  Vital Signs Last 24 Hrs  T(C): 37.9 (04 Aug 2018 05:00), Max: 38.1 (03 Aug 2018 16:33)  T(F): 100.2 (04 Aug 2018 05:00), Max: 100.6 (03 Aug 2018 16:33)  HR: 102 (04 Aug 2018 04:00) (96 - 112)  BP: 87/54 (04 Aug 2018 04:00) (69/43 - 107/61)  BP(mean): 68 (04 Aug 2018 04:00) (54 - 87)  RR: 18 (04 Aug 2018 04:00) (11 - 62)  SpO2: 99% (04 Aug 2018 04:00) (86% - 100%)    REVIEW OF SYSTEMS:    CONSTITUTIONAL: No weakness, fevers or chills  EYES/ENT: No visual changes;  No vertigo or throat pain   NECK: No pain or stiffness  RESPIRATORY: No cough, wheezing, hemoptysis; No shortness of breath  CARDIOVASCULAR: No chest pain or palpitations  GASTROINTESTINAL: No abdominal or epigastric pain. No nausea, vomiting, or hematemesis; No diarrhea or constipation.  GENITOURINARY: No dysuria, frequency or hematuria  NEUROLOGICAL: No numbness or weakness  SKIN: No itching, burning, rashes, or lesions   All other review of systems is negative unless indicated above.    PHYSICAL EXAM:  General: WDWN  HEENT: NC/AT; PERRL, clear conjunctiva  Neck: supple  Cardiovascular: +S1/S2; RRR. 2/6 systolic ejection murmur.   Respiratory: CTA b/l; no W/R/R  Gastrointestinal: soft, NT/ND; +BSx4  Rectal: Dark stool seen on rectal exam. No bright red blood.   Extremities: WWP; 2+ peripheral pulses; no edema   Neurological: AAOx3; no focal deficits    MEDICATIONS:  MEDICATIONS  (STANDING):  atorvastatin 80 milliGRAM(s) Oral at bedtime  cefTRIAXone   IVPB 1 Gram(s) IV Intermittent every 24 hours  chlorhexidine 2% Cloths 1 Application(s) Topical daily  dextrose 5%. 1000 milliLiter(s) (50 mL/Hr) IV Continuous <Continuous>  dextrose 50% Injectable 12.5 Gram(s) IV Push once  dextrose 50% Injectable 25 Gram(s) IV Push once  dextrose 50% Injectable 25 Gram(s) IV Push once  escitalopram 5 milliGRAM(s) Oral daily  insulin lispro (HumaLOG) corrective regimen sliding scale   SubCutaneous three times a day before meals  insulin lispro (HumaLOG) corrective regimen sliding scale   SubCutaneous at bedtime  metoprolol succinate ER 25 milliGRAM(s) Oral daily  pantoprazole    Tablet 40 milliGRAM(s) Oral two times a day  sevelamer carbonate Powder 800 milliGRAM(s) Oral three times a day with meals  sucralfate 1 Gram(s) Oral two times a day    MEDICATIONS  (PRN):  dextrose 40% Gel 15 Gram(s) Oral once PRN Blood Glucose LESS THAN 70 milliGRAM(s)/deciliter  glucagon  Injectable 1 milliGRAM(s) IntraMuscular once PRN Glucose LESS THAN 70 milligrams/deciliter      ALLERGIES:  Allergies    No Known Allergies    Intolerances        LABS:                        7.3    8.9   )-----------( 231      ( 04 Aug 2018 06:38 )             22.9     08-04    136  |  97  |  27<H>  ----------------------------<  110<H>  3.9   |  26  |  1.85<H>    Ca    8.0<L>      04 Aug 2018 06:38  Phos  1.8     08-03  Mg     2.0     08-    TPro  6.9  /  Alb  3.2<L>  /  TBili  0.6  /  DBili  x   /  AST  32  /  ALT  117<H>  /  AlkPhos  103  08-03      Urinalysis Basic - ( 03 Aug 2018 19:42 )    Color: Yellow / Appearance: Cloudy / S.010 / pH: x  Gluc: x / Ketone: NEGATIVE  / Bili: Negative / Urobili: 0.2 E.U./dL   Blood: x / Protein: Trace mg/dL / Nitrite: NEGATIVE   Leuk Esterase: Large / RBC: < 5 /HPF / WBC Many /HPF   Sq Epi: x / Non Sq Epi: x / Bacteria: Many /HPF      CAPILLARY BLOOD GLUCOSE      POCT Blood Glucose.: 116 mg/dL (04 Aug 2018 05:54)      RADIOLOGY & ADDITIONAL TESTS: Reviewed.

## 2018-08-04 NOTE — PROGRESS NOTE ADULT - PROBLEM SELECTOR PLAN 2
- no need for phosphate binder   - please replace the phosphate  PTH - 395   - consider calcitriol 0.25 mcg every other day

## 2018-08-05 LAB
ANION GAP SERPL CALC-SCNC: 11 MMOL/L — SIGNIFICANT CHANGE UP (ref 5–17)
BUN SERPL-MCNC: 22 MG/DL — SIGNIFICANT CHANGE UP (ref 7–23)
CALCIUM SERPL-MCNC: 8.2 MG/DL — LOW (ref 8.4–10.5)
CHLORIDE SERPL-SCNC: 98 MMOL/L — SIGNIFICANT CHANGE UP (ref 96–108)
CO2 SERPL-SCNC: 27 MMOL/L — SIGNIFICANT CHANGE UP (ref 22–31)
CREAT SERPL-MCNC: 1.85 MG/DL — HIGH (ref 0.5–1.3)
GLUCOSE SERPL-MCNC: 130 MG/DL — HIGH (ref 70–99)
HCT VFR BLD CALC: 23.5 % — LOW (ref 39–50)
HCT VFR BLD CALC: 27.3 % — LOW (ref 39–50)
HCT VFR BLD CALC: 28.1 % — LOW (ref 39–50)
HGB BLD-MCNC: 7.4 G/DL — LOW (ref 13–17)
HGB BLD-MCNC: 8.9 G/DL — LOW (ref 13–17)
HGB BLD-MCNC: 9.3 G/DL — LOW (ref 13–17)
MAGNESIUM SERPL-MCNC: 1.9 MG/DL — SIGNIFICANT CHANGE UP (ref 1.6–2.6)
MCHC RBC-ENTMCNC: 29.1 PG — SIGNIFICANT CHANGE UP (ref 27–34)
MCHC RBC-ENTMCNC: 29.7 PG — SIGNIFICANT CHANGE UP (ref 27–34)
MCHC RBC-ENTMCNC: 30.1 PG — SIGNIFICANT CHANGE UP (ref 27–34)
MCHC RBC-ENTMCNC: 31.5 G/DL — LOW (ref 32–36)
MCHC RBC-ENTMCNC: 32.6 G/DL — SIGNIFICANT CHANGE UP (ref 32–36)
MCHC RBC-ENTMCNC: 33.1 G/DL — SIGNIFICANT CHANGE UP (ref 32–36)
MCV RBC AUTO: 90.9 FL — SIGNIFICANT CHANGE UP (ref 80–100)
MCV RBC AUTO: 91 FL — SIGNIFICANT CHANGE UP (ref 80–100)
MCV RBC AUTO: 92.5 FL — SIGNIFICANT CHANGE UP (ref 80–100)
PHOSPHATE SERPL-MCNC: 2.2 MG/DL — LOW (ref 2.5–4.5)
PLATELET # BLD AUTO: 163 K/UL — SIGNIFICANT CHANGE UP (ref 150–400)
PLATELET # BLD AUTO: 174 K/UL — SIGNIFICANT CHANGE UP (ref 150–400)
PLATELET # BLD AUTO: 189 K/UL — SIGNIFICANT CHANGE UP (ref 150–400)
POTASSIUM SERPL-MCNC: 4.2 MMOL/L — SIGNIFICANT CHANGE UP (ref 3.5–5.3)
POTASSIUM SERPL-SCNC: 4.2 MMOL/L — SIGNIFICANT CHANGE UP (ref 3.5–5.3)
RBC # BLD: 2.54 M/UL — LOW (ref 4.2–5.8)
RBC # BLD: 3 M/UL — LOW (ref 4.2–5.8)
RBC # BLD: 3.09 M/UL — LOW (ref 4.2–5.8)
RBC # FLD: 13.6 % — SIGNIFICANT CHANGE UP (ref 10.3–16.9)
RBC # FLD: 13.7 % — SIGNIFICANT CHANGE UP (ref 10.3–16.9)
RBC # FLD: 14 % — SIGNIFICANT CHANGE UP (ref 10.3–16.9)
SODIUM SERPL-SCNC: 136 MMOL/L — SIGNIFICANT CHANGE UP (ref 135–145)
WBC # BLD: 5.2 K/UL — SIGNIFICANT CHANGE UP (ref 3.8–10.5)
WBC # BLD: 5.3 K/UL — SIGNIFICANT CHANGE UP (ref 3.8–10.5)
WBC # BLD: 7.8 K/UL — SIGNIFICANT CHANGE UP (ref 3.8–10.5)
WBC # FLD AUTO: 5.2 K/UL — SIGNIFICANT CHANGE UP (ref 3.8–10.5)
WBC # FLD AUTO: 5.3 K/UL — SIGNIFICANT CHANGE UP (ref 3.8–10.5)
WBC # FLD AUTO: 7.8 K/UL — SIGNIFICANT CHANGE UP (ref 3.8–10.5)

## 2018-08-05 PROCEDURE — 99233 SBSQ HOSP IP/OBS HIGH 50: CPT

## 2018-08-05 PROCEDURE — 93010 ELECTROCARDIOGRAM REPORT: CPT

## 2018-08-05 PROCEDURE — 71045 X-RAY EXAM CHEST 1 VIEW: CPT | Mod: 26

## 2018-08-05 PROCEDURE — 99291 CRITICAL CARE FIRST HOUR: CPT

## 2018-08-05 RX ORDER — ACETAMINOPHEN 500 MG
650 TABLET ORAL ONCE
Qty: 0 | Refills: 0 | Status: COMPLETED | OUTPATIENT
Start: 2018-08-05 | End: 2018-08-05

## 2018-08-05 RX ORDER — SODIUM,POTASSIUM PHOSPHATES 278-250MG
1 POWDER IN PACKET (EA) ORAL ONCE
Qty: 0 | Refills: 0 | Status: COMPLETED | OUTPATIENT
Start: 2018-08-05 | End: 2018-08-05

## 2018-08-05 RX ORDER — SODIUM CHLORIDE 9 MG/ML
250 INJECTION INTRAMUSCULAR; INTRAVENOUS; SUBCUTANEOUS ONCE
Qty: 0 | Refills: 0 | Status: COMPLETED | OUTPATIENT
Start: 2018-08-05 | End: 2018-08-05

## 2018-08-05 RX ORDER — MAGNESIUM SULFATE 500 MG/ML
1 VIAL (ML) INJECTION ONCE
Qty: 0 | Refills: 0 | Status: COMPLETED | OUTPATIENT
Start: 2018-08-05 | End: 2018-08-05

## 2018-08-05 RX ORDER — LACTULOSE 10 G/15ML
15 SOLUTION ORAL ONCE
Qty: 0 | Refills: 0 | Status: COMPLETED | OUTPATIENT
Start: 2018-08-05 | End: 2018-08-05

## 2018-08-05 RX ADMIN — PANTOPRAZOLE SODIUM 40 MILLIGRAM(S): 20 TABLET, DELAYED RELEASE ORAL at 18:06

## 2018-08-05 RX ADMIN — LACTULOSE 15 GRAM(S): 10 SOLUTION ORAL at 11:01

## 2018-08-05 RX ADMIN — Medication 1 PACKET(S): at 09:22

## 2018-08-05 RX ADMIN — ATORVASTATIN CALCIUM 80 MILLIGRAM(S): 80 TABLET, FILM COATED ORAL at 21:23

## 2018-08-05 RX ADMIN — Medication 6: at 11:34

## 2018-08-05 RX ADMIN — ESCITALOPRAM OXALATE 5 MILLIGRAM(S): 10 TABLET, FILM COATED ORAL at 12:32

## 2018-08-05 RX ADMIN — Medication 650 MILLIGRAM(S): at 11:01

## 2018-08-05 RX ADMIN — PANTOPRAZOLE SODIUM 40 MILLIGRAM(S): 20 TABLET, DELAYED RELEASE ORAL at 06:11

## 2018-08-05 RX ADMIN — PIPERACILLIN AND TAZOBACTAM 200 GRAM(S): 4; .5 INJECTION, POWDER, LYOPHILIZED, FOR SOLUTION INTRAVENOUS at 00:00

## 2018-08-05 RX ADMIN — PIPERACILLIN AND TAZOBACTAM 200 GRAM(S): 4; .5 INJECTION, POWDER, LYOPHILIZED, FOR SOLUTION INTRAVENOUS at 06:11

## 2018-08-05 RX ADMIN — CHLORHEXIDINE GLUCONATE 1 APPLICATION(S): 213 SOLUTION TOPICAL at 12:31

## 2018-08-05 RX ADMIN — PIPERACILLIN AND TAZOBACTAM 200 GRAM(S): 4; .5 INJECTION, POWDER, LYOPHILIZED, FOR SOLUTION INTRAVENOUS at 12:32

## 2018-08-05 RX ADMIN — Medication 1 GRAM(S): at 06:11

## 2018-08-05 RX ADMIN — Medication 100 GRAM(S): at 11:34

## 2018-08-05 RX ADMIN — Medication 25 MILLIGRAM(S): at 06:11

## 2018-08-05 RX ADMIN — SODIUM CHLORIDE 1000 MILLILITER(S): 9 INJECTION INTRAMUSCULAR; INTRAVENOUS; SUBCUTANEOUS at 08:53

## 2018-08-05 RX ADMIN — Medication 1 GRAM(S): at 18:06

## 2018-08-05 RX ADMIN — PIPERACILLIN AND TAZOBACTAM 200 GRAM(S): 4; .5 INJECTION, POWDER, LYOPHILIZED, FOR SOLUTION INTRAVENOUS at 18:07

## 2018-08-05 NOTE — PROGRESS NOTE ADULT - ATTENDING COMMENTS
Patient examined, fellow's hx and PE reviewed and confirmed. I find RONNIE, anemia, high PTH. A/P reviewed and confirmed. Follow SCr. Continue anti-hypertensives. See full note.

## 2018-08-05 NOTE — PROGRESS NOTE ADULT - SUBJECTIVE AND OBJECTIVE BOX
Seen in the morning, no shortness of breath, no chest pain, no fever,    Patient seen and examined at bedside.     atorvastatin 80 milliGRAM(s) at bedtime  chlorhexidine 2% Cloths 1 Application(s) daily  dextrose 40% Gel 15 Gram(s) once PRN  dextrose 5%. 1000 milliLiter(s) <Continuous>  dextrose 50% Injectable 12.5 Gram(s) once  dextrose 50% Injectable 25 Gram(s) once  dextrose 50% Injectable 25 Gram(s) once  escitalopram 5 milliGRAM(s) daily  glucagon  Injectable 1 milliGRAM(s) once PRN  insulin lispro (HumaLOG) corrective regimen sliding scale   three times a day before meals  insulin lispro (HumaLOG) corrective regimen sliding scale   at bedtime  metoprolol succinate ER 25 milliGRAM(s) daily  pantoprazole  Injectable 40 milliGRAM(s) every 12 hours  piperacillin/tazobactam IVPB. 3.375 Gram(s) every 6 hours  sucralfate 1 Gram(s) two times a day      Allergies    No Known Allergies    Intolerances        T(C): , Max: 37.9 (18 @ 11:00)  T(F): , Max: 100.3 (18 @ 11:00)  HR: 118 (18 @ 11:00)  BP: 99/61 (18 @ 11:00)  BP(mean): 74 (18 @ 11:00)  RR: 27 (18 @ 11:00)  SpO2: 99% (18 @ 11:00)  Wt(kg): --     @ 07:  -   @ 07:00  --------------------------------------------------------  IN:    Oral Fluid: 560 mL    Packed Red Blood Cells: 350 mL    Solution: 612.5 mL  Total IN: 1522.5 mL    OUT:    Intermittent Catheterization - Urethral: 870 mL  Total OUT: 870 mL    Total NET: 652.5 mL       @ 07:01  -   @ 11:59  --------------------------------------------------------  IN:    Oral Fluid: 697 mL    Sodium Chloride 0.9% IV Bolus: 250 mL    Solution: 100 mL  Total IN: 1047 mL    OUT:    Voided: 500 mL  Total OUT: 500 mL    Total NET: 547 mL        Physical exam:   Alert and oriented   No JVD   Normal air entry into the lungs, no crackles   RRR, normal s1/s2, no murmurs, rubs or gallops   Abdomen - soft, not tender, not distended   extremities; no edema           LABS:                        8.9    7.8   )-----------( 189      ( 05 Aug 2018 06:27 )             27.3     08-05    136  |  98  |  22  ----------------------------<  130<H>  4.2   |  27  |  1.85<H>    Ca    8.2<L>      05 Aug 2018 06:27  Phos  2.2     08-05  Mg     1.9     08-05          Urinalysis Basic - ( 03 Aug 2018 19:42 )    Color: Yellow / Appearance: Cloudy / S.010 / pH: x  Gluc: x / Ketone: NEGATIVE  / Bili: Negative / Urobili: 0.2 E.U./dL   Blood: x / Protein: Trace mg/dL / Nitrite: NEGATIVE   Leuk Esterase: Large / RBC: < 5 /HPF / WBC Many /HPF   Sq Epi: x / Non Sq Epi: x / Bacteria: Many /HPF            RADIOLOGY & ADDITIONAL STUDIES:

## 2018-08-05 NOTE — PROGRESS NOTE ADULT - SUBJECTIVE AND OBJECTIVE BOX
OVERNIGHT EVENTS:    SUBJECTIVE:    Vital Signs Last 12 Hrs  T(F): 97.6 (18 @ 05:40), Max: 99 (18 @ 20:18)  HR: 110 (18 @ 06:00) (106 - 112)  BP: 104/62 (18 @ 06:00) (101/59 - 114/63)  BP(mean): 77 (18 @ 06:00) (70 - 84)  RR: 11 (18 @ 06:00) (11 - 25)  SpO2: 98% (18 @ 06:00) (96% - 99%)  I&O's Summary    03 Aug 2018 07:  -  04 Aug 2018 07:00  --------------------------------------------------------  IN: 1690 mL / OUT: 1710 mL / NET: -20 mL    04 Aug 2018 07:01  -  05 Aug 2018 06:36  --------------------------------------------------------  IN: 1522.5 mL / OUT: 870 mL / NET: 652.5 mL        PHYSICAL EXAM:    General: WDWN  HEENT: NC/AT; PERRL, clear conjunctiva  Neck: supple  Cardiovascular: +S1/S2; RRR. 2/6 systolic ejection murmur.   Respiratory: CTA b/l; no W/R/R  Gastrointestinal: soft, NT/ND; +BSx4  Rectal: Dark stool seen on rectal exam. No bright red blood.   Extremities: WWP; 2+ peripheral pulses; no edema   Neurological: AAOx3; no focal deficits    LABS:                        8.5    8.2   )-----------( 190      ( 04 Aug 2018 15:38 )             25.8     08-04    136  |  97  |  27<H>  ----------------------------<  110<H>  3.9   |  26  |  1.85<H>    Ca    8.0<L>      04 Aug 2018 06:38  Phos  1.8     08-  Mg     2.0     08-    TPro  6.9  /  Alb  3.2<L>  /  TBili  0.6  /  DBili  x   /  AST  32  /  ALT  117<H>  /  AlkPhos  103  08-03      Urinalysis Basic - ( 03 Aug 2018 19:42 )    Color: Yellow / Appearance: Cloudy / S.010 / pH: x  Gluc: x / Ketone: NEGATIVE  / Bili: Negative / Urobili: 0.2 E.U./dL   Blood: x / Protein: Trace mg/dL / Nitrite: NEGATIVE   Leuk Esterase: Large / RBC: < 5 /HPF / WBC Many /HPF   Sq Epi: x / Non Sq Epi: x / Bacteria: Many /HPF        RADIOLOGY & ADDITIONAL TESTS:    MEDICATIONS  (STANDING):  atorvastatin 80 milliGRAM(s) Oral at bedtime  chlorhexidine 2% Cloths 1 Application(s) Topical daily  dextrose 5%. 1000 milliLiter(s) (50 mL/Hr) IV Continuous <Continuous>  dextrose 50% Injectable 12.5 Gram(s) IV Push once  dextrose 50% Injectable 25 Gram(s) IV Push once  dextrose 50% Injectable 25 Gram(s) IV Push once  escitalopram 5 milliGRAM(s) Oral daily  insulin lispro (HumaLOG) corrective regimen sliding scale   SubCutaneous three times a day before meals  insulin lispro (HumaLOG) corrective regimen sliding scale   SubCutaneous at bedtime  metoprolol succinate ER 25 milliGRAM(s) Oral daily  pantoprazole  Injectable 40 milliGRAM(s) IV Push every 12 hours  piperacillin/tazobactam IVPB. 3.375 Gram(s) IV Intermittent every 6 hours  sucralfate 1 Gram(s) Oral two times a day    MEDICATIONS  (PRN):  dextrose 40% Gel 15 Gram(s) Oral once PRN Blood Glucose LESS THAN 70 milliGRAM(s)/deciliter  glucagon  Injectable 1 milliGRAM(s) IntraMuscular once PRN Glucose LESS THAN 70 milligrams/deciliter OVERNIGHT EVENTS:    SUBJECTIVE:    Vital Signs Last 12 Hrs  T(F): 97.6 (18 @ 05:40), Max: 99 (18 @ 20:18)  HR: 110 (18 @ 06:00) (106 - 112)  BP: 104/62 (18 @ 06:00) (101/59 - 114/63)  BP(mean): 77 (18 @ 06:00) (70 - 84)  RR: 11 (18 @ 06:00) (11 - 25)  SpO2: 98% (18 @ 06:00) (96% - 99%)  I&O's Summary    03 Aug 2018 07:  -  04 Aug 2018 07:00  --------------------------------------------------------  IN: 1690 mL / OUT: 1710 mL / NET: -20 mL    04 Aug 2018 07:01  -  05 Aug 2018 06:36  --------------------------------------------------------  IN: 1522.5 mL / OUT: 870 mL / NET: 652.5 mL        PHYSICAL EXAM:    General: WDWN  HEENT: NC/AT; PERRL, clear conjunctiva  Neck: supple  Cardiovascular: +S1/S2; RRR. 2/6 systolic ejection murmur.   Respiratory: CTA b/l; no W/R/R  Gastrointestinal: soft, NT/ND; +BSx4  Extremities: WWP; 2+ peripheral pulses; no edema   Neurological: AAOx3; no focal deficits    LABS:                        8.5    8.2   )-----------( 190      ( 04 Aug 2018 15:38 )             25.8         136  |  97  |  27<H>  ----------------------------<  110<H>  3.9   |  26  |  1.85<H>    Ca    8.0<L>      04 Aug 2018 06:38  Phos  1.8     08-04  Mg     2.0     08-04    TPro  6.9  /  Alb  3.2<L>  /  TBili  0.6  /  DBili  x   /  AST  32  /  ALT  117<H>  /  AlkPhos  103  08-03      Urinalysis Basic - ( 03 Aug 2018 19:42 )    Color: Yellow / Appearance: Cloudy / S.010 / pH: x  Gluc: x / Ketone: NEGATIVE  / Bili: Negative / Urobili: 0.2 E.U./dL   Blood: x / Protein: Trace mg/dL / Nitrite: NEGATIVE   Leuk Esterase: Large / RBC: < 5 /HPF / WBC Many /HPF   Sq Epi: x / Non Sq Epi: x / Bacteria: Many /HPF        RADIOLOGY & ADDITIONAL TESTS:    MEDICATIONS  (STANDING):  atorvastatin 80 milliGRAM(s) Oral at bedtime  chlorhexidine 2% Cloths 1 Application(s) Topical daily  dextrose 5%. 1000 milliLiter(s) (50 mL/Hr) IV Continuous <Continuous>  dextrose 50% Injectable 12.5 Gram(s) IV Push once  dextrose 50% Injectable 25 Gram(s) IV Push once  dextrose 50% Injectable 25 Gram(s) IV Push once  escitalopram 5 milliGRAM(s) Oral daily  insulin lispro (HumaLOG) corrective regimen sliding scale   SubCutaneous three times a day before meals  insulin lispro (HumaLOG) corrective regimen sliding scale   SubCutaneous at bedtime  metoprolol succinate ER 25 milliGRAM(s) Oral daily  pantoprazole  Injectable 40 milliGRAM(s) IV Push every 12 hours  piperacillin/tazobactam IVPB. 3.375 Gram(s) IV Intermittent every 6 hours  sucralfate 1 Gram(s) Oral two times a day    MEDICATIONS  (PRN):  dextrose 40% Gel 15 Gram(s) Oral once PRN Blood Glucose LESS THAN 70 milliGRAM(s)/deciliter  glucagon  Injectable 1 milliGRAM(s) IntraMuscular once PRN Glucose LESS THAN 70 milligrams/deciliter OVERNIGHT EVENTS:  no new events.    SUBJECTIVE:  No new complaints. Denied dizzy/HA/CP/SOB/abd pain    Vital Signs Last 12 Hrs  T(F): 97.6 (18 @ 05:40), Max: 99 (18 @ 20:18)  HR: 110 (18 @ 06:00) (106 - 112)  BP: 104/62 (18 @ 06:00) (101/59 - 114/63)  BP(mean): 77 (18 @ 06:00) (70 - 84)  RR: 11 (18 @ 06:00) (11 - 25)  SpO2: 98% (18 @ 06:00) (96% - 99%)  I&O's Summary    03 Aug 2018 07:01  -  04 Aug 2018 07:00  --------------------------------------------------------  IN: 1690 mL / OUT: 1710 mL / NET: -20 mL    04 Aug 2018 07:01  -  05 Aug 2018 06:36  --------------------------------------------------------  IN: 1522.5 mL / OUT: 870 mL / NET: 652.5 mL      PHYSICAL EXAM:    General: WDWN  HEENT: NC/AT; PERRL, clear conjunctiva  Neck: supple  Cardiovascular: +S1/S2; RRR. 2/6 systolic ejection murmur.   Respiratory: CTA b/l; no W/R/R  Gastrointestinal: soft, NT/ND; +BSx4  Extremities: WWP; 2+ peripheral pulses; no edema   Neurological: AAOx3; no focal deficits    LABS:                        8.5    8.2   )-----------( 190      ( 04 Aug 2018 15:38 )             25.8     08-04    136  |  97  |  27<H>  ----------------------------<  110<H>  3.9   |  26  |  1.85<H>    Ca    8.0<L>      04 Aug 2018 06:38  Phos  1.8     08-04  Mg     2.0     08-04    TPro  6.9  /  Alb  3.2<L>  /  TBili  0.6  /  DBili  x   /  AST  32  /  ALT  117<H>  /  AlkPhos  103  08-03      Urinalysis Basic - ( 03 Aug 2018 19:42 )    Color: Yellow / Appearance: Cloudy / S.010 / pH: x  Gluc: x / Ketone: NEGATIVE  / Bili: Negative / Urobili: 0.2 E.U./dL   Blood: x / Protein: Trace mg/dL / Nitrite: NEGATIVE   Leuk Esterase: Large / RBC: < 5 /HPF / WBC Many /HPF   Sq Epi: x / Non Sq Epi: x / Bacteria: Many /HPF        RADIOLOGY & ADDITIONAL TESTS:    MEDICATIONS  (STANDING):  atorvastatin 80 milliGRAM(s) Oral at bedtime  chlorhexidine 2% Cloths 1 Application(s) Topical daily  dextrose 5%. 1000 milliLiter(s) (50 mL/Hr) IV Continuous <Continuous>  dextrose 50% Injectable 12.5 Gram(s) IV Push once  dextrose 50% Injectable 25 Gram(s) IV Push once  dextrose 50% Injectable 25 Gram(s) IV Push once  escitalopram 5 milliGRAM(s) Oral daily  insulin lispro (HumaLOG) corrective regimen sliding scale   SubCutaneous three times a day before meals  insulin lispro (HumaLOG) corrective regimen sliding scale   SubCutaneous at bedtime  metoprolol succinate ER 25 milliGRAM(s) Oral daily  pantoprazole  Injectable 40 milliGRAM(s) IV Push every 12 hours  piperacillin/tazobactam IVPB. 3.375 Gram(s) IV Intermittent every 6 hours  sucralfate 1 Gram(s) Oral two times a day    MEDICATIONS  (PRN):  dextrose 40% Gel 15 Gram(s) Oral once PRN Blood Glucose LESS THAN 70 milliGRAM(s)/deciliter  glucagon  Injectable 1 milliGRAM(s) IntraMuscular once PRN Glucose LESS THAN 70 milligrams/deciliter

## 2018-08-05 NOTE — PROGRESS NOTE ADULT - PROBLEM SELECTOR PLAN 10
F: IVF 1L NS bolus  E: Replete K>4, Mg>2. Will need to be careful with repletion given poor renal function  N: NPO due to potential GI bleeding    Dispo: CCU F: IVF 250mL NS bolus  E: Replete K>4, Mg>2. Will need to be careful with repletion given poor renal function  N: Clear liquids    Dispo: CCU F: IVF 250mL NS bolus  E: Replete K>4, Mg>2. Will need to be careful with repletion given poor renal function  N: Mech soft/thin DASH/TLC/consistent carb    Dispo: CCU

## 2018-08-05 NOTE — PROGRESS NOTE ADULT - ASSESSMENT
Pt is a 67M w PMH of HTN, DM, HLD, HFrEF of 25%, CAD s/p PCI 7 years ago, with altered mental status in the setting of one week of dyspnea and weakness admitted to the CCU for management of ACS, acute on chronic CHF, RONNIE, s/p acute upper GI bleed 2/2 two gastric ulcers; s/p clipping of one ulcer. He remains in tenuous but improving status, as he is at a high risk for acute re-bleed and high risk for thrombus given recent STEMI, afib, and CVA. Hgb dropped to 6.9 on 07/29, pt given 1 unit of PRBCs. Mental status is improved, in the setting of decreased BUN. Restarted ASA 8/1/18, did not tolerate as Hb drop to 8.6, concern for upper GI re-bleed. No acute s/s of bleed; ASA d/c;d and Hb improved this morning to 9.1. Long term, this is concerning for ability to maximize cardiac medical optimization (anti-platelet/AC) and provide secondary prevention for MI/Stroke,  as risk on these meds for fatal GI Bleed outweighs benefit. LFT's and BUN/Cr improving. On 8/4 Hb dropped again from 9.1 to 7.3, called GI (recs repeat CBC+call IR for embolization if low), will give 1 unit of blood. Pt is a 67M w PMH of HTN, DM, HLD, HFrEF of 25%, CAD s/p PCI 7 years ago, with altered mental status in the setting of one week of dyspnea and weakness admitted to the CCU for management of ACS, acute on chronic CHF, RONNIE, s/p acute upper GI bleed 2/2 two gastric ulcers; s/p clipping of one ulcer. He remains in tenuous but improving status, as he is at a high risk for acute re-bleed and high risk for thrombus given recent STEMI, afib, and CVA. Hgb dropped to 6.9 on 07/29, pt given 1 unit of PRBCs. Mental status is improved, in the setting of decreased BUN. Restarted ASA 8/1/18, did not tolerate as Hb drop to 8.6, concern for upper GI re-bleed. No acute s/s of bleed; ASA d/c;d and Hb initially improved but then dropped to 7.3 8/3. Now s/p 1UPRBC with Hb 8.9. Long term, this is concerning for ability to maximize cardiac medical optimization (anti-platelet/AC) and provide secondary prevention for MI/Stroke,  as risk on these meds for fatal GI Bleed outweighs benefit.  Pt also currently bacteremic with PCR+ Klebsiella; UA pos, likely source, On Zosyn and urine cx/senstitivies pending.  Tachycardic today in setting of anemia and decreased PO. LFT's and BUN/Cr improving. Pt is a 67M w PMH of HTN, DM, HLD, HFrEF of 25%, CAD s/p PCI 7 years ago, with altered mental status in the setting of one week of dyspnea and weakness admitted to the CCU for management of ACS, acute on chronic CHF, RONNIE, s/p acute upper GI bleed 2/2 two gastric ulcers; s/p clipping of one ulcer. He remains in tenuous but improving status, as he is at a high risk for acute re-bleed and high risk for thrombus given recent STEMI, afib, and CVA. Hgb dropped to 6.9 on 07/29, pt given 1 unit of PRBCs. Mental status is improved, in the setting of decreased BUN. Restarted ASA 8/1/18, did not tolerate as Hb drop to 8.6, concern for upper GI re-bleed. No acute s/s of bleed; ASA d/c;d and Hb initially improved but then dropped to 7.3 8/3, in setting of fluids. Now s/p 1UPRBC with Hb 8.9. Long term, this is concerning for ability to maximize cardiac medical optimization (anti-platelet/AC) and provide secondary prevention for MI/Stroke,  as risk on these meds for fatal GI Bleed outweighs benefit.  Pt also currently bacteremic with PCR+ Klebsiella; UA pos, likely source, On Zosyn and urine cx/senstitivies pending.  Tachycardic today in setting of anemia and decreased PO, but will monitor for fever as well.. LFT's and BUN/Cr improving.

## 2018-08-05 NOTE — PROGRESS NOTE ADULT - PROBLEM SELECTOR PLAN 7
Patient has been weak, unsteady, and confused. Improved significantly with decrease in BUN. Likely toxic-metabolic 2/2 uremia.  - On lexapro

## 2018-08-05 NOTE — PROGRESS NOTE ADULT - PROBLEM SELECTOR PLAN 5
Patient's BUN/Cr is up trending, in setting of anemia/low perfusion. (unclear baseline). Likely 2/2 to cardiorenal syndrome as cardiac output is poor. Uremia resolved significantly, with subsequent improved mental status.   - Renal on board, appreciate recs  - Per renal, no urgent need for dialysis at this time  -sevelamer 800 TID to prevent hyperphosphatemia  -Pt dry and euvolemic; hold further diuretics for now    #elevated kappa protein  -elevated kappa and kappa/lambda ratio noted. Not likely multiple myeloma as pt not anemic prior to acute GI bleed and without hypercalcemia. Can f/u with PCP as an OP    #Hyponatremia: Resolved   - d/c jo ann Patient's BUN/Cr is up trending, in setting of anemia/low perfusion. (unclear baseline). Likely 2/2 to cardiorenal syndrome as cardiac output is poor. Uremia resolved significantly, with subsequent improved mental status.   - Renal on board, appreciate recs  - Per renal, no urgent need for dialysis at this time  -sevelamer 800 TID to prevent hyperphosphatemia  -Pt dry and euvolemic; hold further diuretics for now    #elevated kappa protein  -elevated kappa and kappa/lambda ratio noted. Not likely multiple myeloma as pt not anemic prior to acute GI bleed and without hypercalcemia. Can f/u with PCP as an OP

## 2018-08-05 NOTE — PROGRESS NOTE ADULT - PROBLEM SELECTOR PLAN 2
Patient's EF was reportedly 25% in the past according to ED report. On echo 7/20, EF is 15-20%. Was volume overloaded on initial exam, now improved. On CXR, evidence of cardiomegaly but significant decrease in pulmonary vascular congestion.   - d/c bumex gtt, holding further diuresis as appears euvolemic. stood yesterday with PT with stable BP's  -beta blocker as above  -not monitoring I/O's; d/c'd cherry  - Will monitor lungs and fluid status s/p fluids and blood transfusion.

## 2018-08-05 NOTE — PROGRESS NOTE ADULT - PROBLEM SELECTOR PLAN 2
- no need for phosphate binder   - please replace the phosphate- see above   PTH - 395   - consider calcitriol 0.25 mcg every other day

## 2018-08-05 NOTE — PROGRESS NOTE ADULT - PROBLEM SELECTOR PLAN 4
- Patient with positive blood cultures for gram-negative rods on evening of 8/3, low-grade fevers.  - UA is positive, awaiting culture. CXR negative. Will repeat blood cultures.   - Patient initially receiving ceftriaxone, changed to zosyn. Await final cultures and sensitivities. - Patient with positive blood cultures for gram-negative rods on evening of 8/3, low-grade fevers.  - UA is positive, awaiting culture. CXR negative.   - Patient initially receiving ceftriaxone, changed to zosyn. Await final cultures and sensitivities.

## 2018-08-05 NOTE — PROGRESS NOTE ADULT - PROBLEM SELECTOR PLAN 1
Patient has hx of PCI w stents placed 7 years prior. Patient reportedly had an acute MI earlier this week in [place] with EKG showing ST elevations in the lateral leads and troponins in excess of 2000. EKG on admission shows q-waves in leads I and aVL with poor R wave progression consistent with a previous lateral wall MI. Outside therapeutic window for PCI. Troponin and CKMB done 7/22 in setting of new ST changes in 2 precordial leads, both lower, will no longer follow.     In setting of recent upper GI bleed, HELD anti-platelet, anti-coag medications, cardiac rate control, and afterload reduction medications.   Likely not able to add further agents long-term as too high of bleed risk. Started on ASA yesterday, now with acute Hb drop. Discontinue ASA and repeat CBC.   -Discontinue ASA 81mg  - restarted Lipitor 80mg PO QD  - Toprol XL 25mg, tolerating  - Holding ACE/ARB due to RONNIE  - HOLD hydralazine 10 TID for afterload reduction    #asymptomatic afib/aflutter  NSVT noted. Continue on rate control (goal <110) and will continue to monitor  - as above, Toprol XL tolerated  - no anti-platelet/anticoag in setting of concern for GI bleeding Patient has hx of PCI w stents placed 7 years prior. Patient reportedly had an acute MI earlier this week in [place] with EKG showing ST elevations in the lateral leads and troponins in excess of 2000. EKG on admission shows q-waves in leads I and aVL with poor R wave progression consistent with a previous lateral wall MI. Outside therapeutic window for PCI. Troponin and CKMB done 7/22 in setting of new ST changes in 2 precordial leads, both lower, will no longer follow.     In setting of recent upper GI bleed, HELD anti-platelet, anti-coag medications, cardiac rate control, and afterload reduction medications.   Likely not able to add further agents long-term as too high of bleed risk. Started on ASA yesterday, now with acute Hb drop. Discontinue ASA and repeat CBC.   -Discontinue ASA 81mg  - restarted Lipitor 80mg PO QD  - Toprol XL 25mg, tolerating  - Holding ACE/ARB due to RONNIE  - HOLD hydralazine 10 TID for afterload reduction  -tachy this a.m. in setting of anemia and decreased PO; provided 250cc NS bolus  #asymptomatic afib/aflutter  NSVT noted. Continue on rate control (goal <110) and will continue to monitor  - as above, Toprol XL tolerated  - no anti-platelet/anticoag in setting of concern for GI bleeding

## 2018-08-05 NOTE — PROGRESS NOTE ADULT - PROBLEM SELECTOR PLAN 3
Acute Upper GI bleed 2/2 two non-bleed ulcers s/p clipping of one. Hb dropped to 6.9 on 07/29, pt given 1 unit of PRBCs, Concern for possible re-bleed; Hb continued to fluctuate, dc'd ASA, Hb decreased again on 8/4 to 7.3 with dark stool on exam, ordered another unit blood, will repeat CBC, called GI who recs repeat CBC+call IR for embolization if low.  -Appreciate GI f/u and rec's.   -PPI BID transition to PO  -Adv to [facility] soft/thin DASH/TLC  - repeat CBCs   - Active T&S  - Transfusion goal >8 for patient with significant CV disease. Acute Upper GI bleed 2/2 two non-bleed ulcers s/p clipping of one. Hb dropped to 6.9 on 07/29, pt given 1 unit of PRBCs, Concern for possible re-bleed; Hb continued to fluctuate, dc'd ASA, Hb decreased again on 8/4 to 7.3 with dark stool on exam, s/p 1UPRBC; now stable Hb 8.9.   -Appreciate GI f/u and rec's.   -PPI BID transition to PO  -On clear liquid diet for now  - repeat CBCs   - Active T&S  - Transfusion goal >8 for patient with significant CV disease.  -If rebleed will need IR for embolization  -tachy this a.m. in setting of anemia and decreased PO; provided 250cc NS bolus Acute Upper GI bleed 2/2 two non-bleed ulcers s/p clipping of one. Hb dropped to 6.9 on 07/29, pt given 1 unit of PRBCs, Concern for possible re-bleed; Hb continued to fluctuate, dc'd ASA, Hb decreased again on 8/4 to 7.3 with dark stool on exam, s/p 1UPRBC; now stable Hb 8.9.   -Appreciate GI f/u and rec's.   -PPI BID transition to PO  -Upgrade to mech/soft/DASH/TLC/consistent carb  - repeat CBCs   - Active T&S  - Transfusion goal >8 for patient with significant CV disease.  -Low suspicion for active bleed, likely [id number] in setting of fluids, If rebleed will need IR for embolization  -tachy this a.m. in setting of anemia and decreased PO; provided 250cc NS bolus

## 2018-08-06 LAB
-  AMPICILLIN/SULBACTAM: SIGNIFICANT CHANGE UP
-  AMPICILLIN/SULBACTAM: SIGNIFICANT CHANGE UP
-  AMPICILLIN: SIGNIFICANT CHANGE UP
-  AMPICILLIN: SIGNIFICANT CHANGE UP
-  CEFAZOLIN: SIGNIFICANT CHANGE UP
-  CEFAZOLIN: SIGNIFICANT CHANGE UP
-  CEFTRIAXONE: SIGNIFICANT CHANGE UP
-  CEFTRIAXONE: SIGNIFICANT CHANGE UP
-  CIPROFLOXACIN: SIGNIFICANT CHANGE UP
-  CIPROFLOXACIN: SIGNIFICANT CHANGE UP
-  GENTAMICIN: SIGNIFICANT CHANGE UP
-  GENTAMICIN: SIGNIFICANT CHANGE UP
-  NITROFURANTOIN: SIGNIFICANT CHANGE UP
-  PIPERACILLIN/TAZOBACTAM: SIGNIFICANT CHANGE UP
-  PIPERACILLIN/TAZOBACTAM: SIGNIFICANT CHANGE UP
-  TOBRAMYCIN: SIGNIFICANT CHANGE UP
-  TOBRAMYCIN: SIGNIFICANT CHANGE UP
-  TRIMETHOPRIM/SULFAMETHOXAZOLE: SIGNIFICANT CHANGE UP
-  TRIMETHOPRIM/SULFAMETHOXAZOLE: SIGNIFICANT CHANGE UP
ANION GAP SERPL CALC-SCNC: 12 MMOL/L — SIGNIFICANT CHANGE UP (ref 5–17)
APTT BLD: 36.1 SEC — SIGNIFICANT CHANGE UP (ref 27.5–37.4)
BLD GP AB SCN SERPL QL: NEGATIVE — SIGNIFICANT CHANGE UP
BUN SERPL-MCNC: 17 MG/DL — SIGNIFICANT CHANGE UP (ref 7–23)
CALCIUM SERPL-MCNC: 8.5 MG/DL — SIGNIFICANT CHANGE UP (ref 8.4–10.5)
CHLORIDE SERPL-SCNC: 99 MMOL/L — SIGNIFICANT CHANGE UP (ref 96–108)
CO2 SERPL-SCNC: 28 MMOL/L — SIGNIFICANT CHANGE UP (ref 22–31)
CREAT SERPL-MCNC: 1.83 MG/DL — HIGH (ref 0.5–1.3)
CULTURE RESULTS: SIGNIFICANT CHANGE UP
GLUCOSE SERPL-MCNC: 110 MG/DL — HIGH (ref 70–99)
HCT VFR BLD CALC: 28.1 % — LOW (ref 39–50)
HCT VFR BLD CALC: 31.3 % — LOW (ref 39–50)
HGB BLD-MCNC: 10.2 G/DL — LOW (ref 13–17)
HGB BLD-MCNC: 9.3 G/DL — LOW (ref 13–17)
INR BLD: 1.26 — HIGH (ref 0.88–1.16)
LACTATE SERPL-SCNC: 1.8 MMOL/L — SIGNIFICANT CHANGE UP (ref 0.5–2)
MAGNESIUM SERPL-MCNC: 2 MG/DL — SIGNIFICANT CHANGE UP (ref 1.6–2.6)
MCHC RBC-ENTMCNC: 30.1 PG — SIGNIFICANT CHANGE UP (ref 27–34)
MCHC RBC-ENTMCNC: 30.2 PG — SIGNIFICANT CHANGE UP (ref 27–34)
MCHC RBC-ENTMCNC: 32.6 G/DL — SIGNIFICANT CHANGE UP (ref 32–36)
MCHC RBC-ENTMCNC: 33.1 G/DL — SIGNIFICANT CHANGE UP (ref 32–36)
MCV RBC AUTO: 91.2 FL — SIGNIFICANT CHANGE UP (ref 80–100)
MCV RBC AUTO: 92.3 FL — SIGNIFICANT CHANGE UP (ref 80–100)
METHOD TYPE: SIGNIFICANT CHANGE UP
METHOD TYPE: SIGNIFICANT CHANGE UP
ORGANISM # SPEC MICROSCOPIC CNT: SIGNIFICANT CHANGE UP
ORGANISM # SPEC MICROSCOPIC CNT: SIGNIFICANT CHANGE UP
PHOSPHATE SERPL-MCNC: 2 MG/DL — LOW (ref 2.5–4.5)
PLATELET # BLD AUTO: 158 K/UL — SIGNIFICANT CHANGE UP (ref 150–400)
PLATELET # BLD AUTO: 167 K/UL — SIGNIFICANT CHANGE UP (ref 150–400)
POTASSIUM SERPL-MCNC: 4 MMOL/L — SIGNIFICANT CHANGE UP (ref 3.5–5.3)
POTASSIUM SERPL-SCNC: 4 MMOL/L — SIGNIFICANT CHANGE UP (ref 3.5–5.3)
PROTHROM AB SERPL-ACNC: 14 SEC — HIGH (ref 9.8–12.7)
RBC # BLD: 3.08 M/UL — LOW (ref 4.2–5.8)
RBC # BLD: 3.39 M/UL — LOW (ref 4.2–5.8)
RBC # FLD: 13.7 % — SIGNIFICANT CHANGE UP (ref 10.3–16.9)
RBC # FLD: 14 % — SIGNIFICANT CHANGE UP (ref 10.3–16.9)
RH IG SCN BLD-IMP: POSITIVE — SIGNIFICANT CHANGE UP
SODIUM SERPL-SCNC: 139 MMOL/L — SIGNIFICANT CHANGE UP (ref 135–145)
SPECIMEN SOURCE: SIGNIFICANT CHANGE UP
WBC # BLD: 5.1 K/UL — SIGNIFICANT CHANGE UP (ref 3.8–10.5)
WBC # BLD: 5.2 K/UL — SIGNIFICANT CHANGE UP (ref 3.8–10.5)
WBC # FLD AUTO: 5.1 K/UL — SIGNIFICANT CHANGE UP (ref 3.8–10.5)
WBC # FLD AUTO: 5.2 K/UL — SIGNIFICANT CHANGE UP (ref 3.8–10.5)

## 2018-08-06 PROCEDURE — 99233 SBSQ HOSP IP/OBS HIGH 50: CPT

## 2018-08-06 PROCEDURE — 93010 ELECTROCARDIOGRAM REPORT: CPT

## 2018-08-06 PROCEDURE — 74176 CT ABD & PELVIS W/O CONTRAST: CPT | Mod: 26

## 2018-08-06 RX ORDER — SODIUM CHLORIDE 9 MG/ML
250 INJECTION INTRAMUSCULAR; INTRAVENOUS; SUBCUTANEOUS ONCE
Qty: 0 | Refills: 0 | Status: DISCONTINUED | OUTPATIENT
Start: 2018-08-06 | End: 2018-08-06

## 2018-08-06 RX ORDER — CEFTRIAXONE 500 MG/1
INJECTION, POWDER, FOR SOLUTION INTRAMUSCULAR; INTRAVENOUS
Qty: 0 | Refills: 0 | Status: DISCONTINUED | OUTPATIENT
Start: 2018-08-06 | End: 2018-08-14

## 2018-08-06 RX ORDER — ACETAMINOPHEN 500 MG
1000 TABLET ORAL ONCE
Qty: 0 | Refills: 0 | Status: COMPLETED | OUTPATIENT
Start: 2018-08-06 | End: 2018-08-06

## 2018-08-06 RX ORDER — CEFTRIAXONE 500 MG/1
1 INJECTION, POWDER, FOR SOLUTION INTRAMUSCULAR; INTRAVENOUS EVERY 24 HOURS
Qty: 0 | Refills: 0 | Status: DISCONTINUED | OUTPATIENT
Start: 2018-08-07 | End: 2018-08-14

## 2018-08-06 RX ORDER — NOREPINEPHRINE BITARTRATE/D5W 8 MG/250ML
0.05 PLASTIC BAG, INJECTION (ML) INTRAVENOUS
Qty: 8 | Refills: 0 | Status: DISCONTINUED | OUTPATIENT
Start: 2018-08-06 | End: 2018-08-06

## 2018-08-06 RX ORDER — CEFTRIAXONE 500 MG/1
1 INJECTION, POWDER, FOR SOLUTION INTRAMUSCULAR; INTRAVENOUS ONCE
Qty: 0 | Refills: 0 | Status: COMPLETED | OUTPATIENT
Start: 2018-08-06 | End: 2018-08-06

## 2018-08-06 RX ORDER — FENTANYL CITRATE 50 UG/ML
25 INJECTION INTRAVENOUS ONCE
Qty: 0 | Refills: 0 | Status: DISCONTINUED | OUTPATIENT
Start: 2018-08-06 | End: 2018-08-06

## 2018-08-06 RX ORDER — RADIOPAQUE PVC MARKERS/BARIUM 24MARKERS
30 CAPSULE ORAL ONCE
Qty: 0 | Refills: 0 | Status: COMPLETED | OUTPATIENT
Start: 2018-08-06 | End: 2018-08-06

## 2018-08-06 RX ORDER — FENTANYL CITRATE 50 UG/ML
100 INJECTION INTRAVENOUS ONCE
Qty: 0 | Refills: 0 | Status: DISCONTINUED | OUTPATIENT
Start: 2018-08-06 | End: 2018-08-06

## 2018-08-06 RX ORDER — SODIUM CHLORIDE 9 MG/ML
1000 INJECTION INTRAMUSCULAR; INTRAVENOUS; SUBCUTANEOUS ONCE
Qty: 0 | Refills: 0 | Status: DISCONTINUED | OUTPATIENT
Start: 2018-08-06 | End: 2018-08-06

## 2018-08-06 RX ORDER — DEXTROSE 50 % IN WATER 50 %
25 SYRINGE (ML) INTRAVENOUS ONCE
Qty: 0 | Refills: 0 | Status: COMPLETED | OUTPATIENT
Start: 2018-08-06 | End: 2018-08-06

## 2018-08-06 RX ORDER — RADIOPAQUE PVC MARKERS/BARIUM 24MARKERS
1800 CAPSULE ORAL ONCE
Qty: 0 | Refills: 0 | Status: COMPLETED | OUTPATIENT
Start: 2018-08-06 | End: 2018-08-06

## 2018-08-06 RX ORDER — MIDAZOLAM HYDROCHLORIDE 1 MG/ML
4 INJECTION, SOLUTION INTRAMUSCULAR; INTRAVENOUS ONCE
Qty: 0 | Refills: 0 | Status: DISCONTINUED | OUTPATIENT
Start: 2018-08-06 | End: 2018-08-06

## 2018-08-06 RX ORDER — MIDAZOLAM HYDROCHLORIDE 1 MG/ML
1 INJECTION, SOLUTION INTRAMUSCULAR; INTRAVENOUS ONCE
Qty: 0 | Refills: 0 | Status: DISCONTINUED | OUTPATIENT
Start: 2018-08-06 | End: 2018-08-06

## 2018-08-06 RX ADMIN — MIDAZOLAM HYDROCHLORIDE 4 MILLIGRAM(S): 1 INJECTION, SOLUTION INTRAMUSCULAR; INTRAVENOUS at 12:12

## 2018-08-06 RX ADMIN — CEFTRIAXONE 100 GRAM(S): 500 INJECTION, POWDER, FOR SOLUTION INTRAMUSCULAR; INTRAVENOUS at 09:30

## 2018-08-06 RX ADMIN — PANTOPRAZOLE SODIUM 40 MILLIGRAM(S): 20 TABLET, DELAYED RELEASE ORAL at 20:30

## 2018-08-06 RX ADMIN — ATORVASTATIN CALCIUM 80 MILLIGRAM(S): 80 TABLET, FILM COATED ORAL at 21:12

## 2018-08-06 RX ADMIN — PANTOPRAZOLE SODIUM 40 MILLIGRAM(S): 20 TABLET, DELAYED RELEASE ORAL at 05:29

## 2018-08-06 RX ADMIN — PIPERACILLIN AND TAZOBACTAM 200 GRAM(S): 4; .5 INJECTION, POWDER, LYOPHILIZED, FOR SOLUTION INTRAVENOUS at 05:29

## 2018-08-06 RX ADMIN — Medication 400 MILLIGRAM(S): at 05:59

## 2018-08-06 RX ADMIN — FENTANYL CITRATE 100 MICROGRAM(S): 50 INJECTION INTRAVENOUS at 12:12

## 2018-08-06 RX ADMIN — ESCITALOPRAM OXALATE 5 MILLIGRAM(S): 10 TABLET, FILM COATED ORAL at 15:40

## 2018-08-06 RX ADMIN — Medication 1 GRAM(S): at 21:12

## 2018-08-06 RX ADMIN — Medication 25 MILLILITER(S): at 22:22

## 2018-08-06 RX ADMIN — CHLORHEXIDINE GLUCONATE 1 APPLICATION(S): 213 SOLUTION TOPICAL at 12:55

## 2018-08-06 RX ADMIN — Medication 1 GRAM(S): at 05:29

## 2018-08-06 RX ADMIN — Medication 30 MILLILITER(S): at 15:41

## 2018-08-06 RX ADMIN — Medication 62.5 MILLIMOLE(S): at 20:31

## 2018-08-06 RX ADMIN — Medication 1800 MILLILITER(S): at 20:31

## 2018-08-06 RX ADMIN — PIPERACILLIN AND TAZOBACTAM 200 GRAM(S): 4; .5 INJECTION, POWDER, LYOPHILIZED, FOR SOLUTION INTRAVENOUS at 00:02

## 2018-08-06 NOTE — PROGRESS NOTE ADULT - ATTENDING COMMENTS
Patient examined, fellow's hx and PE reviewed and confirmed. I find seen 8/6. I find RONNIE, anemia.  A/P reviewed and confirmed. Follow SCr.  See full note.

## 2018-08-06 NOTE — PROGRESS NOTE ADULT - SUBJECTIVE AND OBJECTIVE BOX
RECONSULT  67yr old M w PMHx significant for dilated cardiomyopathy, HFrEF of 25%, CAD s/p PCI 7 years ago, NIDDM, HTN, and Dyslipidemia, who presented to the [facility] ED with altered mental status in the setting of one week of dyspnea and weakness while on a trip to [place]. GI initially consulted for drop in hgb, sp EGD which showed a large ulcer with clot on incisura sp hemoclip x3 placed.   GI reconsulted due to persistent drop in hgb despite blood transfusions and no output noted (bleeding - hematemesis, coffee grounds, melena, BRBPR).  Patient denies n/v/d, abdominal pain, leg swelling, lightheadedness, falls/syncope. He is still having fevers from his infection and is on ceftriaxone.      MEDICATIONS:  MEDICATIONS  (STANDING):  atorvastatin 80 milliGRAM(s) Oral at bedtime  cefTRIAXone   IVPB      chlorhexidine 2% Cloths 1 Application(s) Topical daily  dextrose 5%. 1000 milliLiter(s) (50 mL/Hr) IV Continuous <Continuous>  dextrose 50% Injectable 12.5 Gram(s) IV Push once  dextrose 50% Injectable 25 Gram(s) IV Push once  dextrose 50% Injectable 25 Gram(s) IV Push once  escitalopram 5 milliGRAM(s) Oral daily  insulin lispro (HumaLOG) corrective regimen sliding scale   SubCutaneous three times a day before meals  insulin lispro (HumaLOG) corrective regimen sliding scale   SubCutaneous at bedtime  pantoprazole  Injectable 40 milliGRAM(s) IV Push every 12 hours  sucralfate 1 Gram(s) Oral two times a day    MEDICATIONS  (PRN):  dextrose 40% Gel 15 Gram(s) Oral once PRN Blood Glucose LESS THAN 70 milliGRAM(s)/deciliter  glucagon  Injectable 1 milliGRAM(s) IntraMuscular once PRN Glucose LESS THAN 70 milligrams/deciliter      Allergies  No Known Allergies    Intolerances      Vital Signs Last 24 Hrs  T(C): 37.3 (06 Aug 2018 11:00), Max: 38.4 (06 Aug 2018 06:00)  T(F): 99.2 (06 Aug 2018 11:00), Max: 101.2 (06 Aug 2018 06:00)  HR: 98 (06 Aug 2018 12:30) (94 - 120)  BP: 98/59 (06 Aug 2018 12:30) (81/55 - 121/71)  BP(mean): 76 (06 Aug 2018 12:30) (65 - 90)  RR: 14 (06 Aug 2018 12:30) (9 - 33)  SpO2: 99% (06 Aug 2018 12:30) (92% - 100%)    08-05 @ 07:01  -  08-06 @ 07:00  --------------------------------------------------------  IN: 2197 mL / OUT: 1500 mL / NET: 697 mL      PHYSICAL EXAM:  General: Well developed; well nourished; in no acute distress  HEENT: MMM, conjunctiva and sclera clear  Gastrointestinal: full, soft, BS+, NT, NR, NG, no masses or organs palpated  Skin: Warm and dry. No obvious rash      LABS:  CBC Full  -  ( 06 Aug 2018 02:19 )  WBC Count : 5.1 K/uL  Hemoglobin : 9.3 g/dL  Hematocrit : 28.1 %  Platelet Count - Automated : 158 K/uL  Mean Cell Volume : 91.2 fL  Mean Cell Hemoglobin : 30.2 pg  Mean Cell Hemoglobin Concentration : 33.1 g/dL    139  |  99  |  17  ----------------------------<  110<H>  4.0   |  28  |  1.83<H>    Ca    8.5      06 Aug 2018 02:19  Phos  2.0     08-06  Mg     2.0     08-06    PT/INR - ( 06 Aug 2018 06:14 )   PT: 14.0 sec;   INR: 1.26        PTT - ( 06 Aug 2018 06:14 )  PTT:36.1 sec          RADIOLOGY & ADDITIONAL STUDIES (The following images were personally reviewed):

## 2018-08-06 NOTE — PROVIDER CONTACT NOTE (OTHER) - SITUATION
Pt got OOB with PT. Pt is very weak, but can take a few steps with assistance. Pt was sitting in chair. BP dropped to 80s systolic. Pt slumped over to R side. Pt is responsive and can put himself
Received pt and did an assessment. Pt is lethargic, but answers questions. Pt has a very soft, low voice. Pt is oriented to self and time only. Pt is very weak and cannot lift arms and legs off of the
Patient noted with Temp 101.2 Rectal, continues tachycardic w/'s. Dr. Marshall made aware, Tylenol 1000mg IV solution given as ordered.
Patient went into a burst of rapid afib
Pt noted w/ PATs 160

## 2018-08-06 NOTE — PROGRESS NOTE ADULT - ASSESSMENT
Pt is a 67M w PMH of HTN, DM, HLD, HFrEF of 25%, CAD s/p PCI 7 years ago, with altered mental status in the setting of one week of dyspnea and weakness admitted to the CCU for management of ACS, acute on chronic CHF, RONNIE, s/p acute upper GI bleed 2/2 two gastric ulcers; s/p clipping of one ulcer. He remains in tenuous but improving status, as he is at a high risk for acute re-bleed and high risk for thrombus given recent STEMI, afib, and CVA. Hgb dropped to 6.9 on 07/29, pt given 1 unit of PRBCs. Mental status is improved, in the setting of decreased BUN. Restarted ASA 8/1/18, did not tolerate as Hb drop to 8.6, concern for upper GI re-bleed. No acute s/s of bleed; ASA d/c;d and Hb initially improved but then dropped to 7.3 8/3, in setting of fluids. Now s/p 1UPRBC with Hb 8.9. Long term, this is concerning for ability to maximize cardiac medical optimization (anti-platelet/AC) and provide secondary prevention for MI/Stroke,  as risk on these meds for fatal GI Bleed outweighs benefit.  Pt also currently bacteremic with PCR+ Klebsiella; UA pos, likely source, On Zosyn and urine cx/senstitivies pending.  Tachycardic today in setting of anemia and decreased PO, but will monitor for fever as well.. LFT's and BUN/Cr improving. Pt is a 67M w PMH of HTN, DM, HLD, HFrEF of 25%, CAD s/p PCI 7 years ago, with altered mental status in the setting of one week of dyspnea and weakness admitted to the CCU for management of ACS, acute on chronic CHF, RONNIE, s/p acute upper GI bleed 2/2 two gastric ulcers; s/p clipping of one ulcer. He remains in tenuous but improving status, as he is at a high risk for acute re-bleed and high risk for thrombus given recent STEMI, afib, and CVA. Hgb dropped to 6.9 on 07/29, pt given 1 unit of PRBCs. Mental status is improved, in the setting of decreased BUN. Restarted ASA 8/1/18, did not tolerate as Hb drop to 8.6, concern for upper GI re-bleed. No acute s/s of bleed; ASA d/c;d and Hb initially improved but then dropped to 7.3 (8/3), given 1UPRBC with Hb to 8.9. However, dropped again, yesterday to 7.3 and given another Unit of PRBC; Hb responded to 9.3. Acutely concerned for GI-rebleed, will confer with GI re: repeat scope vs. need for IR intervention. Long term, this is concerning for ability to maximize cardiac medical optimization (anti-platelet/AC) and provide secondary prevention for MI/Stroke,  as risk on these meds for fatal GI Bleed outweighs benefit.  Pt also with urosepsis as he is bacteremic with PCR+ Klebsiella and positive Urine culture with same. Sensitivities returned and can narrow to Ceft. He fever spikes on Zosyn may be drug related fever, will continue to monitor closely. With respect to CHF, his Chest Xray's 8/4 and 8/5 suggest increase lymphatic congestion, in context of volume resuscitation (IVF's, 2U PRBC) during this time; clinically clear lungs and no peripheral edema. Acutely, need to prioritized GI re-bleed ahead of further diureses. Long term, when stable will need HF consult to address decreased tolerance of medical management. LFT's and BUN/Cr improving. Pt is a 67M w PMH of HTN, DM, HLD, HFrEF of 25%, CAD s/p PCI 7 years ago, with altered mental status in the setting of one week of dyspnea and weakness admitted to the CCU for management of ACS, acute on chronic CHF, RONNIE, s/p acute upper GI bleed 2/2 two gastric ulcers; s/p clipping of one ulcer (7/27). Hgb dropped on 07/29, s/p 1UPRBC's with appropriate Hb response to 8. Started on ASA 8/1 but within 1 day dropped Hb again; d/c'd and Hb rebounded within day. However, Hb dropped again on 8/4, and 8/5, s/p 1U PRBCs each of these times (now 3U total for admission) with appropriate Hb response to 9.3.  Acutely concerned for GI-rebleed, will confer with GI re: repeat scope vs. need for IR intervention. Long term, this is concerning for ability to maximize cardiac medical optimization (anti-platelet/AC) and provide secondary prevention for MI/Stroke,  as risk on these meds for fatal GI Bleed outweighs benefit.  With respect to CHF, his Chest Xray's 8/4 and 8/5 suggest increase lymphatic congestion, in context of volume resuscitation (IVF's, 2U PRBC) during this time; clinically clear lungs and no peripheral edema. Acutely, need to prioritized GI re-bleed ahead of further diureses. Long term, when stable will need HF consult to address decreased tolerance of medical management. Pt also with urosepsis as he is bacteremic with PCR+ Klebsiella and positive Urine culture with same. Sensitivities returned and can narrow to Ceft. His fever spikes on Zosyn may be drug related fever, will continue to monitor closely. Mental status is improved, in the setting of decreased BUN. LFT's and BUN/Cr improving. He remains in tenuous status, as he likely having an acute upper GI re-bleed and high risk for thrombus given recent STEMI, afib, and CVA.

## 2018-08-06 NOTE — PROGRESS NOTE ADULT - PROBLEM SELECTOR PLAN 4
- Patient with positive blood cultures for gram-negative rods on evening of 8/3, low-grade fevers.  - UA is positive, awaiting culture. CXR negative.   - Patient initially receiving ceftriaxone, changed to zosyn. Await final cultures and sensitivities. - Patient with positive blood cultures for gram-negative rods on evening of 8/3, low-grade fevers.  - UA is positive, culture with GNR; will f/u with prelim sensitivities to adjust Abx coverage  CXR negative.   - Patient initially receiving ceftriaxone, changed to zosyn, day 3. Await final sensitivities. Likely urosepsis as positive blood and urine cultures klebsiella; pan sensitive except ampillcilin and Bactrim. Will adjust abx to Ceft, as less toxic to kidneys  -CXR negative for PNA  - Patient initially receiving ceftriaxone, changed to zosyn, day 3. Await final sensitivities.

## 2018-08-06 NOTE — PROGRESS NOTE ADULT - PROBLEM SELECTOR PLAN 5
Patient's BUN/Cr is up trending, in setting of anemia/low perfusion. (unclear baseline). Likely 2/2 to cardiorenal syndrome as cardiac output is poor. Uremia resolved significantly, with subsequent improved mental status.   - Renal on board, appreciate recs  - Per renal, no urgent need for dialysis at this time  -sevelamer 800 TID to prevent hyperphosphatemia  -Pt dry and euvolemic; hold further diuretics for now    #elevated kappa protein  -elevated kappa and kappa/lambda ratio noted. Not likely multiple myeloma as pt not anemic prior to acute GI bleed and without hypercalcemia. Can f/u with PCP as an OP

## 2018-08-06 NOTE — PROGRESS NOTE ADULT - SUBJECTIVE AND OBJECTIVE BOX
OVERNIGHT EVENTS:    SUBJECTIVE:  No CP/SOB/HA/dizziness/abd pain. No new complaints.     Vital Signs Last 12 Hrs  T(F): 101.2 (08-06-18 @ 06:00), Max: 101.2 (08-06-18 @ 06:00)  HR: 120 (08-06-18 @ 06:00) (98 - 120)  BP: 119/64 (08-06-18 @ 06:00) (93/59 - 119/64)  BP(mean): 89 (08-06-18 @ 06:00) (76 - 90)  RR: 17 (08-06-18 @ 06:00) (11 - 19)  SpO2: 98% (08-06-18 @ 06:00) (92% - 99%)  I&O's Summary    04 Aug 2018 07:01  -  05 Aug 2018 07:00  --------------------------------------------------------  IN: 1522.5 mL / OUT: 870 mL / NET: 652.5 mL    05 Aug 2018 07:01  -  06 Aug 2018 06:48  --------------------------------------------------------  IN: 2197 mL / OUT: 1500 mL / NET: 697 mL        PHYSICAL EXAM:  Constitutional: Resting in bed, sleepy but NAD   HEENT: NC/AT; PERRL, clear conjunctiva  Neck: supple, no lymphadenopathy  Cardiovascular: +S1/S2; S3 appreciated at apex. systolic ejection murmur LLSB.   Respiratory: CTA b/l; no W/R/R  Gastrointestinal: soft, NT, distended and tympanic; +BSx4  Extremities: WWP; 2+ peripheral pulses; no edema   Neurological: AAOx3; no focal deficits      LABS:                        9.3    5.1   )-----------( 158      ( 06 Aug 2018 02:19 )             28.1     08-06    139  |  99  |  17  ----------------------------<  110<H>  4.0   |  28  |  1.83<H>    Ca    8.5      06 Aug 2018 02:19  Phos  2.0     08-06  Mg     2.0     08-06            RADIOLOGY & ADDITIONAL TESTS:    MEDICATIONS  (STANDING):  atorvastatin 80 milliGRAM(s) Oral at bedtime  chlorhexidine 2% Cloths 1 Application(s) Topical daily  dextrose 5%. 1000 milliLiter(s) (50 mL/Hr) IV Continuous <Continuous>  dextrose 50% Injectable 12.5 Gram(s) IV Push once  dextrose 50% Injectable 25 Gram(s) IV Push once  dextrose 50% Injectable 25 Gram(s) IV Push once  escitalopram 5 milliGRAM(s) Oral daily  insulin lispro (HumaLOG) corrective regimen sliding scale   SubCutaneous three times a day before meals  insulin lispro (HumaLOG) corrective regimen sliding scale   SubCutaneous at bedtime  pantoprazole  Injectable 40 milliGRAM(s) IV Push every 12 hours  piperacillin/tazobactam IVPB. 3.375 Gram(s) IV Intermittent every 6 hours  sucralfate 1 Gram(s) Oral two times a day    MEDICATIONS  (PRN):  dextrose 40% Gel 15 Gram(s) Oral once PRN Blood Glucose LESS THAN 70 milliGRAM(s)/deciliter  glucagon  Injectable 1 milliGRAM(s) IntraMuscular once PRN Glucose LESS THAN 70 milligrams/deciliter OVERNIGHT EVENTS:  Hb dropped 7.3 yesterday; s/p 1 UPRBC's. Pt spiked T 101.3 with HR in 120's rhythm sinus tach. Given IVF, Tyl and Bld cx sent; metop discontinued. Repeat CBC Hb 9.3 and stable x2.     SUBJECTIVE:  No CP/SOB/HA/dizziness/abd pain. No new complaints.     Vital Signs Last 12 Hrs  T(F): 101.2 (08-06-18 @ 06:00), Max: 101.2 (08-06-18 @ 06:00)  HR: 120 (08-06-18 @ 06:00) (98 - 120)  BP: 119/64 (08-06-18 @ 06:00) (93/59 - 119/64)  BP(mean): 89 (08-06-18 @ 06:00) (76 - 90)  RR: 17 (08-06-18 @ 06:00) (11 - 19)  SpO2: 98% (08-06-18 @ 06:00) (92% - 99%)  I&O's Summary    04 Aug 2018 07:01  -  05 Aug 2018 07:00  --------------------------------------------------------  IN: 1522.5 mL / OUT: 870 mL / NET: 652.5 mL    05 Aug 2018 07:01  -  06 Aug 2018 06:48  --------------------------------------------------------  IN: 2197 mL / OUT: 1500 mL / NET: 697 mL        PHYSICAL EXAM:  Constitutional: Resting in bed, sleepy but NAD   HEENT: NC/AT; PERRL, clear conjunctiva  Neck: supple, no lymphadenopathy  Cardiovascular: +S1/S2; S3 appreciated at apex. systolic ejection murmur LLSB.   Respiratory: CTA b/l; no W/R/R  Gastrointestinal: soft, NT, distended and tympanic; +BSx4  Extremities: WWP; 2+ peripheral pulses; no edema   Neurological: AAOx3; no focal deficits      LABS:                        9.3    5.1   )-----------( 158      ( 06 Aug 2018 02:19 )             28.1     08-06    139  |  99  |  17  ----------------------------<  110<H>  4.0   |  28  |  1.83<H>    Ca    8.5      06 Aug 2018 02:19  Phos  2.0     08-06  Mg     2.0     08-06            RADIOLOGY & ADDITIONAL TESTS:    MEDICATIONS  (STANDING):  atorvastatin 80 milliGRAM(s) Oral at bedtime  chlorhexidine 2% Cloths 1 Application(s) Topical daily  dextrose 5%. 1000 milliLiter(s) (50 mL/Hr) IV Continuous <Continuous>  dextrose 50% Injectable 12.5 Gram(s) IV Push once  dextrose 50% Injectable 25 Gram(s) IV Push once  dextrose 50% Injectable 25 Gram(s) IV Push once  escitalopram 5 milliGRAM(s) Oral daily  insulin lispro (HumaLOG) corrective regimen sliding scale   SubCutaneous three times a day before meals  insulin lispro (HumaLOG) corrective regimen sliding scale   SubCutaneous at bedtime  pantoprazole  Injectable 40 milliGRAM(s) IV Push every 12 hours  piperacillin/tazobactam IVPB. 3.375 Gram(s) IV Intermittent every 6 hours  sucralfate 1 Gram(s) Oral two times a day    MEDICATIONS  (PRN):  dextrose 40% Gel 15 Gram(s) Oral once PRN Blood Glucose LESS THAN 70 milliGRAM(s)/deciliter  glucagon  Injectable 1 milliGRAM(s) IntraMuscular once PRN Glucose LESS THAN 70 milligrams/deciliter OVERNIGHT EVENTS:  Hb dropped 7.3 yesterday; s/p 1 UPRBC's. Pt spiked T 101.3 with HR in 120's rhythm sinus tach. Given IVF, Tyl and Bld cx sent; metop discontinued. Repeat CBC Hb 9.3 and stable x2.     SUBJECTIVE:  No CP/SOB/HA/dizziness/abd pain. No new complaints.     Vital Signs Last 12 Hrs  T(F): 101.2 (08-06-18 @ 06:00), Max: 101.2 (08-06-18 @ 06:00)  HR: 120 (08-06-18 @ 06:00) (98 - 120)  BP: 119/64 (08-06-18 @ 06:00) (93/59 - 119/64)  BP(mean): 89 (08-06-18 @ 06:00) (76 - 90)  RR: 17 (08-06-18 @ 06:00) (11 - 19)  SpO2: 98% (08-06-18 @ 06:00) (92% - 99%)  I&O's Summary    04 Aug 2018 07:01  -  05 Aug 2018 07:00  --------------------------------------------------------  IN: 1522.5 mL / OUT: 870 mL / NET: 652.5 mL    05 Aug 2018 07:01  -  06 Aug 2018 06:48  --------------------------------------------------------  IN: 2197 mL / OUT: 1500 mL / NET: 697 mL        PHYSICAL EXAM:  Constitutional: Resting in bed, sleepy but NAD   HEENT: NC/AT; PERRL, clear conjunctiva  Neck: supple, no lymphadenopathy  Cardiovascular: +S1/S2; S3 appreciated at apex. systolic ejection murmur LLSB.   Respiratory: CTA b/l; no W/R/R  Gastrointestinal: soft, NT, distended and tympanic; +BSx4  Extremities: WWP; 2+ peripheral pulses; no edema   Neurological: AAOx3; no focal deficits      LABS:                        9.3    5.1   )-----------( 158      ( 06 Aug 2018 02:19 )             28.1     08-06    139  |  99  |  17  ----------------------------<  110<H>  4.0   |  28  |  1.83<H>    Ca    8.5      06 Aug 2018 02:19  Phos  2.0     08-06  Mg     2.0     08-06      RADIOLOGY & ADDITIONAL TESTS:    MEDICATIONS  (STANDING):  atorvastatin 80 milliGRAM(s) Oral at bedtime  chlorhexidine 2% Cloths 1 Application(s) Topical daily  dextrose 5%. 1000 milliLiter(s) (50 mL/Hr) IV Continuous <Continuous>  dextrose 50% Injectable 12.5 Gram(s) IV Push once  dextrose 50% Injectable 25 Gram(s) IV Push once  dextrose 50% Injectable 25 Gram(s) IV Push once  escitalopram 5 milliGRAM(s) Oral daily  insulin lispro (HumaLOG) corrective regimen sliding scale   SubCutaneous three times a day before meals  insulin lispro (HumaLOG) corrective regimen sliding scale   SubCutaneous at bedtime  pantoprazole  Injectable 40 milliGRAM(s) IV Push every 12 hours  piperacillin/tazobactam IVPB. 3.375 Gram(s) IV Intermittent every 6 hours  sucralfate 1 Gram(s) Oral two times a day    MEDICATIONS  (PRN):  dextrose 40% Gel 15 Gram(s) Oral once PRN Blood Glucose LESS THAN 70 milliGRAM(s)/deciliter  glucagon  Injectable 1 milliGRAM(s) IntraMuscular once PRN Glucose LESS THAN 70 milligrams/deciliter

## 2018-08-06 NOTE — PROGRESS NOTE ADULT - ASSESSMENT
67yr old M w PMHx significant for dilated cardiomyopathy, HFrEF of 25%, CAD s/p PCI 7 years ago, NIDDM, HTN, and Dyslipidemia, who presented to the [facility] ED with altered mental status in the setting of one week of dyspnea and weakness while on a trip to [place]. GI reconsulted for persistent drop in Hgb.    Anemia -   - unclear etiology at this time  - likely ACD vs nutritional  - sp EGD 7/27/18 - with large ulcer with adherent clot seen in incisura, unable to dislodge clot, sp 3 clips to base of ulcer with no post clipping bleeding reported, smaller 2 clean based ulcer also near large ulcer described above, gastritis, normal esophagus, and duodenum.  - sp EGD 8/6/18 - with hemoclips seen in incisura with clean based ulcer, no stigmata of recent bleeding in stomach or duodenum  - continue with BID PPI [id number]  - if still anemic might benefit from colonoscopy as outpatient for completeness of evaluation as he is not actively bleeding  - please also consider imaging to r/o RP bleed if he remains persistently anemic      Discussed with attending Dr Munoz  GI will sign off for now, please reconsult as needed

## 2018-08-06 NOTE — PROGRESS NOTE ADULT - SUBJECTIVE AND OBJECTIVE BOX
Patient is a 67y Male seen and evaluated at bedside.   Patient seen and Examined  NPO  labs noted: BUn/cr @ 17/1.83  H/H @ 9.3/28.1    atorvastatin 80 at bedtime  cefTRIAXone   IVPB    chlorhexidine 2% Cloths 1 daily  dextrose 40% Gel 15 once PRN  dextrose 5%. 1000 <Continuous>  dextrose 50% Injectable 12.5 once  dextrose 50% Injectable 25 once  dextrose 50% Injectable 25 once  escitalopram 5 daily  glucagon  Injectable 1 once PRN  insulin lispro (HumaLOG) corrective regimen sliding scale  three times a day before meals  insulin lispro (HumaLOG) corrective regimen sliding scale  at bedtime  pantoprazole  Injectable 40 every 12 hours  sucralfate 1 two times a day      Allergies    No Known Allergies    Intolerances        T(C): , Max: 38.4 (08-06-18 @ 06:00)  T(F): , Max: 101.2 (08-06-18 @ 06:00)  HR: 94 (08-06-18 @ 10:00)  BP: 105/69 (08-06-18 @ 10:00)  BP(mean): 84 (08-06-18 @ 10:00)  RR: 9 (08-06-18 @ 10:00)  SpO2: 99% (08-06-18 @ 10:00)  Wt(kg): --    08-05 @ 07:01  -  08-06 @ 07:00  --------------------------------------------------------  IN: 2197 mL / OUT: 1500 mL / NET: 697 mL      PHYSICAL EXAM:  GENERAL: NAD, well-developed, well nourished, alert, awake, no acute distress at present  HEAD:  Atraumatic, Normocephalic,   EYES: Bilateral conjuctiva and sclera normal   Oral cavity: Oral mucosa dry and pink  NECK: Neck supple, No JVD  CHEST/LUNG: Clear to auscultation bilaterally; No wheeze, no rales, no crepitations  HEART: Regular rate and rhythm. WANDER II/VI at LPSB, No gallop, no rub   ABDOMEN: Soft, Nontender, BS+nt, No flank tenderness.   EXTREMITIES: No clubbing, cyanosis, or edema  Neurology: AAOx3, no focal neurological deficit  SKIN: No rashes or lesions          ACCESS:     LABS:                        9.3    5.1   )-----------( 158      ( 06 Aug 2018 02:19 )             28.1     08-06    139  |  99  |  17  ----------------------------<  110<H>  4.0   |  28  |  1.83<H>    Ca    8.5      06 Aug 2018 02:19  Phos  2.0     08-06  Mg     2.0     08-06        PT/INR - ( 06 Aug 2018 06:14 )   PT: 14.0 sec;   INR: 1.26          PTT - ( 06 Aug 2018 06:14 )  PTT:36.1 sec          RADIOLOGY & ADDITIONAL STUDIES:

## 2018-08-06 NOTE — PROGRESS NOTE ADULT - PROBLEM SELECTOR PLAN 10
F: IVF 250mL NS bolus  E: Replete K>4, Mg>2. Will need to be careful with repletion given poor renal function  N: Mech soft/thin DASH/TLC/consistent carb    Dispo: CCU F: no IVF  E: Replete K>4, Mg>2.   N: NPO     Dispo: CCU

## 2018-08-06 NOTE — PROGRESS NOTE ADULT - PROBLEM SELECTOR PLAN 1
Patient has hx of PCI w stents placed 7 years prior. Patient reportedly had an acute MI earlier this week in [place] with EKG showing ST elevations in the lateral leads and troponins in excess of 2000. EKG on admission shows q-waves in leads I and aVL with poor R wave progression consistent with a previous lateral wall MI. Outside therapeutic window for PCI. Troponin and CKMB done 7/22 in setting of new ST changes in 2 precordial leads, both lower, will no longer follow.     In setting of recent upper GI bleed, HELD anti-platelet, anti-coag medications, cardiac rate control, and afterload reduction medications.   Likely not able to add further agents long-term as too high of bleed risk. Started on ASA yesterday, now with acute Hb drop. Discontinue ASA and repeat CBC.   -Discontinue ASA 81mg  - restarted Lipitor 80mg PO QD  - Toprol XL 25mg, tolerating  - Holding ACE/ARB due to RONNIE  - HOLD hydralazine 10 TID for afterload reduction  -tachy this a.m. in setting of anemia and decreased PO; provided 250cc NS bolus  #asymptomatic afib/aflutter  NSVT noted. Continue on rate control (goal <110) and will continue to monitor  - as above, Toprol XL tolerated  - no anti-platelet/anticoag in setting of concern for GI bleeding Patient has hx of PCI w stents placed 7 years prior. Patient reportedly had an acute MI earlier this week in [place] with EKG showing ST elevations in the lateral leads and troponins in excess of 2000. EKG on admission shows q-waves in leads I and aVL with poor R wave progression consistent with a previous lateral wall MI. Outside therapeutic window for PCI. Troponin and CKMB done 7/22 in setting of new ST changes in 2 precordial leads, both lower, will no longer follow.     In setting of recent upper GI bleed, HELD anti-platelet, anti-coag medications, cardiac rate control, and afterload reduction medications.   Likely not able to add further agents long-term as too high of bleed risk. Started on ASA yesterday, now with acute Hb drop. Discontinue ASA and repeat CBC.   -Discontinue ASA 81mg  - restarted Lipitor 80mg PO QD  - STOPPED Toprol XL 25mg, in context of concern for GI Bleed  - Holding ACE/ARB due to RONNIE  - HOLD hydralazine 10 TID for afterload reduction  -sinus tachy overnight and through this morning, etiology fever vs hypovolemia 2/2 possible GI re-bleed. Hb stable overnight after 1U PRBC yesterday. Will continue to monitor   #asymptomatic afib/aflutter  NSVT noted. Continue on rate control (goal <110) and will continue to monitor  - as above, STOPPED Toprol XL   - no anti-platelet/anticoag in setting of concern for GI bleeding Patient has hx of PCI w stents placed 7 years prior. Patient reportedly had an acute MI earlier this week in [place] with EKG showing ST elevations in the lateral leads and troponins in excess of 2000. EKG on admission shows q-waves in leads I and aVL with poor R wave progression consistent with a previous lateral wall MI. Outside therapeutic window for PCI. Troponin and CKMB done 7/22 in setting of new ST changes in 2 precordial leads, both lower, will no longer follow.     In setting of recent upper GI bleed, HELD anti-platelet, anti-coag medications, cardiac rate control, and afterload reduction medications.   Likely not able to add further agents long-term as too high of bleed risk. Started on ASA for 1 day, with acute Hb drop. Discontinued ASA   -Discontinue ASA 81mg  - restarted Lipitor 80mg PO QD  - STOPPED Toprol XL 25mg, in context of concern for GI Bleed  - Holding ACE/ARB as he is not able to tolerate decreased afterload, maintaining syst >90 supine  - HOLD hydralazine 10 TID for afterload reduction, as above  -sinus tachy overnight and through this morning, etiology fever (infectious vs drug fever) vs hypovolemia 2/2 possible GI re-bleed. Hb stable overnight after 1U PRBC yesterday. Will continue to monitor     #asymptomatic afib/aflutter  NSVT noted. Ideally, rate control (goal <110) but need to hold beta blocker for now. will continue to monitor  - as above, STOPPED Toprol XL   - no anti-platelet/anticoag in setting of concern for GI bleeding

## 2018-08-06 NOTE — PROGRESS NOTE ADULT - PROBLEM SELECTOR PLAN 3
Acute Upper GI bleed 2/2 two non-bleed ulcers s/p clipping of one. Hb dropped to 6.9 on 07/29, pt given 1 unit of PRBCs, Concern for possible re-bleed; Hb continued to fluctuate, dc'd ASA, Hb decreased again on 8/4 to 7.3 with dark stool on exam, s/p 1UPRBC; now stable Hb 8.9.   -Appreciate GI f/u and rec's.   -PPI BID transition to PO  -Upgrade to mech/soft/DASH/TLC/consistent carb  - repeat CBCs   - Active T&S  - Transfusion goal >8 for patient with significant CV disease.  -Low suspicion for active bleed, likely [id number] in setting of fluids, If rebleed will need IR for embolization  -tachy this a.m. in setting of anemia and decreased PO; provided 250cc NS bolus Acute Upper GI bleed 2/2 two non-bleed ulcers s/p clipping of one. Hb dropped to 6.9 on 07/29, pt given 1 unit of PRBCs, Concern for possible re-bleed; Hb continued to fluctuate, dc'd ASA, Hb decreased again on 8/4 to 7.3 with dark stool on exam, s/p 1UPRBC; now stable Hb 8.9.   -Appreciate GI f/u and rec's.   -PPI BID transition to PO  -Upgrade to mech/soft/DASH/TLC/consistent carb  - repeat CBCs   - Active T&S  - Transfusion goal >8 for patient with significant CV disease.  -Hb stable overnight. will continue to monitor for active GI re-bleed, will make GI aware and assess need IR for embolization  -sinus tachy overnight and through this morning, etiology fever vs hypovolemia 2/2 possible GI re-bleed. Hb stable overnight after 1U PRBC yesterday. Will continue to monitor Acute Upper GI bleed 2/2 two non-bleed ulcers s/p clipping of one. Hb dropped to 6.9 on 07/29, pt given 1 unit of PRBCs, Concern for possible re-bleed; Hb continued to fluctuate, dc'd ASA, Hb decreased again on 8/4 to 7.3 with dark stool on exam, s/p 1UPRBC; now stable Hb 8.9.   -Appreciate GI f/u and rec's.   -PPI BID transition to PO  -NPO  - repeat CBCs   - Active T&S  - Transfusion goal >8 for patient with significant CV disease.  -Hb stable overnight. will continue to monitor for active GI re-bleed, will make GI aware (repeat EGD vs. need IR for embolization)  -sinus tachy overnight and through this morning, etiology fever (infectious vs drug fever) vs hypovolemia 2/2 possible GI re-bleed. Hb stable overnight after 1U PRBC yesterday. Will continue to monitor Acute Upper GI bleed 2/2 two non-bleed ulcers s/p clipping of one (7/27). Hb dropped to 6.9 on 07/29, pt given 1 unit of PRBCs, Concern for possible re-bleed; Hb continued to fluctuate, dc'd ASA, Hb decreased again on 8/4 and on 8/5; s/p 1U PRBC each time (now 3U PRBC's total this admission) with good Hb response. Now Hb 9.3.   -Appreciate GI f/u and rec's.   -PPI BID transition to PO  -NPO  - repeat CBCs   - Active T&S  - Transfusion goal >8 for patient with significant CV disease.  -Hb stable overnight. will continue to monitor for active GI re-bleed, will make GI aware (repeat EGD vs. need IR for embolization)  -sinus tachy overnight and through this morning, etiology fever (infectious vs drug fever) vs hypovolemia 2/2 possible GI re-bleed. Hb stable overnight after 1U PRBC yesterday. Will continue to monitor

## 2018-08-06 NOTE — PROGRESS NOTE ADULT - PROBLEM SELECTOR PLAN 1
- kidney function stable BUn/ cr @17/1.83  - volume status acceptable   - electrolytes noted   avoid nephrotoxic agents  - renal diet  - hypophosphatemia - please replace - 15 meq of po NaPhO4 and follow up

## 2018-08-06 NOTE — PROGRESS NOTE ADULT - PROBLEM SELECTOR PLAN 2
Patient's EF was reportedly 25% in the past according to ED report. On echo 7/20, EF is 15-20%. Was volume overloaded on initial exam, now improved. On CXR, evidence of cardiomegaly but significant decrease in pulmonary vascular congestion.   - d/c bumex gtt, holding further diuresis as appears euvolemic. stood yesterday with PT with stable BP's  -beta blocker as above  -not monitoring I/O's; d/c'd cherry  - Will monitor lungs and fluid status s/p fluids and blood transfusion. Patient's EF was reportedly 25% in the past according to ED report. On echo 7/20, EF is 15-20%. Was volume overloaded on initial exam, now improved. On CXR, evidence of cardiomegaly but significant decrease in pulmonary vascular congestion.   - d/c bumex gtt, holding further diuresis as appears euvolemic. stood yesterday with PT with stable BP's  -beta blocker as above  -not monitoring I/O's; d/c'd cherry  -  s/p fluids and blood transfusions, increased vascular congestion noted on CXR.   -repeat CXR tomorrow a.m., consider diuresis pending pressures/volume status

## 2018-08-07 LAB
ANION GAP SERPL CALC-SCNC: 11 MMOL/L — SIGNIFICANT CHANGE UP (ref 5–17)
BUN SERPL-MCNC: 14 MG/DL — SIGNIFICANT CHANGE UP (ref 7–23)
CALCIUM SERPL-MCNC: 8.5 MG/DL — SIGNIFICANT CHANGE UP (ref 8.4–10.5)
CHLORIDE SERPL-SCNC: 100 MMOL/L — SIGNIFICANT CHANGE UP (ref 96–108)
CO2 SERPL-SCNC: 29 MMOL/L — SIGNIFICANT CHANGE UP (ref 22–31)
CREAT SERPL-MCNC: 1.54 MG/DL — HIGH (ref 0.5–1.3)
GLUCOSE BLDC GLUCOMTR-MCNC: 141 MG/DL — HIGH (ref 70–99)
GLUCOSE SERPL-MCNC: 122 MG/DL — HIGH (ref 70–99)
HCT VFR BLD CALC: 31.9 % — LOW (ref 39–50)
HGB BLD-MCNC: 10.2 G/DL — LOW (ref 13–17)
MAGNESIUM SERPL-MCNC: 1.8 MG/DL — SIGNIFICANT CHANGE UP (ref 1.6–2.6)
MCHC RBC-ENTMCNC: 29.3 PG — SIGNIFICANT CHANGE UP (ref 27–34)
MCHC RBC-ENTMCNC: 32 G/DL — SIGNIFICANT CHANGE UP (ref 32–36)
MCV RBC AUTO: 91.7 FL — SIGNIFICANT CHANGE UP (ref 80–100)
PHOSPHATE SERPL-MCNC: 2.6 MG/DL — SIGNIFICANT CHANGE UP (ref 2.5–4.5)
PLATELET # BLD AUTO: 155 K/UL — SIGNIFICANT CHANGE UP (ref 150–400)
POTASSIUM SERPL-MCNC: 3.5 MMOL/L — SIGNIFICANT CHANGE UP (ref 3.5–5.3)
POTASSIUM SERPL-SCNC: 3.5 MMOL/L — SIGNIFICANT CHANGE UP (ref 3.5–5.3)
RBC # BLD: 3.48 M/UL — LOW (ref 4.2–5.8)
RBC # FLD: 14 % — SIGNIFICANT CHANGE UP (ref 10.3–16.9)
SODIUM SERPL-SCNC: 140 MMOL/L — SIGNIFICANT CHANGE UP (ref 135–145)
WBC # BLD: 4.5 K/UL — SIGNIFICANT CHANGE UP (ref 3.8–10.5)
WBC # FLD AUTO: 4.5 K/UL — SIGNIFICANT CHANGE UP (ref 3.8–10.5)

## 2018-08-07 PROCEDURE — 36569 INSJ PICC 5 YR+ W/O IMAGING: CPT

## 2018-08-07 PROCEDURE — 71045 X-RAY EXAM CHEST 1 VIEW: CPT | Mod: 26

## 2018-08-07 PROCEDURE — 93010 ELECTROCARDIOGRAM REPORT: CPT

## 2018-08-07 PROCEDURE — 99233 SBSQ HOSP IP/OBS HIGH 50: CPT

## 2018-08-07 PROCEDURE — 76937 US GUIDE VASCULAR ACCESS: CPT | Mod: 26

## 2018-08-07 RX ORDER — SODIUM CHLORIDE 9 MG/ML
10 INJECTION INTRAMUSCULAR; INTRAVENOUS; SUBCUTANEOUS EVERY 12 HOURS
Qty: 0 | Refills: 0 | Status: DISCONTINUED | OUTPATIENT
Start: 2018-08-07 | End: 2018-08-15

## 2018-08-07 RX ORDER — SODIUM CHLORIDE 9 MG/ML
10 INJECTION INTRAMUSCULAR; INTRAVENOUS; SUBCUTANEOUS
Qty: 0 | Refills: 0 | Status: DISCONTINUED | OUTPATIENT
Start: 2018-08-07 | End: 2018-08-15

## 2018-08-07 RX ORDER — METOPROLOL TARTRATE 50 MG
12.5 TABLET ORAL ONCE
Qty: 0 | Refills: 0 | Status: COMPLETED | OUTPATIENT
Start: 2018-08-07 | End: 2018-08-07

## 2018-08-07 RX ORDER — POLYETHYLENE GLYCOL 3350 17 G/17G
17 POWDER, FOR SOLUTION ORAL DAILY
Qty: 0 | Refills: 0 | Status: DISCONTINUED | OUTPATIENT
Start: 2018-08-07 | End: 2018-08-15

## 2018-08-07 RX ORDER — DOCUSATE SODIUM 100 MG
100 CAPSULE ORAL DAILY
Qty: 0 | Refills: 0 | Status: DISCONTINUED | OUTPATIENT
Start: 2018-08-07 | End: 2018-08-15

## 2018-08-07 RX ORDER — PANTOPRAZOLE SODIUM 20 MG/1
40 TABLET, DELAYED RELEASE ORAL
Qty: 0 | Refills: 0 | Status: DISCONTINUED | OUTPATIENT
Start: 2018-08-07 | End: 2018-08-15

## 2018-08-07 RX ORDER — MAGNESIUM SULFATE 500 MG/ML
1 VIAL (ML) INJECTION ONCE
Qty: 0 | Refills: 0 | Status: COMPLETED | OUTPATIENT
Start: 2018-08-07 | End: 2018-08-07

## 2018-08-07 RX ORDER — METOPROLOL TARTRATE 50 MG
12.5 TABLET ORAL EVERY 12 HOURS
Qty: 0 | Refills: 0 | Status: DISCONTINUED | OUTPATIENT
Start: 2018-08-07 | End: 2018-08-08

## 2018-08-07 RX ORDER — SODIUM CHLORIDE 9 MG/ML
20 INJECTION INTRAMUSCULAR; INTRAVENOUS; SUBCUTANEOUS ONCE
Qty: 0 | Refills: 0 | Status: DISCONTINUED | OUTPATIENT
Start: 2018-08-07 | End: 2018-08-15

## 2018-08-07 RX ORDER — SENNA PLUS 8.6 MG/1
1 TABLET ORAL DAILY
Qty: 0 | Refills: 0 | Status: DISCONTINUED | OUTPATIENT
Start: 2018-08-07 | End: 2018-08-15

## 2018-08-07 RX ORDER — POTASSIUM CHLORIDE 20 MEQ
40 PACKET (EA) ORAL ONCE
Qty: 0 | Refills: 0 | Status: COMPLETED | OUTPATIENT
Start: 2018-08-07 | End: 2018-08-07

## 2018-08-07 RX ADMIN — ATORVASTATIN CALCIUM 80 MILLIGRAM(S): 80 TABLET, FILM COATED ORAL at 21:32

## 2018-08-07 RX ADMIN — Medication 12.5 MILLIGRAM(S): at 13:52

## 2018-08-07 RX ADMIN — Medication 1 GRAM(S): at 17:45

## 2018-08-07 RX ADMIN — Medication 2: at 16:46

## 2018-08-07 RX ADMIN — ESCITALOPRAM OXALATE 5 MILLIGRAM(S): 10 TABLET, FILM COATED ORAL at 12:40

## 2018-08-07 RX ADMIN — Medication 100 GRAM(S): at 07:16

## 2018-08-07 RX ADMIN — CHLORHEXIDINE GLUCONATE 1 APPLICATION(S): 213 SOLUTION TOPICAL at 13:58

## 2018-08-07 RX ADMIN — Medication 2: at 12:39

## 2018-08-07 RX ADMIN — Medication 40 MILLIEQUIVALENT(S): at 07:16

## 2018-08-07 RX ADMIN — PANTOPRAZOLE SODIUM 40 MILLIGRAM(S): 20 TABLET, DELAYED RELEASE ORAL at 06:03

## 2018-08-07 RX ADMIN — Medication 12.5 MILLIGRAM(S): at 17:44

## 2018-08-07 RX ADMIN — CEFTRIAXONE 100 GRAM(S): 500 INJECTION, POWDER, FOR SOLUTION INTRAMUSCULAR; INTRAVENOUS at 08:48

## 2018-08-07 RX ADMIN — Medication 1 GRAM(S): at 06:03

## 2018-08-07 NOTE — PROGRESS NOTE ADULT - PROBLEM SELECTOR PLAN 10
F: no IVF  E: Replete K>4, Mg>2.   N: NPO     Dispo: CCU F: no IVF  E: Replete K>4, Mg>2.   N:     Dispo: CCU F: no IVF  E: Replete K>4, Mg>2.   N: Mech soft/thin DASH/TLC/consistent carbs    Dispo: stepdown

## 2018-08-07 NOTE — PROGRESS NOTE ADULT - PROBLEM SELECTOR PLAN 1
- now with improving of the renal function, stable  - volume status acceptable   - electrolytes noted   avoid nephrotoxic agents  - renal diet  - with bacteremia - gram negative Kl. pneumonia on ceftriaxone   - hypophosphatemia - resolved, please follow up

## 2018-08-07 NOTE — PROGRESS NOTE ADULT - PROBLEM SELECTOR PLAN 10
F: no IVF  E: Replete K>4, Mg>2.   N: Mech soft/thin DASH/TLC/consistent carbs    Dispo: stepdown F: no IVF  E: Replete K>4, Mg>2.   N: Mech soft/thin DASH/TLC/consistent carbs    Dispo: [address] [place]

## 2018-08-07 NOTE — PROGRESS NOTE ADULT - PROBLEM SELECTOR PLAN 3
Acute Upper GI bleed 2/2 two non-bleed ulcers s/p clipping of one (7/27). Hb dropped to 6.9 on 07/29, pt given 1 unit of PRBCs, Concern for possible re-bleed; Hb continued to fluctuate, dc'd ASA, Hb decreased again on 8/4 and on 8/5; s/p 1U PRBC each time (now 3U PRBC's total this admission) with good Hb response. Now Hb 9.3.   -Appreciate GI f/u and rec's.   -PPI BID transition to PO for 8 weeks (stop 9/21)  -return to [facility] soft/thin diet  - monitor CBCs   - Transfusion goal >8 for patient with significant CV disease.  -EGD completed yesterday, no active bleed; clips in situ on previous ulcer. GI to f/u OP for colonoscopy Acute Upper GI bleed 2/2 two non-bleed ulcers s/p clipping of one (7/27). Total 3U PRBC's total this admission with good Hb response. Now Hb 10.2.   - s/p EGD 8/6 EGD- no active bleed; clips in situ on previous ulcer. GI to f/u OP for colonoscopy  - CT ab 8/6: Negative for retroperitoneal or intraperitoneal hematoma. No evidence of esophageal or bowel perforation.  - continue to HOLD anti-platelets and anti-coags 2/2 GIB   - c/w Carafate and Protonix BID x 8 weeks (stop 9/21)  - c/w Mech soft/thin diet  - monitor CBCs, transfuse goal hgb < 8 as has significant CV disease  - GI following, appreciate recs.

## 2018-08-07 NOTE — PROGRESS NOTE ADULT - PROBLEM SELECTOR PLAN 8
Holding home medications. hypoglycemic this morning  - Hb A1c was 8  - d/c'd  Lantus   - c/w ISS, HX DM. HgbA1c. 8  - Lantus dc' d as pt hypoglycemic while NPO, resume once tolerating PO    - c/w ISS

## 2018-08-07 NOTE — PROGRESS NOTE ADULT - PROBLEM SELECTOR PLAN 2
Patient's EF was reportedly 25% in the past according to ED report. On echo 7/20, EF is 15-20%. Was volume overloaded on initial exam, now improved. On CXR, evidence of cardiomegaly but significant decrease in pulmonary vascular congestion.   - d/c bumex gtt, holding further diuresis as appears euvolemic. stood yesterday with PT with stable BP's  -beta blocker as above  -not monitoring I/O's; d/c'd cherry  -  s/p fluids and blood transfusions, no congestion on CXR, S3 gallop appreciated. Likely will need diuresis in future, when eating more PO  -Would benefit from lifevest while awaiting ICD consideration, as EF <30% and can not be optimized with medicaitons EF reportedly 25% in the past.  Echo 7/20, EF is 15-20%. Was volume overloaded on initial exam, now improved. On CXR, evidence of cardiomegaly but significant decrease in pulmonary vascular congestion.   - transitioned off bumex gtt.   - Continue to HOLD further diuresis as pt appears euvolemic, will re-eval in AM  - c/w Metoprolol tartrate 12.5mg BID and uptitrate as tolerated with plan to transition to Toprol XL  - continue to HOLD ACEi/ARB 2/2 RONNIE, initiate when resolves   - Would benefit from life vest while awaiting ICD consideration, as EF <30% and can not be optimized with medications  - strict I/Os, daily weights

## 2018-08-07 NOTE — PROGRESS NOTE ADULT - PROBLEM SELECTOR PLAN 4
Likely urosepsis as positive blood and urine cultures klebsiella; pan sensitive except ampillcilin and Bactrim. Will adjust abx to Ceft, as less toxic to kidneys  -CXR negative for PNA  -Ceft (day 4/14, last day 8/17) Likely urosepsis as positive blood and urine cultures klebsiella; pan sensitive except ampillcilin and Bactrim. Will adjust abx to Ceft, as less toxic to kidneys  - CXR negative for PNA  - Ceft (day 4/14, last day 8/17) s/p midline placement today

## 2018-08-07 NOTE — PROGRESS NOTE ADULT - PROBLEM SELECTOR PLAN 4
Likely urosepsis as positive blood and urine cultures klebsiella; pan sensitive except ampillcilin and Bactrim. Will adjust abx to Ceft, as less toxic to kidneys  -CXR negative for PNA  - Patient initially receiving ceftriaxone, changed to zosyn, day 3. Await final sensitivities. Likely urosepsis as positive blood and urine cultures klebsiella; pan sensitive except ampillcilin and Bactrim. Will adjust abx to Ceft, as less toxic to kidneys  -CXR negative for PNA  -Ceft (day 4/14, last day 8/17)

## 2018-08-07 NOTE — PROGRESS NOTE ADULT - SUBJECTIVE AND OBJECTIVE BOX
Seen in the morning, no shortness of breath, no chest pain, no fever,      The renal service for the renal function     atorvastatin 80 milliGRAM(s) at bedtime  cefTRIAXone   IVPB 1 Gram(s) every 24 hours  cefTRIAXone   IVPB     chlorhexidine 2% Cloths 1 Application(s) daily  dextrose 40% Gel 15 Gram(s) once PRN  dextrose 5%. 1000 milliLiter(s) <Continuous>  dextrose 50% Injectable 12.5 Gram(s) once  dextrose 50% Injectable 25 Gram(s) once  dextrose 50% Injectable 25 Gram(s) once  docusate sodium 100 milliGRAM(s) daily  escitalopram 5 milliGRAM(s) daily  glucagon  Injectable 1 milliGRAM(s) once PRN  insulin lispro (HumaLOG) corrective regimen sliding scale   three times a day before meals  insulin lispro (HumaLOG) corrective regimen sliding scale   at bedtime  metoprolol tartrate 12.5 milliGRAM(s) every 12 hours  pantoprazole   Suspension 40 milliGRAM(s) two times a day before meals  polyethylene glycol 3350 17 Gram(s) daily  senna 1 Tablet(s) daily  sucralfate 1 Gram(s) two times a day      Allergies    No Known Allergies    Intolerances      Physical exam:   Alert and oriented   No JVD   Normal air entry into the lungs, no crackles   RRR, normal s1/s2, no murmurs, rubs or gallops   Abdomen - soft, not tender, not distended   extremities; no edema     T(C): , Max: 37.3 (08-07-18 @ 09:00)  T(F): , Max: 99.1 (08-07-18 @ 09:00)  HR: 110 (08-07-18 @ 11:00)  BP: 96/61 (08-07-18 @ 11:00)  BP(mean): 77 (08-07-18 @ 11:00)  RR: 18 (08-07-18 @ 11:00)  SpO2: 99% (08-07-18 @ 11:00)  Wt(kg): --    08-06 @ 07:01  -  08-07 @ 07:00  --------------------------------------------------------  IN:    Solution: 250 mL  Total IN: 250 mL    OUT:  Total OUT: 0 mL    Total NET: 250 mL              LABS:                        10.2   4.5   )-----------( 155      ( 07 Aug 2018 06:17 )             31.9     08-07    140  |  100  |  14  ----------------------------<  122<H>  3.5   |  29  |  1.54<H>    Ca    8.5      07 Aug 2018 06:17  Phos  2.6     08-07  Mg     1.8     08-07        PT/INR - ( 06 Aug 2018 06:14 )   PT: 14.0 sec;   INR: 1.26          PTT - ( 06 Aug 2018 06:14 )  PTT:36.1 sec          RADIOLOGY & ADDITIONAL STUDIES:

## 2018-08-07 NOTE — PROGRESS NOTE ADULT - PROBLEM SELECTOR PLAN 1
Patient has hx of PCI w stents placed 7 years prior. Patient reportedly had an acute MI earlier this week in [place] with EKG showing ST elevations in the lateral leads and troponins in excess of 2000. EKG on admission shows q-waves in leads I and aVL with poor R wave progression consistent with a previous lateral wall MI. Outside therapeutic window for PCI. Troponin and CKMB done 7/22 in setting of new ST changes in 2 precordial leads, both lower, will no longer follow.     In setting of recent upper GI bleed, HELD anti-platelet, anti-coag medications, cardiac rate control, and afterload reduction medications.   Likely not able to add further agents long-term as too high of bleed risk. Started on ASA for 1 day, with acute Hb drop. Discontinued ASA   -Discontinue ASA 81mg  - restarted Lipitor 80mg PO QD  - STOPPED Toprol XL 25mg, in context of concern for GI Bleed  - Holding ACE/ARB as he is not able to tolerate decreased afterload, maintaining syst >90 supine  - HOLD hydralazine 10 TID for afterload reduction, as above  -sinus tachy overnight and through this morning, etiology fever (infectious vs drug fever) vs hypovolemia 2/2 possible GI re-bleed. Hb stable overnight after 1U PRBC yesterday. Will continue to monitor     #asymptomatic afib/aflutter  NSVT noted. Ideally, rate control (goal <110) but need to hold beta blocker for now. will continue to monitor  - as above, STOPPED Toprol XL   - no anti-platelet/anticoag in setting of concern for GI bleeding Patient has hx of PCI w stents placed 7 years prior. Patient reportedly had an acute MI earlier this week in [place] with EKG showing ST elevations in the lateral leads and troponins in excess of 2000. EKG on admission shows q-waves in leads I and aVL with poor R wave progression consistent with a previous lateral wall MI. Outside therapeutic window for PCI. Troponin and CKMB done 7/22 in setting of new ST changes in 2 precordial leads, both lower, will no longer follow.     In setting of recent upper GI bleed, HELD anti-platelet, anti-coag medications, cardiac rate control, and afterload reduction medications.   Likely not able to add further agents long-term as too high of bleed risk. Started on ASA for 1 day, with acute Hb drop. Discontinued ASA   -Discontinue ASA 81mg  - Lipitor 80mg PO QD  - Restart Toprol XL 25mg  - Holding ACE/ARB with elev Cr and ability to tolerate decreased afterload,   - HOLD hydralazine 10 TID for afterload reduction, as above    #asymptomatic afib/aflutter  NSVT noted. Ideally, rate control (goal <110) but need to hold beta blocker for now. will continue to monitor  - Retart Toprol XL as above  - no anticoag, 2/2 risk for GI bleeding Patient has hx of PCI w stents placed 7 years prior. Patient reportedly had an acute MI earlier this week in [place] with EKG showing ST elevations in the lateral leads and troponins in excess of 2000. EKG on admission shows q-waves in leads I and aVL with poor R wave progression consistent with a previous lateral wall MI. Outside therapeutic window for PCI. Troponin and CKMB done 7/22 in setting of new ST changes in 2 precordial leads, both lower, will no longer follow.    In setting of recent upper GI bleed, HELD anti-platelet, anti-coag medications, cardiac rate control, and afterload reduction medications.   Likely not able to add further agents long-term as too high of bleed risk. Started on ASA for 1 day, with acute Hb drop. Discontinued ASA   -Discontinue ASA 81mg  - Lipitor 80mg PO QD  - Restart lopressor 12.5mg q12hr with goal to transition to Toprol XL 25mg tomorrow  - Holding ACE/ARB with elev Cr and ability to tolerate decreased afterload, as pt was previously orthostatic   - HOLD hydralazine 10 TID for afterload reduction, as above    #asymptomatic afib/aflutter  NSVT noted. Ideally, rate control (goal <110)   - Retart beta blocker as above   - no anticoag, 2/2 risk for GI bleeding

## 2018-08-07 NOTE — PROGRESS NOTE ADULT - PROBLEM SELECTOR PLAN 6
MRI significant for subacute ischemia involving right parietal area   with numerous scattered areas of acute ischemia within the basal ganglia   and frontal and parietal lobes bilaterally some of which are in a   watershed distribution.. Improved mentation in setting of decreased uremia.   - F/U neuro recs  - HOLD MRA study for full neuro workup.   - carotid u/s negative   - PT/OT/ST as tolerated MRI significant for subacute ischemia involving right parietal area   with numerous scattered areas of acute ischemia within the basal ganglia   and frontal and parietal lobes bilaterally some of which are in a   watershed distribution. Improved mentation in setting of decreased uremia.   - F/U neuro recs  - HOLD MRA study for full neuro workup.   - carotid u/s negative   - PT/OT/ST as tolerated

## 2018-08-07 NOTE — PROGRESS NOTE ADULT - ASSESSMENT
Pt is a 67M w PMH of HTN, DM, HLD, HFrEF of 25%, CAD s/p PCI 7 years ago, with altered mental status in the setting of one week of dyspnea and weakness admitted to the CCU for management of ACS, acute on chronic CHF, RONNIE, s/p acute upper GI bleed 2/2 two gastric ulcers; s/p clipping of one ulcer (7/27). Hgb dropped on 07/29, s/p 1UPRBC's with appropriate Hb response to 8. Started on ASA 8/1 but within 1 day dropped Hb again; d/c'd and Hb rebounded within day. However, Hb dropped again on 8/4, and 8/5, s/p 1U PRBCs each of these times (now 3U total for admission) with appropriate Hb response to 9.3.  Acutely concerned for GI-rebleed, will confer with GI re: repeat scope vs. need for IR intervention. Long term, this is concerning for ability to maximize cardiac medical optimization (anti-platelet/AC) and provide secondary prevention for MI/Stroke,  as risk on these meds for fatal GI Bleed outweighs benefit.  With respect to CHF, his Chest Xray's 8/4 and 8/5 suggest increase lymphatic congestion, in context of volume resuscitation (IVF's, 2U PRBC) during this time; clinically clear lungs and no peripheral edema. Acutely, need to prioritized GI re-bleed ahead of further diureses. Long term, when stable will need HF consult to address decreased tolerance of medical management. Pt also with urosepsis as he is bacteremic with PCR+ Klebsiella and positive Urine culture with same. Sensitivities returned and can narrow to Ceft. His fever spikes on Zosyn may be drug related fever, will continue to monitor closely. Mental status is improved, in the setting of decreased BUN. LFT's and BUN/Cr improving. He remains in tenuous status, as he likely having an acute upper GI re-bleed and high risk for thrombus given recent STEMI, afib, and CVA. Pt is a 67M w PMH of HTN, DM, HLD, HFrEF of 25%, CAD s/p PCI 7 years ago, with altered mental status in the setting of one week of dyspnea and weakness admitted to the CCU for management of ACS, acute on chronic CHF, RONNIE, s/p acute upper GI bleed 2/2 two gastric ulcers; s/p clipping of one ulcer (7/27). Hgb dropped on 07/29, s/p 1UPRBC's with appropriate Hb response to 8. Started on ASA 8/1 but within 1 day dropped Hb again; d/c'd and Hb rebounded within day. However, Hb dropped again on 8/4, and 8/5, s/p 1U PRBCs each of these times (now 3U total for admission) with appropriate Hb response to 9.3.  Acutely concerned for GI-rebleed, EGD yesterday without evidence of bleed and clips in place. CT abd/pelvis without indication of bleed in RP or thighs. Possible Hb dips are 2/2 to dilution in setting of IVF. Hb this morning (10.2) is reassuring. Long term, acute anemia is concerning for ability to maximize cardiac medical optimization (anti-platelet/AC) and provide secondary prevention for MI/Stroke,  as risk on these meds for fatal GI Bleed outweighs benefit.  With respect to CHF, ***his Chest Xray's 8/4 and 8/5 suggest increase lymphatic congestion, in context of volume resuscitation (IVF's, 2U PRBC) during this time; clinically clear lungs and no peripheral edema. Acutely, need to prioritized GI re-bleed ahead of further diureses. Long term, when stable will need HF consult to address decreased tolerance of medical management. Pt also with urosepsis, bacteremic with PCR+ Klebsiella and positive urine culture with same. Sensitivities returned and narrowed to Ceft. No further fever spikes and survellience and repeat Bld cx (at time of fever) with NGTD; fever spike likely drug related and not progression of infection. Mental status is improved, in the setting of decreased BUN. LFT's and BUN/Cr improving. ***He remains in tenuous status, as he likely having an acute upper GI re-bleed and high risk for thrombus given recent STEMI, afib, and CVA. Pt is a 67M w PMH of HTN, DM, HLD, HFrEF of 25%, CAD s/p PCI 7 years ago, with altered mental status in the setting of one week of dyspnea and weakness admitted to the CCU for management of ACS, acute on chronic CHF, RONNIE, s/p acute upper GI bleed 2/2 two gastric ulcers; s/p clipping of one ulcer (7/27). Hgb dropped on 07/29, s/p 1UPRBC's with appropriate Hb response to 8. Started on ASA 8/1 but within 1 day dropped Hb again; d/c'd and Hb rebounded within day. However, Hb dropped again on 8/4, and 8/5, s/p 1U PRBCs each of these times (now 3U total for admission) with appropriate Hb response to 9.3.  Acutely concerned for GI-rebleed, EGD yesterday without evidence of bleed and clips in place. CT abd/pelvis without indication of bleed in RP or thighs. Possible Hb dips are 2/2 to dilution in setting of IVF. Hb this morning (10.2) is reassuring. Long term, acute anemia is concerning for ability to maximize cardiac medical optimization (anti-platelet/AC) and provide secondary prevention for MI/Stroke,  as risk on these meds for fatal GI Bleed outweighs benefit.  With respect to CHF, clinically clear lungs and no peripheral edema, but S3 gallop suggestive of mild congestion. Long term, when stable will need HF consult to address decreased tolerance of medical management. Treament for urosepsis continues on Ceft. No further fevers and negative Bld cx to date. BUN/Cr continue to improve. Stable for step down today.

## 2018-08-07 NOTE — PROGRESS NOTE ADULT - ASSESSMENT
67M PMHx HTN, DM, HLD, HFrEF of 25%, CAD s/p PCI 7 years ago presented 7/20 with altered mental status in the setting of one week of dyspnea and weakness admitted to the CCU for management of ACS, acute on chronic CHF, RONNIE, s/p acute upper GI bleed 2/2 two gastric ulcers; s/p clipping of one ulcer (7/27) s/p 3U PRBCs. Hospital course further c/b urosepsis, on Ceftriaxone, remains hemodynamically stable and is stepped down to [facility] for further management

## 2018-08-07 NOTE — PROCEDURE NOTE - NSPOSTCAREGUIDE_GEN_A_CORE
Verbal/written post procedure instructions were given to patient/caregiver/Instructed patient/caregiver regarding signs and symptoms of infection/Care for catheter as per unit/ICU protocols/Keep the cast/splint/dressing clean and dry

## 2018-08-07 NOTE — PROGRESS NOTE ADULT - PROBLEM SELECTOR PLAN 5
Patient's BUN/Cr is up trending, in setting of anemia/low perfusion. (unclear baseline). Likely 2/2 to cardiorenal syndrome as cardiac output is poor. Uremia resolved significantly, with subsequent improved mental status.   - Renal on board, appreciate recs  - Per renal, no urgent need for dialysis at this time  -Pt dry and euvolemic; hold further diuretics for now    #elevated kappa protein  -elevated kappa and kappa/lambda ratio noted. Not likely multiple myeloma as pt not anemic prior to acute GI bleed and without hypercalcemia. Can f/u with PCP as an OP BUN/Cr up trended in setting of anemia/low perfusion. (unclear baseline). Likely 2/2 to cardiorenal syndrome as cardiac output is poor. Uremia resolved significantly, with subsequent improved mental status. Creatinine 1.54 today, improved.   - Continue to HOLD further diuresis as pt appears euvolemic, will re-eval in AM  - Renal following, appreciate recs.   - Per renal, no urgent need for dialysis at this time  - renally dose medications  - avoid nephrotoxic agents     #elevated kappa protein  -elevated kappa and kappa/lambda ratio noted. Not likely multiple myeloma as pt not anemic prior to acute GI bleed and without hypercalcemia. Can f/u with PCP as an OP

## 2018-08-07 NOTE — PROGRESS NOTE ADULT - PROBLEM SELECTOR PLAN 1
HX PCI 7 yrs ago. Reportedly STEMI in [place] (ST elevations in lateral leads and trop 2000). On admission, EKG with q-waves in leads I and aVL w/poor R wave progression.   - continue to HOLD anti-platelets 2/2 GIB   - continue to HOLD ACEi/ARB 2/2 RONNIE, initiate when resolves   - c/w Metoprolol tartrate 12.5mg BID and uptitrate as tolerated with plan to transition to Toprol XL  - c/w Lipitor 80mg PO QD    #asymptomatic afib/aflutter  NSVT noted. Ideally, rate control (goal <110)   - Retart beta blocker as above   - no anticoag, 2/2 risk for GI bleeding HX PCI 7 yrs ago. Reportedly STEMI in [place] (ST elevations in lateral leads and trop 2000). On admission, EKG with q-waves in leads I and aVL w/poor R wave progression.   - continue to HOLD anti-platelets 2/2 GIB   - continue to HOLD ACEi/ARB 2/2 RONNIE, initiate when resolves   - c/w Metoprolol tartrate 12.5mg BID and uptitrate as tolerated with plan to transition to Toprol XL  - c/w Lipitor 80mg PO QD    #asymptomatic afib/aflutter  HX paroxysmal afib with paroxysmal afib noted in beginning of hospital course   - continue to HOLD anti-coag 2/2 GIB   - c/w Metoprolol tartrate 12.5mg BID and uptitrate as tolerated with plan to transition to Toprol XL

## 2018-08-07 NOTE — PROGRESS NOTE ADULT - SUBJECTIVE AND OBJECTIVE BOX
CARDIOLOGY NP PROGRESS/TRANSFER ACCEPTANCE NOTE    Hospital Course:  67M PMHx HTN, DM, HLD, HFrEF of 25%, CAD s/p PCI 7 years ago, s/p STEMI in [place] that was medically managed, presented 7/20 w/AMS in the setting of one week of dyspnea and weakness. Pt admitted to CCU for ACS (outside of therapeutic window for cath), acute on chronic CHF, RONNIE, cortical and subcortical CVAs. Hospital course complicated by acute upper GI bleed in context of dual antiplatelet and AC therapy, with subsequent gastric ulcers (x2) requiring clipping of one ulcer (7/27) by GI. Resumption of ASA resulted in another drop of hemoglobin, however GI repeated EGD on 8/6; clips in situ and no evidence of bleeding. CT abd/pelv to the thighs with barium contrast completed without evidence of bleed. Course further complicated by Klebsiella bacteremia 2.2 urosepsis, now being treated with CTX (day 3/14, to end 8/17) and s/p midline line placement 8/7. Bcx have been NGTD, pt afebrile for >24hours. Pt transitioned off bumex drip for diuresis and remains euvolemic, along with resolution of RONNIE. Pt advanced diet to mechanical soft, and vitals have improved. Lopressor 12.5mg BID started today and patient tolerating well. He remains hemodynamically stable and is being stepped down to tele [address] [place] [address] for further management.       Subjective:  Patient seen and examined at bedside. States feeling well. Denies CP/SOB, dizziness/diaphoresis, n/v, palpitations. Remainder ROS otherwise negative.    Overnight Events:  Diet advanced and patient tolerating well     TELEMETRY: ST with PVCs (100s-120s)    EKG: ST @ 120 BPM with frequent PVCs       VITAL SIGNS:  T(C): 37 (08-07-18 @ 13:00), Max: 37.3 (08-07-18 @ 09:00)  HR: 122 (08-07-18 @ 13:00) (96 - 124)  BP: 107/65 (08-07-18 @ 13:00) (96/61 - 124/70)  RR: 19 (08-07-18 @ 13:00) (10 - 29)  SpO2: 99% (08-07-18 @ 13:00) (95% - 100%)  Wt(kg): --    I&O's Summary    06 Aug 2018 07:01  -  07 Aug 2018 07:00  --------------------------------------------------------  IN: 250 mL / OUT: 0 mL / NET: 250 mL          PHYSICAL EXAM:    General: A/ox 3, No acute Distress  Neck: Supple, NO JVD  Cardiac: S1 S2,   Pulmonary: CTAB, Breathing unlabored, No Rhonchi/Rales/Wheezing  Abdomen: Soft, Non -tender, +BS x 4 quads  Extremities: No Rashes, No edema  Neuro: A/o x 3, No focal deficits          LABS:                          10.2   4.5   )-----------( 155      ( 07 Aug 2018 06:17 )             31.9                              08-07    140  |  100  |  14  ----------------------------<  122<H>  3.5   |  29  |  1.54<H>    Ca    8.5      07 Aug 2018 06:17  Phos  2.6     08-07  Mg     1.8     08-07                              PT/INR - ( 06 Aug 2018 06:14 )   PT: 14.0 sec;   INR: 1.26          PTT - ( 06 Aug 2018 06:14 )  PTT:36.1 sec  CAPILLARY BLOOD GLUCOSE      POCT Blood Glucose.: 177 mg/dL (07 Aug 2018 12:31)  POCT Blood Glucose.: 122 mg/dL (07 Aug 2018 06:12)  POCT Blood Glucose.: 143 mg/dL (06 Aug 2018 23:18)  POCT Blood Glucose.: 93 mg/dL (06 Aug 2018 21:25)  POCT Blood Glucose.: 87 mg/dL (06 Aug 2018 18:31)            Allergies:  No Known Allergies    MEDICATIONS  (STANDING):  atorvastatin 80 milliGRAM(s) Oral at bedtime  cefTRIAXone   IVPB 1 Gram(s) IV Intermittent every 24 hours  cefTRIAXone   IVPB      chlorhexidine 2% Cloths 1 Application(s) Topical daily  dextrose 5%. 1000 milliLiter(s) (50 mL/Hr) IV Continuous <Continuous>  dextrose 50% Injectable 12.5 Gram(s) IV Push once  dextrose 50% Injectable 25 Gram(s) IV Push once  dextrose 50% Injectable 25 Gram(s) IV Push once  docusate sodium 100 milliGRAM(s) Oral daily  escitalopram 5 milliGRAM(s) Oral daily  insulin lispro (HumaLOG) corrective regimen sliding scale   SubCutaneous three times a day before meals  insulin lispro (HumaLOG) corrective regimen sliding scale   SubCutaneous at bedtime  metoprolol tartrate 12.5 milliGRAM(s) Oral every 12 hours  pantoprazole   Suspension 40 milliGRAM(s) Oral two times a day before meals  polyethylene glycol 3350 17 Gram(s) Oral daily  senna 1 Tablet(s) Oral daily  sodium chloride 0.9% lock flush 20 milliLiter(s) IV Push once  sucralfate 1 Gram(s) Oral two times a day    MEDICATIONS  (PRN):  dextrose 40% Gel 15 Gram(s) Oral once PRN Blood Glucose LESS THAN 70 milliGRAM(s)/deciliter  glucagon  Injectable 1 milliGRAM(s) IntraMuscular once PRN Glucose LESS THAN 70 milligrams/deciliter  sodium chloride 0.9% lock flush 10 milliLiter(s) IV Push every 1 hour PRN After each medication administration  sodium chloride 0.9% lock flush 10 milliLiter(s) IV Push every 12 hours PRN Lumen of catheter NOT used        DIAGNOSTIC TESTS:

## 2018-08-07 NOTE — PROGRESS NOTE ADULT - PROBLEM SELECTOR PLAN 2
Patient's EF was reportedly 25% in the past according to ED report. On echo 7/20, EF is 15-20%. Was volume overloaded on initial exam, now improved. On CXR, evidence of cardiomegaly but significant decrease in pulmonary vascular congestion.   - d/c bumex gtt, holding further diuresis as appears euvolemic. stood yesterday with PT with stable BP's  -beta blocker as above  -not monitoring I/O's; d/c'd cherry  -  s/p fluids and blood transfusions, increased vascular congestion noted on CXR.   -repeat CXR tomorrow a.m., consider diuresis pending pressures/volume status Patient's EF was reportedly 25% in the past according to ED report. On echo 7/20, EF is 15-20%. Was volume overloaded on initial exam, now improved. On CXR, evidence of cardiomegaly but significant decrease in pulmonary vascular congestion.   - d/c bumex gtt, holding further diuresis as appears euvolemic. stood yesterday with PT with stable BP's  -beta blocker as above  -not monitoring I/O's; d/c'd cherry  -  s/p fluids and blood transfusions, no congestion on CXR, S3 gallop appreciated. Likely will need diuresis in future, when eating more PO  -Would benefit from lifevest while awaiting ICD consideration, as EF <30% and can not be optimized with medicaitons

## 2018-08-07 NOTE — CONSULT NOTE ADULT - SUBJECTIVE AND OBJECTIVE BOX
Vascular Access Service Consult Note    [facility] ISSUES - PROBLEM Dx:  Anemia: Anemia  Bacteremia due to Gram-negative bacteria: Bacteremia due to Gram-negative bacteria  Hypernatremia: Hypernatremia  Acute anemia: Acute anemia  Cerebrovascular accident (CVA), unspecified mechanism: Cerebrovascular accident (CVA), unspecified mechanism  Renal bone disease: Renal bone disease  Transition of care performed with sharing of clinical summary: Transition of care performed with sharing of clinical summary  Nutrition, metabolism, and development symptoms: Nutrition, metabolism, and development symptoms  Need for prophylactic measure: Need for prophylactic measure  Type 2 diabetes mellitus without complication, without long-term current use of insulin: Type 2 diabetes mellitus without complication, without long-term current use of insulin  Acute on chronic systolic congestive heart failure: Acute on chronic systolic congestive heart failure  Transaminitis: Transaminitis  Acute kidney injury: Acute kidney injury  Altered mental status, unspecified altered mental status type: Altered mental status, unspecified altered mental status type  Acute coronary syndrome: Acute coronary syndrome  Metabolic acidosis: Metabolic acidosis  CHF (congestive heart failure): CHF (congestive heart failure)  RONNIE (acute kidney injury): RONNIE (acute kidney injury)             Diagnosis: Bacteremia     Indications for Vascular Access (Check all that apply)  [  ]  Antibiotic Therapy       Antibiotic Prescribed:                                                                             Expected Duration of Therapy:               [  ]  IV Hydration  [  ]  Total Parenteral Nutrition  [  ]  Chemotherapy  [ X ]  Difficult Venous Access  [  ]  CVP monitoring  [  ]  Medications with high potential for tissue necrosis on extravasation  [  ]  Other    Screening (Check all that apply)  Previous Radiation to chest  [  ] Yes      [  X]  No  Breast Cancer                          [  ] Left     [  ]  Right    [ X ]  No  Lymph Node Dissection         [  ] Left     [  ]  Right    [X  ]  No  Pacemaker or ICD                   [  ] Left     [  ]  Right    [ X ]  No  Upper Extremity DVT             [  ] Left     [  ]  Right    [ X ]  No  Chronic Kidney Disease         [  ]  Yes     [X  ]  No  Hemodialysis                           [  ]  Yes     [ X ]  No  AV Fistula/ Graft                     [  ]  Left    [  ]  Right    [ X ]  No  Temp>101F in past 24 H       [  ]  Yes     [ X ]  No  H/O PICC/Midline                   [  ]  Yes     [ X ]  No    Lab data:                        10.2   4.5   )-----------( 155      ( 07 Aug 2018 06:17 )             31.9     08-07    140  |  100  |  14  ----------------------------<  122<H>  3.5   |  29  |  1.54<H>    Ca    8.5      07 Aug 2018 06:17  Phos  2.6     08-07  Mg     1.8     08-07      PT/INR - ( 06 Aug 2018 06:14 )   PT: 14.0 sec;   INR: 1.26          PTT - ( 06 Aug 2018 06:14 )  PTT:36.1 sec  Culture Results:   No growth at 1 day. (08-06 @ 08:31)  Culture Results:   No growth at 1 day. (08-06 @ 08:31)          I have reviewed the chart, interviewed and examined the patient and determined that this patient:  [  ] Is a candidate for a PICC line  [ X ] Is a candidate for a Midline  [  ] Is not a candidate for vascular access device (reason)    Lumens:    [  ] Single  [ X ] Double

## 2018-08-07 NOTE — PROGRESS NOTE ADULT - PROBLEM SELECTOR PLAN 9
DVT: SCD's  GI: PPI IV 8 weeks total, 9/21  Activity: PT following DVT: SCD's  GI: PPI x 8 weeks total, 9/21  Activity: pending PT eval

## 2018-08-07 NOTE — PROGRESS NOTE ADULT - ATTENDING COMMENTS
Patient examined, fellow's hx and PE reviewed and confirmed. I find RONNIE, anemia. A/P reviewed and confirmed. Follow Follow up HGB. See full note.

## 2018-08-07 NOTE — PROGRESS NOTE ADULT - PROBLEM SELECTOR PLAN 9
DVT: SCD's  GI: PPI IV  Activity: PT following DVT: SCD's  GI: PPI IV 8 weeks total, 9/21  Activity: PT following

## 2018-08-07 NOTE — PROGRESS NOTE ADULT - PROBLEM SELECTOR PLAN 7
Patient has been weak, unsteady, and confused. Improved significantly with decrease in BUN. Likely toxic-metabolic 2/2 uremia.  - On lexapro Patient has been weak, unsteady, and confused. Improved significantly with decrease in BUN. Likely toxic-metabolic 2/2 uremia.  - c/w lexapro

## 2018-08-07 NOTE — PROGRESS NOTE ADULT - PROBLEM SELECTOR PLAN 5
Patient's BUN/Cr is up trending, in setting of anemia/low perfusion. (unclear baseline). Likely 2/2 to cardiorenal syndrome as cardiac output is poor. Uremia resolved significantly, with subsequent improved mental status.   - Renal on board, appreciate recs  - Per renal, no urgent need for dialysis at this time  -sevelamer 800 TID to prevent hyperphosphatemia  -Pt dry and euvolemic; hold further diuretics for now    #elevated kappa protein  -elevated kappa and kappa/lambda ratio noted. Not likely multiple myeloma as pt not anemic prior to acute GI bleed and without hypercalcemia. Can f/u with PCP as an OP Patient's BUN/Cr is up trending, in setting of anemia/low perfusion. (unclear baseline). Likely 2/2 to cardiorenal syndrome as cardiac output is poor. Uremia resolved significantly, with subsequent improved mental status.   - Renal on board, appreciate recs  - Per renal, no urgent need for dialysis at this time  -Pt dry and euvolemic; hold further diuretics for now    #elevated kappa protein  -elevated kappa and kappa/lambda ratio noted. Not likely multiple myeloma as pt not anemic prior to acute GI bleed and without hypercalcemia. Can f/u with PCP as an OP

## 2018-08-07 NOTE — PROGRESS NOTE ADULT - PROBLEM SELECTOR PLAN 3
Acute Upper GI bleed 2/2 two non-bleed ulcers s/p clipping of one (7/27). Hb dropped to 6.9 on 07/29, pt given 1 unit of PRBCs, Concern for possible re-bleed; Hb continued to fluctuate, dc'd ASA, Hb decreased again on 8/4 and on 8/5; s/p 1U PRBC each time (now 3U PRBC's total this admission) with good Hb response. Now Hb 9.3.   -Appreciate GI f/u and rec's.   -PPI BID transition to PO  -NPO  - repeat CBCs   - Active T&S  - Transfusion goal >8 for patient with significant CV disease.  -Hb stable overnight. will continue to monitor for active GI re-bleed, will make GI aware (repeat EGD vs. need IR for embolization)  -sinus tachy overnight and through this morning, etiology fever (infectious vs drug fever) vs hypovolemia 2/2 possible GI re-bleed. Hb stable overnight after 1U PRBC yesterday. Will continue to monitor Acute Upper GI bleed 2/2 two non-bleed ulcers s/p clipping of one (7/27). Hb dropped to 6.9 on 07/29, pt given 1 unit of PRBCs, Concern for possible re-bleed; Hb continued to fluctuate, dc'd ASA, Hb decreased again on 8/4 and on 8/5; s/p 1U PRBC each time (now 3U PRBC's total this admission) with good Hb response. Now Hb 9.3.   -Appreciate GI f/u and rec's.   -PPI BID transition to PO for 8 weeks (stop 9/21)  -return to [facility] soft/thin diet  - monitor CBCs   - Transfusion goal >8 for patient with significant CV disease.  -EGD completed yesterday, no active bleed; clips in situ on previous ulcer. GI to f/u OP for colonoscopy

## 2018-08-07 NOTE — PROCEDURE NOTE - NSPROCDETAILS_GEN_ALL_CORE
sterile dressing applied/supine position/location identified, draped/prepped, sterile technique used/ultrasound guidance/ultrasound assessment/sterile technique, catheter placed

## 2018-08-07 NOTE — PROGRESS NOTE ADULT - SUBJECTIVE AND OBJECTIVE BOX
PGY-1 Off Service Note  67M w PMH of HTN, DM, HLD, HFrEF of 25%, CAD s/p PCI 7 years ago, with altered mental status in the setting of one week of dyspnea and weakness admitted to the CCU for management of ACS, acute on chronic CHF, RONNIE, Also found to have cortical and subcortical CVAs, Outside of therapeutic window for cath intervention. Treated for acute on chronic HF initially with bumex drip and transitioned to IV Bumex 1mg BID with good UOP response to euvolemic state; no further diuresis. BUN continued to elevate out of proportion to Cr, in addition to acute drop in his Hb 14 to 9. Hb continued to drop; s/p 1UPRBC. Found to have acute upper GI bleed in context of dual antiplatelet and AC therapy, with subsequent gastric ulcers (x2); s/p clipping of one ulcer (7/27). After ulcer clipping, BUN trended down with improved mental status (affect, verbalizations and ability to follow commands). Attempted to resume 1 cardio/neuro secondary preventative med (ASA). However, within one day Hb dropped from new baseline of 9.6 to 8.6, demonstrating inability to tolerate anti-platelet agent. Discontinued ASA and Hb 9.1; stabilized. However, Hb dropped again on 8/4, and 8/5, s/p 1U PRBCs each of these times (now 3U PRBC’s total for admission) with appropriate Hb response to 9.3.  GI repeated EGD on 8/6 *****  Long term, this is concerning for ability to maximize cardiac medical optimization (anti-platelet/AC) and provide secondary prevention for MI/Stroke,  as risk on these meds for fatal GI Bleed outweighs benefit.  With respect to CHF, his Chest Xray's 8/4 and 8/5 suggest increase lymphatic congestion, in context of volume resuscitation (IVF's, 2U PRBC) during this time; clinically clear lungs and no peripheral edema. Acutely, need to prioritized GI re-bleed ahead of further diuresis. Long term, when stable will need HF consult to address decreased tolerance of medical management.  Pt spiked fever 8/3/18; found to have urosepsis as he is bacteremic with PCR+ Klebsiella and positive Urine culture with same. He received Ceft x1 and then Zosyn for 3 days. He spiked a fever after 2 days of Zosyn and was recultured. NGTD*** on survellience or repeat blood cultures. Sensitivities returned and narrowed to 1g IV Ceftriaxone (8/6, day 3 of antibiotics). His fever spikes on Zosyn may be drug related fever,***. Mental status is improved, in the setting of decreased BUN. LFT's and BUN/Cr improved as well. Statin initially held in context of elevated LFT's, but this improved and he was restarted on Lipitor 80mg PO QD. During his stay, he was noted to have Afib and initially on metop and eliquis. Eliquis stopped in context of GI bleed and can not be resumed as risk for fatal bleed too high. Initially tolerating Toprol XL 25mg PO QD with systolic pressures , but this was discontinued in context of urosepsis and GI bleed.  RONNIE improving; remains on sevelamer; renal following.  Mental status improved with resolution of uremia. Started on lexapro and continued on same with good affect. Of note, GI assessed for other etiologies of LFT elevation, found to have elevated kappa and kappa/lambda ratio noted. Not likely multiple myeloma as pt not anemic prior to acute GI bleed and without hypercalcemia. Can f/u with PCP as an OP.  His diabetes has been managed with SSI and attempted lantus. However, with fluctuating PO intake, hypoglycemia noted in morning x2. Stopped lantus and continues on SSI, Diet *** (cleared by speech service); PPx for VTE with SCD only as risk for bleed too high. Will remain on PPI for GI ppx, ACTIVITY*** He remains in tenuous status, as he likely having an acute upper GI re-bleed and high risk for thrombus given recent STEMI, afib, and CVA.      OVERNIGHT EVENTS:    SUBJECTIVE:    Vital Signs Last 12 Hrs  T(F): 96.9 (08-07-18 @ 06:20), Max: 98.8 (08-07-18 @ 01:30)  HR: 114 (08-07-18 @ 06:00) (106 - 118)  BP: 117/80 (08-07-18 @ 06:00) (114/72 - 122/73)  BP(mean): 95 (08-07-18 @ 06:00) (89 - 96)  RR: 20 (08-07-18 @ 06:00) (10 - 20)  SpO2: 99% (08-07-18 @ 06:00) (98% - 100%)  I&O's Summary    05 Aug 2018 07:01  -  06 Aug 2018 07:00  --------------------------------------------------------  IN: 2197 mL / OUT: 1500 mL / NET: 697 mL    06 Aug 2018 07:01  -  07 Aug 2018 06:35  --------------------------------------------------------  IN: 250 mL / OUT: 0 mL / NET: 250 mL        PHYSICAL EXAM:  Constitutional: NAD, comfortable in bed.  HEENT: NC/AT, PERRLA, EOMI, no conjunctival pallor or scleral icterus, MMM  Neck: Supple, no JVD  Respiratory: Normal rate, rhythm, depth, effort. CTAB. No w/r/r.   Cardiovascular: RRR, normal S1 and S2, no m/r/g.   Gastrointestinal: +BS, soft NTND, no guarding or rebound tenderness, no palpable masses   Extremities: wwp; no cyanosis, clubbing or edema.   Vascular: Pulses equal and strong throughout.   Neurological: AAOx3, no CN deficits, strength and sensation intact throughout.   Skin: No gross skin abnormalities or rashes        LABS:                        10.2   5.2   )-----------( 167      ( 06 Aug 2018 20:44 )             31.3     08-06    139  |  99  |  17  ----------------------------<  110<H>  4.0   |  28  |  1.83<H>    Ca    8.5      06 Aug 2018 02:19  Phos  2.0     08-06  Mg     2.0     08-06      PT/INR - ( 06 Aug 2018 06:14 )   PT: 14.0 sec;   INR: 1.26          PTT - ( 06 Aug 2018 06:14 )  PTT:36.1 sec      RADIOLOGY & ADDITIONAL TESTS:    MEDICATIONS  (STANDING):  atorvastatin 80 milliGRAM(s) Oral at bedtime  cefTRIAXone   IVPB 1 Gram(s) IV Intermittent every 24 hours  cefTRIAXone   IVPB      chlorhexidine 2% Cloths 1 Application(s) Topical daily  dextrose 5%. 1000 milliLiter(s) (50 mL/Hr) IV Continuous <Continuous>  dextrose 50% Injectable 12.5 Gram(s) IV Push once  dextrose 50% Injectable 25 Gram(s) IV Push once  dextrose 50% Injectable 25 Gram(s) IV Push once  escitalopram 5 milliGRAM(s) Oral daily  insulin lispro (HumaLOG) corrective regimen sliding scale   SubCutaneous three times a day before meals  insulin lispro (HumaLOG) corrective regimen sliding scale   SubCutaneous at bedtime  pantoprazole  Injectable 40 milliGRAM(s) IV Push every 12 hours  sucralfate 1 Gram(s) Oral two times a day    MEDICATIONS  (PRN):  dextrose 40% Gel 15 Gram(s) Oral once PRN Blood Glucose LESS THAN 70 milliGRAM(s)/deciliter  glucagon  Injectable 1 milliGRAM(s) IntraMuscular once PRN Glucose LESS THAN 70 milligrams/deciliter PGY-1 Off Service Note  67M w PMH of HTN, DM, HLD, HFrEF of 25%, CAD s/p PCI 7 years ago, with altered mental status in the setting of one week of dyspnea and weakness admitted to the CCU for management of ACS, acute on chronic CHF, RONNIE, Also found to have cortical and subcortical CVAs, Outside of therapeutic window for cath intervention. Treated for acute on chronic HF initially with bumex drip and transitioned to IV Bumex 1mg BID with good UOP response to euvolemic state; no further diuresis. BUN continued to elevate out of proportion to Cr, in addition to acute drop in his Hb 14 to 9. Hb continued to drop; s/p 1UPRBC. Found to have acute upper GI bleed in context of dual antiplatelet and AC therapy, with subsequent gastric ulcers (x2); s/p clipping of one ulcer (7/27). After ulcer clipping, BUN trended down with improved mental status (affect, verbalizations and ability to follow commands). Attempted to resume 1 cardio/neuro secondary preventative med (ASA). However, within one day Hb dropped from new baseline of 9.6 to 8.6, demonstrating inability to tolerate anti-platelet agent. Discontinued ASA and Hb 9.1; stabilized. However, Hb dropped again on 8/4, and 8/5, s/p 1U PRBCs each of these times (now 3U PRBC’s total for admission) with appropriate Hb response to 9.3.  GI repeated EGD on 8/6 *****  Long term, this is concerning for ability to maximize cardiac medical optimization (anti-platelet/AC) and provide secondary prevention for MI/Stroke,  as risk on these meds for fatal GI Bleed outweighs benefit.  With respect to CHF, his Chest Xray's 8/4 and 8/5 suggest increase lymphatic congestion, in context of volume resuscitation (IVF's, 2U PRBC) during this time; clinically clear lungs and no peripheral edema. Acutely, need to prioritized GI re-bleed ahead of further diuresis. Long term, when stable will need HF consult to address decreased tolerance of medical management.  Pt spiked fever 8/3/18; found to have urosepsis as he is bacteremic with PCR+ Klebsiella and positive Urine culture with same. He received Ceft x1 and then Zosyn for 3 days. He spiked a fever after 2 days of Zosyn and was recultured. NGTD*** on survellience or repeat blood cultures. Sensitivities returned and narrowed to 1g IV Ceftriaxone (8/6, day 3 of antibiotics). His fever spikes on Zosyn may be drug related fever,***. Mental status is improved, in the setting of decreased BUN. LFT's and BUN/Cr improved as well. Statin initially held in context of elevated LFT's, but this improved and he was restarted on Lipitor 80mg PO QD. During his stay, he was noted to have Afib and initially on metop and eliquis. Eliquis stopped in context of GI bleed and can not be resumed as risk for fatal bleed too high. Initially tolerating Toprol XL 25mg PO QD with systolic pressures , but this was discontinued in context of urosepsis and GI bleed.  RONNIE improving; remains on sevelamer; renal following.  Mental status improved with resolution of uremia. Started on lexapro and continued on same with good affect. Of note, GI assessed for other etiologies of LFT elevation, found to have elevated kappa and kappa/lambda ratio noted. Not likely multiple myeloma as pt not anemic prior to acute GI bleed and without hypercalcemia. Can f/u with PCP as an OP.  His diabetes has been managed with SSI and attempted lantus. However, with fluctuating PO intake, hypoglycemia noted in morning x2. Stopped lantus and continues on SSI, Diet *** (cleared by speech service); PPx for VTE with SCD only as risk for bleed too high. Will remain on PPI for GI ppx, ACTIVITY*** He remains in tenuous status, as he likely having an acute upper GI re-bleed and high risk for thrombus given recent STEMI, afib, and CVA.      OVERNIGHT EVENTS:    Tele with 1-2 NSVT intermittent through night.     SUBJECTIVE:  No new complaints. Denied CP/SOB/HA/dizziness/no abd discomfort.     Vital Signs Last 12 Hrs  T(F): 96.9 (08-07-18 @ 06:20), Max: 98.8 (08-07-18 @ 01:30)  HR: 114 (08-07-18 @ 06:00) (106 - 118)  BP: 117/80 (08-07-18 @ 06:00) (114/72 - 122/73)  BP(mean): 95 (08-07-18 @ 06:00) (89 - 96)  RR: 20 (08-07-18 @ 06:00) (10 - 20)  SpO2: 99% (08-07-18 @ 06:00) (98% - 100%)  I&O's Summary    05 Aug 2018 07:01  -  06 Aug 2018 07:00  --------------------------------------------------------  IN: 2197 mL / OUT: 1500 mL / NET: 697 mL    06 Aug 2018 07:01  -  07 Aug 2018 06:35  --------------------------------------------------------  IN: 250 mL / OUT: 0 mL / NET: 250 mL        PHYSICAL EXAM:  Constitutional: Resting in bed, sleepy but NAD   HEENT: NC/AT; PERRL, clear conjunctiva  Neck: supple, no lymphadenopathy  Cardiovascular: +S1/S2; S3 appreciated at apex. systolic ejection murmur LLSB.   Respiratory: CTA b/l; no W/R/R  Gastrointestinal: soft, NT, distended and tympanic; +BSx4  Extremities: WWP; 2+ peripheral pulses; no edema   Neurological: AAOx3; no focal deficits        LABS:                        10.2   5.2   )-----------( 167      ( 06 Aug 2018 20:44 )             31.3     08-06    139  |  99  |  17  ----------------------------<  110<H>  4.0   |  28  |  1.83<H>    Ca    8.5      06 Aug 2018 02:19  Phos  2.0     08-06  Mg     2.0     08-06      PT/INR - ( 06 Aug 2018 06:14 )   PT: 14.0 sec;   INR: 1.26          PTT - ( 06 Aug 2018 06:14 )  PTT:36.1 sec      RADIOLOGY & ADDITIONAL TESTS:    MEDICATIONS  (STANDING):  atorvastatin 80 milliGRAM(s) Oral at bedtime  cefTRIAXone   IVPB 1 Gram(s) IV Intermittent every 24 hours  cefTRIAXone   IVPB      chlorhexidine 2% Cloths 1 Application(s) Topical daily  dextrose 5%. 1000 milliLiter(s) (50 mL/Hr) IV Continuous <Continuous>  dextrose 50% Injectable 12.5 Gram(s) IV Push once  dextrose 50% Injectable 25 Gram(s) IV Push once  dextrose 50% Injectable 25 Gram(s) IV Push once  escitalopram 5 milliGRAM(s) Oral daily  insulin lispro (HumaLOG) corrective regimen sliding scale   SubCutaneous three times a day before meals  insulin lispro (HumaLOG) corrective regimen sliding scale   SubCutaneous at bedtime  pantoprazole  Injectable 40 milliGRAM(s) IV Push every 12 hours  sucralfate 1 Gram(s) Oral two times a day    MEDICATIONS  (PRN):  dextrose 40% Gel 15 Gram(s) Oral once PRN Blood Glucose LESS THAN 70 milliGRAM(s)/deciliter  glucagon  Injectable 1 milliGRAM(s) IntraMuscular once PRN Glucose LESS THAN 70 milligrams/deciliter PGY-1 Off Service Note  67M w PMH of HTN, DM, HLD, HFrEF of 25%, CAD s/p PCI 7 years ago, with altered mental status in the setting of one week of dyspnea and weakness admitted to the CCU for management of ACS, acute on chronic CHF, RONNIE, Also found to have cortical and subcortical CVAs, Outside of therapeutic window for cath intervention. Treated for acute on chronic HF initially with bumex drip and transitioned to IV Bumex 1mg BID with good UOP response to euvolemic state; no further diuresis. BUN continued to elevate out of proportion to Cr, in addition to acute drop in his Hb 14 to 9. Hb continued to drop; s/p 1UPRBC. Found to have acute upper GI bleed in context of dual antiplatelet and AC therapy, with subsequent gastric ulcers (x2); s/p clipping of one ulcer (7/27). After ulcer clipping, BUN trended down with improved mental status (affect, verbalizations and ability to follow commands). Attempted to resume 1 cardio/neuro secondary preventative med (ASA). However, within one day Hb dropped from new baseline of 9.6 to 8.6, demonstrating inability to tolerate anti-platelet agent. Discontinued ASA and Hb 9.1; stabilized. However, Hb dropped again on 8/4, and 8/5, s/p 1U PRBCs each of these times (now 3U PRBC’s total for admission) with appropriate Hb response to 9.3.  GI repeated EGD on 8/6 *****  Long term, this is concerning for ability to maximize cardiac medical optimization (anti-platelet/AC) and provide secondary prevention for MI/Stroke,  as risk on these meds for fatal GI Bleed outweighs benefit.  With respect to CHF, his Chest Xray's 8/4 and 8/5 suggest increase lymphatic congestion, in context of volume resuscitation (IVF's, 2U PRBC) during this time; clinically clear lungs and no peripheral edema. Acutely, need to prioritized GI re-bleed ahead of further diuresis. Long term, when stable will need HF consult to address decreased tolerance of medical management.  Pt spiked fever 8/3/18; found to have urosepsis as he is bacteremic with PCR+ Klebsiella and positive Urine culture with same. He received Ceft x1 and then Zosyn for 3 days. He spiked a fever after 2 days of Zosyn and was recultured. NGTD*** on survellience or repeat blood cultures. Sensitivities returned and narrowed to 1g IV Ceftriaxone (8/6, day 3 of antibiotics). His fever spikes on Zosyn may be drug related fever,***. Mental status is improved, in the setting of decreased BUN. LFT's and BUN/Cr improved as well. Statin initially held in context of elevated LFT's, but this improved and he was restarted on Lipitor 80mg PO QD. During his stay, he was noted to have Afib and initially on metop and eliquis. Eliquis stopped in context of GI bleed and can not be resumed as risk for fatal bleed too high. Initially tolerating Toprol XL 25mg PO QD with systolic pressures , but this was discontinued in context of urosepsis and GI bleed.  RONNIE improving; remains on sevelamer; renal following.  Mental status improved with resolution of uremia. Started on lexapro and continued on same with good affect. Of note, GI assessed for other etiologies of LFT elevation, found to have elevated kappa and kappa/lambda ratio noted. Not likely multiple myeloma as pt not anemic prior to acute GI bleed and without hypercalcemia. Can f/u with PCP as an OP.  His diabetes has been managed with SSI and attempted lantus. However, with fluctuating PO intake, hypoglycemia noted in morning x2. Stopped lantus and continues on SSI, Diet *** (cleared by speech service); PPx for VTE with SCD only as risk for bleed too high. Will remain on PPI for GI ppx, ACTIVITY*** He remains in tenuous status, as he likely having an acute upper GI re-bleed and high risk for thrombus given recent STEMI, afib, and CVA.      OVERNIGHT EVENTS:  CT abd/pelvis to thighs neg for bleed. Started on clear diet; tolerated same. Large BM without melana or overt blood.     Tele with 1-2 NSVT intermittent through night.     SUBJECTIVE:  No new complaints. Denied CP/SOB/HA/dizziness/no abd discomfort.     Vital Signs Last 12 Hrs  T(F): 96.9 (08-07-18 @ 06:20), Max: 98.8 (08-07-18 @ 01:30)  HR: 114 (08-07-18 @ 06:00) (106 - 118)  BP: 117/80 (08-07-18 @ 06:00) (114/72 - 122/73)  BP(mean): 95 (08-07-18 @ 06:00) (89 - 96)  RR: 20 (08-07-18 @ 06:00) (10 - 20)  SpO2: 99% (08-07-18 @ 06:00) (98% - 100%)  I&O's Summary    05 Aug 2018 07:01  -  06 Aug 2018 07:00  --------------------------------------------------------  IN: 2197 mL / OUT: 1500 mL / NET: 697 mL    06 Aug 2018 07:01  -  07 Aug 2018 06:35  --------------------------------------------------------  IN: 250 mL / OUT: 0 mL / NET: 250 mL        PHYSICAL EXAM:  Constitutional: Resting in bed, sleepy but NAD   HEENT: NC/AT; PERRL, clear conjunctiva  Neck: supple, no lymphadenopathy  Cardiovascular: +S1/S2; S3 appreciated at apex. systolic ejection murmur LLSB.   Respiratory: CTA b/l; no W/R/R  Gastrointestinal: soft, NT, distended and tympanic; +BSx4  Extremities: WWP; 2+ peripheral pulses; no edema   Neurological: AAOx3; no focal deficits        LABS:                        10.2   5.2   )-----------( 167      ( 06 Aug 2018 20:44 )             31.3     08-06    139  |  99  |  17  ----------------------------<  110<H>  4.0   |  28  |  1.83<H>    Ca    8.5      06 Aug 2018 02:19  Phos  2.0     08-06  Mg     2.0     08-06      PT/INR - ( 06 Aug 2018 06:14 )   PT: 14.0 sec;   INR: 1.26          PTT - ( 06 Aug 2018 06:14 )  PTT:36.1 sec      RADIOLOGY & ADDITIONAL TESTS:    MEDICATIONS  (STANDING):  atorvastatin 80 milliGRAM(s) Oral at bedtime  cefTRIAXone   IVPB 1 Gram(s) IV Intermittent every 24 hours  cefTRIAXone   IVPB      chlorhexidine 2% Cloths 1 Application(s) Topical daily  dextrose 5%. 1000 milliLiter(s) (50 mL/Hr) IV Continuous <Continuous>  dextrose 50% Injectable 12.5 Gram(s) IV Push once  dextrose 50% Injectable 25 Gram(s) IV Push once  dextrose 50% Injectable 25 Gram(s) IV Push once  escitalopram 5 milliGRAM(s) Oral daily  insulin lispro (HumaLOG) corrective regimen sliding scale   SubCutaneous three times a day before meals  insulin lispro (HumaLOG) corrective regimen sliding scale   SubCutaneous at bedtime  pantoprazole  Injectable 40 milliGRAM(s) IV Push every 12 hours  sucralfate 1 Gram(s) Oral two times a day    MEDICATIONS  (PRN):  dextrose 40% Gel 15 Gram(s) Oral once PRN Blood Glucose LESS THAN 70 milliGRAM(s)/deciliter  glucagon  Injectable 1 milliGRAM(s) IntraMuscular once PRN Glucose LESS THAN 70 milligrams/deciliter PGY-1 Transfer Note Summary  67M w PMH of HTN, DM, HLD, HFrEF of 25%, CAD s/p PCI 7 years ago, s/p STEMI in [place] that was medically managed, came back to [place] and then developed altered mental status in the setting of one week of dyspnea and weakness admitted to the CCU for management of ACS, acute on chronic CHF, RONNIE, Also found to have cortical and subcortical CVAs, Outside of therapeutic window for cath intervention. Treated for acute on chronic HF initially with bumex drip and transitioned to IV Bumex 1mg BID with good UOP response to euvolemic state; no further diuresis. BUN continued to elevate out of proportion to Cr, in addition to acute drop in his Hb 14 to 9. Hb continued to drop; s/p 1UPRBC. Found to have acute upper GI bleed in context of dual antiplatelet and AC therapy, with subsequent gastric ulcers (x2); s/p clipping of one ulcer (7/27) by GI. After ulcer clipping, BUN trended down with improved mental status (affect, verbalizations and ability to follow commands). Attempted to resume 1 cardio/neuro secondary preventative med (ASA). However, within one day Hb dropped from new baseline of 9.6 to 8.6, demonstrating inability to tolerate anti-platelet agent. Discontinued ASA and Hb 9.1; stabilized. However, Hb dropped again on 8/4, and 8/5, s/p 1U PRBCs each of these times (now 3U PRBC’s total for admission) with appropriate Hb response to 10.1 today. GI repeated EGD on 8/6; clips in situ and no evidence of bleeding. Pt had BM yesterday and no melana or overt signs of blood; low suspicion for lower GI bleed. GI recommend OP f/u colonscopy. CT abd/pelv to the thighs with barium contrast completed without evidence of bleed. Unclear etiology to recent drops in Hb, perhaps dilutional as in context of IVF. Long term, the initial upper GI bleed is concerning for ability to maximize cardiac medical optimization (anti-platelet/AC) and provide secondary prevention for MI/Stroke,  as risk on these meds  resulting in fatal GI Bleed outweighs benefit. With respect to CHF, he remains euvolemic, with clear lungs and no peripheral edema. As he returns to normal eating habits, likely will require duiretic support. His beta blocker was restarted today (lopressor), and if good tolerance, consider returning to Toprol XL 25mg. Given limitation of optimized medical intervention, will need HF consult to address management options. For CAD, can not tolerate ELAN. Statin initially held in context of elevated LFT's, but this improved and he was restarted on Lipitor 80mg PO QD. During his stay, he was noted to have Afib and initially on metop and eliquis. Eliquis stopped in context of GI bleed and can not be resumed as risk for fatal bleed too high. Initially tolerating Toprol XL 25mg PO QD with systolic pressures ; held in context of urosepsis and GI bleed. As above, restarted lopressor with goal to transition back to Toprol XL tomorrow.     During stay, pt spiked fever 8/3/18; found to have urosepsis as he was bacteremic with PCR+ Klebsiella and positive Urine culture with same. He received Ceft x1 and then Zosyn for 3 days. He spiked a fever after 2 days of Zosyn and was recultured. NGTD on survellence or repeat blood cultures. Sensitivities returned and narrowed to 1g IV Ceftriaxone (8/6, day 3 of antibiotics to be completed on 8/17). WBC trended to normal and no further s/s of infection. Pt to have Midline placed today for abx. Mental status is improved from presentation, in the setting of decreased BUN. LFT's and BUN/Cr continue to improve as well.  RONNIE improving; dicontinued sevelamer as Phos WNL with improved kidney function; renal following.  Mental status improved with resolution of uremia. Started on lexapro and continued on same with good affect. Of note, GI assessed for other etiologies of LFT elevation, found to have elevated kappa and kappa/lambda ratio noted. Not likely multiple myeloma as pt not anemic prior to acute GI bleed and without hypercalcemia. Can f/u with PCP as an OP.  His diabetes has been managed with SSI and attempted lantus. However, with fluctuating PO intake, hypoglycemia noted intermittently. Stopped lantus and continues on SSI, [facility]thin DASH/TLC/consistent carbs (cleared by speech service); PPx for VTE with SCD only as risk for bleed too high. Will remain on PPI for GI ppx (8 weeks total, stop on 9/21 , Activity to resume with OOB as tolerated; resume work with PT. He was discussed on pt care rounds with attending and determined to be hemodynamically stable for step down today,.       OVERNIGHT EVENTS:  CT abd/pelvis to thighs neg for bleed. Started on clear diet; tolerated same. Large BM without melana or overt blood.     Tele with 1-2 NSVT intermittent through night.     SUBJECTIVE:  No new complaints. Denied CP/SOB/HA/dizziness/no abd discomfort.     Vital Signs Last 12 Hrs  T(F): 96.9 (08-07-18 @ 06:20), Max: 98.8 (08-07-18 @ 01:30)  HR: 114 (08-07-18 @ 06:00) (106 - 118)  BP: 117/80 (08-07-18 @ 06:00) (114/72 - 122/73)  BP(mean): 95 (08-07-18 @ 06:00) (89 - 96)  RR: 20 (08-07-18 @ 06:00) (10 - 20)  SpO2: 99% (08-07-18 @ 06:00) (98% - 100%)  I&O's Summary    05 Aug 2018 07:01  -  06 Aug 2018 07:00  --------------------------------------------------------  IN: 2197 mL / OUT: 1500 mL / NET: 697 mL    06 Aug 2018 07:01  -  07 Aug 2018 06:35  --------------------------------------------------------  IN: 250 mL / OUT: 0 mL / NET: 250 mL        PHYSICAL EXAM:  Constitutional: Resting in bed, sleepy but NAD   HEENT: NC/AT; PERRL, clear conjunctiva  Neck: supple, no lymphadenopathy  Cardiovascular: +S1/S2; S3 appreciated at apex. systolic ejection murmur LLSB.   Respiratory: CTA b/l; no W/R/R  Gastrointestinal: soft, NT, distended and tympanic; +BSx4  Extremities: WWP; 2+ peripheral pulses; no edema   Neurological: AAOx3; no focal deficits        LABS:                        10.2   5.2   )-----------( 167      ( 06 Aug 2018 20:44 )             31.3     08-06    139  |  99  |  17  ----------------------------<  110<H>  4.0   |  28  |  1.83<H>    Ca    8.5      06 Aug 2018 02:19  Phos  2.0     08-06  Mg     2.0     08-06      PT/INR - ( 06 Aug 2018 06:14 )   PT: 14.0 sec;   INR: 1.26          PTT - ( 06 Aug 2018 06:14 )  PTT:36.1 sec      RADIOLOGY & ADDITIONAL TESTS:    MEDICATIONS  (STANDING):  atorvastatin 80 milliGRAM(s) Oral at bedtime  cefTRIAXone   IVPB 1 Gram(s) IV Intermittent every 24 hours  cefTRIAXone   IVPB      chlorhexidine 2% Cloths 1 Application(s) Topical daily  dextrose 5%. 1000 milliLiter(s) (50 mL/Hr) IV Continuous <Continuous>  dextrose 50% Injectable 12.5 Gram(s) IV Push once  dextrose 50% Injectable 25 Gram(s) IV Push once  dextrose 50% Injectable 25 Gram(s) IV Push once  escitalopram 5 milliGRAM(s) Oral daily  insulin lispro (HumaLOG) corrective regimen sliding scale   SubCutaneous three times a day before meals  insulin lispro (HumaLOG) corrective regimen sliding scale   SubCutaneous at bedtime  pantoprazole  Injectable 40 milliGRAM(s) IV Push every 12 hours  sucralfate 1 Gram(s) Oral two times a day    MEDICATIONS  (PRN):  dextrose 40% Gel 15 Gram(s) Oral once PRN Blood Glucose LESS THAN 70 milliGRAM(s)/deciliter  glucagon  Injectable 1 milliGRAM(s) IntraMuscular once PRN Glucose LESS THAN 70 milligrams/deciliter PGY-1 Transfer Note Summary  67M w PMH of HTN, DM, HLD, HFrEF of 25%, CAD s/p PCI 7 years ago, s/p STEMI in [place] that was medically managed, came [place] and then developed altered mental status in the setting of one week of dyspnea and weakness admitted to the CCU for management of ACS, acute on chronic CHF, RONNIE, Also found to have cortical and subcortical CVAs, Outside of therapeutic window for cath intervention. Treated for acute on chronic HF initially with bumex drip and transitioned to IV Bumex 1mg BID with good UOP response to euvolemic state; no further diuresis. BUN continued to elevate out of proportion to Cr, in addition to acute drop in his Hb 14 to 9. Hb continued to drop; s/p 1UPRBC. Found to have acute upper GI bleed in context of dual antiplatelet and AC therapy, with subsequent gastric ulcers (x2); s/p clipping of one ulcer (7/27) by GI. After ulcer clipping, BUN trended down with improved mental status (affect, verbalizations and ability to follow commands). Attempted to resume 1 cardio/neuro secondary preventative med (ASA). However, within one day Hb dropped from new baseline of 9.6 to 8.6, demonstrating inability to tolerate anti-platelet agent. Discontinued ASA and Hb 9.1; stabilized. However, Hb dropped again on 8/4, and 8/5, s/p 1U PRBCs each of these times (now 3U PRBC’s total for admission) with appropriate Hb response to 10.1 today. GI repeated EGD on 8/6; clips in situ and no evidence of bleeding. Pt had BM yesterday and no melana or overt signs of blood; low suspicion for lower GI bleed. GI recommend OP f/u colonscopy. CT abd/pelv to the thighs with barium contrast completed without evidence of bleed. Unclear etiology to recent drops in Hb, perhaps dilutional as in context of IVF. Long term, the initial upper GI bleed is concerning for ability to maximize cardiac medical optimization (anti-platelet/AC) and provide secondary prevention for MI/Stroke,  as risk on these meds  resulting in fatal GI Bleed outweighs benefit. With respect to CHF, he remains euvolemic, with clear lungs and no peripheral edema. As he returns to normal eating habits, likely will require duiretic support. His beta blocker was restarted today (lopressor), and if good tolerance, consider returning to Toprol XL 25mg. Given limitation of optimized medical intervention, will need HF consult to address management options. For CAD, can not tolerate ELAN. Statin initially held in context of elevated LFT's, but this improved and he was restarted on Lipitor 80mg PO QD. During his stay, he was noted to have Afib and initially on metop and eliquis. Eliquis stopped in context of GI bleed and can not be resumed as risk for fatal bleed too high. Initially tolerating Toprol XL 25mg PO QD with systolic pressures ; held in context of urosepsis and GI bleed. As above, restarted lopressor with goal to transition back to Toprol XL tomorrow.     During stay, pt spiked fever 8/3/18; found to have urosepsis as he was bacteremic with PCR+ Klebsiella and positive Urine culture with same. He received Ceft x1 and then Zosyn for 3 days. He spiked a fever after 2 days of Zosyn and was recultured. NGTD on survellence or repeat blood cultures. Sensitivities returned and narrowed to 1g IV Ceftriaxone (8/6, day 3 of antibiotics to be completed on 8/17). WBC trended to normal and no further s/s of infection. Pt to have Midline placed today for abx. Mental status is improved from presentation, in the setting of decreased BUN. LFT's and BUN/Cr continue to improve as well.  RONNIE improving; dicontinued sevelamer as Phos WNL with improved kidney function; renal following.  Mental status improved with resolution of uremia. Started on lexapro and continued on same with good affect. Of note, GI assessed for other etiologies of LFT elevation, found to have elevated kappa and kappa/lambda ratio noted. Not likely multiple myeloma as pt not anemic prior to acute GI bleed and without hypercalcemia. Can f/u with PCP as an OP.  His diabetes has been managed with SSI and attempted lantus. However, with fluctuating PO intake, hypoglycemia noted intermittently. Stopped lantus and continues on SSI, Diet upgraded back to [facility] soft/thin DASH/TLC/consistent carbs (cleared by speech service); PPx for VTE with SCD only as risk for bleed too high. Will remain on PPI for GI ppx (8 weeks total, stop on 9/21 , Activity to resume with OOB as tolerated; resume work with PT. He was discussed on pt care rounds with attending and determined to be hemodynamically stable for step down today,.       OVERNIGHT EVENTS:  CT abd/pelvis to thighs neg for bleed. Started on clear diet; tolerated same. Large BM without melana or overt blood.     Tele with 1-2 NSVT intermittent through night.     SUBJECTIVE:  No new complaints. Denied CP/SOB/HA/dizziness/no abd discomfort.     Vital Signs Last 12 Hrs  T(F): 96.9 (08-07-18 @ 06:20), Max: 98.8 (08-07-18 @ 01:30)  HR: 114 (08-07-18 @ 06:00) (106 - 118)  BP: 117/80 (08-07-18 @ 06:00) (114/72 - 122/73)  BP(mean): 95 (08-07-18 @ 06:00) (89 - 96)  RR: 20 (08-07-18 @ 06:00) (10 - 20)  SpO2: 99% (08-07-18 @ 06:00) (98% - 100%)  I&O's Summary    05 Aug 2018 07:01  -  06 Aug 2018 07:00  --------------------------------------------------------  IN: 2197 mL / OUT: 1500 mL / NET: 697 mL    06 Aug 2018 07:01  -  07 Aug 2018 06:35  --------------------------------------------------------  IN: 250 mL / OUT: 0 mL / NET: 250 mL        PHYSICAL EXAM:  Constitutional: Resting in bed, sleepy but NAD   HEENT: NC/AT; PERRL, clear conjunctiva  Neck: supple, no lymphadenopathy  Cardiovascular: +S1/S2; S3 appreciated at LLSB and apex. systolic ejection murmur LLSB.   Respiratory: CTA b/l; no W/R/R  Gastrointestinal: soft, NT, distended and tympanic; +BSx4  Extremities: WWP; 2+ peripheral pulses; no edema   Neurological: AAOx3; no focal deficits        LABS:                        10.2   5.2   )-----------( 167      ( 06 Aug 2018 20:44 )             31.3     08-06    139  |  99  |  17  ----------------------------<  110<H>  4.0   |  28  |  1.83<H>    Ca    8.5      06 Aug 2018 02:19  Phos  2.0     08-06  Mg     2.0     08-06      PT/INR - ( 06 Aug 2018 06:14 )   PT: 14.0 sec;   INR: 1.26          PTT - ( 06 Aug 2018 06:14 )  PTT:36.1 sec      RADIOLOGY & ADDITIONAL TESTS:    MEDICATIONS  (STANDING):  atorvastatin 80 milliGRAM(s) Oral at bedtime  cefTRIAXone   IVPB 1 Gram(s) IV Intermittent every 24 hours  cefTRIAXone   IVPB      chlorhexidine 2% Cloths 1 Application(s) Topical daily  dextrose 5%. 1000 milliLiter(s) (50 mL/Hr) IV Continuous <Continuous>  dextrose 50% Injectable 12.5 Gram(s) IV Push once  dextrose 50% Injectable 25 Gram(s) IV Push once  dextrose 50% Injectable 25 Gram(s) IV Push once  escitalopram 5 milliGRAM(s) Oral daily  insulin lispro (HumaLOG) corrective regimen sliding scale   SubCutaneous three times a day before meals  insulin lispro (HumaLOG) corrective regimen sliding scale   SubCutaneous at bedtime  pantoprazole  Injectable 40 milliGRAM(s) IV Push every 12 hours  sucralfate 1 Gram(s) Oral two times a day    MEDICATIONS  (PRN):  dextrose 40% Gel 15 Gram(s) Oral once PRN Blood Glucose LESS THAN 70 milliGRAM(s)/deciliter  glucagon  Injectable 1 milliGRAM(s) IntraMuscular once PRN Glucose LESS THAN 70 milligrams/deciliter PGY-1 Transfer Note Summary  67M w PMH of HTN, DM, HLD, HFrEF of 25%, CAD s/p PCI 7 years ago, s/p STEMI in [place] that was medically managed, [place] and then developed altered mental status in the setting of one week of dyspnea and weakness admitted to the CCU for management of ACS, acute on chronic CHF, RONNIE, Also found to have cortical and subcortical CVAs, Outside of therapeutic window for cath intervention. Treated for acute on chronic HF initially with bumex drip and transitioned to IV Bumex 1mg BID with good UOP response to euvolemic state; no further diuresis. BUN continued to elevate out of proportion to Cr, in addition to acute drop in his Hb 14 to 9. Hb continued to drop; s/p 1UPRBC. Found to have acute upper GI bleed in context of dual antiplatelet and AC therapy, with subsequent gastric ulcers (x2); s/p clipping of one ulcer (7/27) by GI. After ulcer clipping, BUN trended down with improved mental status (affect, verbalizations and ability to follow commands). Attempted to resume 1 cardio/neuro secondary preventative med (ASA). However, within one day Hb dropped from new baseline of 9.6 to 8.6, demonstrating inability to tolerate anti-platelet agent. Discontinued ASA and Hb 9.1; stabilized. However, Hb dropped again on 8/4, and 8/5, s/p 1U PRBCs each of these times (now 3U PRBC’s total for admission) with appropriate Hb response to 10.1 today. GI repeated EGD on 8/6; clips in situ and no evidence of bleeding. Pt had BM yesterday and no melana or overt signs of blood; low suspicion for lower GI bleed. GI recommend OP f/u colonscopy. CT abd/pelv to the thighs with barium contrast completed without evidence of bleed. Unclear etiology to recent drops in Hb, perhaps dilutional as in context of IVF. Long term, the initial upper GI bleed is concerning for ability to maximize cardiac medical optimization (anti-platelet/AC) and provide secondary prevention for MI/Stroke,  as risk on these meds  resulting in fatal GI Bleed outweighs benefit. With respect to CHF, he remains euvolemic, with clear lungs and no peripheral edema. As he returns to normal eating habits, likely will require duiretic support. His beta blocker was restarted today (lopressor), and if good tolerance, consider returning to Toprol XL 25mg. Given limitation of optimized medical intervention, will need HF consult to address management options. For CAD, can not tolerate ELAN. Statin initially held in context of elevated LFT's, but this improved and he was restarted on Lipitor 80mg PO QD. During his stay, he was noted to have Afib and initially on metop and eliquis. Eliquis stopped in context of GI bleed and can not be resumed as risk for fatal bleed too high. Initially tolerating Toprol XL 25mg PO QD with systolic pressures ; held in context of urosepsis and GI bleed. As above, restarted lopressor with goal to transition back to Toprol XL tomorrow.     During stay, pt spiked fever 8/3/18; found to have urosepsis as he was bacteremic with PCR+ Klebsiella and positive Urine culture with same. He received Ceft x1 and then Zosyn for 3 days. He spiked a fever after 2 days of Zosyn and was recultured. NGTD on survellence or repeat blood cultures. Sensitivities returned and narrowed to 1g IV Ceftriaxone (8/6, day 3 of antibiotics to be completed on 8/17). WBC trended to normal and no further s/s of infection. Pt to have Midline placed today for abx. Mental status is improved from presentation, in the setting of decreased BUN. LFT's and BUN/Cr continue to improve as well.  RONNIE improving; dicontinued sevelamer as Phos WNL with improved kidney function; renal following.  Mental status improved with resolution of uremia. Started on lexapro and continued on same with good affect. Of note, GI assessed for other etiologies of LFT elevation, found to have elevated kappa and kappa/lambda ratio noted. Not likely multiple myeloma as pt not anemic prior to acute GI bleed and without hypercalcemia. Can f/u with PCP as an OP.  His diabetes has been managed with SSI and attempted lantus. However, with fluctuating PO intake, hypoglycemia noted intermittently. Stopped lantus and continues on SSI, [facility] soft/thin DASH/TLC/consistent carbs (cleared by speech service); PPx for VTE with SCD only as risk for bleed too high. Will remain on PPI for GI ppx (8 weeks total, stop on 9/21 , Activity to resume with OOB as tolerated; resume work with PT. He was discussed on pt care rounds with attending and determined to be hemodynamically stable for step down today,.       OVERNIGHT EVENTS:  CT abd/pelvis to thighs neg for bleed. Started on clear diet; tolerated same. Large BM without melana or overt blood.     Tele with 1-2 NSVT intermittent through night.     SUBJECTIVE:  No new complaints. Denied CP/SOB/HA/dizziness/no abd discomfort.     Vital Signs Last 12 Hrs  T(F): 96.9 (08-07-18 @ 06:20), Max: 98.8 (08-07-18 @ 01:30)  HR: 114 (08-07-18 @ 06:00) (106 - 118)  BP: 117/80 (08-07-18 @ 06:00) (114/72 - 122/73)  BP(mean): 95 (08-07-18 @ 06:00) (89 - 96)  RR: 20 (08-07-18 @ 06:00) (10 - 20)  SpO2: 99% (08-07-18 @ 06:00) (98% - 100%)  I&O's Summary    05 Aug 2018 07:01  -  06 Aug 2018 07:00  --------------------------------------------------------  IN: 2197 mL / OUT: 1500 mL / NET: 697 mL    06 Aug 2018 07:01  -  07 Aug 2018 06:35  --------------------------------------------------------  IN: 250 mL / OUT: 0 mL / NET: 250 mL        PHYSICAL EXAM:  Constitutional: Resting in bed, sleepy but NAD   HEENT: NC/AT; PERRL, clear conjunctiva  Neck: supple, no lymphadenopathy  Cardiovascular: +S1/S2; S3 appreciated at LLSB and apex. systolic ejection murmur LLSB.   Respiratory: CTA b/l; no W/R/R  Gastrointestinal: soft, NT, distended and tympanic; +BSx4  Extremities: WWP; 2+ peripheral pulses; no edema   Neurological: AAOx3; no focal deficits        LABS:                        10.2   5.2   )-----------( 167      ( 06 Aug 2018 20:44 )             31.3     08-06    139  |  99  |  17  ----------------------------<  110<H>  4.0   |  28  |  1.83<H>    Ca    8.5      06 Aug 2018 02:19  Phos  2.0     08-06  Mg     2.0     08-06      PT/INR - ( 06 Aug 2018 06:14 )   PT: 14.0 sec;   INR: 1.26          PTT - ( 06 Aug 2018 06:14 )  PTT:36.1 sec      RADIOLOGY & ADDITIONAL TESTS:    MEDICATIONS  (STANDING):  atorvastatin 80 milliGRAM(s) Oral at bedtime  cefTRIAXone   IVPB 1 Gram(s) IV Intermittent every 24 hours  cefTRIAXone   IVPB      chlorhexidine 2% Cloths 1 Application(s) Topical daily  dextrose 5%. 1000 milliLiter(s) (50 mL/Hr) IV Continuous <Continuous>  dextrose 50% Injectable 12.5 Gram(s) IV Push once  dextrose 50% Injectable 25 Gram(s) IV Push once  dextrose 50% Injectable 25 Gram(s) IV Push once  escitalopram 5 milliGRAM(s) Oral daily  insulin lispro (HumaLOG) corrective regimen sliding scale   SubCutaneous three times a day before meals  insulin lispro (HumaLOG) corrective regimen sliding scale   SubCutaneous at bedtime  pantoprazole  Injectable 40 milliGRAM(s) IV Push every 12 hours  sucralfate 1 Gram(s) Oral two times a day    MEDICATIONS  (PRN):  dextrose 40% Gel 15 Gram(s) Oral once PRN Blood Glucose LESS THAN 70 milliGRAM(s)/deciliter  glucagon  Injectable 1 milliGRAM(s) IntraMuscular once PRN Glucose LESS THAN 70 milligrams/deciliter PGY-1 Transfer Note Summary  67M w PMH of HTN, DM, HLD, HFrEF of 25%, CAD s/p PCI 7 years ago, s/p STEMI in [place] that was medically managed, [place] and then developed altered mental status in the setting of one week of dyspnea and weakness admitted to the CCU for management of ACS, acute on chronic CHF, RONNIE, Also found to have cortical and subcortical CVAs, Outside of therapeutic window for cath intervention. Treated for acute on chronic HF initially with bumex drip, then IV, now in euvolemic state. Hospital course complicated by acute upper GI bleed in context of dual antiplatelet and AC therapy. Pt s/p EGD 7/27 with clipping of one of two gastric ulcers; s/p 1UPRBC. Trial of re-starting ASA x1 day but Hb fluctuated; s/p 2 additional UPRBC (3U total in CCU), GI repeated EGD on 8/6; clips in situ and no evidence of bleeding  Continue with PPI for 8 weeks, end on 9/21. After ulcer clipping, BUN trended down with improved mental status (affect, verbalizations and ability to follow commands). Started on lexapro and continued on same with good affect.  Hospital course also complicated by urosepsis (klebsiella). Pt on Ceftriaxone, day 4/14 to finish on 8/17. Pt to have Midline placed today for abx. His beta blocker was restarted today (lopressor), and if good tolerance, consider returning to Toprol XL 25mg.     OVERNIGHT EVENTS:  CT abd/pelvis to thighs neg for bleed. Started on clear diet; tolerated same. Large BM without melana or overt blood.     Tele with 1-2 NSVT intermittent through night.     SUBJECTIVE:  No new complaints. Denied CP/SOB/HA/dizziness/no abd discomfort.     Vital Signs Last 12 Hrs  T(F): 96.9 (08-07-18 @ 06:20), Max: 98.8 (08-07-18 @ 01:30)  HR: 114 (08-07-18 @ 06:00) (106 - 118)  BP: 117/80 (08-07-18 @ 06:00) (114/72 - 122/73)  BP(mean): 95 (08-07-18 @ 06:00) (89 - 96)  RR: 20 (08-07-18 @ 06:00) (10 - 20)  SpO2: 99% (08-07-18 @ 06:00) (98% - 100%)  I&O's Summary    05 Aug 2018 07:01  -  06 Aug 2018 07:00  --------------------------------------------------------  IN: 2197 mL / OUT: 1500 mL / NET: 697 mL    06 Aug 2018 07:01  -  07 Aug 2018 06:35  --------------------------------------------------------  IN: 250 mL / OUT: 0 mL / NET: 250 mL        PHYSICAL EXAM:  Constitutional: Resting in bed, sleepy but NAD   HEENT: NC/AT; PERRL, clear conjunctiva  Neck: supple, no lymphadenopathy  Cardiovascular: +S1/S2; S3 appreciated at LLSB and apex. systolic ejection murmur LLSB.   Respiratory: CTA b/l; no W/R/R  Gastrointestinal: soft, NT, distended and tympanic; +BSx4  Extremities: WWP; 2+ peripheral pulses; no edema   Neurological: AAOx3; no focal deficits        LABS:                        10.2   5.2   )-----------( 167      ( 06 Aug 2018 20:44 )             31.3     08-06    139  |  99  |  17  ----------------------------<  110<H>  4.0   |  28  |  1.83<H>    Ca    8.5      06 Aug 2018 02:19  Phos  2.0     08-06  Mg     2.0     08-06      PT/INR - ( 06 Aug 2018 06:14 )   PT: 14.0 sec;   INR: 1.26          PTT - ( 06 Aug 2018 06:14 )  PTT:36.1 sec      RADIOLOGY & ADDITIONAL TESTS:    MEDICATIONS  (STANDING):  atorvastatin 80 milliGRAM(s) Oral at bedtime  cefTRIAXone   IVPB 1 Gram(s) IV Intermittent every 24 hours  cefTRIAXone   IVPB      chlorhexidine 2% Cloths 1 Application(s) Topical daily  dextrose 5%. 1000 milliLiter(s) (50 mL/Hr) IV Continuous <Continuous>  dextrose 50% Injectable 12.5 Gram(s) IV Push once  dextrose 50% Injectable 25 Gram(s) IV Push once  dextrose 50% Injectable 25 Gram(s) IV Push once  escitalopram 5 milliGRAM(s) Oral daily  insulin lispro (HumaLOG) corrective regimen sliding scale   SubCutaneous three times a day before meals  insulin lispro (HumaLOG) corrective regimen sliding scale   SubCutaneous at bedtime  pantoprazole  Injectable 40 milliGRAM(s) IV Push every 12 hours  sucralfate 1 Gram(s) Oral two times a day    MEDICATIONS  (PRN):  dextrose 40% Gel 15 Gram(s) Oral once PRN Blood Glucose LESS THAN 70 milliGRAM(s)/deciliter  glucagon  Injectable 1 milliGRAM(s) IntraMuscular once PRN Glucose LESS THAN 70 milligrams/deciliter

## 2018-08-08 LAB
ANION GAP SERPL CALC-SCNC: 12 MMOL/L — SIGNIFICANT CHANGE UP (ref 5–17)
APTT BLD: 29.2 SEC — SIGNIFICANT CHANGE UP (ref 27.5–37.4)
BUN SERPL-MCNC: 15 MG/DL — SIGNIFICANT CHANGE UP (ref 7–23)
CALCIUM SERPL-MCNC: 8.2 MG/DL — LOW (ref 8.4–10.5)
CHLORIDE SERPL-SCNC: 105 MMOL/L — SIGNIFICANT CHANGE UP (ref 96–108)
CO2 SERPL-SCNC: 26 MMOL/L — SIGNIFICANT CHANGE UP (ref 22–31)
CREAT SERPL-MCNC: 1.58 MG/DL — HIGH (ref 0.5–1.3)
CULTURE RESULTS: SIGNIFICANT CHANGE UP
GLUCOSE BLDC GLUCOMTR-MCNC: 129 MG/DL — HIGH (ref 70–99)
GLUCOSE BLDC GLUCOMTR-MCNC: 147 MG/DL — HIGH (ref 70–99)
GLUCOSE BLDC GLUCOMTR-MCNC: 277 MG/DL — HIGH (ref 70–99)
GLUCOSE BLDC GLUCOMTR-MCNC: 91 MG/DL — SIGNIFICANT CHANGE UP (ref 70–99)
GLUCOSE SERPL-MCNC: 132 MG/DL — HIGH (ref 70–99)
HCT VFR BLD CALC: 28.9 % — LOW (ref 39–50)
HGB BLD-MCNC: 9.4 G/DL — LOW (ref 13–17)
INR BLD: 1.28 — HIGH (ref 0.88–1.16)
MAGNESIUM SERPL-MCNC: 1.9 MG/DL — SIGNIFICANT CHANGE UP (ref 1.6–2.6)
MCHC RBC-ENTMCNC: 30.2 PG — SIGNIFICANT CHANGE UP (ref 27–34)
MCHC RBC-ENTMCNC: 32.5 G/DL — SIGNIFICANT CHANGE UP (ref 32–36)
MCV RBC AUTO: 92.9 FL — SIGNIFICANT CHANGE UP (ref 80–100)
PLATELET # BLD AUTO: 135 K/UL — LOW (ref 150–400)
POTASSIUM SERPL-MCNC: 3.9 MMOL/L — SIGNIFICANT CHANGE UP (ref 3.5–5.3)
POTASSIUM SERPL-SCNC: 3.9 MMOL/L — SIGNIFICANT CHANGE UP (ref 3.5–5.3)
PROTHROM AB SERPL-ACNC: 14.3 SEC — HIGH (ref 9.8–12.7)
RBC # BLD: 3.11 M/UL — LOW (ref 4.2–5.8)
RBC # FLD: 14 % — SIGNIFICANT CHANGE UP (ref 10.3–16.9)
SODIUM SERPL-SCNC: 143 MMOL/L — SIGNIFICANT CHANGE UP (ref 135–145)
SPECIMEN SOURCE: SIGNIFICANT CHANGE UP
WBC # BLD: 5 K/UL — SIGNIFICANT CHANGE UP (ref 3.8–10.5)
WBC # FLD AUTO: 5 K/UL — SIGNIFICANT CHANGE UP (ref 3.8–10.5)

## 2018-08-08 PROCEDURE — 99233 SBSQ HOSP IP/OBS HIGH 50: CPT

## 2018-08-08 PROCEDURE — 99232 SBSQ HOSP IP/OBS MODERATE 35: CPT

## 2018-08-08 PROCEDURE — 71045 X-RAY EXAM CHEST 1 VIEW: CPT | Mod: 26

## 2018-08-08 PROCEDURE — 93010 ELECTROCARDIOGRAM REPORT: CPT

## 2018-08-08 RX ORDER — ASPIRIN/CALCIUM CARB/MAGNESIUM 324 MG
81 TABLET ORAL DAILY
Qty: 0 | Refills: 0 | Status: DISCONTINUED | OUTPATIENT
Start: 2018-08-08 | End: 2018-08-11

## 2018-08-08 RX ORDER — POTASSIUM CHLORIDE 20 MEQ
20 PACKET (EA) ORAL ONCE
Qty: 0 | Refills: 0 | Status: COMPLETED | OUTPATIENT
Start: 2018-08-08 | End: 2018-08-08

## 2018-08-08 RX ORDER — MAGNESIUM SULFATE 500 MG/ML
1 VIAL (ML) INJECTION ONCE
Qty: 0 | Refills: 0 | Status: COMPLETED | OUTPATIENT
Start: 2018-08-08 | End: 2018-08-08

## 2018-08-08 RX ORDER — LOSARTAN POTASSIUM 100 MG/1
25 TABLET, FILM COATED ORAL DAILY
Qty: 0 | Refills: 0 | Status: DISCONTINUED | OUTPATIENT
Start: 2018-08-09 | End: 2018-08-09

## 2018-08-08 RX ORDER — CHLORHEXIDINE GLUCONATE 213 G/1000ML
1 SOLUTION TOPICAL
Qty: 0 | Refills: 0 | Status: DISCONTINUED | OUTPATIENT
Start: 2018-08-08 | End: 2018-08-11

## 2018-08-08 RX ORDER — METOPROLOL TARTRATE 50 MG
25 TABLET ORAL EVERY 12 HOURS
Qty: 0 | Refills: 0 | Status: DISCONTINUED | OUTPATIENT
Start: 2018-08-08 | End: 2018-08-11

## 2018-08-08 RX ADMIN — CEFTRIAXONE 100 GRAM(S): 500 INJECTION, POWDER, FOR SOLUTION INTRAMUSCULAR; INTRAVENOUS at 09:22

## 2018-08-08 RX ADMIN — Medication 100 GRAM(S): at 10:19

## 2018-08-08 RX ADMIN — PANTOPRAZOLE SODIUM 40 MILLIGRAM(S): 20 TABLET, DELAYED RELEASE ORAL at 06:21

## 2018-08-08 RX ADMIN — Medication 25 MILLIGRAM(S): at 17:19

## 2018-08-08 RX ADMIN — PANTOPRAZOLE SODIUM 40 MILLIGRAM(S): 20 TABLET, DELAYED RELEASE ORAL at 17:19

## 2018-08-08 RX ADMIN — Medication 12.5 MILLIGRAM(S): at 06:21

## 2018-08-08 RX ADMIN — Medication 6: at 11:25

## 2018-08-08 RX ADMIN — Medication 1 GRAM(S): at 17:19

## 2018-08-08 RX ADMIN — Medication 81 MILLIGRAM(S): at 14:13

## 2018-08-08 RX ADMIN — Medication 20 MILLIEQUIVALENT(S): at 10:19

## 2018-08-08 RX ADMIN — ATORVASTATIN CALCIUM 80 MILLIGRAM(S): 80 TABLET, FILM COATED ORAL at 21:17

## 2018-08-08 RX ADMIN — Medication 1 GRAM(S): at 06:21

## 2018-08-08 RX ADMIN — ESCITALOPRAM OXALATE 5 MILLIGRAM(S): 10 TABLET, FILM COATED ORAL at 10:19

## 2018-08-08 NOTE — DISCHARGE NOTE ADULT - MEDICATION SUMMARY - MEDICATIONS TO STOP TAKING
I will STOP taking the medications listed below when I get home from the hospital:    simvastatin 40 mg oral tablet  -- 1 tab(s) by mouth once a day (at bedtime)    Coreg 3.125 mg oral tablet  -- 1 tab(s) by mouth 2 times a day    losartan 50 mg oral tablet  -- 1 tab(s) by mouth once a day

## 2018-08-08 NOTE — CHART NOTE - NSCHARTNOTEFT_GEN_A_CORE
GASTROENTEROLOGY CHART NOTE  Called by primary team to ask if it is safe to re-start patients antiplatelet agents.  Discussed with attending Dr Munoz  Patient is not having any active bleeding, and Hgb has been relatively stable  sp 2 EGDs first one with large ulcer with adherent clot sp clipsx3, second EGD shows ulcer healing appropriately and no stigmata of bleeding    PLAN -   - Patient needs PPI BID [id number] from 1st EGD, then daily as long as on antiplatelet as he is high risk for re-bleed  - Ok to gently re-introduce antiplatelet agents as long as no GI output or active bleeding and Hgb remains stable  - Patient will benefit from outpatient colonoscopy once stable  - He may follow up with Dr Munoz on dc    Discussed with attending Dr Munoz

## 2018-08-08 NOTE — DISCHARGE NOTE ADULT - CARE PROVIDERS DIRECT ADDRESSES
,DirectAddress_Unknown ,DirectAddress_Unknown,[email or web address]

## 2018-08-08 NOTE — PROGRESS NOTE ADULT - ASSESSMENT
67M PMHx HTN, DM, HLD, HFrEF (15-25% on echo), CAD s/p PCI 7 years ago presented 7/20 with altered mental status in the setting of one week of dyspnea and weakness admitted to the CCU for management of ACS, acute on chronic CHF, RONNIE. Hospital course c/b acute upper GI bleed 2/2 two gastric ulcers; s/p clipping of one ulcer (7/27) s/p 3U PRBCs, found to have urosepsis curretnly on Ceftriaxone, remains hemodynamically stable and [facility].

## 2018-08-08 NOTE — PROGRESS NOTE ADULT - PROBLEM SELECTOR PLAN 4
Likely urosepsis as positive blood and urine cultures klebsiella; pan sensitive except ampillcilin and Bactrim. Will adjust abx to Ceft, as less toxic to kidneys  - CXR negative for PNA  - Ceft (day 4/14, last day 8/17) s/p midline placement today Likely urosepsis as positive blood and urine cultures klebsiella; pan sensitive except ampillcilin and Bactrim. Will adjust abx to Ceftriaxone, as less toxic to kidneys  - CXR negative for PNA  - Ceftriaxone (day 5/14, last day 8/17) s/p midline placement 8/7 Likely urosepsis as positive blood and urine cultures klebsiella; pan sensitive except ampillcilin and Bactrim. Will adjust abx to Ceftriaxone, as less toxic to kidneys  - CXR negative for PNA  - Ceftriaxone 1g qd (day 5/14, last day 8/17/18) s/p midline placement 8/7

## 2018-08-08 NOTE — PROGRESS NOTE ADULT - ATTENDING COMMENTS
Assessment: Patient personally seen and examined myself during rounds with the Physician Assistant/House Staff/Nurse Practitioner  ON DATE 8/8/18  Physician Assistant/House Staff/Nurse Practitioner note read, including vitals, physical findings, laboratory data, and radiological reports.   Revisions included below.   Direct personal management at bed side and extensive interpretation of the data.    Plan was outlined and discussed in details with the Physician Assistant/House Staff/Nurse Practitioner.    Decision making of high complexity   Risk high of complications, morbidity, and/or mortality  Assessment and Action taken for acute disease activity to reflect the level of care provided:  -Hemodynamic evaluation and support  -Medication reconciliation  -Review laboratory data  -EKG reviewed   -    TIME SPENT in evaluation and management, reassessments, review and interpretation of labs and x-rays, and hemodynamic management, formulating a plan and coordinating care: ___25____ MIN.  Time does not include procedural time.    Isaias Fountain MD  Cardiology    Mobile: [phone]  Office: [phone]  [address] [place] [address], [id number]

## 2018-08-08 NOTE — DISCHARGE NOTE ADULT - CARE PLAN
Principal Discharge DX:	Acute coronary syndrome  Goal:	Follow up with your cardiologist Dr Octavio Haley  Secondary Diagnosis:	Acute on chronic systolic congestive heart failure  Goal:	Follow up with your cardiologist Dr Octavio Haley  Assessment and plan of treatment:	You were admitted to the cardiac floor with congestive heart failure.  You were given intravenous diuretic medication Bumex to get rid of the fluid in your lungs and body to help you breathe better. You were started on a diuretic medication (water pill) Lasix to prevent recurrence of fluid buildup in the lungs. Please take Lasix as prescribed without missing doses.   -You had an echocardiogram performed that showed your heart muscle is very weak, the pumping function of your heart or Ejection Fraction is 15-20%. Your heart is unable to pump the blood effectively throughout your body and can cause fluid backup into your lungs and rest of body. Please maintain a low salt diet to prevent from fluid being reaccumulated. Weigh yourself daily and report any weight gain over 2 pounds/day or per week to your Doctor. Please continue taking medications Metoprolol as prescribed to hopefully improve the weakness of the heart.  -You were recommended to wear a Lifevest to protect against dangerous arrhythmias that can be associated with a weak heart. Please wear this at all times, except when showering or bathing, it can only help protect you when you are wearing it.  Secondary Diagnosis:	Acute anemia  Secondary Diagnosis:	Bacteremia due to Gram-negative bacteria  Secondary Diagnosis:	RONNIE (acute kidney injury)  Secondary Diagnosis:	Cerebrovascular accident (CVA), unspecified mechanism Principal Discharge DX:	Acute coronary syndrome  Goal:	Follow up with your cardiologist Dr Octavio Haley  Secondary Diagnosis:	Acute on chronic systolic congestive heart failure  Goal:	Follow up with your cardiologist Dr Octavio Haley  Assessment and plan of treatment:	You were admitted to the cardiac floor with congestive heart failure.  You were given intravenous diuretic medication Bumex to get rid of the fluid in your lungs and body to help you breathe better. You were started on a diuretic medication (water pill) Lasix to prevent recurrence of fluid buildup in the lungs. Please take Lasix as prescribed without missing doses.   -You had an echocardiogram performed that showed your heart muscle is very weak, the pumping function of your heart or Ejection Fraction is 15-20%. Your heart is unable to pump the blood effectively throughout your body and can cause fluid backup into your lungs and rest of body. Please maintain a low salt diet to prevent from fluid being reaccumulated. Weigh yourself daily and report any weight gain over 2 pounds/day or per week to your Doctor. Please continue taking medications Metoprolol as prescribed to hopefully improve the weakness of the heart.  -You were recommended to wear a Lifevest to protect against dangerous arrhythmias that can be associated with a weak heart. Please wear this at all times, except when showering or bathing, it can only help protect you when you are wearing it.  Secondary Diagnosis:	Acute anemia  Assessment and plan of treatment:	You were found to have anemia due gastric bleeding from 2 gastric ulcers where you required clipping of 1 gastric ulcer to stop the bleeding. You required 3 units of blood transfusion due to the anemia. You were started on medication Protonix 40mg twice a day for 8 weeks to help the ulcers heal, it then may be decreased to once a day after 8 weeks completed. Please follow up with gastroenterologist Dr Munoz. You may need a colonoscopy in the future as an outpatient.  Secondary Diagnosis:	Bacteremia due to Gram-negative bacteria  Secondary Diagnosis:	RONNIE (acute kidney injury)  Secondary Diagnosis:	Cerebrovascular accident (CVA), unspecified mechanism  Secondary Diagnosis:	PAF (paroxysmal atrial fibrillation)  Assessment and plan of treatment:	You had an irregular heart rhythm called Atrial Fibrillation during the beginning of your hospitalization. In people with A-fib, the electrical signals that control the heartbeat are abnormal. As a result, the top 2 chambers of the heart stop pumping effectively, and a small amount of the blood that should move out of these chambers gets left behind. As the blood pools, it can start to form clots. These clots can travel to the brain through the blood vessels, and cause strokes. You were started on a medication called Metoprolol to help control the heart rate. Unfortunately due to the gastric bleeding while on blood thinner medication during the hospitalization, you are not a good candidate to be on blood thinner medication to prevent the risk of stroke that is associated with A-fib. You spontaneously converted back to normal rhythm. Principal Discharge DX:	Acute coronary syndrome  Goal:	Follow up with your cardiologist Dr Octavio Haley  Assessment and plan of treatment:	You came into the hospital after you had a heart attack in [place]. You were started on medical management of heart attack when you were admitted to [facility]. While you were given blood thinner medications, unfortunately you developed gastric bleeding and the blood thinner medications had to be held. You were started on Aspirin 81mg daily, Lipitor 80mg daily and Metoprolol to help improve the heart after heart attack. Losartan was stopped after your blood pressure became too low.  Secondary Diagnosis:	Acute on chronic systolic congestive heart failure  Goal:	Follow up with your cardiologist Dr Octavio Haley  Assessment and plan of treatment:	You were admitted to the cardiac floor with congestive heart failure.  You were given intravenous diuretic medication Bumex to get rid of the fluid in your lungs and body to help you breathe better. You were started on a diuretic medication (water pill) Lasix to prevent recurrence of fluid buildup in the lungs. Please take Lasix as prescribed without missing doses.   -You had an echocardiogram performed that showed your heart muscle is very weak, the pumping function of your heart or Ejection Fraction is 15-20%. Your heart is unable to pump the blood effectively throughout your body and can cause fluid backup into your lungs and rest of body. Please maintain a low salt diet to prevent from fluid being reaccumulated. Weigh yourself daily and report any weight gain over 2 pounds/day or per week to your Doctor. Please continue taking medications Metoprolol as prescribed to hopefully improve the weakness of the heart.  -You were recommended to wear a Lifevest to protect against dangerous arrhythmias that can be associated with a weak heart. Please wear this at all times, except when showering or bathing, it can only help protect you when you are wearing it.  Secondary Diagnosis:	Acute anemia  Assessment and plan of treatment:	You were found to have anemia due gastric bleeding from 2 gastric ulcers where you required clipping of 1 gastric ulcer to stop the bleeding. You required 3 units of blood transfusion due to the anemia. You were started on medication Protonix 40mg twice a day for 8 weeks to help the ulcers heal, it then may be decreased to once a day after 8 weeks completed. Please follow up with gastroenterologist Dr Munoz. You may need a colonoscopy in the future as an outpatient.  Secondary Diagnosis:	Bacteremia due to Gram-negative bacteria  Assessment and plan of treatment:	You were found to have a severe infection from the urine that traveled to the blood stream where you were given intravenous antibiotics with improvement of the infection. You will need to continue intravenous antibiotic Ceftriaxone until 8/17/18. A Midline catheter was placed to your left arm where you may continue to receive the antibiotic while in rehab.  Secondary Diagnosis:	RONNIE (acute kidney injury)  Assessment and plan of treatment:	When you came into the hospital you had highly abnormal kidney function. This may be multifactorial due to anemia, fluid overload, and a weakened heart. You received intravenous diuretic medication to help get rid of fluid from your lungs and body and your kidney function have been improved. Please follow up with your PMD and Dr Cunningham in 1-2 weeks.  Secondary Diagnosis:	Cerebrovascular accident (CVA), unspecified mechanism  Assessment and plan of treatment:	You were found to have altered mental status where a CT Scan of Head and MRI of Head was performed that showed you had a stroke. Your mentation improved back to your baseline. Please continue taking Aspirin and Lipitor as prescribed.  Secondary Diagnosis:	PAF (paroxysmal atrial fibrillation)  Assessment and plan of treatment:	You had an irregular heart rhythm called Atrial Fibrillation during the beginning of your hospitalization. In people with A-fib, the electrical signals that control the heartbeat are abnormal. As a result, the top 2 chambers of the heart stop pumping effectively, and a small amount of the blood that should move out of these chambers gets left behind. As the blood pools, it can start to form clots. These clots can travel to the brain through the blood vessels, and cause strokes. You were started on a medication called Metoprolol to help control the heart rate. Unfortunately due to the gastric bleeding while on blood thinner medication during the hospitalization, you are not a good candidate to be on blood thinner medication to prevent the risk of stroke that is associated with A-fib. You spontaneously converted back to normal rhythm. Principal Discharge DX:	Acute coronary syndrome  Goal:	Follow up with your cardiologist Dr Octavio Haley  Assessment and plan of treatment:	You came into the hospital after you had a heart attack in [place]. You were started on medical management of heart attack when you were admitted to [facility]. While you were given blood thinner medications, unfortunately you developed gastric bleeding and the blood thinner medications had to be held. You were started on Aspirin 81mg daily, Lipitor 80mg daily and Metoprolol to help improve the heart after heart attack. Losartan was stopped after your blood pressure became too low. Please continue your medications as prescribed  Secondary Diagnosis:	Acute on chronic systolic congestive heart failure  Goal:	Follow up with your cardiologist Dr Octavio Haley  Assessment and plan of treatment:	You were admitted to the cardiac floor with congestive heart failure.  You were given intravenous diuretic medication Bumex to get rid of the fluid in your lungs and body to help you breathe better. You were started on a diuretic medication (water pill) Lasix to prevent recurrence of fluid buildup in the lungs. Please take Lasix as prescribed without missing doses.   -You had an echocardiogram performed that showed your heart muscle is very weak, the pumping function of your heart or Ejection Fraction is 15-20%. Your heart is unable to pump the blood effectively throughout your body and can cause fluid backup into your lungs and rest of body. Please maintain a low salt diet to prevent from fluid being reaccumulated. Weigh yourself daily and report any weight gain over 2 pounds/day or per week to your Doctor. Please continue taking medications Metoprolol as prescribed to hopefully improve the weakness of the heart.  -You were recommended to wear a Lifevest to protect against dangerous arrhythmias that can be associated with a weak heart. Please wear this at all times, except when showering or bathing, it can only help protect you when you are wearing it.  Secondary Diagnosis:	Acute anemia  Assessment and plan of treatment:	You were found to have anemia due gastric bleeding from 2 gastric ulcers where you required clipping of 1 gastric ulcer to stop the bleeding. You required 3 units of blood transfusion due to the anemia. You were started on medication Protonix 40mg twice a day for 8 weeks to help the ulcers heal, it then may be decreased to once a day after 8 weeks completed. Please follow up with gastroenterologist Dr Munoz. You may need a colonoscopy in the future as an outpatient.  Secondary Diagnosis:	Bacteremia due to Gram-negative bacteria  Assessment and plan of treatment:	You were found to have a severe infection from the urine that traveled to the blood stream where you were given intravenous antibiotics with improvement of the infection. You will need to continue intravenous antibiotic Ceftriaxone until 8/17/18. A Midline catheter was placed to your left arm where you may continue to receive the antibiotic while in rehab.  Secondary Diagnosis:	RONNIE (acute kidney injury)  Assessment and plan of treatment:	When you came into the hospital you had highly abnormal kidney function. This may be multifactorial due to anemia, fluid overload, and a weakened heart. You received intravenous diuretic medication to help get rid of fluid from your lungs and body and your kidney function have been improved. Please follow up with your PMD and Dr Cunningham in 1-2 weeks.  Secondary Diagnosis:	Cerebrovascular accident (CVA), unspecified mechanism  Assessment and plan of treatment:	You were found to have altered mental status where a CT Scan of Head and MRI of Head was performed that showed you had a stroke. Your mentation improved back to your baseline. Please continue taking Aspirin and Lipitor as prescribed.  Secondary Diagnosis:	PAF (paroxysmal atrial fibrillation)  Assessment and plan of treatment:	You had an irregular heart rhythm called Atrial Fibrillation during the beginning of your hospitalization. In people with A-fib, the electrical signals that control the heartbeat are abnormal. As a result, the top 2 chambers of the heart stop pumping effectively, and a small amount of the blood that should move out of these chambers gets left behind. As the blood pools, it can start to form clots. These clots can travel to the brain through the blood vessels, and cause strokes. You were started on a medication called Metoprolol to help control the heart rate. Unfortunately due to the gastric bleeding while on blood thinner medication during the hospitalization, you are not a good candidate to be on blood thinner medication to prevent the risk of stroke that is associated with A-fib. You spontaneously converted back to normal rhythm. Principal Discharge DX:	Acute coronary syndrome  Goal:	Follow up with your cardiologist Dr Octavio Haley  Assessment and plan of treatment:	You came into the hospital after you had a heart attack in [place]. You were started on medical management of heart attack when you were admitted to [facility]. While you were given blood thinner medications, unfortunately you developed gastric bleeding and the blood thinner medications had to be held. You were started on Aspirin 81mg daily, Lipitor 80mg daily and Metoprolol to help improve the heart after heart attack. Losartan was stopped after your blood pressure became too low. Please continue your medications as prescribed  Secondary Diagnosis:	Acute on chronic systolic congestive heart failure  Goal:	Follow up with your cardiologist Dr Octavio Haley  Assessment and plan of treatment:	You were admitted to the cardiac floor with congestive heart failure.  You were given intravenous diuretic medication Bumex to get rid of the fluid in your lungs and body to help you breathe better. You were started on a diuretic medication (water pill) Lasix to prevent recurrence of fluid buildup in the lungs. Please take Lasix as prescribed without missing doses.   -You had an echocardiogram performed that showed your heart muscle is very weak, the pumping function of your heart or Ejection Fraction is 15-20%. Your heart is unable to pump the blood effectively throughout your body and can cause fluid backup into your lungs and rest of body. Please maintain a low salt diet to prevent from fluid being reaccumulated. Weigh yourself daily and report any weight gain over 2 pounds/day or per week to your Doctor. Please continue taking medications Metoprolol as prescribed to hopefully improve the weakness of the heart.  -You were recommended to wear a Life-vest to protect against dangerous arrhythmias that can be associated with a weak heart. Please wear this at all times, except when showering or bathing, it can only help protect you when you are wearing it.  Secondary Diagnosis:	Acute anemia  Assessment and plan of treatment:	You were found to have anemia due gastric bleeding from 2 gastric ulcers where you required clipping of 1 gastric ulcer to stop the bleeding. You required 3 units of blood transfusion due to the anemia. You were started on medication Protonix 40mg twice a day for 8 weeks to help the ulcers heal, it then may be decreased to once a day after 8 weeks completed. You will STOP these medications on September 21. Please follow up with gastroenterologist Dr Munoz. You may need a colonoscopy in the future as an outpatient.  Secondary Diagnosis:	Bacteremia due to Gram-negative bacteria  Assessment and plan of treatment:	You were found to have a severe infection from the urine that traveled to the blood stream where you were given intravenous antibiotics with improvement of the infection.  Your antibiotics were changed to oral antibiotics. You will continue the antibiotics for 2 more days for a total of 4 doses.  Secondary Diagnosis:	RONNIE (acute kidney injury)  Assessment and plan of treatment:	When you came into the hospital you had highly abnormal kidney function. This may be multifactorial due to anemia, fluid overload, and a weakened heart. You received intravenous diuretic medication to help get rid of fluid from your lungs and body and your kidney function have been improved. Please follow up with your PMD and Dr Cunningham in 1-2 weeks.  Secondary Diagnosis:	Cerebrovascular accident (CVA), unspecified mechanism  Assessment and plan of treatment:	You were found to have altered mental status where a CT Scan of Head and MRI of Head was performed that showed you had a stroke. Your mentation improved back to your baseline. Please continue taking Aspirin and Lipitor as prescribed.  Secondary Diagnosis:	PAF (paroxysmal atrial fibrillation)  Assessment and plan of treatment:	You had an irregular heart rhythm called Atrial Fibrillation during the beginning of your hospitalization. In people with A-fib, the electrical signals that control the heartbeat are abnormal. As a result, the top 2 chambers of the heart stop pumping effectively, and a small amount of the blood that should move out of these chambers gets left behind. As the blood pools, it can start to form clots. These clots can travel to the brain through the blood vessels, and cause strokes. You were started on a medication called Metoprolol to help control the heart rate. Unfortunately due to the gastric bleeding while on blood thinner medication during the hospitalization, you are not a good candidate to be on blood thinner medication to prevent the risk of stroke that is associated with A-fib. You spontaneously converted back to normal rhythm. Principal Discharge DX:	Acute coronary syndrome  Goal:	Follow up with your cardiologist Dr Octavio Haley  Assessment and plan of treatment:	You came into the hospital after you had a heart attack in [place]. You were started on medical management of heart attack when you were admitted to [facility]. While you were given blood thinner medications, unfortunately you developed gastric bleeding and the blood thinner medications had to be held. You were started on Aspirin 81mg daily, Lipitor 80mg daily and Metoprolol to help improve the heart after heart attack. Losartan was stopped after your blood pressure became too low. Please continue your medications as prescribed  Secondary Diagnosis:	Acute on chronic systolic congestive heart failure  Goal:	Follow up with your cardiologist Dr Octavio Haley  Assessment and plan of treatment:	You were admitted to the cardiac floor with congestive heart failure.  You were given intravenous diuretic medication Bumex to get rid of the fluid in your lungs and body to help you breathe better. You were started on a diuretic medication (water pill) Lasix to prevent recurrence of fluid buildup in the lungs. Please take Lasix as prescribed without missing doses.   -You had an echocardiogram performed that showed your heart muscle is very weak, the pumping function of your heart or Ejection Fraction is 15-20%. Your heart is unable to pump the blood effectively throughout your body and can cause fluid backup into your lungs and rest of body. Please maintain a low salt diet to prevent from fluid being reaccumulated. Weigh yourself daily and report any weight gain over 2 pounds/day or per week to your Doctor. Please continue taking medications Metoprolol as prescribed to hopefully improve the weakness of the heart.  -You were recommended to wear a Life-vest to protect against dangerous arrhythmias that can be associated with a weak heart. Please wear this at all times, except when showering or bathing, it can only help protect you when you are wearing it.  Secondary Diagnosis:	Acute anemia  Assessment and plan of treatment:	You were found to have anemia due gastric bleeding from 2 gastric ulcers where you required clipping of 1 gastric ulcer to stop the bleeding. You required 3 units of blood transfusion due to the anemia. You were started on medication Protonix 40mg twice a day for 8 weeks to help the ulcers heal, it then may be decreased to once a day after 8 weeks completed. You will STOP these medications on September 21. Please follow up with gastroenterologist Dr Munzo. You may need a colonoscopy in the future as an outpatient.  Secondary Diagnosis:	Bacteremia due to Gram-negative bacteria  Assessment and plan of treatment:	You were found to have a severe infection from the urine that traveled to the blood stream where you were given intravenous antibiotics with improvement of the infection.  Your antibiotics were changed to oral antibiotics. You will continue the antibiotics for 2 more days for a total of 4 doses.  Secondary Diagnosis:	RONNIE (acute kidney injury)  Assessment and plan of treatment:	When you came into the hospital you had highly abnormal kidney function. This may be multifactorial due to anemia, fluid overload, and a weakened heart. You received intravenous diuretic medication to help get rid of fluid from your lungs and body and your kidney function have been improved. Please follow up with your PMD and Dr Cunningham as scheduled.  Secondary Diagnosis:	Cerebrovascular accident (CVA), unspecified mechanism  Assessment and plan of treatment:	You were found to have altered mental status where a CT Scan of Head and MRI of Head was performed that showed you had a stroke. Your mentation improved back to your baseline. Please continue taking Aspirin and Lipitor as prescribed.  Secondary Diagnosis:	PAF (paroxysmal atrial fibrillation)  Assessment and plan of treatment:	You had an irregular heart rhythm called Atrial Fibrillation during the beginning of your hospitalization. In people with A-fib, the electrical signals that control the heartbeat are abnormal. As a result, the top 2 chambers of the heart stop pumping effectively, and a small amount of the blood that should move out of these chambers gets left behind. As the blood pools, it can start to form clots. These clots can travel to the brain through the blood vessels, and cause strokes. You were started on a medication called Metoprolol to help control the heart rate. Unfortunately due to the gastric bleeding while on blood thinner medication during the hospitalization, you are not a good candidate to be on blood thinner medication to prevent the risk of stroke that is associated with A-fib. You spontaneously converted back to normal rhythm. Principal Discharge DX:	Acute coronary syndrome  Goal:	Follow up with your cardiologist Dr Octavio Haley  Assessment and plan of treatment:	You came into the hospital after you had a heart attack in [place]. You were started on medical management of heart attack when you were admitted to [facility]. While you were given blood thinner medications, unfortunately you developed gastric bleeding and the blood thinner medications had to be held. You were started on Aspirin 81mg daily, Lipitor 80mg daily and Metoprolol to help improve the heart after heart attack. Losartan was stopped after your blood pressure became too low. Please continue your medications as prescribed  Secondary Diagnosis:	Acute on chronic systolic congestive heart failure  Goal:	Follow up with your cardiologist Dr Octavio Haley  Assessment and plan of treatment:	You were admitted to the cardiac floor with congestive heart failure.  You were given intravenous diuretic medication Bumex to get rid of the fluid in your lungs and body to help you breathe better. You were started on a diuretic medication (water pill) Lasix to prevent recurrence of fluid buildup in the lungs. Please take Lasix as prescribed without missing doses.   -You had an echocardiogram performed that showed your heart muscle is very weak, the pumping function of your heart or Ejection Fraction is 15-20%. Your heart is unable to pump the blood effectively throughout your body and can cause fluid backup into your lungs and rest of body. Please maintain a low salt diet to prevent from fluid being reaccumulated. Weigh yourself daily and report any weight gain over 2 pounds/day or per week to your Doctor. Please continue taking medications Metoprolol as prescribed to hopefully improve the weakness of the heart.  -You were recommended to wear a Life-vest to protect against dangerous arrhythmias that can be associated with a weak heart. Please wear this at all times, except when showering or bathing, it can only help protect you when you are wearing it.  Secondary Diagnosis:	Acute anemia  Assessment and plan of treatment:	You were found to have anemia due gastric bleeding from 2 gastric ulcers where you required clipping of 1 gastric ulcer to stop the bleeding. You required 3 units of blood transfusion due to the anemia. You were started on medication Protonix 40mg twice a day for 8 weeks to help the ulcers heal, it then may be decreased to once a day after 8 weeks completed. You will STOP these medications on September 21. Please follow up with gastroenterologist Dr Munoz. You may need a colonoscopy in the future as an outpatient.  Secondary Diagnosis:	Bacteremia due to Gram-negative bacteria  Assessment and plan of treatment:	You were found to have a severe infection from the urine that traveled to the blood stream where you were given intravenous antibiotics with improvement of the infection.  Your antibiotics were changed to oral antibiotics. You will continue the antibiotics for 2 more days for a total of 4 doses.  Secondary Diagnosis:	RONNIE (acute kidney injury)  Assessment and plan of treatment:	When you came into the hospital you had highly abnormal kidney function. This may be multifactorial due to anemia, fluid overload, and a weakened heart. You received intravenous diuretic medication to help get rid of fluid from your lungs and body and your kidney function have been improved.   You are being restarted on Metformin and Glipizide for your diabetes. These medications can affect your kidney function. When you follow up with Dr. Cunningham as scheduled, PLEASE MAKE SURE BLOOD WORK IS DRAWN TO MONITOR YOUR KIDNEY FUNCTION.  Secondary Diagnosis:	Cerebrovascular accident (CVA), unspecified mechanism  Assessment and plan of treatment:	You were found to have altered mental status where a CT Scan of Head and MRI of Head was performed that showed you had a stroke. Your mentation improved back to your baseline. Please continue taking Aspirin and Lipitor as prescribed.  Secondary Diagnosis:	PAF (paroxysmal atrial fibrillation)  Assessment and plan of treatment:	You had an irregular heart rhythm called Atrial Fibrillation during the beginning of your hospitalization. In people with A-fib, the electrical signals that control the heartbeat are abnormal. As a result, the top 2 chambers of the heart stop pumping effectively, and a small amount of the blood that should move out of these chambers gets left behind. As the blood pools, it can start to form clots. These clots can travel to the brain through the blood vessels, and cause strokes. You were started on a medication called Metoprolol to help control the heart rate. Unfortunately due to the gastric bleeding while on blood thinner medication during the hospitalization, you are not a good candidate to be on blood thinner medication to prevent the risk of stroke that is associated with A-fib. You spontaneously converted back to normal rhythm.

## 2018-08-08 NOTE — PROGRESS NOTE ADULT - PROBLEM SELECTOR PLAN 8
HX DM. HgbA1c. 8  - Lantus dc' d as pt hypoglycemic while NPO, resume once tolerating PO    - c/w ISS HX DM. HgbA1c. 8 (on Metformin at home)  - FS   - c/w ISS

## 2018-08-08 NOTE — PROGRESS NOTE ADULT - ATTENDING COMMENTS
Patient examined, fellow's hx and PE reviewed and confirmed. I find RONNIE improved, BP controlled. A/P reviewed and confirmed. Follow SCr. Continue metoprolol. Follow BP. See full note.

## 2018-08-08 NOTE — PROGRESS NOTE ADULT - PROBLEM SELECTOR PLAN 3
Acute Upper GI bleed 2/2 two non-bleed ulcers s/p clipping of one (7/27). Total 3U PRBC's total this admission with good Hb response. Now Hb 10.2.   - s/p EGD 8/6 EGD- no active bleed; clips in situ on previous ulcer. GI to f/u OP for colonoscopy  - CT ab 8/6: Negative for retroperitoneal or intraperitoneal hematoma. No evidence of esophageal or bowel perforation.  - continue to HOLD anti-platelets and anti-coags 2/2 GIB   - c/w Carafate and Protonix BID x 8 weeks (stop 9/21)  - c/w Mech soft/thin diet  - monitor CBCs, transfuse goal hgb < 8 as has significant CV disease  - GI following, appreciate recs. Acute Upper GI bleed 2/2 two non-bleed ulcers s/p clipping of one (7/27). Total 3U PRBC's total this admission with good Hgb response. Now Hgb 9-10.   - s/p EGD 8/6 EGD- no active bleed; clips in situ on previous ulcer. GI to f/u outpt for colonoscopy  - CT ab 8/6: Negative for retroperitoneal or intraperitoneal hematoma. No evidence of esophageal or bowel perforation.  - continue to HOLD AC 2/2 GIB   - Okay to resume ASA 81mg qd per GI as pt w/o further GIB, Hgb remains stable  - c/w Carafate and Protonix BID x 8 weeks (stop 9/21)  - c/w Mechanical soft/thin diet  - monitor CBCs, transfuse goal hgb < 8 as has significant CV disease  - GI following, appreciate recs.

## 2018-08-08 NOTE — PROGRESS NOTE ADULT - PROBLEM SELECTOR PLAN 5
BUN/Cr up trended in setting of anemia/low perfusion. (unclear baseline). Likely 2/2 to cardiorenal syndrome as cardiac output is poor. Uremia resolved significantly, with subsequent improved mental status. Creatinine 1.54 today, improved.   - Continue to HOLD further diuresis as pt appears euvolemic, will re-eval in AM  - Renal following, appreciate recs.   - Per renal, no urgent need for dialysis at this time  - renally dose medications  - avoid nephrotoxic agents     #elevated kappa protein  -elevated kappa and kappa/lambda ratio noted. Not likely multiple myeloma as pt not anemic prior to acute GI bleed and without hypercalcemia. Can f/u with PCP as an OP BUN/Crea up trended in setting of anemia/low perfusion. (unclear baseline). Likely 2/2 to cardiorenal syndrome as cardiac output is poor. Uremia resolved significantly, with subsequent improved mental status. Creatinine 1.58 today, improved.   - Continue to HOLD further diuresis as pt appears euvolemic, will re-eval in AM  - Renal following, appreciate recs.   - Per renal, no urgent need for dialysis at this time  - renally dose medications  - avoid nephrotoxic agents     #elevated kappa protein  -elevated kappa and kappa/lambda ratio noted. Not likely multiple myeloma as pt not anemic prior to acute GI bleed and without hypercalcemia. Can f/u with PCP as an outpt

## 2018-08-08 NOTE — DISCHARGE NOTE ADULT - ADDITIONAL INSTRUCTIONS
1. Please follow up with your Cardiologist Dr Octavio Haley as scheduled on 8/14/18 at 3:15pm  [address] [address]  Phone: [phone]. Please follow up with your Cardiologist Dr Octavio Haley as scheduled on 8/14/18 at 3:15pm.  [address] [place] [address]  Phone: [phone] 1. Please follow up with your Cardiologist Dr Octavio Haley as scheduled on 8/21/18 at 1pm.  [address] [place] [address]  Phone: [phone]. Please follow up with your Cardiologist Dr Octavio Haley as scheduled on 8/21/18 at 1pm.  [facility] [place] [address]  Phone: [phone]    2. Please follow up with Gastroenterologist Dr Munoz. You will need a colonoscopy as an outpatient.  Hector Munoz), Medicine  [address], [address]  [place] [address]  Phone: [phone]. Please follow up with your Cardiologist Dr Octavio Haley as scheduled on Tuesday, 8/21/18 at 1pm.  [address] [place] [address]  Phone: [phone]    2. Please follow up with Gastroenterologist Dr Munoz on Monday, 8/27/18 at 2pm. You will need follow up with a colonoscopy as an outpatient.  Hector Munoz), [facility]  [address], [address]  [place] [address]  Phone: [phone]. Please follow up with your Cardiologist Dr Octavio Haley as scheduled on Tuesday, 8/21/18 at 1pm.  [address] [place] [address]  Phone: [phone]    2. Please follow up with Gastroenterologist Dr Munoz on Monday, 8/27/18 at 2pm. You will need to schedule for a colonoscopy as an outpatient.  Hector Munoz), [facility]  [address], [address]  [place] [address]  Phone: [phone]

## 2018-08-08 NOTE — PROGRESS NOTE ADULT - ASSESSMENT
This is 67 year old male without any prior h/o kidney disease. Now admitted for NSTEMI/STEMI  with acute kidney injury--> unknown baseline creatinine. Now with improving and stable renal function, transferred to step down unit

## 2018-08-08 NOTE — DISCHARGE NOTE ADULT - HOSPITAL COURSE
67M PMHx HTN, DM, HLD, HFrEF of 25%, CAD s/p PCI 7 years ago, s/p STEMI in [place] that was medically managed, presented 7/20 w/AMS in the setting of one week of dyspnea and weakness. Pt admitted to CCU for ACS (outside of therapeutic window for cath), acute on chronic CHF, RONNIE, cortical and subcortical CVAs. Hospital course complicated by acute upper GI bleed in context of dual antiplatelet and AC therapy, with subsequent gastric ulcers (x2) requiring clipping of one ulcer (7/27) by GI. Resumption of ASA resulted in another drop of hemoglobin, however GI repeated EGD on 8/6; clips in situ and no evidence of bleeding. Received PRBC [id number] in CCU. CT abd/pelv to the thighs with barium contrast completed without evidence of bleed. Course further complicated by Klebsiella bacteremia 2.2 urosepsis, now being treated with CTX (day 3/14, to end 8/17) and s/p midline line placement 8/7. Bcx have been NGTD, pt afebrile for >24hours. Pt transitioned off bumex drip for diuresis and remains euvolemic, along with resolution of RONNIE. Pt advanced diet to mechanical soft, and vitals have improved. Lopressor 12.5mg BID started today and patient tolerating well. He remains hemodynamically stable and is being stepped down to [place] further management. 67M PMHx HTN, DM, HLD, HFrEF of 25%, CAD s/p PCI 7 years ago, s/p STEMI in [place] that was medically managed, presented 7/20 w/AMS in the setting of one week of dyspnea and weakness. Pt admitted to CCU for ACS (outside of therapeutic window for cath), acute on chronic CHF, RONNIE, cortical and subcortical CVAs. Hospital course complicated by acute upper GI bleed in context of dual antiplatelet and AC therapy, with subsequent gastric ulcers (x2) requiring clipping of one ulcer (7/27) by GI. Resumption of ASA resulted in another drop of hemoglobin, however GI repeated EGD on 8/6; clips in situ and no evidence of bleeding. Received PRBC [id number] in CCU. CT abd/pelv to the thighs with barium contrast completed without evidence of bleed. Course further complicated by Klebsiella bacteremia 2.2 urosepsis, now being treated with CTX (day 3/14, to end 8/17) and s/p midline line placement 8/7. Bcx have been NGTD, pt afebrile for >24hours. Pt transitioned off bumex drip for diuresis and remains euvolemic, along with resolution of RONNIE. Pt advanced diet to mechanical soft, and vitals have improved. Lopressor 12.5mg BID started today and patient tolerating well. He remains hemodynamically stable and is being stepped down to [place] for further management. 67M PMHx HTN, DM, HLD, HFrEF of 15-20% on echo, CAD s/p PCI 7 years ago, s/p STEMI in [place] that was medically managed, presented 7/20 w/ AMS in the setting of one week of dyspnea and weakness. Pt admitted to CCU for ACS (outside of therapeutic window for cath), acute on chronic CHF, RONNIE, cortical and subcortical CVAs. Hospital course complicated by acute upper GI bleed in context of dual antiplatelet and AC therapy, with subsequent gastric ulcers (x2) requiring clipping of one ulcer (7/27) by GI. Resumption of ASA resulted in another drop of hemoglobin, however GI repeated EGD on 8/6; clips in situ and no evidence of bleeding. Received PRBC [id number] in CCU. CT abd/pelv to the thighs with barium contrast completed without evidence of bleed. Course further complicated by Klebsiella bacteremia 2.2 urosepsis, now being treated with CTX (day 3/14, to end 8/17) and s/p midline line placement 8/7. Bcx have been NGTD, pt afebrile for >24hours. Pt transitioned off bumex drip for diuresis and remains euvolemic, along with resolution of RONNIE. Pt advanced diet to mechanical soft, and vitals have improved. Lopressor 12.5mg BID started today and patient tolerating well. He remains hemodynamically stable and is being stepped down [place] further [place]. 67M PMHx HTN, DM, HLD, HFrEF (15-20% on echo), CAD s/p PCI 7 years ago, s/p STEMI in [place] that was medically managed, presented 7/20 w/ AMS in the setting of one week of dyspnea and weakness. Pt admitted to CCU for ACS (outside of therapeutic window for cath), acute on chronic CHF, RONNIE, cortical and subcortical CVAs. Hospital course complicated by acute upper GI bleed in context of dual antiplatelet and AC therapy, with subsequent gastric ulcers (x2) seen via EDG requiring clipping of one ulcer (7/27) by GI. Resumption of ASA resulted in another drop of hemoglobin, however GI repeated EGD on 8/6; clips in situ and no evidence of bleeding. Received PRBC x 3units in CCU w/ Hgb stable 9-10. CT abd/pelvis to the thighs with barium contrast completed without evidence of bleed. Course further complicated by Klebsiella bacteremia 2/2 urosepsis, being treated with Ceftriaxone (14 days course, to end 8/17) and s/p LUE Midline line placement 8/7. Surveillance BCx have been NGTD, pt afebrile for >48hours. Pt transitioned off Bumex drip for diuresis transitioned to PO Lasix and remains euvolemic, along with resolution of RONNIE (improved Crea to 1.8). Pt seen by speech/swallow and advanced diet to mechanical soft. PT rec acute rehab 2/2 skye LE weakness when ambulating w/ CVA. 67M PMHx HTN, DM, HLD, HFrEF (15-20% on echo), CAD s/p PCI 7 years ago, s/p STEMI in [place] that was medically managed, presented 7/20 w/ AMS in the setting of one week of dyspnea and weakness. Pt admitted to CCU for ACS (outside of therapeutic window for cardiac cath), acute on chronic CHF exac, RONNIE (Crea 9.4), cortical and subcortical CVAs. Hospital course was complicated by acute upper GI bleed in context of dual antiplatelet and AC therapy, with subsequent gastric ulcers (x2) seen via EDG requiring clipping of one ulcer (7/27) by GI. Initial resumption of ASA resulted in another drop of hemoglobin to 7.4, however GI repeated EGD on 8/6; clips in situ and no evidence of bleeding. Received total of PRBC x 3units in CCU w/ Hgb stable 9-10. CT abd/pelvis to the thighs with barium contrast completed without evidence of bleed. Course further complicated by Klebsiella bacteremia 2/2 urosepsis, being treated with Ceftriaxone (14 days course, to end 8/17/18) and s/p LUE Midline line placement 8/7. Surveillance BCx have been NGTD, pt afebrilesince 8/5. Pt was diuresed w/ Bumex drip and transitioned to PO Lasix, and remains euvolemic, along with resolution of RONNIE (improved Crea to 1.8). Pt seen by speech/swallow and advanced diet to mechanical soft. Stepped down from CCU to tele 5 [facility]. Pt was resumed on ASA w/ GI clearance w/o further evidence of GIB, Hgb stable. Pt was started CHF medication optimization, tolerating Metoprolol, but unable to tolerate ARB 2/2 hypotension. Pt was fitted w/ Lifevest as he is s/p STEMI w/o revascularization a/w depressed EF 15-20%. PT recommended acute rehab 2/2 skye LE weakness when ambulating / CVA, otherwise nonfocal neuro exam. Pt is medically stable for discharge pending acute rehab placement. 67M PMHx HTN, DM, HLD, HFrEF (15-20% on echo), CAD s/p PCI 7 years ago, s/p STEMI in [place] that was medically managed, presented 7/20 w/ AMS in the setting of one week of dyspnea and weakness. Pt admitted to CCU for ACS (outside of therapeutic window for cardiac cath), acute on chronic CHF exac, RONNIE (Crea 9.4), cortical and subcortical CVAs. Hospital course was complicated by acute upper GI bleed in context of dual antiplatelet and AC therapy, with subsequent gastric ulcers (x2) seen via EDG requiring clipping of one ulcer (7/27) by GI. Initial resumption of ASA resulted in another drop of hemoglobin to 7.4, however GI repeated EGD on 8/6; clips in situ and no evidence of bleeding. Received total of PRBC x 3units in CCU w/ Hgb stable 9-10. CT abd/pelvis to the thighs with barium contrast completed without evidence of bleed. Course further complicated by Klebsiella bacteremia 2/2 urosepsis, being treated with Ceftriaxone (14 days course, to end 8/17/18) and s/p LUE Midline line placement 8/7. Surveillance BCx have been NGTD, pt afebrilesince 8/5. Pt was diuresed w/ Bumex drip and transitioned to PO Lasix, and remains euvolemic, along with resolution of RONNIE (improved Crea to 1.8). Pt seen by speech/swallow and advanced diet to mechanical soft. Stepped down from CCU to tele 5 [facility]. Pt was resumed on ASA w/ GI clearance w/o further evidence of GIB, Hgb stable. Pt was started CHF medication optimization, tolerating Metoprolol, but unable to tolerate ARB 2/2 hypotension. Pt was fitted w/ Lifevest as he is s/p STEMI w/o revascularization a/w depressed EF 15-20%. PT recommended acute rehab 2/2 skye LE weakness when ambulating / CVA, otherwise nonfocal neuro exam. Pt is medically stable for discharge and is being discharged to rehab today. 67M PMHx HTN, DM, HLD, HFrEF (15-20% on echo), CAD s/p PCI 7 years ago, s/p STEMI in [place] that was medically managed, presented 7/20 w/ AMS in the setting of one week of dyspnea and weakness. Pt admitted to CCU for ACS (outside of therapeutic window for cardiac cath), acute on chronic CHF exac, RONNIE (Crea 9.4), cortical and subcortical CVAs. Hospital course was complicated by acute upper GI bleed in context of dual antiplatelet and AC therapy, with subsequent gastric ulcers (x2) seen via EDG requiring clipping of one ulcer (7/27) by GI. Initial resumption of ASA resulted in another drop of hemoglobin to 7.4, however GI repeated EGD on 8/6; clips in situ and no evidence of bleeding. Received total of PRBC x 3units in CCU w/ Hgb stable 9-10. CT abd/pelvis to the thighs with barium contrast completed without evidence of bleed. Course further complicated by Klebsiella bacteremia 2/2 urosepsis, being treated with Ceftriaxone (14 days course, to end 8/17/18) and s/p LUE Midline line placement 8/7. Surveillance BCx have been NGTD, pt afebrilesince 8/5. IV abx tx to PO abx as UC NGTD and midline dc'd 8/15. Pt was diuresed w/ Bumex drip and transitioned to PO Lasix, and remains euvolemic, along with resolution of RONNIE (improved Crea to 1.8). Pt seen by speech/swallow and advanced diet to mechanical soft. Stepped down from CCU to tele 5 [facility]. Pt was resumed on ASA w/ GI clearance w/o further evidence of GIB, Hgb stable. Pt was started CHF medication optimization, tolerating Metoprolol, but unable to tolerate ARB 2/2 hypotension. Pt was fitted w/ Lifevest as he is s/p STEMI w/o revascularization a/w depressed EF 15-20%. PT recommended acute rehab 2/2 skye LE weakness when ambulating / CVA, otherwise nonfocal neuro exam. Pt is medically stable for discharge and is being discharged to rehab today.

## 2018-08-08 NOTE — PROGRESS NOTE ADULT - PROBLEM SELECTOR PLAN 9
DVT: SCD's  GI: PPI x 8 weeks total, 9/21  Activity: pending PT eval DVT: SCD's  GI: PPI x 8 weeks total, completes 9/21  Activity: PT eval rec [facility] vs acute rehab

## 2018-08-08 NOTE — PROGRESS NOTE ADULT - SUBJECTIVE AND OBJECTIVE BOX
CARDIOLOGY NP PROGRESS NOTE    Subjective: Pt seen and examined at bedside. Reports feeling well. Denies chest pain, sob, lightheadedness, dizziness, palpitations.  Remainder ROS otherwise negative.    Overnight Events: None    TELEMETRY:     EKG:      VITAL SIGNS:  T(C): 36.4 (08-08-18 @ 09:21), Max: 37.2 (08-07-18 @ 22:00)  HR: 107 (08-08-18 @ 10:21) (107 - 122)  BP: 94/71 (08-08-18 @ 10:21) (90/60 - 113/82)  RR: 17 (08-08-18 @ 10:21) (15 - 27)  SpO2: 99% (08-08-18 @ 10:21) (94% - 100%)  Wt(kg): --    I&O's Summary    07 Aug 2018 07:01  -  08 Aug 2018 07:00  --------------------------------------------------------  IN: 470 mL / OUT: 600 mL / NET: -130 mL    08 Aug 2018 07:01  -  08 Aug 2018 12:40  --------------------------------------------------------  IN: 450 mL / OUT: 200 mL / NET: 250 mL          PHYSICAL EXAM:    General: A/ox 3, No acute Distress  Neck: Supple, NO JVD  Cardiac: S1 S2, No M/R/G  Pulmonary: CTAB, Breathing unlabored, No Rhonchi/Rales/Wheezing  Abdomen: Soft, Non -tender, +BS x 4 quads  Extremities: No Rashes, No edema  Neuro: A/o x 3, No focal deficits          LABS:                          9.4    5.0   )-----------( 135      ( 08 Aug 2018 07:31 )             28.9                              08-08    143  |  105  |  15  ----------------------------<  132<H>  3.9   |  26  |  1.58<H>    Ca    8.2<L>      08 Aug 2018 07:29  Phos  2.6     08-07  Mg     1.9     08-08                              PT/INR - ( 08 Aug 2018 07:32 )   PT: 14.3 sec;   INR: 1.28          PTT - ( 08 Aug 2018 07:32 )  PTT:29.2 sec  CAPILLARY BLOOD GLUCOSE      POCT Blood Glucose.: 277 mg/dL (08 Aug 2018 11:12)  POCT Blood Glucose.: 91 mg/dL (08 Aug 2018 06:37)  POCT Blood Glucose.: 141 mg/dL (07 Aug 2018 21:40)  POCT Blood Glucose.: 179 mg/dL (07 Aug 2018 16:21)            Allergies:  No Known Allergies    MEDICATIONS  (STANDING):  aspirin enteric coated 81 milliGRAM(s) Oral daily  atorvastatin 80 milliGRAM(s) Oral at bedtime  cefTRIAXone   IVPB 1 Gram(s) IV Intermittent every 24 hours  cefTRIAXone   IVPB      dextrose 5%. 1000 milliLiter(s) (50 mL/Hr) IV Continuous <Continuous>  dextrose 50% Injectable 12.5 Gram(s) IV Push once  dextrose 50% Injectable 25 Gram(s) IV Push once  dextrose 50% Injectable 25 Gram(s) IV Push once  docusate sodium 100 milliGRAM(s) Oral daily  escitalopram 5 milliGRAM(s) Oral daily  insulin lispro (HumaLOG) corrective regimen sliding scale   SubCutaneous three times a day before meals  insulin lispro (HumaLOG) corrective regimen sliding scale   SubCutaneous at bedtime  metoprolol tartrate 25 milliGRAM(s) Oral every 12 hours  pantoprazole   Suspension 40 milliGRAM(s) Oral two times a day before meals  polyethylene glycol 3350 17 Gram(s) Oral daily  senna 1 Tablet(s) Oral daily  sodium chloride 0.9% lock flush 20 milliLiter(s) IV Push once  sucralfate 1 Gram(s) Oral two times a day    MEDICATIONS  (PRN):  dextrose 40% Gel 15 Gram(s) Oral once PRN Blood Glucose LESS THAN 70 milliGRAM(s)/deciliter  glucagon  Injectable 1 milliGRAM(s) IntraMuscular once PRN Glucose LESS THAN 70 milligrams/deciliter  sodium chloride 0.9% lock flush 10 milliLiter(s) IV Push every 1 hour PRN After each medication administration  sodium chloride 0.9% lock flush 10 milliLiter(s) IV Push every 12 hours PRN Lumen of catheter NOT used        DIAGNOSTIC TESTS: CARDIOLOGY NP PROGRESS NOTE    Subjective: Pt seen and examined at bedside. Reports feeling well. Denies chest pain, sob, lightheadedness, dizziness, palpitations.  Remainder ROS otherwise negative.    Overnight Events: None    TELEMETRY: -110s    EKG: , TWi I, aVL,V4-V6, Q waves I and aVL      VITAL SIGNS:  T(C): 36.4 (08-08-18 @ 09:21), Max: 37.2 (08-07-18 @ 22:00)  HR: 107 (08-08-18 @ 10:21) (107 - 122)  BP: 94/71 (08-08-18 @ 10:21) (90/60 - 113/82)  RR: 17 (08-08-18 @ 10:21) (15 - 27)  SpO2: 99% (08-08-18 @ 10:21) (94% - 100%)  Wt(kg): --    I&O's Summary    07 Aug 2018 07:01  -  08 Aug 2018 07:00  --------------------------------------------------------  IN: 470 mL / OUT: 600 mL / NET: -130 mL    08 Aug 2018 07:01  -  08 Aug 2018 12:40  --------------------------------------------------------  IN: 450 mL / OUT: 200 mL / NET: 250 mL          PHYSICAL EXAM:    General: A/ox 3, No acute Distress, back to baseline per wife  Neck: Supple, NO JVD  Cardiac: S1 S2, grade II/VI systolic mumur  Pulmonary: CTAB, Breathing unlabored on RA, No Rhonchi/Rales/Wheezing  Abdomen: Soft, Non -tender, +BS x 4 quads  Extremities: No Rashes, No edema. 4/5 skye UE/LE motor strength  Neuro: A/o x 3, No focal deficits          LABS:                          9.4    5.0   )-----------( 135      ( 08 Aug 2018 07:31 )             28.9                              08-08    143  |  105  |  15  ----------------------------<  132<H>  3.9   |  26  |  1.58<H>    Ca    8.2<L>      08 Aug 2018 07:29  Phos  2.6     08-07  Mg     1.9     08-08      PT/INR - ( 08 Aug 2018 07:32 )   PT: 14.3 sec;   INR: 1.28          PTT - ( 08 Aug 2018 07:32 )  PTT:29.2 sec  CAPILLARY BLOOD GLUCOSE      POCT Blood Glucose.: 277 mg/dL (08 Aug 2018 11:12)  POCT Blood Glucose.: 91 mg/dL (08 Aug 2018 06:37)  POCT Blood Glucose.: 141 mg/dL (07 Aug 2018 21:40)  POCT Blood Glucose.: 179 mg/dL (07 Aug 2018 16:21)            Allergies:  No Known Allergies    MEDICATIONS  (STANDING):  aspirin enteric coated 81 milliGRAM(s) Oral daily  atorvastatin 80 milliGRAM(s) Oral at bedtime  cefTRIAXone   IVPB 1 Gram(s) IV Intermittent every 24 hours  cefTRIAXone   IVPB      dextrose 5%. 1000 milliLiter(s) (50 mL/Hr) IV Continuous <Continuous>  dextrose 50% Injectable 12.5 Gram(s) IV Push once  dextrose 50% Injectable 25 Gram(s) IV Push once  dextrose 50% Injectable 25 Gram(s) IV Push once  docusate sodium 100 milliGRAM(s) Oral daily  escitalopram 5 milliGRAM(s) Oral daily  insulin lispro (HumaLOG) corrective regimen sliding scale   SubCutaneous three times a day before meals  insulin lispro (HumaLOG) corrective regimen sliding scale   SubCutaneous at bedtime  metoprolol tartrate 25 milliGRAM(s) Oral every 12 hours  pantoprazole   Suspension 40 milliGRAM(s) Oral two times a day before meals  polyethylene glycol 3350 17 Gram(s) Oral daily  senna 1 Tablet(s) Oral daily  sodium chloride 0.9% lock flush 20 milliLiter(s) IV Push once  sucralfate 1 Gram(s) Oral two times a day    MEDICATIONS  (PRN):  dextrose 40% Gel 15 Gram(s) Oral once PRN Blood Glucose LESS THAN 70 milliGRAM(s)/deciliter  glucagon  Injectable 1 milliGRAM(s) IntraMuscular once PRN Glucose LESS THAN 70 milligrams/deciliter  sodium chloride 0.9% lock flush 10 milliLiter(s) IV Push every 1 hour PRN After each medication administration  sodium chloride 0.9% lock flush 10 milliLiter(s) IV Push every 12 hours PRN Lumen of catheter NOT used        DIAGNOSTIC TESTS:

## 2018-08-08 NOTE — PROGRESS NOTE ADULT - PROBLEM SELECTOR PLAN 1
HX PCI 7 yrs ago. Reportedly STEMI in [place] (ST elevations in lateral leads and trop 2000). On admission, EKG with q-waves in leads I and aVL w/poor R wave progression.   - Resume ASA 81mg qd, as discussed w/ GI for clearance. Pt w/o further GIB or dark colored stools, Hgb stable 9-10  - Start ACE/ARB as BP tolerates, as discussed w/ Renal pt's Crea continues to improve since admission  - c/w Metoprolol tartrate 12.5mg BID and uptitrate as tolerated with plan to transition to Toprol XL  - c/w Lipitor 80mg PO QD    #asymptomatic afib/aflutter  HX paroxysmal afib with paroxysmal afib noted in beginning of hospital course   - continue to HOLD anti-coag 2/2 GIB   - c/w Metoprolol tartrate 12.5mg BID and uptitrate as tolerated with plan to transition to Toprol XL HX PCI 7 yrs ago. Reportedly STEMI in [place] (ST elevations in lateral leads and trop 2000). On admission, EKG with q-waves in leads I and aVL w/poor R wave progression.   - Resume ASA 81mg qd, as discussed w/ GI for clearance. Pt w/o further GIB or dark colored stools, Hgb stable 9-10  - Start ACE/ARB as BP tolerates, as discussed w/ Renal, pt's Crea continues to improve since admission  - Uptitrate to Metoprolol tartrate 25mg BID, plan to transition to Toprol XL  - c/w Lipitor 80mg PO qd    #Asymptomatic paroxysmal Afib/Aflutter  HX paroxysmal afib with paroxysmal aflutter noted in beginning of hospital course   - HOLD anti-coagulation 2/2 GIB   - Uptitrate Metoprolol tartrate 25mg BID, plan to transition to Toprol XL HX PCI 7 yrs ago. Reportedly STEMI in [place] (ST elevations in lateral leads and trop 2000). On admission, EKG with Q waves in leads I and aVL w/poor R wave progression.   - Resume ASA 81mg qd, as discussed w/ GI for clearance. Pt w/o further GIB or dark colored stools, Hgb stable 9-10  - Start ACE/ARB as BP tolerates, as discussed w/ Renal, pt's Crea continues to improve since admission  - Uptitrate to Metoprolol tartrate 25mg BID, plan to transition to Toprol XL  - c/w Lipitor 80mg PO qd    #Asymptomatic paroxysmal Afib/Aflutter  HX paroxysmal afib with paroxysmal aflutter noted in beginning of hospital course   - HOLD anti-coagulation 2/2 GIB   - Uptitrate Metoprolol tartrate 25mg BID, plan to transition to Toprol XL HX PCI 7 yrs ago. Reportedly STEMI in [place] (ST elevations in lateral leads and trop 2000). On admission, EKG with Q waves in leads I and aVL w/poor R wave progression.   - Resume ASA 81mg qd, as discussed w/ GI for clearance. Pt w/o further GIB or dark colored stools, Hgb stable 9-10  - Start Losartan 25mg qd as BP tolerates, as discussed w/ Renal, pt's Crea continues to improve since admission  - Uptitrate to Metoprolol tartrate 25mg BID, plan to transition to Toprol XL  - c/w Lipitor 80mg PO qd    #Asymptomatic paroxysmal Afib/Aflutter  HX paroxysmal afib with paroxysmal aflutter noted in beginning of hospital course   - HOLD anti-coagulation 2/2 GIB   - Uptitrate Metoprolol tartrate 25mg BID, plan to transition to Toprol XL

## 2018-08-08 NOTE — DISCHARGE NOTE ADULT - PATIENT PORTAL LINK FT
You can access the [facility] Portal, offered by [facility], by registering with the following website: [email or web address]

## 2018-08-08 NOTE — DISCHARGE NOTE ADULT - PLAN OF CARE
Follow up with your cardiologist Dr Octavio Haley You were admitted to the cardiac floor with congestive heart failure.  You were given intravenous diuretic medication Bumex to get rid of the fluid in your lungs and body to help you breathe better. You were started on a diuretic medication (water pill) Lasix to prevent recurrence of fluid buildup in the lungs. Please take Lasix as prescribed without missing doses.   -You had an echocardiogram performed that showed your heart muscle is very weak, the pumping function of your heart or Ejection Fraction is 15-20%. Your heart is unable to pump the blood effectively throughout your body and can cause fluid backup into your lungs and rest of body. Please maintain a low salt diet to prevent from fluid being reaccumulated. Weigh yourself daily and report any weight gain over 2 pounds/day or per week to your Doctor. Please continue taking medications Metoprolol as prescribed to hopefully improve the weakness of the heart.  -You were recommended to wear a Lifevest to protect against dangerous arrhythmias that can be associated with a weak heart. Please wear this at all times, except when showering or bathing, it can only help protect you when you are wearing it. You were found to have anemia due gastric bleeding from 2 gastric ulcers where you required clipping of 1 gastric ulcer to stop the bleeding. You required 3 units of blood transfusion due to the anemia. You were started on medication Protonix 40mg twice a day for 8 weeks to help the ulcers heal, it then may be decreased to once a day after 8 weeks completed. Please follow up with gastroenterologist Dr Munoz. You may need a colonoscopy in the future as an outpatient. You had an irregular heart rhythm called Atrial Fibrillation during the beginning of your hospitalization. In people with A-fib, the electrical signals that control the heartbeat are abnormal. As a result, the top 2 chambers of the heart stop pumping effectively, and a small amount of the blood that should move out of these chambers gets left behind. As the blood pools, it can start to form clots. These clots can travel to the brain through the blood vessels, and cause strokes. You were started on a medication called Metoprolol to help control the heart rate. Unfortunately due to the gastric bleeding while on blood thinner medication during the hospitalization, you are not a good candidate to be on blood thinner medication to prevent the risk of stroke that is associated with A-fib. You spontaneously converted back to normal rhythm. You came into the hospital after you had a heart attack in [place]. You were started on medical management of heart attack when you were admitted to [facility]. While you were given blood thinner medications, unfortunately you developed gastric bleeding and the blood thinner medications had to be held. You were started on Aspirin 81mg daily, Lipitor 80mg daily and Metoprolol to help improve the heart after heart attack. Losartan was stopped after your blood pressure became too low. You were found to have a severe infection from the urine that traveled to the blood stream where you were given intravenous antibiotics with improvement of the infection. You will need to continue intravenous antibiotic Ceftriaxone until 8/17/18. A Midline catheter was placed to your left arm where you may continue to receive the antibiotic while in rehab. When you came into the hospital you had highly abnormal kidney function. This may be multifactorial due to anemia, fluid overload, and a weakened heart. You received intravenous diuretic medication to help get rid of fluid from your lungs and body and your kidney function have been improved. Please follow up with your PMD and Dr Cunningham in 1-2 weeks. You were found to have altered mental status where a CT Scan of Head and MRI of Head was performed that showed you had a stroke. Your mentation improved back to your baseline. Please continue taking Aspirin and Lipitor as prescribed. You came into the hospital after you had a heart attack in [place]. You were started on medical management of heart attack when you were admitted to [facility]. While you were given blood thinner medications, unfortunately you developed gastric bleeding and the blood thinner medications had to be held. You were started on Aspirin 81mg daily, Lipitor 80mg daily and Metoprolol to help improve the heart after heart attack. Losartan was stopped after your blood pressure became too low. Please continue your medications as prescribed You were admitted to the cardiac floor with congestive heart failure.  You were given intravenous diuretic medication Bumex to get rid of the fluid in your lungs and body to help you breathe better. You were started on a diuretic medication (water pill) Lasix to prevent recurrence of fluid buildup in the lungs. Please take Lasix as prescribed without missing doses.   -You had an echocardiogram performed that showed your heart muscle is very weak, the pumping function of your heart or Ejection Fraction is 15-20%. Your heart is unable to pump the blood effectively throughout your body and can cause fluid backup into your lungs and rest of body. Please maintain a low salt diet to prevent from fluid being reaccumulated. Weigh yourself daily and report any weight gain over 2 pounds/day or per week to your Doctor. Please continue taking medications Metoprolol as prescribed to hopefully improve the weakness of the heart.  -You were recommended to wear a Life-vest to protect against dangerous arrhythmias that can be associated with a weak heart. Please wear this at all times, except when showering or bathing, it can only help protect you when you are wearing it. You were found to have anemia due gastric bleeding from 2 gastric ulcers where you required clipping of 1 gastric ulcer to stop the bleeding. You required 3 units of blood transfusion due to the anemia. You were started on medication Protonix 40mg twice a day for 8 weeks to help the ulcers heal, it then may be decreased to once a day after 8 weeks completed. You will STOP these medications on September 21. Please follow up with gastroenterologist Dr Munoz. You may need a colonoscopy in the future as an outpatient. You were found to have a severe infection from the urine that traveled to the blood stream where you were given intravenous antibiotics with improvement of the infection.  Your antibiotics were changed to oral antibiotics. You will continue the antibiotics for 2 more days for a total of 4 doses. When you came into the hospital you had highly abnormal kidney function. This may be multifactorial due to anemia, fluid overload, and a weakened heart. You received intravenous diuretic medication to help get rid of fluid from your lungs and body and your kidney function have been improved. Please follow up with your PMD and Dr Cunningham as scheduled. When you came into the hospital you had highly abnormal kidney function. This may be multifactorial due to anemia, fluid overload, and a weakened heart. You received intravenous diuretic medication to help get rid of fluid from your lungs and body and your kidney function have been improved.   You are being restarted on Metformin and Glipizide for your diabetes. These medications can affect your kidney function. When you follow up with Dr. Cunningham as scheduled, PLEASE MAKE SURE BLOOD WORK IS DRAWN TO MONITOR YOUR KIDNEY FUNCTION.

## 2018-08-08 NOTE — CHART NOTE - NSCHARTNOTEFT_GEN_A_CORE
Admitting Diagnosis:   Patient is a 67y old  Male who presents with a chief complaint of Acute Coronary Syndrome (2018 16:48)      PAST MEDICAL & SURGICAL HISTORY:  Coronary artery disease involving native heart without angina pectoris, unspecified vessel or lesion type  Hyperlipidemia, unspecified hyperlipidemia type  Hypertension, unspecified type  Type 2 diabetes mellitus without complication, without long-term current use of insulin  History of cataract extraction, unspecified laterality  CAD S/P percutaneous coronary angioplasty      Current Nutrition Order:   Dysphagia 2 Mech Soft with thin liquids, CSTCHOSN, DASH/TLC    PO Intake: Good (%) [   ]  Fair (50-75%) [   ] Poor (<25%) [   ]    GI Issues:     Pain:    Skin Integrity: intact     Labs:       143  |  105  |  15  ----------------------------<  132<H>  3.9   |  26  |  1.58<H>    Ca    8.2<L>      08 Aug 2018 07:29  Phos  2.6       Mg     1.9           CAPILLARY BLOOD GLUCOSE      POCT Blood Glucose.: 91 mg/dL (08 Aug 2018 06:37)  POCT Blood Glucose.: 141 mg/dL (07 Aug 2018 21:40)  POCT Blood Glucose.: 179 mg/dL (07 Aug 2018 16:21)  POCT Blood Glucose.: 177 mg/dL (07 Aug 2018 12:31)      Medications:  MEDICATIONS  (STANDING):  atorvastatin 80 milliGRAM(s) Oral at bedtime  cefTRIAXone   IVPB 1 Gram(s) IV Intermittent every 24 hours  cefTRIAXone   IVPB      dextrose 5%. 1000 milliLiter(s) (50 mL/Hr) IV Continuous <Continuous>  dextrose 50% Injectable 12.5 Gram(s) IV Push once  dextrose 50% Injectable 25 Gram(s) IV Push once  dextrose 50% Injectable 25 Gram(s) IV Push once  docusate sodium 100 milliGRAM(s) Oral daily  escitalopram 5 milliGRAM(s) Oral daily  insulin lispro (HumaLOG) corrective regimen sliding scale   SubCutaneous three times a day before meals  insulin lispro (HumaLOG) corrective regimen sliding scale   SubCutaneous at bedtime  magnesium sulfate  IVPB 1 Gram(s) IV Intermittent once  metoprolol tartrate 12.5 milliGRAM(s) Oral every 12 hours  pantoprazole   Suspension 40 milliGRAM(s) Oral two times a day before meals  polyethylene glycol 3350 17 Gram(s) Oral daily  potassium chloride    Tablet ER 20 milliEquivalent(s) Oral once  senna 1 Tablet(s) Oral daily  sodium chloride 0.9% lock flush 20 milliLiter(s) IV Push once  sucralfate 1 Gram(s) Oral two times a day    MEDICATIONS  (PRN):  dextrose 40% Gel 15 Gram(s) Oral once PRN Blood Glucose LESS THAN 70 milliGRAM(s)/deciliter  glucagon  Injectable 1 milliGRAM(s) IntraMuscular once PRN Glucose LESS THAN 70 milligrams/deciliter  sodium chloride 0.9% lock flush 10 milliLiter(s) IV Push every 1 hour PRN After each medication administration  sodium chloride 0.9% lock flush 10 milliLiter(s) IV Push every 12 hours PRN Lumen of catheter NOT used      New Weight: Daily weight order  125.6 lbs (),   131.1 lbs (),   126.9 lbs (),   Weight hx:  130.5 lbs (),   152 lbs (),   130 lbs ()    Weight Change:   Weights recorded indicative to likely fluid shifts. Will continue to monitor weight trends.     Height: 5' 6" Weight: 152 lbs (), 130 lbs (),  lbs+/-10%, %IBW 85%, BMI 25.2  Estimated energy needs:   ABW used to calculate EER as per pt's current body weight is within % IBW  Estimated nutrient needs based on [facility] SOC for maintenance in adults  25-30 kcal/k6845-8146 kcal  1-1.2 g/k-86 gm protein  FR 1L/d per MD    Subjective:   67 y.o M from home with PMHx of Dilated Cardiomyopathy, HFrEF of 25%, CAD s/p PCI 7 years ago, DM, HTN, HLD. Pt p/w AMS and admitted for management of ACS and subacute systolic CHF. Wife cooks at home, follows low Na and moderate CHO intake at home, good appetite, NKFA. s/p EDG 18, findings of large ulcer with adherent clot seen in incisura, uanble to dislodge clot, s/p 3 clips to base of ulcer with no post clipping bleeding reported, smaller 2 clean based ulcer also near large ulcer described above, gastritis, normal esophagus and duodenum. SLP seen pt , recommend mech soft with thin liquids. Pt currently on pureed solids at this time.     Previous Nutrition Diagnosis:  1) Swallowing difficulty RT currently on dysphagia 1 pureed with nectar thick liquids per SLP AEB SLP eval completed, currently recommending dysphagia 1 pureed with nectar thick liquids (remains active, awaiting PO diet upgrade)  2) Inadequate energy intake R/T NPO AEB pt meets 0% estimated nutritional needs (resolved, PO diet started)    Active [   ]  Resolved [   ]    If resolved, new PES:     Goal:  Tolerance to PO diet with diet progression/upgrade  Improve >75% PO intake  Nutrition related labs WNL  No s/s of any GI intolerances (GIB)    Recommendations:  1) Upgrade PO diet to Dysphagia 2 Mechanical Soft with thin liquids, Renal Restriction, Consistent Carbohydrate (No Snacks)  2) Order Nepro 8 oz PO daily (425 kcal, 19 gm protein)  3) Monitor H/H and transfuse PRN  4) Daily weight  5) Strict I/O's     Education:     Risk Level: High [   ] Moderate [   ] Low [   ] Admitting Diagnosis:   Patient is a 67y old  Male who presents with a chief complaint of Acute Coronary Syndrome (2018 16:48)      PAST MEDICAL & SURGICAL HISTORY:  Coronary artery disease involving native heart without angina pectoris, unspecified vessel or lesion type  Hyperlipidemia, unspecified hyperlipidemia type  Hypertension, unspecified type  Type 2 diabetes mellitus without complication, without long-term current use of insulin  History of cataract extraction, unspecified laterality  CAD S/P percutaneous coronary angioplasty      Current Nutrition Order:   Dysphagia 2 Mech Soft with thin liquids, CSTCHOSN, DASH/TLC    PO Intake: Good (%) [ X ]  Fair (50-75%) [   ] Poor (<25%) [   ]    GI Issues: Denies N/V/D/C    Pain: Denies pain/discomfort     Skin Integrity: intact     Labs:       143  |  105  |  15  ----------------------------<  132<H>  3.9   |  26  |  1.58<H>    Ca    8.2<L>      08 Aug 2018 07:29  Phos  2.6     08-  Mg     1.9     08-08      CAPILLARY BLOOD GLUCOSE      POCT Blood Glucose.: 91 mg/dL (08 Aug 2018 06:37)  POCT Blood Glucose.: 141 mg/dL (07 Aug 2018 21:40)  POCT Blood Glucose.: 179 mg/dL (07 Aug 2018 16:21)  POCT Blood Glucose.: 177 mg/dL (07 Aug 2018 12:31)      Medications:  MEDICATIONS  (STANDING):  atorvastatin 80 milliGRAM(s) Oral at bedtime  cefTRIAXone   IVPB 1 Gram(s) IV Intermittent every 24 hours  cefTRIAXone   IVPB      dextrose 5%. 1000 milliLiter(s) (50 mL/Hr) IV Continuous <Continuous>  dextrose 50% Injectable 12.5 Gram(s) IV Push once  dextrose 50% Injectable 25 Gram(s) IV Push once  dextrose 50% Injectable 25 Gram(s) IV Push once  docusate sodium 100 milliGRAM(s) Oral daily  escitalopram 5 milliGRAM(s) Oral daily  insulin lispro (HumaLOG) corrective regimen sliding scale   SubCutaneous three times a day before meals  insulin lispro (HumaLOG) corrective regimen sliding scale   SubCutaneous at bedtime  magnesium sulfate  IVPB 1 Gram(s) IV Intermittent once  metoprolol tartrate 12.5 milliGRAM(s) Oral every 12 hours  pantoprazole   Suspension 40 milliGRAM(s) Oral two times a day before meals  polyethylene glycol 3350 17 Gram(s) Oral daily  potassium chloride    Tablet ER 20 milliEquivalent(s) Oral once  senna 1 Tablet(s) Oral daily  sodium chloride 0.9% lock flush 20 milliLiter(s) IV Push once  sucralfate 1 Gram(s) Oral two times a day    MEDICATIONS  (PRN):  dextrose 40% Gel 15 Gram(s) Oral once PRN Blood Glucose LESS THAN 70 milliGRAM(s)/deciliter  glucagon  Injectable 1 milliGRAM(s) IntraMuscular once PRN Glucose LESS THAN 70 milligrams/deciliter  sodium chloride 0.9% lock flush 10 milliLiter(s) IV Push every 1 hour PRN After each medication administration  sodium chloride 0.9% lock flush 10 milliLiter(s) IV Push every 12 hours PRN Lumen of catheter NOT used      New Weight: Daily weight order  136.9 lbs (),   137.1 lbs (6),   133.8 lbs (),   136.6 lbs (4),   131.1 lbs (8/3),   130.7 lbs ()  Weight hx:  125.6 lbs (),   131.1 lbs ()    Weight Change: Stable around ~136 lbs based on weights recorded.   Weights recorded indicative to likely fluid shifts. Will continue to monitor weight trends.     Height: 5' 6" Weight: 152 lbs (), 130 lbs (),  lbs+/-10%, %IBW 85%, BMI 25.2  Estimated energy needs:   ABW used to calculate EER as per pt's current body weight is within % IBW  Estimated nutrient needs based on [facility] SOC for maintenance in adults  25-30 kcal/k7530-9878 kcal  1-1.2 g/k-86 gm protein  FR 1L/d per MD    Subjective:   67 y.o M from home with PMHx of Dilated Cardiomyopathy, HFrEF of 25%, CAD s/p PCI 7 years ago, DM, HTN, HLD. Pt p/w AMS and admitted for management of ACS and subacute systolic CHF. Wife cooks at home, follows low Na and moderate CHO intake at home, good appetite, NKFA. s/p EDG 18, findings of large ulcer with adherent clot seen in incisura, unable to dislodge clot, s/p 3 clips to base of ulcer with no post clipping bleeding reported, smaller 2 clean based ulcer also near large ulcer described above, gastritis, normal esophagus and duodenum. repeat EDG , showed clips in situ and no evidence of bleeding. Pt is now transferred to [facility] for further management from CCU. PO diet resume and advanced, pt is currently tolerating Dysphagia 2 mech soft diet, good >75% PO intake of meals. Denies N/V/D/C or pain.     Previous Nutrition Diagnosis:  1) Swallowing difficulty RT currently on dysphagia 1 pureed with nectar thick liquids per SLP AEB SLP eval completed, currently recommending dysphagia 1 pureed with nectar thick liquids (remains active, awaiting PO diet upgrade)  2) Inadequate energy intake R/T NPO AEB pt meets 0% estimated nutritional needs (resolved, PO diet started)    Active [   ]  Resolved [ X ]    If resolved, new PES: none identified    Goal:  Tolerance to PO diet  Maintain >75% PO intake  Nutrition related labs WNL   No s/s of any GI intolerances    Recommendations:  1) Continue Dysphagia 2 Mechanical Soft with thin liquids, DASH/TLC, Consistent Carbohydrate (No Snacks)  2) Monitor H/H and transfuse PRN  3) Daily weight  4) Strict I/O's     Education: Reviewed nutrition edu DASH/TLC, T2DM diet management with patient. Handout provided. RD will reinforce teaching as needed.     Risk Level: High [   ] Moderate [ X ] Low [   ]

## 2018-08-08 NOTE — PROGRESS NOTE ADULT - PROBLEM SELECTOR PLAN 2
Hx sCHF x 7years per wife. EF reportedly 25% in the past.  Echo 7/20, EF is 15-20%. Was volume overloaded on initial exam, now improved. On CXR, evidence of cardiomegaly but significant decrease in pulmonary vascular congestion.   - transitioned off bumex gtt.   - Continue to HOLD further diuresis as pt appears euvolemic, will re-eval in AM  - c/w Metoprolol tartrate 12.5mg BID and uptitrate as tolerated with plan to transition to Toprol XL  - continue to HOLD ACEi/ARB 2/2 RONNIE, initiate when resolves   - Would benefit from life vest while awaiting ICD consideration, as EF <30% and can not be optimized with medications  - strict I/Os, daily weights Hx sCHF x 7years per wife. EF reportedly 25% in the past.  Echo 7/20, EF is 15-20%. Was volume overloaded on initial exam, now improved. On CXR, evidence of cardiomegaly but significant decrease in pulmonary vascular congestion.   - S/p Bumex gtt  - Continue to HOLD further diuresis as pt appears euvolemic, will re-eval in AM  - Uptitrate Metoprolol tartrate 25mg BID and uptitrate as tolerated with plan to transition to Toprol XL  - Start ACE/ARB as BP tolerates as discussed w/ renal, Crea improving since admission  - Pending Lifevest while awaiting ICD consideration, as EF <30% and can not be optimized with medications  - strict I/Os, daily weights Hx sCHF x 7years per wife. EF reportedly 25% in the past.  Echo performed 7/20/18 w/ EF 15-20%. Was volume overloaded on initial exam, now improved. On CXR, evidence of cardiomegaly but significant decrease in pulmonary vascular congestion.   - S/p Bumex gtt  - Continue to HOLD further diuresis as pt appears euvolemic, will re-eval in AM  - Uptitrate Metoprolol tartrate 25mg BID and uptitrate as tolerated with plan to transition to Toprol XL  - Start Losartan 25mg qd as BP tolerates as discussed w/ renal, Crea improving since admission  - Pending Lifevest prior to discharge while awaiting outpt ICD consideration, as EF <30% and not revascularized s/p STEMI, not on optimal medical therapy x 3mos  - strict I/Os, daily weights

## 2018-08-08 NOTE — DISCHARGE NOTE ADULT - PROVIDER TOKENS
FREE:[LAST:[Tera],FIRST:[Octavio],PHONE:[phone]],FAX:[(   )    -],ADDRESS:[[address] [place] [address]]] FREE:[LAST:[Tera],FIRST:[Octavio],PHONE:[phone]],FAX:[(   )    -],ADDRESS:[[address] [place] [address]]],TOKEN:'[id number]'

## 2018-08-08 NOTE — PROGRESS NOTE ADULT - PROBLEM SELECTOR PLAN 6
MRI significant for subacute ischemia involving right parietal area   with numerous scattered areas of acute ischemia within the basal ganglia   and frontal and parietal lobes bilaterally some of which are in a   watershed distribution. Improved mentation in setting of decreased uremia.   - F/U neuro recs  - HOLD MRA study for full neuro workup.   - carotid u/s negative   - PT/OT/ST as tolerated MRI significant for subacute ischemia involving right parietal area with numerous scattered areas of acute ischemia within the basal ganglia and frontal and parietal lobes bilaterally some of which are in a watershed distribution. Improved mentation in setting of decreased uremia. Wife reports pt back at baseline mental status.  - F/U neuro recs  - HOLD MRA study for full neuro workup  - Carotid US negative   - PT/OT/ST as tolerated  - PT rec KRIS vs Acute rehab

## 2018-08-08 NOTE — DISCHARGE NOTE ADULT - CARE PROVIDER_API CALL
Octavio Haley  [address] [place] [address]  Phone: [phone]  Fax: (       - Octavio Haley  [address] [place] [address]  Phone: [phone]  Fax: (   )    -    Hector Munoz), [place]  [address] [facility] [address]  [place] [address]  Phone: [phone]  Fax: [phone]

## 2018-08-08 NOTE — DISCHARGE NOTE ADULT - OTHER SIGNIFICANT FINDINGS
CT abd/pelvis: 8/6/18: Negative for retroperitoneal or intraperitoneal hematoma. No evidence of esophageal or bowel perforation. Severe cardiomegaly with bilateral pleural effusions.    MRI Head 7/21/18:   IMPRESSION: Limited exam. Subacute ischemia involving right parietal area with numerous scattered areas of acute ischemia within the basal ganglia and frontal and parietal lobes bilaterally some of which are in a watershed distribution.    ECHOCARDIOGRAM 7/20/18: here is mild concentric left ventricular hypertrophy.The left ventricle is mildly dilated. Severe segmental left ventricular systolic dysfunction with akinesis of anteroseptum, inferoseptum, all apical segments. The inferior and anterior walls are hypokinetic. The basal anterior wall and lateral wall are mildly hypokinetic. No LV thrombus seen. The left ventricular ejection fraction is severely reduced. The left ventricular ejection fraction is estimated to be 15-20%  The right ventricle is mildly dilated.The right ventricular systolic function is probably normal.The left atrium is mildly dilated. The left atrial volume index is 40 cc/m2 (normal <34cc/m2)  The right atrium is mildly dilated.Structurally normal aortic valve.There is trace aortic regurgitation.Tethered mitral valve leaflets with mark opening. Structurally normal tricuspid valve.There is mild to moderate tricuspid regurgitation.There is moderate pulmonary hypertension.The pulmonary artery systolic pressure is estimated to be 50 mmHg. Structurally normal pulmonic valve.No pulmonic regurgitation noted.A small pericardial effusion noted

## 2018-08-08 NOTE — PROGRESS NOTE ADULT - SUBJECTIVE AND OBJECTIVE BOX
Seen in the morning, no shortness of breath, no chest pain, no fever, transferred to step down unit       The renal service for the renal function - CKD, was with RONNIE on the admission - cardiorenal syndrome           Patient seen and examined at bedside.     atorvastatin 80 milliGRAM(s) at bedtime  cefTRIAXone   IVPB 1 Gram(s) every 24 hours  cefTRIAXone   IVPB     dextrose 40% Gel 15 Gram(s) once PRN  dextrose 5%. 1000 milliLiter(s) <Continuous>  dextrose 50% Injectable 12.5 Gram(s) once  dextrose 50% Injectable 25 Gram(s) once  dextrose 50% Injectable 25 Gram(s) once  docusate sodium 100 milliGRAM(s) daily  escitalopram 5 milliGRAM(s) daily  glucagon  Injectable 1 milliGRAM(s) once PRN  insulin lispro (HumaLOG) corrective regimen sliding scale   three times a day before meals  insulin lispro (HumaLOG) corrective regimen sliding scale   at bedtime  metoprolol tartrate 12.5 milliGRAM(s) every 12 hours  pantoprazole   Suspension 40 milliGRAM(s) two times a day before meals  polyethylene glycol 3350 17 Gram(s) daily  senna 1 Tablet(s) daily  sodium chloride 0.9% lock flush 10 milliLiter(s) every 1 hour PRN  sodium chloride 0.9% lock flush 10 milliLiter(s) every 12 hours PRN  sodium chloride 0.9% lock flush 20 milliLiter(s) once  sucralfate 1 Gram(s) two times a day      Allergies    No Known Allergies    Intolerances        T(C): , Max: 37.2 (08-07-18 @ 22:00)  T(F): , Max: 99 (08-07-18 @ 22:00)  HR: 107 (08-08-18 @ 10:21)  BP: 94/71 (08-08-18 @ 10:21)  BP(mean): 78 (08-08-18 @ 10:21)  RR: 17 (08-08-18 @ 10:21)  SpO2: 99% (08-08-18 @ 10:21)  Wt(kg): --    08-07 @ 07:01  -  08-08 @ 07:00  --------------------------------------------------------  IN:    Oral Fluid: 470 mL  Total IN: 470 mL    OUT:    Voided: 600 mL  Total OUT: 600 mL    Total NET: -130 mL      08-08 @ 07:01  -  08-08 @ 11:01  --------------------------------------------------------  IN:    Solution: 150 mL  Total IN: 150 mL    OUT:    Voided: 200 mL  Total OUT: 200 mL    Total NET: -50 mL      Physical exam:  Alert and oriented   No JVD   Normal air entry into the lungs, no crackles   RRR, normal s1/s2, no murmurs, rubs or gallops   Abdomen - soft, not tender, not distended   extremities; no edema         LABS:                        9.4    5.0   )-----------( 135      ( 08 Aug 2018 07:31 )             28.9     08-08    143  |  105  |  15  ----------------------------<  132<H>  3.9   |  26  |  1.58<H>    Ca    8.2<L>      08 Aug 2018 07:29  Phos  2.6     08-07  Mg     1.9     08-08        PT/INR - ( 08 Aug 2018 07:32 )   PT: 14.3 sec;   INR: 1.28          PTT - ( 08 Aug 2018 07:32 )  PTT:29.2 sec          RADIOLOGY & ADDITIONAL STUDIES:

## 2018-08-08 NOTE — DISCHARGE NOTE ADULT - MEDICATION SUMMARY - MEDICATIONS TO TAKE
I will START or STAY ON the medications listed below when I get home from the hospital:    aspirin 81 mg oral tablet  -- 1 tab(s) by mouth once a day  -- Indication: For Coronary artery disease    escitalopram 5 mg oral tablet  -- 1 tab(s) by mouth once a day  -- Indication: For Depression     glipiZIDE 10 mg oral tablet, extended release  -- 2 tab(s) by mouth once a day  -- Indication: For Diabetes     metFORMIN 1000 mg oral tablet  -- 1 tab(s) by mouth 2 times a day  -- Indication: For Diabetes     atorvastatin 80 mg oral tablet  -- 1 tab(s) by mouth once a day (at bedtime)  -- Indication: For Hyperlipidemia/Coronary artery disease     metoprolol tartrate 50 mg oral tablet  -- 1 tab(s) by mouth 2 times a day  -- Indication: For CHF (congestive heart failure)/Coronary Artery Disease     furosemide 20 mg oral tablet  -- 1 tab(s) by mouth once a day  -- Indication: For CHF (congestive heart failure)    guaiFENesin 100 mg/5 mL oral liquid  -- 5 milliliter(s) by mouth every 6 hours, As needed, Cough  -- Indication: For Cough     docusate sodium 100 mg oral capsule  -- 1 cap(s) by mouth once a day  -- Indication: For Constipation    senna oral tablet  -- 1 tab(s) by mouth once a day  -- Indication: For Constipation     polyethylene glycol 3350 oral powder for reconstitution  -- 17 gram(s) by mouth once a day  -- Indication: For Constipation     sucralfate 1 g oral tablet  -- 1 tab(s) by mouth 2 times a day    STOP 9/21  -- Indication: For GI bleed    Protonix 40 mg oral granule, delayed release  -- 1 tab(s) by mouth 2 times a day    STOP 9/21  -- Indication: For GI bleed     ciprofloxacin 500 mg oral tablet  -- 1 tab(s) by mouth every 12 hours    TAKE TILL 8/17.. START 8/16 FOR TOTAL 4 DOSES  -- Indication: For urosepsis

## 2018-08-08 NOTE — PROGRESS NOTE ADULT - PROBLEM SELECTOR PLAN 7
Patient has been weak, unsteady, and confused. Improved significantly with decrease in BUN. Likely toxic-metabolic 2/2 uremia.  - c/w lexapro Patient has been weak, unsteady, and confused. Improved significantly with decrease in BUN, now resolved. Wife reports pt back at baseline mental status. Likely toxic-metabolic 2/2 uremia.  - c/w lexapro

## 2018-08-09 DIAGNOSIS — I95.9 HYPOTENSION, UNSPECIFIED: ICD-10-CM

## 2018-08-09 LAB
ANION GAP SERPL CALC-SCNC: 11 MMOL/L — SIGNIFICANT CHANGE UP (ref 5–17)
BUN SERPL-MCNC: 17 MG/DL — SIGNIFICANT CHANGE UP (ref 7–23)
CALCIUM SERPL-MCNC: 8.7 MG/DL — SIGNIFICANT CHANGE UP (ref 8.4–10.5)
CHLORIDE SERPL-SCNC: 103 MMOL/L — SIGNIFICANT CHANGE UP (ref 96–108)
CO2 SERPL-SCNC: 27 MMOL/L — SIGNIFICANT CHANGE UP (ref 22–31)
CREAT SERPL-MCNC: 1.64 MG/DL — HIGH (ref 0.5–1.3)
CULTURE RESULTS: SIGNIFICANT CHANGE UP
GLUCOSE BLDC GLUCOMTR-MCNC: 103 MG/DL — HIGH (ref 70–99)
GLUCOSE BLDC GLUCOMTR-MCNC: 111 MG/DL — HIGH (ref 70–99)
GLUCOSE BLDC GLUCOMTR-MCNC: 212 MG/DL — HIGH (ref 70–99)
GLUCOSE BLDC GLUCOMTR-MCNC: 240 MG/DL — HIGH (ref 70–99)
GLUCOSE SERPL-MCNC: 113 MG/DL — HIGH (ref 70–99)
HCT VFR BLD CALC: 31.1 % — LOW (ref 39–50)
HGB BLD-MCNC: 9.7 G/DL — LOW (ref 13–17)
MAGNESIUM SERPL-MCNC: 2 MG/DL — SIGNIFICANT CHANGE UP (ref 1.6–2.6)
MCHC RBC-ENTMCNC: 29.3 PG — SIGNIFICANT CHANGE UP (ref 27–34)
MCHC RBC-ENTMCNC: 31.2 G/DL — LOW (ref 32–36)
MCV RBC AUTO: 94 FL — SIGNIFICANT CHANGE UP (ref 80–100)
PLATELET # BLD AUTO: 143 K/UL — LOW (ref 150–400)
POTASSIUM SERPL-MCNC: 4.1 MMOL/L — SIGNIFICANT CHANGE UP (ref 3.5–5.3)
POTASSIUM SERPL-SCNC: 4.1 MMOL/L — SIGNIFICANT CHANGE UP (ref 3.5–5.3)
RBC # BLD: 3.31 M/UL — LOW (ref 4.2–5.8)
RBC # FLD: 14.5 % — SIGNIFICANT CHANGE UP (ref 10.3–16.9)
SODIUM SERPL-SCNC: 141 MMOL/L — SIGNIFICANT CHANGE UP (ref 135–145)
SPECIMEN SOURCE: SIGNIFICANT CHANGE UP
WBC # BLD: 4.4 K/UL — SIGNIFICANT CHANGE UP (ref 3.8–10.5)
WBC # FLD AUTO: 4.4 K/UL — SIGNIFICANT CHANGE UP (ref 3.8–10.5)

## 2018-08-09 PROCEDURE — 99232 SBSQ HOSP IP/OBS MODERATE 35: CPT

## 2018-08-09 PROCEDURE — 99232 SBSQ HOSP IP/OBS MODERATE 35: CPT | Mod: GC

## 2018-08-09 RX ORDER — CARVEDILOL PHOSPHATE 80 MG/1
1 CAPSULE, EXTENDED RELEASE ORAL
Qty: 0 | Refills: 0 | COMMUNITY

## 2018-08-09 RX ORDER — FUROSEMIDE 40 MG
20 TABLET ORAL DAILY
Qty: 0 | Refills: 0 | Status: DISCONTINUED | OUTPATIENT
Start: 2018-08-09 | End: 2018-08-15

## 2018-08-09 RX ADMIN — Medication 1 GRAM(S): at 17:57

## 2018-08-09 RX ADMIN — Medication 4: at 11:56

## 2018-08-09 RX ADMIN — PANTOPRAZOLE SODIUM 40 MILLIGRAM(S): 20 TABLET, DELAYED RELEASE ORAL at 17:57

## 2018-08-09 RX ADMIN — Medication 1 GRAM(S): at 06:36

## 2018-08-09 RX ADMIN — CEFTRIAXONE 100 GRAM(S): 500 INJECTION, POWDER, FOR SOLUTION INTRAMUSCULAR; INTRAVENOUS at 09:13

## 2018-08-09 RX ADMIN — CHLORHEXIDINE GLUCONATE 1 APPLICATION(S): 213 SOLUTION TOPICAL at 06:46

## 2018-08-09 RX ADMIN — ATORVASTATIN CALCIUM 80 MILLIGRAM(S): 80 TABLET, FILM COATED ORAL at 21:42

## 2018-08-09 RX ADMIN — LOSARTAN POTASSIUM 25 MILLIGRAM(S): 100 TABLET, FILM COATED ORAL at 11:59

## 2018-08-09 RX ADMIN — PANTOPRAZOLE SODIUM 40 MILLIGRAM(S): 20 TABLET, DELAYED RELEASE ORAL at 06:36

## 2018-08-09 RX ADMIN — Medication 25 MILLIGRAM(S): at 06:36

## 2018-08-09 RX ADMIN — Medication 25 MILLIGRAM(S): at 17:57

## 2018-08-09 RX ADMIN — Medication 81 MILLIGRAM(S): at 11:56

## 2018-08-09 RX ADMIN — ESCITALOPRAM OXALATE 5 MILLIGRAM(S): 10 TABLET, FILM COATED ORAL at 11:56

## 2018-08-09 NOTE — PROGRESS NOTE ADULT - PROBLEM SELECTOR PLAN 2
Hx sCHF x 7years per wife. EF reportedly 25% in the past.  Echo performed 7/20/18 w/ EF 15-20%. Was volume overloaded on initial exam, now improved. On CXR, evidence of cardiomegaly but significant decrease in pulmonary vascular congestion.   - S/p Bumex gtt  - Continue to HOLD further diuresis as pt appears euvolemic, will re-eval in AM  - Uptitrate Metoprolol tartrate 25mg BID and uptitrate as tolerated with plan to transition to Toprol XL  - Start Losartan 25mg qd as BP tolerates as discussed w/ renal, Crea improving since admission  - Pending Lifevest prior to discharge while awaiting outpt ICD consideration, as EF <30% and not revascularized s/p STEMI, not on optimal medical therapy x 3mos  - strict I/Os, daily weights Hx sCHF x 7years per wife. EF reportedly 25% in the past.  Echo performed 7/20/18 w/ EF 15-20%. Was volume overloaded on initial exam, now improved. On CXR, evidence of cardiomegaly but significant decrease in pulmonary vascular congestion.   - S/p Bumex gtt  - Start Lasix 20mg qd   - Uptitrated Metoprolol tartrate 25mg BID and uptitrate as tolerated with plan to transition to Toprol XL  - Start Losartan 25mg qd today, but   - Pending Lifevest prior to discharge while awaiting outpt ICD consideration, as EF <30% and not revascularized s/p STEMI, not on optimal medical therapy x 3mos  - strict I/Os, daily weights Hx sCHF x 7years per wife. EF reportedly 25% in the past.  Echo performed 7/20/18 w/ EF 15-20%. Was volume overloaded on initial exam, now improved. On CXR, evidence of cardiomegaly but significant decrease in pulmonary vascular congestion.   - S/p Bumex gtt  - Start Lasix 20mg qd   - Uptitrated Metoprolol tartrate 25mg BID and uptitrate as tolerated with plan to transition to Toprol XL  - Start Losartan 25mg qd today, but had asymptomatic hypotension 85/57. D/c Losartan for now  - Fitted for [employer] 8/8. As pt is awaiting outpt ICD consideration, as EF <30% and not revascularized s/p STEMI, unable optimal medical therapy x 3mos  - strict I/Os, daily weights

## 2018-08-09 NOTE — PROGRESS NOTE ADULT - ATTENDING COMMENTS
Assessment: Patient personally seen and examined myself during rounds with the Physician Assistant/House Staff/Nurse Practitioner  ON DATE 8/9/18  Physician Assistant/House Staff/Nurse Practitioner note read, including vitals, physical findings, laboratory data, and radiological reports.   Revisions included below.   Direct personal management at bed side and extensive interpretation of the data.    Plan was outlined and discussed in details with the Physician Assistant/House Staff/Nurse Practitioner.    Decision making of high complexity   Risk high of complications, morbidity, and/or mortality  Assessment and Action taken for acute disease activity to reflect the level of care provided:  -Hemodynamic evaluation and support  -Medication reconciliation  -Review laboratory data  -EKG reviewed   -    TIME SPENT in evaluation and management, reassessments, review and interpretation of labs and x-rays, and hemodynamic management, formulating a plan and coordinating care: ___25____ MIN.  Time does not include procedural time.    Isaias Fountain MD  Cardiology    Mobile: [phone]  Office: [phone]  [address] [place] [address], [id number]

## 2018-08-09 NOTE — PROGRESS NOTE ADULT - PROBLEM SELECTOR PLAN 4
Likely urosepsis as positive blood and urine cultures klebsiella; pan sensitive except ampillcilin and Bactrim. Will adjust abx to Ceftriaxone, as less toxic to kidneys  - CXR negative for PNA  - Ceftriaxone 1g qd (day 5/14, last day 8/17/18) s/p midline placement 8/7 Likely urosepsis as positive blood and urine cultures klebsiella; pan sensitive except ampillcilin and Bactrim. Will adjust abx to Ceftriaxone, as less toxic to kidneys  - CXR negative for PNA  - Ceftriaxone 1g qd (day 6/14, last day 8/17/18) s/p midline placement 8/7 -CXR read noting coiling at axila of Midline catheter, able to flush and draw back ok to use IR Likely urosepsis as positive blood and urine cultures klebsiella; pan sensitive except Ampillcilin and Bactrim. Will adjust abx to Ceftriaxone, as less toxic to kidneys  - CXR negative for PNA  - Ceftriaxone 1g qd (day 6/14, last day 8/17/18) s/p midline placement 8/7   - Midline CXR read noting coiling at axilla, catheter able to flushed and draws back blood. Okay to use per IR.

## 2018-08-09 NOTE — PROGRESS NOTE ADULT - PROBLEM SELECTOR PLAN 5
BUN/Crea up trended in setting of anemia/low perfusion. (unclear baseline). Likely 2/2 to cardiorenal syndrome as cardiac output is poor. Uremia resolved significantly, with subsequent improved mental status. Creatinine 1.58 today, improved.   - Continue to HOLD further diuresis as pt appears euvolemic, will re-eval in AM  - Renal following, appreciate recs.   - Per renal, no urgent need for dialysis at this time  - renally dose medications  - avoid nephrotoxic agents     #elevated kappa protein  -elevated kappa and kappa/lambda ratio noted. Not likely multiple myeloma as pt not anemic prior to acute GI bleed and without hypercalcemia. Can f/u with PCP as an outpt BUN/Crea up trended in setting of anemia/low perfusion. (unclear baseline). Likely 2/2 to cardiorenal syndrome as cardiac output is poor. Uremia resolved significantly, with subsequent improved mental status. Creatinine 1.64 today, improved.   - Resume Lasix 20mg qd tomorrow  - Renal following, appreciate recs.   - Per renal, no urgent need for dialysis at this time  - renally dose medications  - avoid nephrotoxic agents     #elevated kappa protein  -elevated kappa and kappa/lambda ratio noted. Not likely multiple myeloma as pt not anemic prior to acute GI bleed and without hypercalcemia. Can f/u with PCP as an outpt

## 2018-08-09 NOTE — PROGRESS NOTE ADULT - ATTENDING COMMENTS
RONNIE resolved   may consider dc [facility] if BP remains low -- no apparent sx now   check vit D with high PTH

## 2018-08-09 NOTE — PROGRESS NOTE ADULT - SUBJECTIVE AND OBJECTIVE BOX
patient seen and examined  Cr @ 1.64 <--1.58  H/H @ 9.7/31.1  BP running on the low side, and patient is tachycardic      aspirin enteric coated 81 daily  atorvastatin 80 at bedtime  cefTRIAXone   IVPB 1 every 24 hours  cefTRIAXone   IVPB    chlorhexidine 2% Cloths 1 <User Schedule>  dextrose 40% Gel 15 once PRN  dextrose 5%. 1000 <Continuous>  dextrose 50% Injectable 12.5 once  dextrose 50% Injectable 25 once  dextrose 50% Injectable 25 once  docusate sodium 100 daily  escitalopram 5 daily  glucagon  Injectable 1 once PRN  insulin lispro (HumaLOG) corrective regimen sliding scale  three times a day before meals  insulin lispro (HumaLOG) corrective regimen sliding scale  at bedtime  losartan 25 daily  metoprolol tartrate 25 every 12 hours  pantoprazole   Suspension 40 two times a day before meals  polyethylene glycol 3350 17 daily  senna 1 daily  sodium chloride 0.9% lock flush 10 every 1 hour PRN  sodium chloride 0.9% lock flush 10 every 12 hours PRN  sodium chloride 0.9% lock flush 20 once  sucralfate 1 two times a day      Allergies    No Known Allergies    Intolerances        T(C): , Max: 37.1 (08-08-18 @ 14:09)  T(F): , Max: 98.7 (08-08-18 @ 14:09)  HR: 108 (08-09-18 @ 08:18)  BP: 88/63 (08-09-18 @ 08:18)  BP(mean): 69 (08-09-18 @ 08:18)  RR: 16 (08-09-18 @ 08:18)  SpO2: 99% (08-09-18 @ 08:18)  Wt(kg): --    08-08 @ 07:01  -  08-09 @ 07:00  --------------------------------------------------------  IN: 1245 mL / OUT: 730 mL / NET: 515 mL    08-09 @ 07:01  -  08-09 @ 10:27  --------------------------------------------------------  IN: 160 mL / OUT: 0 mL / NET: 160 mL          Review of Systems:  CONSTITUTIONAL: No fever or chills, No fatigue or tiredness.  EYES: No blurred or double vision.  RESPIRATORY: No shortness of breath, cough, hemoptysis  CARDIOVASCULAR: No Chest pain or shortness of breath  GASTROINTESTINAL: NO abdominal or flank pain, No nausea or vomiting, No diarrhea  GENITOURINARY: No dysuria or urinary burning, No difficulty passing urine, No hematuria  NEUROLOGICAL: No headaches or blurred vision  SKIN: No skin rashes   MUSCULOSKELETAL: No arthralgia, Joint pain, leg edema, No muscle pains      PHYSICAL EXAM:  GENERAL: NAD, well-developed, well nourished, alert, awake, no acute distress at present  HEAD:  Atraumatic, Normocephalic,   EYES: Bilateral conjuctiva and sclera normal   Oral cavity: Oral mucosa dry and pink  NECK: Neck supple, No JVD  CHEST/LUNG: Clear to auscultation bilaterally; No wheeze, no rales, no crepitations  HEART: Regular rate and rhythm. WANDER II/VI at LPSB, No gallop, no rub   ABDOMEN: Soft, Nontender, BS+nt, No flank tenderness.   EXTREMITIES: No clubbing, cyanosis, or edema  Neurology: AAOx3, no focal neurological deficit  SKIN: No rashes or lesions          ACCESS:     LABS:                        9.7    4.4   )-----------( 143      ( 09 Aug 2018 07:09 )             31.1     08-09    141  |  103  |  17  ----------------------------<  113<H>  4.1   |  27  |  1.64<H>    Ca    8.7      09 Aug 2018 07:09  Mg     2.0     08-09        PT/INR - ( 08 Aug 2018 07:32 )   PT: 14.3 sec;   INR: 1.28          PTT - ( 08 Aug 2018 07:32 )  PTT:29.2 sec          RADIOLOGY & ADDITIONAL STUDIES:

## 2018-08-09 NOTE — PROGRESS NOTE ADULT - PROBLEM SELECTOR PLAN 7
Patient has been weak, unsteady, and confused. Improved significantly with decrease in BUN, now resolved. Wife reports pt back at baseline mental status. Likely toxic-metabolic 2/2 uremia.  - c/w lexapro Patient was weak, unsteady, and confused on admission. Improved significantly with decrease in BUN, now resolved. Wife reports pt back at baseline mental status. Likely toxic-metabolic 2/2 uremia.  - c/w Lexapro

## 2018-08-09 NOTE — PROGRESS NOTE ADULT - PROBLEM SELECTOR PLAN 1
HX PCI 7 yrs ago. Reportedly STEMI in [place] (ST elevations in lateral leads and trop 2000). On admission, EKG with Q waves in leads I and aVL w/poor R wave progression.   - Resume ASA 81mg qd, as discussed w/ GI for clearance. Pt w/o further GIB or dark colored stools, Hgb stable 9-10  - Start Losartan 25mg qd as BP tolerates, as discussed w/ Renal, pt's Crea continues to improve since admission  - Uptitrate to Metoprolol tartrate 25mg BID, plan to transition to Toprol XL  - c/w Lipitor 80mg PO qd    #Asymptomatic paroxysmal Afib/Aflutter  HX paroxysmal afib with paroxysmal aflutter noted in beginning of hospital course   - HOLD anti-coagulation 2/2 GIB   - Uptitrate Metoprolol tartrate 25mg BID, plan to transition to Toprol XL HX PCI 7 yrs ago. Reportedly STEMI in [place] (ST elevations in lateral leads and trop 2000). On admission, EKG with Q waves in leads I and aVL w/poor R wave progression.   - Resume ASA 81mg qd, as discussed w/ GI for clearance. Pt w/o further GIB or dark colored stools, Hgb stable 9-10  - Start Losartan 25mg qd today, but had asymptomatic hypotension 85/57. D/c Losartan for now  - Uptitrated to Metoprolol tartrate 25mg BID, plan to transition to Toprol XL  - c/w Lipitor 80mg PO qd    #Asymptomatic paroxysmal Afib/Aflutter  HX paroxysmal afib with paroxysmal aflutter noted in beginning of hospital course   - HOLD anti-coagulation 2/2 GIB   - Uptitrated Metoprolol tartrate 25mg BID, plan to transition to Toprol XL

## 2018-08-09 NOTE — PROGRESS NOTE ADULT - PROBLEM SELECTOR PLAN 9
DVT: SCD's  GI: PPI x 8 weeks total, completes 9/21  Activity: PT eval rec [facility] vs acute rehab DVT: SCD's  GI: PPI x 8 weeks total, completes 9/21  Activity: PT eval rec Acute Rehab. Pt seen by PT various times inhospital, in setting of CVA would be beneficial for acute rehab, pt able to tolerate 3hrs/day of PT if splits sessions.

## 2018-08-09 NOTE — PROGRESS NOTE ADULT - PROBLEM SELECTOR PLAN 3
Acute Upper GI bleed 2/2 two non-bleed ulcers s/p clipping of one (7/27). Total 3U PRBC's total this admission with good Hgb response. Now Hgb 9-10.   - s/p EGD 8/6 EGD- no active bleed; clips in situ on previous ulcer. GI to f/u outpt for colonoscopy  - CT ab 8/6: Negative for retroperitoneal or intraperitoneal hematoma. No evidence of esophageal or bowel perforation.  - continue to HOLD AC 2/2 GIB   - Okay to resume ASA 81mg qd per GI as pt w/o further GIB, Hgb remains stable  - c/w Carafate and Protonix BID x 8 weeks (stop 9/21)  - c/w Mechanical soft/thin diet  - monitor CBCs, transfuse goal hgb < 8 as has significant CV disease  - GI following, appreciate recs.

## 2018-08-09 NOTE — PROGRESS NOTE ADULT - SUBJECTIVE AND OBJECTIVE BOX
CARDIOLOGY NP PROGRESS NOTE    Subjective: Pt seen and examined at bedside. Reports feeling well. Denies chest pain, sob, lightheadedness, dizziness, palpitations. Remainder ROS otherwise negative.    Overnight Events: None    TELEMETRY: -110s        VITAL SIGNS:  T(C): 36.6 (08-09-18 @ 14:00), Max: 37 (08-08-18 @ 19:30)  HR: 106 (08-09-18 @ 14:29) (106 - 121)  BP: 86/62 (08-09-18 @ 14:29) (85/57 - 115/80)  RR: 16 (08-09-18 @ 14:29) (16 - 18)  SpO2: 96% (08-09-18 @ 14:29) (96% - 100%)  Wt(kg): --    I&O's Summary    08 Aug 2018 07:01  -  09 Aug 2018 07:00  --------------------------------------------------------  IN: 1245 mL / OUT: 730 mL / NET: 515 mL    09 Aug 2018 07:01  -  09 Aug 2018 15:21  --------------------------------------------------------  IN: 260 mL / OUT: 0 mL / NET: 260 mL          PHYSICAL EXAM:    General: A/ox 3, No acute Distress, back to baseline per wife  Neck: Supple, NO JVD  Cardiac: S1 S2, grade II/VI systolic mumur  Pulmonary: CTA skye, Breathing unlabored on RA, No Rhonchi/Rales/Wheezing  Abdomen: Soft, Non -tender, +BS x 4 quads  Extremities: No Rashes, No edema. 4/5 skye UE/LE motor strength  Neuro: A/o x 3, No focal deficits            LABS:                          9.7    4.4   )-----------( 143      ( 09 Aug 2018 07:09 )             31.1                              08-09    141  |  103  |  17  ----------------------------<  113<H>  4.1   |  27  |  1.64<H>    Ca    8.7      09 Aug 2018 07:09  Mg     2.0     08-09                              PT/INR - ( 08 Aug 2018 07:32 )   PT: 14.3 sec;   INR: 1.28          PTT - ( 08 Aug 2018 07:32 )  PTT:29.2 sec  CAPILLARY BLOOD GLUCOSE      POCT Blood Glucose.: 212 mg/dL (09 Aug 2018 11:27)  POCT Blood Glucose.: 103 mg/dL (09 Aug 2018 06:41)  POCT Blood Glucose.: 147 mg/dL (08 Aug 2018 21:40)  POCT Blood Glucose.: 129 mg/dL (08 Aug 2018 16:10)            Allergies:  No Known Allergies    MEDICATIONS  (STANDING):  aspirin enteric coated 81 milliGRAM(s) Oral daily  atorvastatin 80 milliGRAM(s) Oral at bedtime  cefTRIAXone   IVPB 1 Gram(s) IV Intermittent every 24 hours  cefTRIAXone   IVPB      chlorhexidine 2% Cloths 1 Application(s) Topical <User Schedule>  dextrose 5%. 1000 milliLiter(s) (50 mL/Hr) IV Continuous <Continuous>  dextrose 50% Injectable 12.5 Gram(s) IV Push once  dextrose 50% Injectable 25 Gram(s) IV Push once  dextrose 50% Injectable 25 Gram(s) IV Push once  docusate sodium 100 milliGRAM(s) Oral daily  escitalopram 5 milliGRAM(s) Oral daily  furosemide    Tablet 20 milliGRAM(s) Oral daily  insulin lispro (HumaLOG) corrective regimen sliding scale   SubCutaneous three times a day before meals  insulin lispro (HumaLOG) corrective regimen sliding scale   SubCutaneous at bedtime  losartan 25 milliGRAM(s) Oral daily  metoprolol tartrate 25 milliGRAM(s) Oral every 12 hours  pantoprazole   Suspension 40 milliGRAM(s) Oral two times a day before meals  polyethylene glycol 3350 17 Gram(s) Oral daily  senna 1 Tablet(s) Oral daily  sodium chloride 0.9% lock flush 20 milliLiter(s) IV Push once  sucralfate 1 Gram(s) Oral two times a day    MEDICATIONS  (PRN):  dextrose 40% Gel 15 Gram(s) Oral once PRN Blood Glucose LESS THAN 70 milliGRAM(s)/deciliter  glucagon  Injectable 1 milliGRAM(s) IntraMuscular once PRN Glucose LESS THAN 70 milligrams/deciliter  sodium chloride 0.9% lock flush 10 milliLiter(s) IV Push every 1 hour PRN After each medication administration  sodium chloride 0.9% lock flush 10 milliLiter(s) IV Push every 12 hours PRN Lumen of catheter NOT used        DIAGNOSTIC TESTS:

## 2018-08-09 NOTE — PROGRESS NOTE ADULT - PROBLEM SELECTOR PLAN 6
MRI significant for subacute ischemia involving right parietal area with numerous scattered areas of acute ischemia within the basal ganglia and frontal and parietal lobes bilaterally some of which are in a watershed distribution. Improved mentation in setting of decreased uremia. Wife reports pt back at baseline mental status.  - F/U neuro recs  - HOLD MRA study for full neuro workup  - Carotid US negative   - PT/OT/ST as tolerated  - PT rec [facility] vs Acute rehab MRI significant for subacute ischemia involving right parietal area with numerous scattered areas of acute ischemia within the basal ganglia and frontal and parietal lobes bilaterally some of which are in a watershed distribution. Improved mentation in setting of decreased uremia. Wife reports pt back at baseline mental status.  - F/U neuro recs  - HOLD MRA study for full neuro workup  - Carotid US negative   - PT/OT/ST as tolerated  - PT rec Acute rehab

## 2018-08-09 NOTE — PROGRESS NOTE ADULT - ASSESSMENT
67M PMHx HTN, DM, HLD, HFrEF (15-25% on echo), CAD s/p PCI 7 years ago presented 7/20 with altered mental status in the setting of one week of dyspnea and weakness admitted to the CCU for management of ACS, acute on chronic CHF, RONNIE. Hospital course c/b acute upper GI bleed 2/2 two gastric ulcers; s/p clipping of one ulcer (7/27) s/p 3U PRBCs, found to have urosepsis curretnly on Ceftriaxone, remains hemodynamically stable and [facility].

## 2018-08-09 NOTE — PROGRESS NOTE ADULT - PROBLEM SELECTOR PLAN 1
kidney function stable  - volume status acceptable   - electrolytes noted   avoid nephrotoxic agents  - renal diet  renally dose ABX  - with bacteremia - gram negative Kl. pneumonia on ceftriaxone; the repeated blood cultures negative  - hypophosphatemia - resolved, please follow up

## 2018-08-09 NOTE — PROGRESS NOTE ADULT - PROBLEM SELECTOR PLAN 10
F: no IVF  E: Replete K>4, Mg>2.   N: Mechnical soft/thin DASH/TLC/consistent carbs    Dispo: [facility], pending DC to [facility] F: no IVF  E: Replete K>4, Mg>2.   N: Mechanical soft/thin DASH/TLC/consistent carbs    Dispo: [facility], pending DC to acute rehab w/ [facility]

## 2018-08-10 LAB
ANION GAP SERPL CALC-SCNC: 11 MMOL/L — SIGNIFICANT CHANGE UP (ref 5–17)
BLD GP AB SCN SERPL QL: NEGATIVE — SIGNIFICANT CHANGE UP
BUN SERPL-MCNC: 21 MG/DL — SIGNIFICANT CHANGE UP (ref 7–23)
CALCIUM SERPL-MCNC: 9.2 MG/DL — SIGNIFICANT CHANGE UP (ref 8.4–10.5)
CHLORIDE SERPL-SCNC: 103 MMOL/L — SIGNIFICANT CHANGE UP (ref 96–108)
CO2 SERPL-SCNC: 27 MMOL/L — SIGNIFICANT CHANGE UP (ref 22–31)
CREAT SERPL-MCNC: 1.72 MG/DL — HIGH (ref 0.5–1.3)
GLUCOSE BLDC GLUCOMTR-MCNC: 110 MG/DL — HIGH (ref 70–99)
GLUCOSE BLDC GLUCOMTR-MCNC: 143 MG/DL — HIGH (ref 70–99)
GLUCOSE BLDC GLUCOMTR-MCNC: 155 MG/DL — HIGH (ref 70–99)
GLUCOSE BLDC GLUCOMTR-MCNC: 191 MG/DL — HIGH (ref 70–99)
GLUCOSE SERPL-MCNC: 135 MG/DL — HIGH (ref 70–99)
HCT VFR BLD CALC: 28.8 % — LOW (ref 39–50)
HGB BLD-MCNC: 9 G/DL — LOW (ref 13–17)
MAGNESIUM SERPL-MCNC: 2.1 MG/DL — SIGNIFICANT CHANGE UP (ref 1.6–2.6)
MCHC RBC-ENTMCNC: 29.6 PG — SIGNIFICANT CHANGE UP (ref 27–34)
MCHC RBC-ENTMCNC: 31.3 G/DL — LOW (ref 32–36)
MCV RBC AUTO: 94.7 FL — SIGNIFICANT CHANGE UP (ref 80–100)
PLATELET # BLD AUTO: 136 K/UL — LOW (ref 150–400)
POTASSIUM SERPL-MCNC: 4.4 MMOL/L — SIGNIFICANT CHANGE UP (ref 3.5–5.3)
POTASSIUM SERPL-SCNC: 4.4 MMOL/L — SIGNIFICANT CHANGE UP (ref 3.5–5.3)
RBC # BLD: 3.04 M/UL — LOW (ref 4.2–5.8)
RBC # FLD: 14.5 % — SIGNIFICANT CHANGE UP (ref 10.3–16.9)
RH IG SCN BLD-IMP: POSITIVE — SIGNIFICANT CHANGE UP
SODIUM SERPL-SCNC: 141 MMOL/L — SIGNIFICANT CHANGE UP (ref 135–145)
WBC # BLD: 4.9 K/UL — SIGNIFICANT CHANGE UP (ref 3.8–10.5)
WBC # FLD AUTO: 4.9 K/UL — SIGNIFICANT CHANGE UP (ref 3.8–10.5)

## 2018-08-10 PROCEDURE — 99233 SBSQ HOSP IP/OBS HIGH 50: CPT

## 2018-08-10 PROCEDURE — 99232 SBSQ HOSP IP/OBS MODERATE 35: CPT

## 2018-08-10 PROCEDURE — 93010 ELECTROCARDIOGRAM REPORT: CPT

## 2018-08-10 RX ADMIN — Medication 81 MILLIGRAM(S): at 11:38

## 2018-08-10 RX ADMIN — Medication 2: at 11:41

## 2018-08-10 RX ADMIN — Medication 25 MILLIGRAM(S): at 17:30

## 2018-08-10 RX ADMIN — ESCITALOPRAM OXALATE 5 MILLIGRAM(S): 10 TABLET, FILM COATED ORAL at 11:38

## 2018-08-10 RX ADMIN — ATORVASTATIN CALCIUM 80 MILLIGRAM(S): 80 TABLET, FILM COATED ORAL at 21:00

## 2018-08-10 RX ADMIN — CEFTRIAXONE 100 GRAM(S): 500 INJECTION, POWDER, FOR SOLUTION INTRAMUSCULAR; INTRAVENOUS at 09:20

## 2018-08-10 RX ADMIN — CHLORHEXIDINE GLUCONATE 1 APPLICATION(S): 213 SOLUTION TOPICAL at 06:22

## 2018-08-10 RX ADMIN — Medication 1 GRAM(S): at 17:30

## 2018-08-10 RX ADMIN — Medication 25 MILLIGRAM(S): at 06:22

## 2018-08-10 RX ADMIN — PANTOPRAZOLE SODIUM 40 MILLIGRAM(S): 20 TABLET, DELAYED RELEASE ORAL at 06:22

## 2018-08-10 RX ADMIN — Medication 1 GRAM(S): at 06:22

## 2018-08-10 RX ADMIN — Medication 20 MILLIGRAM(S): at 06:22

## 2018-08-10 RX ADMIN — PANTOPRAZOLE SODIUM 40 MILLIGRAM(S): 20 TABLET, DELAYED RELEASE ORAL at 16:35

## 2018-08-10 NOTE — PROGRESS NOTE ADULT - PROBLEM SELECTOR PLAN 1
kidney function stable  cr @ 1.72 <--1.64  - volume status acceptable   lasix resumed yesterday   - electrolytes noted   avoid nephrotoxic agents  - renal diet  renally dose ABX  - with bacteremia - gram negative Kl. pneumonia on ceftriaxone; the repeated blood cultures negative  - hypophosphatemia - resolved, please follow up

## 2018-08-10 NOTE — PROGRESS NOTE ADULT - PROBLEM SELECTOR PLAN 4
Likely urosepsis as positive blood and urine cultures klebsiella; pan sensitive except Ampillcilin and Bactrim. Will adjust abx to Ceftriaxone, as less toxic to kidneys  - CXR negative for PNA  - Ceftriaxone 1g qd (day 6/14, last day 8/17/18) s/p midline placement 8/7   - Midline CXR read noting coiling at axilla, catheter able to flushed and draws back blood. Okay to use per IR.

## 2018-08-10 NOTE — PROGRESS NOTE ADULT - ASSESSMENT
67M PMHx HTN, DM, HLD, HFrEF (15-25% on echo), CAD s/p PCI 7 years ago presented 7/20 with altered mental status in the setting of one week of dyspnea and weakness admitted to the CCU for management of ACS, acute on chronic CHF, RONNIE. Hospital course c/b acute upper GI bleed 2/2 two gastric ulcers; s/p clipping of one ulcer (7/27) s/p 3U PRBCs, found to have urosepsis currently on Ceftriaxone, stepped down from CCU to [facility] for further management, now pending acute rehab placement.

## 2018-08-10 NOTE — PROGRESS NOTE ADULT - PROBLEM SELECTOR PLAN 5
BUN/Crea uptrended in setting of anemia/low perfusion. Crea 9.4 on admission (unclear baseline). Likely 2/2 to cardiorenal syndrome as cardiac output is poor. Uremia resolved significantly, with subsequent improved mental status. Creatinine 1.72 today  - Start Lasix 20mg qd   - Renal following, appreciate recs.   - Per renal, no urgent need for dialysis at this time  - renally dose medications  - avoid nephrotoxic agents     #elevated kappa protein  -elevated kappa and kappa/lambda ratio noted. Not likely multiple myeloma as pt not anemic prior to acute GI bleed and without hypercalcemia. Can f/u with PCP as an outpt

## 2018-08-10 NOTE — PROGRESS NOTE ADULT - PROBLEM SELECTOR PLAN 9
DVT: SCD's  GI: PPI x 8 weeks total, completes 9/21  Activity: PT eval rec Acute Rehab. Pt seen by PT various times inhospital, in setting of CVA would be beneficial for acute rehab, pt able to tolerate 3hrs/day of PT if splits sessions.

## 2018-08-10 NOTE — PROGRESS NOTE ADULT - ATTENDING COMMENTS
Assessment: Patient personally seen and examined myself during rounds with the Physician Assistant/House Staff/Nurse Practitioner  ON DATE 8/10/18  Physician Assistant/House Staff/Nurse Practitioner note read, including vitals, physical findings, laboratory data, and radiological reports.   Revisions included below.   Direct personal management at bed side and extensive interpretation of the data.    Plan was outlined and discussed in details with the Physician Assistant/House Staff/Nurse Practitioner.    Decision making of high complexity   Risk high of complications, morbidity, and/or mortality  Assessment and Action taken for acute disease activity to reflect the level of care provided:  -Hemodynamic evaluation and support  -Medication reconciliation  -Review laboratory data  -EKG reviewed   -    TIME SPENT in evaluation and management, reassessments, review and interpretation of labs and x-rays, and hemodynamic management, formulating a plan and coordinating care: ___25____ MIN.  Time does not include procedural time.    Isaias Fountain MD  Cardiology    Mobile: [phone]  Office: [phone]  [address] [place] [address], [id number]

## 2018-08-10 NOTE — PROGRESS NOTE ADULT - ATTENDING COMMENTS
have reviewed above and discussed with pt and family.  pt has contd to improve, and now has markedly improved renal fct when compared to adm, and looks markedly more comfortable.   vol status appears satisfactory.  agree with dr Santana findings and plans.

## 2018-08-10 NOTE — PROGRESS NOTE ADULT - PROBLEM SELECTOR PLAN 10
F: no IVF  E: Replete K>4, Mg>2.   N: Mechanical soft/thin DASH/TLC/consistent carbs    Dispo: [facility], pending DC to acute rehab w/ [facility]

## 2018-08-10 NOTE — PROGRESS NOTE ADULT - PROBLEM SELECTOR PLAN 7
Patient was weak, unsteady, and confused on admission. Improved significantly with decrease in BUN, now resolved. Wife reports pt back at baseline mental status. Likely toxic-metabolic 2/2 uremia.  - c/w Lexapro

## 2018-08-10 NOTE — PROGRESS NOTE ADULT - SUBJECTIVE AND OBJECTIVE BOX
CARDIOLOGY NP PROGRESS NOTE    Subjective: Pt seen and examined at bedside. Reports feeling well. Denies chest pain, sob, lightheadedness, dizziness, palpitations.  Remainder ROS otherwise negative.    Overnight Events: None    TELEMETRY: -110s        VITAL SIGNS:  T(C): 37.3 (08-10-18 @ 09:32), Max: 37.3 (08-10-18 @ 09:32)  HR: 108 (08-10-18 @ 12:16) (102 - 116)  BP: 96/625 (08-10-18 @ 12:16) (85/57 - 123/82)  RR: 18 (08-10-18 @ 12:16) (16 - 18)  SpO2: 96% (08-10-18 @ 12:16) (96% - 100%)  Wt(kg): --    I&O's Summary    09 Aug 2018 07:01  -  10 Aug 2018 07:00  --------------------------------------------------------  IN: 560 mL / OUT: 500 mL / NET: 60 mL    10 Aug 2018 07:01  -  10 Aug 2018 12:30  --------------------------------------------------------  IN: 360 mL / OUT: 0 mL / NET: 360 mL          PHYSICAL EXAM:    General: A/ox 3, No acute Distress, back to baseline mental status  Neck: Supple, Mild JVD  Cardiac: S1 S2, grade II/VI systolic mumur  Pulmonary: CTA skye, Breathing unlabored on RA, No Rhonchi/Rales/Wheezing  Abdomen: Soft, Non -tender, +BS x 4 quads  Extremities: No Rashes, No edema. 4/5 skye UE/LE motor strength  Neuro: A/o x 3, No focal deficits          LABS:                          9.0    4.9   )-----------( 136      ( 10 Aug 2018 07:38 )             28.8                              08-10    141  |  103  |  21  ----------------------------<  135<H>  4.4   |  27  |  1.72<H>    Ca    9.2      10 Aug 2018 07:37  Mg     2.1     08-10                                CAPILLARY BLOOD GLUCOSE      POCT Blood Glucose.: 191 mg/dL (10 Aug 2018 11:30)  POCT Blood Glucose.: 143 mg/dL (10 Aug 2018 07:06)  POCT Blood Glucose.: 240 mg/dL (09 Aug 2018 21:13)  POCT Blood Glucose.: 111 mg/dL (09 Aug 2018 17:47)            Allergies:  No Known Allergies    MEDICATIONS  (STANDING):  aspirin enteric coated 81 milliGRAM(s) Oral daily  atorvastatin 80 milliGRAM(s) Oral at bedtime  cefTRIAXone   IVPB 1 Gram(s) IV Intermittent every 24 hours  cefTRIAXone   IVPB      chlorhexidine 2% Cloths 1 Application(s) Topical <User Schedule>  dextrose 5%. 1000 milliLiter(s) (50 mL/Hr) IV Continuous <Continuous>  dextrose 50% Injectable 12.5 Gram(s) IV Push once  dextrose 50% Injectable 25 Gram(s) IV Push once  dextrose 50% Injectable 25 Gram(s) IV Push once  docusate sodium 100 milliGRAM(s) Oral daily  escitalopram 5 milliGRAM(s) Oral daily  furosemide    Tablet 20 milliGRAM(s) Oral daily  insulin lispro (HumaLOG) corrective regimen sliding scale   SubCutaneous three times a day before meals  insulin lispro (HumaLOG) corrective regimen sliding scale   SubCutaneous at bedtime  metoprolol tartrate 25 milliGRAM(s) Oral every 12 hours  pantoprazole   Suspension 40 milliGRAM(s) Oral two times a day before meals  polyethylene glycol 3350 17 Gram(s) Oral daily  senna 1 Tablet(s) Oral daily  sodium chloride 0.9% lock flush 20 milliLiter(s) IV Push once  sucralfate 1 Gram(s) Oral two times a day    MEDICATIONS  (PRN):  dextrose 40% Gel 15 Gram(s) Oral once PRN Blood Glucose LESS THAN 70 milliGRAM(s)/deciliter  glucagon  Injectable 1 milliGRAM(s) IntraMuscular once PRN Glucose LESS THAN 70 milligrams/deciliter  sodium chloride 0.9% lock flush 10 milliLiter(s) IV Push every 1 hour PRN After each medication administration  sodium chloride 0.9% lock flush 10 milliLiter(s) IV Push every 12 hours PRN Lumen of catheter NOT used        DIAGNOSTIC TESTS:

## 2018-08-10 NOTE — PROGRESS NOTE ADULT - SUBJECTIVE AND OBJECTIVE BOX
Patient is a 67y Male seen and evaluated at bedside.   no complaints  labs noted: Cr @ 1.72    aspirin enteric coated 81 daily  atorvastatin 80 at bedtime  cefTRIAXone   IVPB 1 every 24 hours  cefTRIAXone   IVPB    chlorhexidine 2% Cloths 1 <User Schedule>  dextrose 40% Gel 15 once PRN  dextrose 5%. 1000 <Continuous>  dextrose 50% Injectable 12.5 once  dextrose 50% Injectable 25 once  dextrose 50% Injectable 25 once  docusate sodium 100 daily  escitalopram 5 daily  furosemide    Tablet 20 daily  glucagon  Injectable 1 once PRN  insulin lispro (HumaLOG) corrective regimen sliding scale  three times a day before meals  insulin lispro (HumaLOG) corrective regimen sliding scale  at bedtime  metoprolol tartrate 25 every 12 hours  pantoprazole   Suspension 40 two times a day before meals  polyethylene glycol 3350 17 daily  senna 1 daily  sodium chloride 0.9% lock flush 10 every 1 hour PRN  sodium chloride 0.9% lock flush 10 every 12 hours PRN  sodium chloride 0.9% lock flush 20 once  sucralfate 1 two times a day      Allergies    No Known Allergies    Intolerances        T(C): , Max: 36.9 (08-09-18 @ 19:16)  T(F): , Max: 98.5 (08-09-18 @ 19:16)  HR: 110 (08-10-18 @ 08:14)  BP: 102/77 (08-10-18 @ 08:14)  BP(mean): 85 (08-10-18 @ 08:14)  RR: 18 (08-10-18 @ 08:14)  SpO2: 97% (08-10-18 @ 08:14)  Wt(kg): --    08-09 @ 07:01  -  08-10 @ 07:00  --------------------------------------------------------  IN: 560 mL / OUT: 500 mL / NET: 60 mL    08-10 @ 07:01  -  08-10 @ 09:28  --------------------------------------------------------  IN: 180 mL / OUT: 0 mL / NET: 180 mL        PHYSICAL EXAM:  GENERAL: NAD, well-developed, well nourished, alert, awake, no acute distress at present  HEAD:  Atraumatic, Normocephalic,   EYES: Bilateral conjuctiva and sclera normal   Oral cavity: Oral mucosa dry and pink  NECK: Neck supple, No JVD  CHEST/LUNG: Clear to auscultation bilaterally; No wheeze, no rales, no crepitations  HEART: Regular rate and rhythm. WANDER II/VI at LPSB, No gallop, no rub   ABDOMEN: Soft, Nontender, BS+nt, No flank tenderness.   EXTREMITIES: No clubbing, cyanosis, or edema  Neurology: AAOx3, no focal neurological deficit  SKIN: No rashes or lesions          ACCESS:     LABS:                        9.0    4.9   )-----------( 136      ( 10 Aug 2018 07:38 )             28.8     08-10    141  |  103  |  21  ----------------------------<  135<H>  4.4   |  27  |  1.72<H>    Ca    9.2      10 Aug 2018 07:37  Mg     2.1     08-10                  RADIOLOGY & ADDITIONAL STUDIES:

## 2018-08-10 NOTE — PROGRESS NOTE ADULT - PROBLEM SELECTOR PLAN 2
Hx sCHF x 7years per wife. EF reportedly 25% in the past.  Echo performed 7/20/18 w/ EF 15-20%. Was volume overloaded on initial exam, now improved. On CXR, evidence of cardiomegaly but significant decrease in pulmonary vascular congestion.   - S/p Bumex gtt  - Started Lasix 20mg qd   - Uptitrated Metoprolol tartrate 25mg BID and uptitrate as tolerated with plan to transition to Toprol XL  - Unable to tolerate Losartan 2/2 hypotension 85/57. D/c'ed Losartan for now  - Fitted for Lifevest on 8/8. As pt is awaiting outpt ICD consideration, as EF <30% and not revascularized s/p STEMI, unable optimal medical therapy x 3mos 2/2 HoTN  - strict I/Os, daily weights

## 2018-08-10 NOTE — PROGRESS NOTE ADULT - PROBLEM SELECTOR PLAN 1
HX PCI 7 yrs ago. Reportedly STEMI in [place] (ST elevations in lateral leads and trop 2000). On admission, EKG with Q waves in leads I and aVL w/poor R wave progression.   - Resumed ASA 81mg qd, as discussed w/ GI for clearance. Pt w/o further GIB or dark colored stools, Hgb stable 9-10  - Unable to tolerate Losartan 2/2 hypotension 85/57. D/c'ed Losartan for now  - Uptitrated Metoprolol tartrate 25mg BID, plan to transition to Toprol XL  - c/w Lipitor 80mg PO qd  - No plan for cardiac cath this admission as pt unable to tolerate AC 2/2 acute GIB     #Asymptomatic paroxysmal Afib/Aflutter  HX paroxysmal afib with paroxysmal aflutter noted in beginning of hospital course   - HOLD anti-coagulation 2/2 GIB   - Uptitrated Metoprolol tartrate 25mg BID, plan to transition to Toprol XL

## 2018-08-10 NOTE — PROGRESS NOTE ADULT - PROBLEM SELECTOR PLAN 6
MRI significant for subacute ischemia involving right parietal area with numerous scattered areas of acute ischemia within the basal ganglia and frontal and parietal lobes bilaterally some of which are in a watershed distribution. Improved mentation in setting of decreased uremia. Wife reports pt back at baseline mental status.  - Neuro signed off  - Carotid US negative   - PT/OT/ST as tolerated  - PT rec Acute rehab

## 2018-08-11 DIAGNOSIS — D55.1 ANEMIA DUE TO OTHER DISORDERS OF GLUTATHIONE METABOLISM: ICD-10-CM

## 2018-08-11 LAB
ALBUMIN SERPL ELPH-MCNC: 2.9 G/DL — LOW (ref 3.3–5)
ALP SERPL-CCNC: 94 U/L — SIGNIFICANT CHANGE UP (ref 40–120)
ALT FLD-CCNC: 45 U/L — SIGNIFICANT CHANGE UP (ref 10–45)
ANION GAP SERPL CALC-SCNC: 11 MMOL/L — SIGNIFICANT CHANGE UP (ref 5–17)
AST SERPL-CCNC: 29 U/L — SIGNIFICANT CHANGE UP (ref 10–40)
BILIRUB SERPL-MCNC: 0.4 MG/DL — SIGNIFICANT CHANGE UP (ref 0.2–1.2)
BUN SERPL-MCNC: 22 MG/DL — SIGNIFICANT CHANGE UP (ref 7–23)
CALCIUM SERPL-MCNC: 8.9 MG/DL — SIGNIFICANT CHANGE UP (ref 8.4–10.5)
CHLORIDE SERPL-SCNC: 104 MMOL/L — SIGNIFICANT CHANGE UP (ref 96–108)
CO2 SERPL-SCNC: 26 MMOL/L — SIGNIFICANT CHANGE UP (ref 22–31)
CREAT SERPL-MCNC: 1.7 MG/DL — HIGH (ref 0.5–1.3)
CULTURE RESULTS: SIGNIFICANT CHANGE UP
GLUCOSE BLDC GLUCOMTR-MCNC: 116 MG/DL — HIGH (ref 70–99)
GLUCOSE BLDC GLUCOMTR-MCNC: 123 MG/DL — HIGH (ref 70–99)
GLUCOSE BLDC GLUCOMTR-MCNC: 143 MG/DL — HIGH (ref 70–99)
GLUCOSE BLDC GLUCOMTR-MCNC: 163 MG/DL — HIGH (ref 70–99)
GLUCOSE SERPL-MCNC: 114 MG/DL — HIGH (ref 70–99)
HCT VFR BLD CALC: 25.9 % — LOW (ref 39–50)
HCT VFR BLD CALC: 26.1 % — LOW (ref 39–50)
HCT VFR BLD CALC: 26.7 % — LOW (ref 39–50)
HGB BLD-MCNC: 8.3 G/DL — LOW (ref 13–17)
HGB BLD-MCNC: 8.3 G/DL — LOW (ref 13–17)
HGB BLD-MCNC: 8.4 G/DL — LOW (ref 13–17)
MAGNESIUM SERPL-MCNC: 1.9 MG/DL — SIGNIFICANT CHANGE UP (ref 1.6–2.6)
MCHC RBC-ENTMCNC: 29.3 PG — SIGNIFICANT CHANGE UP (ref 27–34)
MCHC RBC-ENTMCNC: 29.9 PG — SIGNIFICANT CHANGE UP (ref 27–34)
MCHC RBC-ENTMCNC: 30.1 PG — SIGNIFICANT CHANGE UP (ref 27–34)
MCHC RBC-ENTMCNC: 31.1 G/DL — LOW (ref 32–36)
MCHC RBC-ENTMCNC: 32 G/DL — SIGNIFICANT CHANGE UP (ref 32–36)
MCHC RBC-ENTMCNC: 32.2 G/DL — SIGNIFICANT CHANGE UP (ref 32–36)
MCV RBC AUTO: 93.2 FL — SIGNIFICANT CHANGE UP (ref 80–100)
MCV RBC AUTO: 93.5 FL — SIGNIFICANT CHANGE UP (ref 80–100)
MCV RBC AUTO: 94.3 FL — SIGNIFICANT CHANGE UP (ref 80–100)
ORGANISM # SPEC MICROSCOPIC CNT: SIGNIFICANT CHANGE UP
PLATELET # BLD AUTO: 115 K/UL — LOW (ref 150–400)
PLATELET # BLD AUTO: 120 K/UL — LOW (ref 150–400)
PLATELET # BLD AUTO: 128 K/UL — LOW (ref 150–400)
POTASSIUM SERPL-MCNC: 4.4 MMOL/L — SIGNIFICANT CHANGE UP (ref 3.5–5.3)
POTASSIUM SERPL-SCNC: 4.4 MMOL/L — SIGNIFICANT CHANGE UP (ref 3.5–5.3)
PROT SERPL-MCNC: 6.1 G/DL — SIGNIFICANT CHANGE UP (ref 6–8.3)
RBC # BLD: 2.78 M/UL — LOW (ref 4.2–5.8)
RBC # BLD: 2.79 M/UL — LOW (ref 4.2–5.8)
RBC # BLD: 2.83 M/UL — LOW (ref 4.2–5.8)
RBC # FLD: 14.3 % — SIGNIFICANT CHANGE UP (ref 10.3–16.9)
RBC # FLD: 14.3 % — SIGNIFICANT CHANGE UP (ref 10.3–16.9)
RBC # FLD: 14.5 % — SIGNIFICANT CHANGE UP (ref 10.3–16.9)
SODIUM SERPL-SCNC: 141 MMOL/L — SIGNIFICANT CHANGE UP (ref 135–145)
SPECIMEN SOURCE: SIGNIFICANT CHANGE UP
TSH SERPL-MCNC: 2.01 UIU/ML — SIGNIFICANT CHANGE UP (ref 0.35–4.94)
WBC # BLD: 3.7 K/UL — LOW (ref 3.8–10.5)
WBC # BLD: 3.7 K/UL — LOW (ref 3.8–10.5)
WBC # BLD: 3.9 K/UL — SIGNIFICANT CHANGE UP (ref 3.8–10.5)
WBC # FLD AUTO: 3.7 K/UL — LOW (ref 3.8–10.5)
WBC # FLD AUTO: 3.7 K/UL — LOW (ref 3.8–10.5)
WBC # FLD AUTO: 3.9 K/UL — SIGNIFICANT CHANGE UP (ref 3.8–10.5)

## 2018-08-11 PROCEDURE — 99233 SBSQ HOSP IP/OBS HIGH 50: CPT

## 2018-08-11 RX ORDER — METOPROLOL TARTRATE 50 MG
25 TABLET ORAL EVERY 8 HOURS
Qty: 0 | Refills: 0 | Status: DISCONTINUED | OUTPATIENT
Start: 2018-08-11 | End: 2018-08-13

## 2018-08-11 RX ORDER — ASPIRIN/CALCIUM CARB/MAGNESIUM 324 MG
81 TABLET ORAL DAILY
Qty: 0 | Refills: 0 | Status: DISCONTINUED | OUTPATIENT
Start: 2018-08-11 | End: 2018-08-15

## 2018-08-11 RX ADMIN — PANTOPRAZOLE SODIUM 40 MILLIGRAM(S): 20 TABLET, DELAYED RELEASE ORAL at 06:15

## 2018-08-11 RX ADMIN — CHLORHEXIDINE GLUCONATE 1 APPLICATION(S): 213 SOLUTION TOPICAL at 06:14

## 2018-08-11 RX ADMIN — PANTOPRAZOLE SODIUM 40 MILLIGRAM(S): 20 TABLET, DELAYED RELEASE ORAL at 17:03

## 2018-08-11 RX ADMIN — Medication 1 GRAM(S): at 06:14

## 2018-08-11 RX ADMIN — Medication 2: at 11:34

## 2018-08-11 RX ADMIN — Medication 81 MILLIGRAM(S): at 14:51

## 2018-08-11 RX ADMIN — Medication 25 MILLIGRAM(S): at 06:14

## 2018-08-11 RX ADMIN — Medication 25 MILLIGRAM(S): at 21:03

## 2018-08-11 RX ADMIN — ATORVASTATIN CALCIUM 80 MILLIGRAM(S): 80 TABLET, FILM COATED ORAL at 21:03

## 2018-08-11 RX ADMIN — CEFTRIAXONE 100 GRAM(S): 500 INJECTION, POWDER, FOR SOLUTION INTRAMUSCULAR; INTRAVENOUS at 10:05

## 2018-08-11 RX ADMIN — ESCITALOPRAM OXALATE 5 MILLIGRAM(S): 10 TABLET, FILM COATED ORAL at 12:43

## 2018-08-11 RX ADMIN — Medication 20 MILLIGRAM(S): at 06:14

## 2018-08-11 RX ADMIN — Medication 100 MILLIGRAM(S): at 12:44

## 2018-08-11 RX ADMIN — SENNA PLUS 1 TABLET(S): 8.6 TABLET ORAL at 12:44

## 2018-08-11 RX ADMIN — Medication 1 GRAM(S): at 17:05

## 2018-08-11 RX ADMIN — Medication 25 MILLIGRAM(S): at 13:19

## 2018-08-11 NOTE — PROGRESS NOTE ADULT - ATTENDING COMMENTS
Covering for Dr. Fountain.  Pt seen and examined this am. Agree with NP assessment and plan above. No o/n events. Pt feeling well this am. Denies brbpr, dark stools. VS notable for ST. Tele c/w ST to the 130-140s at times. Exam unremarkable. Labs notable for drop in Hemoglobin.   - In light of drop in H/H, repeat stat CBC. Ensure has active T&S. If true drop, c/s GI. Okay to hold ASA until repeat cbc; however, if cbc stable would resume.   - Additionally, if CBC stable would increase Lopressor from 50 q12h to 50 q8h for further optimization.  - Pt remains in house awaiting acute rehab placement. Tentatively Monday, 8/13/18. Covering for Dr. Fountain.  Pt seen and examined this am. Agree with NP assessment and plan above. No o/n events. Pt feeling well this am. Denies brbpr, dark stools. VS notable for ST. Tele c/w ST to the 130-140s at times. Exam unremarkable. Labs notable for drop in Hemoglobin.   - In light of drop in H/H, repeat stat CBC. Ensure has active T&S. If true drop, c/s GI. Okay to hold ASA until repeat cbc; however, if cbc stable would resume. Check TSH.   - Additionally, if CBC stable would increase Lopressor from 50 q12h to 50 q8h for further optimization.  - Pt remains in house awaiting acute rehab placement. Tentatively Monday, 8/13/18.

## 2018-08-11 NOTE — PROGRESS NOTE ADULT - PROBLEM SELECTOR PLAN 5
BUN/Crea uptrended in setting of anemia/low perfusion. Crea 9.4 on admission (unclear baseline). Likely 2/2 to cardiorenal syndrome as cardiac output is poor. Uremia resolved significantly, with subsequent improved mental status. Creatinine 1.72 today  - Start Lasix 20mg qd   - Renal following, appreciate recs.   - Per renal, no urgent need for dialysis at this time  - renally dose medications  - avoid nephrotoxic agents     #elevated kappa protein  -elevated kappa and kappa/lambda ratio noted. Not likely multiple myeloma as pt not anemic prior to acute GI bleed and without hypercalcemia. Can f/u with PCP as an outpt BUN/Crea uptrended in setting of anemia/low perfusion. Crea 9.4 on admission (unclear baseline). Likely 2/2 to cardiorenal syndrome as cardiac output is poor. Uremia resolved significantly, with subsequent improved mental status. Creatinine 1.70 today  - c/w Lasix 20mg qd   - Renal following, appreciate recs.   - Per renal, no urgent need for dialysis at this time  - renally dose medications  - avoid nephrotoxic agents     #elevated kappa protein  -elevated kappa and kappa/lambda ratio noted. Not likely multiple myeloma as pt not anemic prior to acute GI bleed and without hypercalcemia. Can f/u with PCP as an outpt

## 2018-08-11 NOTE — PROGRESS NOTE ADULT - SUBJECTIVE AND OBJECTIVE BOX
CARDIOLOGY NP PROGRESS NOTE    Subjective: Patient seen and examined at bedside. Denies CP/SOB, dizziness/diaphoresis, n/v, palpitations. Denies melena or tarry black stool.   Remainder ROS otherwise negative.    Overnight Events:  8 beats NSVT, asx.     TELEMETRY: SR- ST (90-s-120s) with occasional PVCs      EKG:      VITAL SIGNS:  T(C): 36.8 (08-11-18 @ 04:56), Max: 37.3 (08-10-18 @ 09:32)  HR: 110 (08-11-18 @ 05:11) (100 - 110)  BP: 100/65 (08-11-18 @ 05:11) (96/65 - 112/74)  RR: 16 (08-11-18 @ 05:11) (16 - 18)  SpO2: 98% (08-10-18 @ 20:12) (96% - 99%)  Wt(kg): --    I&O's Summary    10 Aug 2018 07:01  -  11 Aug 2018 07:00  --------------------------------------------------------  IN: 720 mL / OUT: 405 mL / NET: 315 mL          PHYSICAL EXAM:    General: A/ox 3, No acute Distress  Neck: Supple, NO JVD  Cardiac: S1 S2, No M/R/G  Pulmonary: CTAB, Breathing unlabored, No Rhonchi/Rales/Wheezing  Abdomen: Soft, Non -tender, +BS x 4 quads  Extremities: No Rashes, No edema  Neuro: A/o x 3, No focal deficits          LABS:                          8.3    3.9   )-----------( 120      ( 11 Aug 2018 06:09 )             25.9                              08-11    141  |  104  |  22  ----------------------------<  114<H>  4.4   |  26  |  1.70<H>    Ca    8.9      11 Aug 2018 06:11  Mg     1.9     08-11    TPro  6.1  /  Alb  2.9<L>  /  TBili  0.4  /  DBili  x   /  AST  29  /  ALT  45  /  AlkPhos  94  08-11    LIVER FUNCTIONS - ( 11 Aug 2018 06:11 )  Alb: 2.9 g/dL / Pro: 6.1 g/dL / ALK PHOS: 94 U/L / ALT: 45 U/L / AST: 29 U/L / GGT: x                                   CAPILLARY BLOOD GLUCOSE      POCT Blood Glucose.: 123 mg/dL (11 Aug 2018 07:11)  POCT Blood Glucose.: 155 mg/dL (10 Aug 2018 21:22)  POCT Blood Glucose.: 110 mg/dL (10 Aug 2018 16:19)  POCT Blood Glucose.: 191 mg/dL (10 Aug 2018 11:30)            Allergies:  No Known Allergies    MEDICATIONS  (STANDING):  atorvastatin 80 milliGRAM(s) Oral at bedtime  cefTRIAXone   IVPB 1 Gram(s) IV Intermittent every 24 hours  cefTRIAXone   IVPB      chlorhexidine 2% Cloths 1 Application(s) Topical <User Schedule>  dextrose 5%. 1000 milliLiter(s) (50 mL/Hr) IV Continuous <Continuous>  dextrose 50% Injectable 12.5 Gram(s) IV Push once  dextrose 50% Injectable 25 Gram(s) IV Push once  dextrose 50% Injectable 25 Gram(s) IV Push once  docusate sodium 100 milliGRAM(s) Oral daily  escitalopram 5 milliGRAM(s) Oral daily  furosemide    Tablet 20 milliGRAM(s) Oral daily  insulin lispro (HumaLOG) corrective regimen sliding scale   SubCutaneous three times a day before meals  insulin lispro (HumaLOG) corrective regimen sliding scale   SubCutaneous at bedtime  metoprolol tartrate 25 milliGRAM(s) Oral every 12 hours  pantoprazole   Suspension 40 milliGRAM(s) Oral two times a day before meals  polyethylene glycol 3350 17 Gram(s) Oral daily  senna 1 Tablet(s) Oral daily  sodium chloride 0.9% lock flush 20 milliLiter(s) IV Push once  sucralfate 1 Gram(s) Oral two times a day    MEDICATIONS  (PRN):  dextrose 40% Gel 15 Gram(s) Oral once PRN Blood Glucose LESS THAN 70 milliGRAM(s)/deciliter  glucagon  Injectable 1 milliGRAM(s) IntraMuscular once PRN Glucose LESS THAN 70 milligrams/deciliter  sodium chloride 0.9% lock flush 10 milliLiter(s) IV Push every 1 hour PRN After each medication administration  sodium chloride 0.9% lock flush 10 milliLiter(s) IV Push every 12 hours PRN Lumen of catheter NOT used        DIAGNOSTIC TESTS: CARDIOLOGY NP PROGRESS NOTE    Subjective: Patient seen and examined at bedside. Denies CP/SOB, dizziness/diaphoresis, n/v, palpitations. Denies melena or tarry black stool.   Remainder ROS otherwise negative.    Overnight Events:  8 beats NSVT, asx.     TELEMETRY: SR- ST (90-s-120s) with occasional PVCs          VITAL SIGNS:  T(C): 36.8 (08-11-18 @ 04:56), Max: 37.3 (08-10-18 @ 09:32)  HR: 110 (08-11-18 @ 05:11) (100 - 110)  BP: 100/65 (08-11-18 @ 05:11) (96/65 - 112/74)  RR: 16 (08-11-18 @ 05:11) (16 - 18)  SpO2: 98% (08-10-18 @ 20:12) (96% - 99%)  Wt(kg): --    I&O's Summary    10 Aug 2018 07:01  -  11 Aug 2018 07:00  --------------------------------------------------------  IN: 720 mL / OUT: 405 mL / NET: 315 mL          PHYSICAL EXAM:    General: A/ox 3, No acute Distress  Neck: Supple, No JVD  Cardiac: S1 S2, grade II/VI systolic murmur  Pulmonary: CTA skye, Breathing unlabored on RA, No Rhonchi/Rales/Wheezing  Abdomen: Soft, Non -tender, +BS x 4 quads  Extremities: No Rashes, No edema.   Neuro: A/o x 3, No focal deficits                  LABS:                          8.3    3.9   )-----------( 120      ( 11 Aug 2018 06:09 )             25.9                              08-11    141  |  104  |  22  ----------------------------<  114<H>  4.4   |  26  |  1.70<H>    Ca    8.9      11 Aug 2018 06:11  Mg     1.9     08-11    TPro  6.1  /  Alb  2.9<L>  /  TBili  0.4  /  DBili  x   /  AST  29  /  ALT  45  /  AlkPhos  94  08-11    LIVER FUNCTIONS - ( 11 Aug 2018 06:11 )  Alb: 2.9 g/dL / Pro: 6.1 g/dL / ALK PHOS: 94 U/L / ALT: 45 U/L / AST: 29 U/L / GGT: x                                   CAPILLARY BLOOD GLUCOSE      POCT Blood Glucose.: 123 mg/dL (11 Aug 2018 07:11)  POCT Blood Glucose.: 155 mg/dL (10 Aug 2018 21:22)  POCT Blood Glucose.: 110 mg/dL (10 Aug 2018 16:19)  POCT Blood Glucose.: 191 mg/dL (10 Aug 2018 11:30)            Allergies:  No Known Allergies    MEDICATIONS  (STANDING):  atorvastatin 80 milliGRAM(s) Oral at bedtime  cefTRIAXone   IVPB 1 Gram(s) IV Intermittent every 24 hours  cefTRIAXone   IVPB      chlorhexidine 2% Cloths 1 Application(s) Topical <User Schedule>  dextrose 5%. 1000 milliLiter(s) (50 mL/Hr) IV Continuous <Continuous>  dextrose 50% Injectable 12.5 Gram(s) IV Push once  dextrose 50% Injectable 25 Gram(s) IV Push once  dextrose 50% Injectable 25 Gram(s) IV Push once  docusate sodium 100 milliGRAM(s) Oral daily  escitalopram 5 milliGRAM(s) Oral daily  furosemide    Tablet 20 milliGRAM(s) Oral daily  insulin lispro (HumaLOG) corrective regimen sliding scale   SubCutaneous three times a day before meals  insulin lispro (HumaLOG) corrective regimen sliding scale   SubCutaneous at bedtime  metoprolol tartrate 25 milliGRAM(s) Oral every 12 hours  pantoprazole   Suspension 40 milliGRAM(s) Oral two times a day before meals  polyethylene glycol 3350 17 Gram(s) Oral daily  senna 1 Tablet(s) Oral daily  sodium chloride 0.9% lock flush 20 milliLiter(s) IV Push once  sucralfate 1 Gram(s) Oral two times a day    MEDICATIONS  (PRN):  dextrose 40% Gel 15 Gram(s) Oral once PRN Blood Glucose LESS THAN 70 milliGRAM(s)/deciliter  glucagon  Injectable 1 milliGRAM(s) IntraMuscular once PRN Glucose LESS THAN 70 milligrams/deciliter  sodium chloride 0.9% lock flush 10 milliLiter(s) IV Push every 1 hour PRN After each medication administration  sodium chloride 0.9% lock flush 10 milliLiter(s) IV Push every 12 hours PRN Lumen of catheter NOT used        DIAGNOSTIC TESTS:

## 2018-08-11 NOTE — PROGRESS NOTE ADULT - PROBLEM SELECTOR PLAN 2
Hx sCHF x 7years per wife. EF reportedly 25% in the past.  Echo performed 7/20/18 w/ EF 15-20%. Was volume overloaded on initial exam, now improved. On CXR, evidence of cardiomegaly but significant decrease in pulmonary vascular congestion.   - S/p Bumex gtt  - Started Lasix 20mg qd   - Uptitrated Metoprolol tartrate 25mg BID and uptitrate as tolerated with plan to transition to Toprol XL  - Unable to tolerate Losartan 2/2 hypotension 85/57. D/c'ed Losartan for now  - Fitted for Lifevest on 8/8. As pt is awaiting outpt ICD consideration, as EF <30% and not revascularized s/p STEMI, unable optimal medical therapy x 3mos 2/2 HoTN  - strict I/Os, daily weights Hx sCHF x 7years per wife. EF reportedly 25% in the past.  Echo performed 7/20/18 w/ EF 15-20%. Was volume overloaded on initial exam, now improved. On CXR, evidence of cardiomegaly but significant decrease in pulmonary vascular congestion.   - c/w Lasix 20mg qd   - Uptitrated Metoprolol tartrate 25mg BID to Q8H, plan to transition to Toprol XL  - Fitted for Lifevest on 8/8. As pt is awaiting outpt ICD consideration, as EF <30% and not revascularized s/p STEMI, unable optimal medical therapy x 3mos 2/2 HoTN  - strict I/Os, daily weights

## 2018-08-11 NOTE — PROGRESS NOTE ADULT - SUBJECTIVE AND OBJECTIVE BOX
CC: MYOCARDIAL INFARCTION      INTERVAL HISTORY:  no complaints        ROS: No chest pain, no sob, no abd pain. No n/v/d    PAST MEDICAL & SURGICAL HISTORY:  Coronary artery disease involving native heart without angina pectoris, unspecified vessel or lesion type  Hyperlipidemia, unspecified hyperlipidemia type  Hypertension, unspecified type  Type 2 diabetes mellitus without complication, without long-term current use of insulin  History of cataract extraction, unspecified laterality  CAD S/P percutaneous coronary angioplasty      PHYSICAL EXAM:  T(C): 36.8 (08-11-18 @ 10:00), Max: 36.9 (08-10-18 @ 14:00)  HR: 104 (08-11-18 @ 08:17)  BP: 96/72 (08-11-18 @ 08:17) (96/65 - 112/74)  RR: 16 (08-11-18 @ 08:17)  SpO2: 99% (08-11-18 @ 08:17)  Wt(kg): --  I&O's Summary    10 Aug 2018 07:01  -  11 Aug 2018 07:00  --------------------------------------------------------  IN: 720 mL / OUT: 405 mL / NET: 315 mL      Weight   General: AAO x 3,  NAD.  HEENT: moist mucous membranes, no pallor/cyanosis.  Neck: no JVD visible.  Cardiac: S1, S2. RRR. No murmurs   Respratory: CTA b/l, no access muscle use.   Abdomen: soft. nontender. nondistended  Skin: no rashes.  Extremities: no LE edema b/l  Access:       DATA:                        8.3<L>  3.9   )-----------( 120<L>    ( 11 Aug 2018 06:09 )             25.9<L>    Ferritin, Serum: 2037 ng/mL <H> (07-30 @ 18:14)      141    |  104    |  22     ----------------------------<  114<H>  Ca:8.9   (11 Aug 2018 06:11)  4.4     |  26     |  1.70<H>      eGFR if Non : 41 <L>  eGFR if : 48 <L>    TPro  6.1    /  Alb  2.9<L>  /  TBili  0.4    /  DBili  x      /  AST  29     /  ALT  45     /  AlkPhos  94     11 Aug 2018 06:11      Vitamin D, 1, 25-Dihydroxy: 34.0 pg/mL [19.9 - 79.3] (07-23 @ 15:30)  Vitamin D, 25-Hydroxy: 38.9 ng/mL [30.0 - 80.0] (07-23 @ 15:30)  Vitamin D, 25-Hydroxy: 19.0 ng/mL <L> [30.0 - 80.0] (07-22 @ 23:00)  Vitamin D, 1, 25-Dihydroxy: 50.1 pg/mL [19.9 - 79.3] (07-22 @ 23:00)        Protein/Creatinine Ratio Calculation: 0.3 Ratio <H> (07-22 @ 19:42)          MEDICATIONS  (STANDING):  atorvastatin 80 milliGRAM(s) Oral at bedtime  cefTRIAXone   IVPB 1 Gram(s) IV Intermittent every 24 hours  cefTRIAXone   IVPB      chlorhexidine 2% Cloths 1 Application(s) Topical <User Schedule>  dextrose 5%. 1000 milliLiter(s) (50 mL/Hr) IV Continuous <Continuous>  dextrose 50% Injectable 12.5 Gram(s) IV Push once  dextrose 50% Injectable 25 Gram(s) IV Push once  dextrose 50% Injectable 25 Gram(s) IV Push once  docusate sodium 100 milliGRAM(s) Oral daily  escitalopram 5 milliGRAM(s) Oral daily  furosemide    Tablet 20 milliGRAM(s) Oral daily  insulin lispro (HumaLOG) corrective regimen sliding scale   SubCutaneous three times a day before meals  insulin lispro (HumaLOG) corrective regimen sliding scale   SubCutaneous at bedtime  metoprolol tartrate 25 milliGRAM(s) Oral every 8 hours  pantoprazole   Suspension 40 milliGRAM(s) Oral two times a day before meals  polyethylene glycol 3350 17 Gram(s) Oral daily  senna 1 Tablet(s) Oral daily  sodium chloride 0.9% lock flush 20 milliLiter(s) IV Push once  sucralfate 1 Gram(s) Oral two times a day    MEDICATIONS  (PRN):  dextrose 40% Gel 15 Gram(s) Oral once PRN Blood Glucose LESS THAN 70 milliGRAM(s)/deciliter  glucagon  Injectable 1 milliGRAM(s) IntraMuscular once PRN Glucose LESS THAN 70 milligrams/deciliter  sodium chloride 0.9% lock flush 10 milliLiter(s) IV Push every 1 hour PRN After each medication administration  sodium chloride 0.9% lock flush 10 milliLiter(s) IV Push every 12 hours PRN Lumen of catheter NOT used

## 2018-08-11 NOTE — PROGRESS NOTE ADULT - PROBLEM SELECTOR PLAN 3
Acute Upper GI bleed 2/2 two non-bleed ulcers s/p clipping of one (7/27). Total 3U PRBC's total this admission with good Hgb response. Now Hgb 9-10.   - s/p EGD 8/6 EGD- no active bleed; clips in situ on previous ulcer. GI to f/u outpt for colonoscopy  - CT ab 8/6: Negative for retroperitoneal or intraperitoneal hematoma. No evidence of esophageal or bowel perforation.  - continue to HOLD AC 2/2 GIB   - Okay to resume ASA 81mg qd per GI as pt w/o further GIB, Hgb remains stable  - c/w Carafate and Protonix BID x 8 weeks (stop 9/21)  - c/w Mechanical soft/thin diet  - monitor CBCs, transfuse goal hgb < 8 as has significant CV disease  - GI following, appreciate recs. Acute Upper GI bleed 2/2 two non-bleed ulcers s/p clipping of one (7/27). Total 3U PRBC's total this admission with good Hgb response.  - s/p EGD 8/6 EGD- no active bleed; clips in situ on previous ulcer. GI to f/u outpt for colonoscopy  - CT ab 8/6: Negative for retroperitoneal or intraperitoneal hematoma. No evidence of esophageal or bowel perforation.  - continue to HOLD AC 2/2 GIB   HX PCI 7 yrs ago. Reportedly STEMI in [place] (ST elevations in lateral leads and trop 2000). On admission, EKG with Q waves in leads I and aVL w/poor R wave progression.   - Hgb 8.3, ASA on HOLD. GI did clear for pt to resume. No GIB or dark stools at this time.   - CBC 12 PM, if hgb remains stable, resume ASA 81   - c/w Carafate and Protonix BID x 8 weeks (stop 9/21)  - c/w Mechanical soft/thin diet  - monitor CBCs, transfuse goal hgb < 8 as has significant CV disease  - GI following, appreciate recs.

## 2018-08-11 NOTE — PROGRESS NOTE ADULT - PROBLEM SELECTOR PLAN 1
HX PCI 7 yrs ago. Reportedly STEMI in [place] (ST elevations in lateral leads and trop 2000). On admission, EKG with Q waves in leads I and aVL w/poor R wave progression.   - Resumed ASA 81mg qd, as discussed w/ GI for clearance. Pt w/o further GIB or dark colored stools, Hgb stable 9-10  - Unable to tolerate Losartan 2/2 hypotension 85/57. D/c'ed Losartan for now  - Uptitrated Metoprolol tartrate 25mg BID, plan to transition to Toprol XL  - c/w Lipitor 80mg PO qd  - No plan for cardiac cath this admission as pt unable to tolerate AC 2/2 acute GIB     #Asymptomatic paroxysmal Afib/Aflutter  HX paroxysmal afib with paroxysmal aflutter noted in beginning of hospital course   - HOLD anti-coagulation 2/2 GIB   - Uptitrated Metoprolol tartrate 25mg BID, plan to transition to Toprol XL HX PCI 7 yrs ago. Reportedly STEMI in [place] (ST elevations in lateral leads and trop 2000). On admission, EKG with Q waves in leads I and aVL w/poor R wave progression.   - Hgb 8.3, ASA on HOLD. GI did clear for pt to resume. No GIB or dark stools at this time.   - CBC 12 PM, if hgb remains stable, resume ASA 81   - Uptitrated Metoprolol tartrate 25mg BID to Q8H, plan to transition to Toprol XL  - c/w Lipitor 80mg PO qd  - No plan for cardiac cath this admission as pt unable to tolerate AC 2/2 acute GIB     #Asymptomatic paroxysmal Afib/Aflutter  HX paroxysmal afib with paroxysmal aflutter noted in beginning of hospital course   - HOLD anti-coagulation 2/2 GIB   - Uptitrated Metoprolol tartrate 25mg BID to Q8H, plan to transition to Toprol XL

## 2018-08-12 LAB
ANION GAP SERPL CALC-SCNC: 14 MMOL/L — SIGNIFICANT CHANGE UP (ref 5–17)
ANISOCYTOSIS BLD QL: SLIGHT — SIGNIFICANT CHANGE UP
BUN SERPL-MCNC: 21 MG/DL — SIGNIFICANT CHANGE UP (ref 7–23)
CALCIUM SERPL-MCNC: 9 MG/DL — SIGNIFICANT CHANGE UP (ref 8.4–10.5)
CHLORIDE SERPL-SCNC: 101 MMOL/L — SIGNIFICANT CHANGE UP (ref 96–108)
CO2 SERPL-SCNC: 24 MMOL/L — SIGNIFICANT CHANGE UP (ref 22–31)
CREAT SERPL-MCNC: 1.79 MG/DL — HIGH (ref 0.5–1.3)
EOSINOPHIL NFR BLD AUTO: 1 % — SIGNIFICANT CHANGE UP (ref 0–6)
GIANT PLATELETS BLD QL SMEAR: PRESENT — SIGNIFICANT CHANGE UP
GLUCOSE BLDC GLUCOMTR-MCNC: 135 MG/DL — HIGH (ref 70–99)
GLUCOSE BLDC GLUCOMTR-MCNC: 139 MG/DL — HIGH (ref 70–99)
GLUCOSE BLDC GLUCOMTR-MCNC: 145 MG/DL — HIGH (ref 70–99)
GLUCOSE BLDC GLUCOMTR-MCNC: 187 MG/DL — HIGH (ref 70–99)
GLUCOSE SERPL-MCNC: 217 MG/DL — HIGH (ref 70–99)
HCT VFR BLD CALC: 26.1 % — LOW (ref 39–50)
HGB BLD-MCNC: 8.4 G/DL — LOW (ref 13–17)
HYPOCHROMIA BLD QL: SIGNIFICANT CHANGE UP
LG PLATELETS BLD QL AUTO: PRESENT — SIGNIFICANT CHANGE UP
LYMPHOCYTES # BLD AUTO: 20 % — SIGNIFICANT CHANGE UP (ref 13–44)
LYMPHOCYTES # SPEC AUTO: 1 % — SIGNIFICANT CHANGE UP
MACROCYTES BLD QL: SLIGHT — SIGNIFICANT CHANGE UP
MAGNESIUM SERPL-MCNC: 1.7 MG/DL — SIGNIFICANT CHANGE UP (ref 1.6–2.6)
MANUAL DIF COMMENT BLD-IMP: SIGNIFICANT CHANGE UP
MANUAL SMEAR VERIFICATION: SIGNIFICANT CHANGE UP
MCHC RBC-ENTMCNC: 29.9 PG — SIGNIFICANT CHANGE UP (ref 27–34)
MCHC RBC-ENTMCNC: 32.2 G/DL — SIGNIFICANT CHANGE UP (ref 32–36)
MCV RBC AUTO: 92.9 FL — SIGNIFICANT CHANGE UP (ref 80–100)
MONOCYTES NFR BLD AUTO: 3 % — SIGNIFICANT CHANGE UP (ref 2–14)
NEUTROPHILS NFR BLD AUTO: 71 % — SIGNIFICANT CHANGE UP (ref 43–77)
NEUTS BAND # BLD: 4 % — SIGNIFICANT CHANGE UP
OVALOCYTES BLD QL SMEAR: SLIGHT — SIGNIFICANT CHANGE UP
PLAT MORPH BLD: ABNORMAL
PLATELET # BLD AUTO: 120 K/UL — LOW (ref 150–400)
PLATELET CLUMP BLD QL SMEAR: PRESENT
POLYCHROMASIA BLD QL SMEAR: SLIGHT — SIGNIFICANT CHANGE UP
POTASSIUM SERPL-MCNC: 4.2 MMOL/L — SIGNIFICANT CHANGE UP (ref 3.5–5.3)
POTASSIUM SERPL-SCNC: 4.2 MMOL/L — SIGNIFICANT CHANGE UP (ref 3.5–5.3)
RBC # BLD: 2.81 M/UL — LOW (ref 4.2–5.8)
RBC # BLD: 2.81 M/UL — LOW (ref 4.2–5.8)
RBC # FLD: 14.2 % — SIGNIFICANT CHANGE UP (ref 10.3–16.9)
RBC BLD AUTO: ABNORMAL
RETICS/RBC NFR: 1.6 % — SIGNIFICANT CHANGE UP (ref 0.5–2.5)
SODIUM SERPL-SCNC: 139 MMOL/L — SIGNIFICANT CHANGE UP (ref 135–145)
SPHEROCYTES BLD QL SMEAR: SLIGHT — SIGNIFICANT CHANGE UP
WBC # BLD: 3.9 K/UL — SIGNIFICANT CHANGE UP (ref 3.8–10.5)
WBC # FLD AUTO: 3.9 K/UL — SIGNIFICANT CHANGE UP (ref 3.8–10.5)

## 2018-08-12 PROCEDURE — 99233 SBSQ HOSP IP/OBS HIGH 50: CPT

## 2018-08-12 RX ADMIN — Medication 1 GRAM(S): at 06:30

## 2018-08-12 RX ADMIN — ATORVASTATIN CALCIUM 80 MILLIGRAM(S): 80 TABLET, FILM COATED ORAL at 21:03

## 2018-08-12 RX ADMIN — Medication 100 MILLIGRAM(S): at 15:30

## 2018-08-12 RX ADMIN — Medication 2: at 11:19

## 2018-08-12 RX ADMIN — PANTOPRAZOLE SODIUM 40 MILLIGRAM(S): 20 TABLET, DELAYED RELEASE ORAL at 06:30

## 2018-08-12 RX ADMIN — PANTOPRAZOLE SODIUM 40 MILLIGRAM(S): 20 TABLET, DELAYED RELEASE ORAL at 16:42

## 2018-08-12 RX ADMIN — Medication 20 MILLIGRAM(S): at 06:30

## 2018-08-12 RX ADMIN — ESCITALOPRAM OXALATE 5 MILLIGRAM(S): 10 TABLET, FILM COATED ORAL at 15:30

## 2018-08-12 RX ADMIN — POLYETHYLENE GLYCOL 3350 17 GRAM(S): 17 POWDER, FOR SOLUTION ORAL at 15:28

## 2018-08-12 RX ADMIN — Medication 1 GRAM(S): at 18:29

## 2018-08-12 RX ADMIN — Medication 81 MILLIGRAM(S): at 15:30

## 2018-08-12 RX ADMIN — Medication 25 MILLIGRAM(S): at 21:03

## 2018-08-12 RX ADMIN — Medication 25 MILLIGRAM(S): at 06:30

## 2018-08-12 RX ADMIN — CEFTRIAXONE 100 GRAM(S): 500 INJECTION, POWDER, FOR SOLUTION INTRAMUSCULAR; INTRAVENOUS at 08:33

## 2018-08-12 RX ADMIN — SENNA PLUS 1 TABLET(S): 8.6 TABLET ORAL at 15:30

## 2018-08-12 RX ADMIN — Medication 25 MILLIGRAM(S): at 16:38

## 2018-08-12 NOTE — PROGRESS NOTE ADULT - SUBJECTIVE AND OBJECTIVE BOX
O/N Events:  Subjective:  MARKO overnight, remained tachycardiac at low 100s, pt denies any acute complaints denies lightheadedness or dizziness, denies acute/active bleeding  metop titrated up to 25 Q8 yesterday with good tolerance, weight down to 64 kg from 70 kg on admission, saturating 98% on RA  exam with clear lungs, flat neck veins and no LE edema    VITALS  Vital Signs Last 24 Hrs  T(C): 36.7 (12 Aug 2018 06:00), Max: 37.6 (11 Aug 2018 14:00)  T(F): 98 (12 Aug 2018 06:00), Max: 99.6 (11 Aug 2018 14:00)  HR: 108 (12 Aug 2018 05:26) (98 - 108)  BP: 129/99 (12 Aug 2018 05:26) (95/66 - 129/99)  BP(mean): 116 (12 Aug 2018 05:26) (73 - 116)  RR: 18 (12 Aug 2018 05:26) (16 - 18)  SpO2: 97% (12 Aug 2018 05:26) (97% - 100%)    I&O's Summary    11 Aug 2018 07:01  -  12 Aug 2018 07:00  --------------------------------------------------------  IN: 617 mL / OUT: 500 mL / NET: 117 mL      CAPILLARY BLOOD GLUCOSE  POCT Blood Glucose.: 145 mg/dL (12 Aug 2018 06:55)    PHYSICAL EXAM  General: A&Ox 3; NAD  Eyes: PERRL; EOMI  Neck: Supple; no JVD  Respiratory: slightly diminished breath sounds on left base, otherwise clear/ life vest on   Cardiovascular: tachycardia at low 100s, regular, no MGR  Gastrointestinal: Soft; NTND BS, +  Extremities: WWP; no edema, radial/pedal pulses palpable  Neurological:  CNII-XII grossly intact; no obvious focal deficits    LABS                        8.4    3.7   )-----------( 115      ( 11 Aug 2018 15:39 )             26.1     08-11    141  |  104  |  22  ----------------------------<  114<H>  4.4   |  26  |  1.70<H>    Ca    8.9      11 Aug 2018 06:11  Mg     1.9     08-11    TPro  6.1  /  Alb  2.9<L>  /  TBili  0.4  /  DBili  x   /  AST  29  /  ALT  45  /  AlkPhos  94  08-11    LIVER FUNCTIONS - ( 11 Aug 2018 06:11 )  Alb: 2.9 g/dL / Pro: 6.1 g/dL / ALK PHOS: 94 U/L / ALT: 45 U/L / AST: 29 U/L / GGT: x             IMAGING/EKG/ETC  Xray 8/8:  left pleural effusion and left lower lobe atelectasis or infiltrate.     ECG 8/10 :  Sinus tachycardia, anterolateral infarct, age undetermined O/N Events:  Subjective:  MARKO overnight, remained tachycardiac at low 100s, pt denies any acute complaints denies lightheadedness or dizziness, denies acute/active bleeding  metop titrated up to 25 Q8 yesterday with good tolerance, weight down to 64 kg from 70 kg on admission, saturating 98% on RA  exam with clear lungs, flat neck veins and no LE edema    VITALS  Vital Signs Last 24 Hrs  T(C): 36.7 (12 Aug 2018 06:00), Max: 37.6 (11 Aug 2018 14:00)  T(F): 98 (12 Aug 2018 06:00), Max: 99.6 (11 Aug 2018 14:00)  HR: 108 (12 Aug 2018 05:26) (98 - 108)  BP: 129/99 (12 Aug 2018 05:26) (95/66 - 129/99)  BP(mean): 116 (12 Aug 2018 05:26) (73 - 116)  RR: 18 (12 Aug 2018 05:26) (16 - 18)  SpO2: 97% (12 Aug 2018 05:26) (97% - 100%)    I&O's Summary    11 Aug 2018 07:01  -  12 Aug 2018 07:00  --------------------------------------------------------  IN: 617 mL / OUT: 500 mL / NET: 117 mL      CAPILLARY BLOOD GLUCOSE  POCT Blood Glucose.: 145 mg/dL (12 Aug 2018 06:55)    PHYSICAL EXAM  General: A&Ox 3; NAD  Eyes: PERRL; EOMI  Neck: Supple; JVD present  Respiratory: slightly diminished breath sounds on left base, otherwise clear/ life vest on   Cardiovascular: tachycardia at low 100s, regular, no MGR  Gastrointestinal: Soft; NTND BS, +  Extremities: WWP; no edema, radial/pedal pulses palpable  Neurological:  CNII-XII grossly intact; no obvious focal deficits    T/L/D: PIVc c/d/i/ Midline LUE c/d/i/     LABS                        8.4    3.7   )-----------( 115      ( 11 Aug 2018 15:39 )             26.1     08-11    141  |  104  |  22  ----------------------------<  114<H>  4.4   |  26  |  1.70<H>    Ca    8.9      11 Aug 2018 06:11  Mg     1.9     08-11    TPro  6.1  /  Alb  2.9<L>  /  TBili  0.4  /  DBili  x   /  AST  29  /  ALT  45  /  AlkPhos  94  08-11    LIVER FUNCTIONS - ( 11 Aug 2018 06:11 )  Alb: 2.9 g/dL / Pro: 6.1 g/dL / ALK PHOS: 94 U/L / ALT: 45 U/L / AST: 29 U/L / GGT: x             IMAGING/EKG/ETC  Xray 8/8:  left pleural effusion and left lower lobe atelectasis vs infiltrate.     ECG 8/10 :  Sinus tachycardia, anterolateral infarct, age undetermined

## 2018-08-12 NOTE — PROGRESS NOTE ADULT - PROBLEM SELECTOR PLAN 10
F: no IVF  E: Replete K>4, Mg>2.   N: Mechanical soft/thin DASH/TLC/consistent carbs    Dispo: [facility], pending DC to acute rehab w/ Life vest F: no IVF  E: Replete K>4, Mg>2.   N: Mechanical soft/thin DASH/TLC/consistent carbs    Dispo: pending DC to acute rehab w/ Life vest

## 2018-08-12 NOTE — PROGRESS NOTE ADULT - PROBLEM SELECTOR PLAN 9
DVT: SCD's  GI: PPI x 8 weeks total, completes 9/21  Activity: PT eval rec Acute Rehab. Pt seen by PT various times in hospital, in setting of CVA would be beneficial for acute rehab, pt able to tolerate 3hrs/day of PT if splits sessions.

## 2018-08-12 NOTE — PROGRESS NOTE ADULT - PROBLEM SELECTOR PLAN 3
Acute Upper GI bleed 2/2 two non-bleed ulcers s/p clipping of one (7/27). Total 3U PRBC's total this admission with good Hgb response.  - s/p EGD 8/6 EGD- no active bleed; clips in situ on previous ulcer. GI to f/u outpt for colonoscopy  - CT ab 8/6: Negative for retroperitoneal or intraperitoneal hematoma. No evidence of esophageal or bowel perforation.  - continue to HOLD AC 2/2 GIB/ ASA resumed   HX PCI 7 yrs ago. Reportedly STEMI in [place] (ST elevations in lateral leads and trop 2000). On admission, EKG with Q waves in leads I and aVL w/poor R wave progression.   - Hgb 8.3, No GIB or dark stools at this time.   - Continue ASA 81 mg daily  - c/w Carafate and Protonix BID x 8 weeks (stop 9/21)  - c/w Mechanical soft/thin diet  - monitor CBCs, transfuse goal hgb < 8 as has significant CV disease  - GI following, appreciate recs. Impression: bone marrow suppression in the setting of Abx vs nutritional deficiencies vs iatrogenic anemia   Acute Upper GI bleed 2/2 two non-bleed ulcers s/p clipping of one (7/27). Total 3U PRBC's total this admission with good Hgb response.  - s/p EGD 8/6 EGD- no active bleed; clips in situ on previous ulcer. GI to f/u outpt for colonoscopy  - CT ab 8/6: Negative for retroperitoneal or intraperitoneal hematoma. No evidence of esophageal or bowel perforation.  - continue to HOLD AC 2/2 GIB/ ASA resumed   HX PCI 7 yrs ago. Reportedly STEMI in [place] (ST elevations in lateral leads and trop 2000). On admission, EKG with Q waves in leads I and aVL w/poor R wave progression.   - Hgb 8.3, No GIB or dark stools at this time, associated with leukopenia and thrombocytopenia   - Retic count  - pediatric tubes for blood draw  - Continue ASA 81 mg daily  - c/w Carafate and Protonix BID x 8 weeks (stop 9/21)  - c/w Mechanical soft/thin diet  - monitor CBCs, preferably peds using peds tube for blood draw, transfuse goal   hgb < 8   - GI following, appreciate recs.

## 2018-08-12 NOTE — PROGRESS NOTE ADULT - PROBLEM SELECTOR PLAN 1
HX PCI 7 yrs ago. Reportedly STEMI in [place] (ST elevations in lateral leads and trop 2000). On admission, EKG with Q waves in leads I and aVL w/poor R wave progression.   - Hgb 8.3, ASA on HOLD. GI did clear for pt to resume. No GIB or dark stools at this time.   - CBC 12 PM, if hgb remains stable, resume ASA 81   - Uptitrated Metoprolol tartrate 25mg BID to Q8H, plan to transition to Toprol XL  - c/w Lipitor 80mg PO qd  - No plan for cardiac cath this admission as pt unable to tolerate AC 2/2 acute GIB     #Asymptomatic paroxysmal Afib/Aflutter  HX paroxysmal afib with paroxysmal aflutter noted in beginning of hospital course   - HOLD anti-coagulation 2/2 GIB   - Cont Metoprolol tartrate 25mg Q8H, plan to transition to Toprol XL HX PCI 7 yrs ago. Reportedly STEMI in [place] (ST elevations in lateral leads and trop 2000). On admission, EKG with Q waves in leads I and aVL w/poor R wave progression.   - Cont baby ASA  - Uptitrated Metoprolol tartrate 25mg BID to Q8H, plan to transition to Toprol XL  - c/w Lipitor 80mg PO qd  - No plan for cardiac cath this admission as pt unable to tolerate AC 2/2 acute GIB     #Asymptomatic paroxysmal Afib/Aflutter  HX paroxysmal afib with paroxysmal aflutter noted in beginning of hospital course   - HOLD anti-coagulation 2/2 GIB   - Cont Metoprolol tartrate 25mg Q8H, plan to transition to Toprol XL

## 2018-08-12 NOTE — PROGRESS NOTE ADULT - PROBLEM SELECTOR PLAN 7
Patient was weak, unsteady, and confused on admission. Improved significantly with decrease in BUN, now resolved. Wife reports pt back at baseline mental status. Likely toxic-metabolic 2/2 uremia, improving.  - c/w Lexapro

## 2018-08-12 NOTE — PROGRESS NOTE ADULT - PROBLEM SELECTOR PLAN 4
Likely urosepsis as positive blood and urine cultures klebsiella; currently on ceftriaxone   - Ceftriaxone 1g qd (day 6/14, last day 8/17/18) s/p midline placement 8/7   off note, Midline CXR read noting coiling at axilla, catheter able to flushed and draws back blood. Okay to use per IR.

## 2018-08-12 NOTE — PROGRESS NOTE ADULT - SUBJECTIVE AND OBJECTIVE BOX
CC: MYOCARDIAL INFARCTION      INTERVAL HISTORY:  resting comfortably in bed   no complaints      ROS: No chest pain, no sob, no abd pain. No n/v/d    PAST MEDICAL & SURGICAL HISTORY:  Coronary artery disease involving native heart without angina pectoris, unspecified vessel or lesion type  Hyperlipidemia, unspecified hyperlipidemia type  Hypertension, unspecified type  Type 2 diabetes mellitus without complication, without long-term current use of insulin  History of cataract extraction, unspecified laterality  CAD S/P percutaneous coronary angioplasty      PHYSICAL EXAM:  T(C): 36.7 (08-12-18 @ 06:00), Max: 37.6 (08-11-18 @ 14:00)  HR: 102 (08-12-18 @ 08:40)  BP: 95/62 (08-12-18 @ 08:40) (95/62 - 129/99)  RR: 18 (08-12-18 @ 08:40)  SpO2: 96% (08-12-18 @ 08:40)  Wt(kg): --  I&O's Summary    11 Aug 2018 07:01  -  12 Aug 2018 07:00  --------------------------------------------------------  IN: 617 mL / OUT: 500 mL / NET: 117 mL      Weight   General: AAO x 3,  NAD.  HEENT: moist mucous membranes, no pallor/cyanosis.  Neck: no JVD visible.  Cardiac: S1, S2. RRR. No murmurs   Respratory: CTA b/l, no access muscle use.   Abdomen: soft. nontender. nondistended  Skin: no rashes.  Extremities: no LE edema b/l  Access:       DATA:                        8.4<L>  3.7<L> )-----------( 115<L>    ( 11 Aug 2018 15:39 )             26.1<L>    Ferritin, Serum: 2037 ng/mL <H> (07-30 @ 18:14)      141    |  104    |  22     ----------------------------<  114<H>  Ca:8.9   (11 Aug 2018 06:11)  4.4     |  26     |  1.70<H>        TPro  6.1    /  Alb  2.9<L>  /  TBili  0.4    /  DBili  x      /  AST  29     /  ALT  45     /  AlkPhos  94     11 Aug 2018 06:11      Vitamin D, 1, 25-Dihydroxy: 34.0 pg/mL [19.9 - 79.3] (07-23 @ 15:30)  Vitamin D, 25-Hydroxy: 38.9 ng/mL [30.0 - 80.0] (07-23 @ 15:30)  Vitamin D, 25-Hydroxy: 19.0 ng/mL <L> [30.0 - 80.0] (07-22 @ 23:00)  Vitamin D, 1, 25-Dihydroxy: 50.1 pg/mL [19.9 - 79.3] (07-22 @ 23:00)        Protein/Creatinine Ratio Calculation: 0.3 Ratio <H> (07-22 @ 19:42)          MEDICATIONS  (STANDING):  aspirin  chewable 81 milliGRAM(s) Oral daily  atorvastatin 80 milliGRAM(s) Oral at bedtime  cefTRIAXone   IVPB 1 Gram(s) IV Intermittent every 24 hours  cefTRIAXone   IVPB      dextrose 5%. 1000 milliLiter(s) (50 mL/Hr) IV Continuous <Continuous>  dextrose 50% Injectable 12.5 Gram(s) IV Push once  dextrose 50% Injectable 25 Gram(s) IV Push once  dextrose 50% Injectable 25 Gram(s) IV Push once  docusate sodium 100 milliGRAM(s) Oral daily  escitalopram 5 milliGRAM(s) Oral daily  furosemide    Tablet 20 milliGRAM(s) Oral daily  insulin lispro (HumaLOG) corrective regimen sliding scale   SubCutaneous three times a day before meals  insulin lispro (HumaLOG) corrective regimen sliding scale   SubCutaneous at bedtime  metoprolol tartrate 25 milliGRAM(s) Oral every 8 hours  pantoprazole   Suspension 40 milliGRAM(s) Oral two times a day before meals  polyethylene glycol 3350 17 Gram(s) Oral daily  senna 1 Tablet(s) Oral daily  sodium chloride 0.9% lock flush 20 milliLiter(s) IV Push once  sucralfate 1 Gram(s) Oral two times a day    MEDICATIONS  (PRN):  dextrose 40% Gel 15 Gram(s) Oral once PRN Blood Glucose LESS THAN 70 milliGRAM(s)/deciliter  glucagon  Injectable 1 milliGRAM(s) IntraMuscular once PRN Glucose LESS THAN 70 milligrams/deciliter  sodium chloride 0.9% lock flush 10 milliLiter(s) IV Push every 1 hour PRN After each medication administration  sodium chloride 0.9% lock flush 10 milliLiter(s) IV Push every 12 hours PRN Lumen of catheter NOT used

## 2018-08-12 NOTE — PROGRESS NOTE ADULT - ATTENDING COMMENTS
Covering for Dr. Fountain.  Pt seen and examined this am. Agree with resident assessment and plan above. No o/n events. Pt feeling well this am. Denies brbpr, dark stools. VS notable for ST. Tele c/w ST (slightly improved since increased BB). Exam unremarkable. Labs pending.  - CBC has been notable for panctyopenia. Unclear if 2/2 antibiotics causing BM suppression. await retic count. Will cont to closely monitor and await am labs to see if stability. Mild drop in hemoglobin with no signs of active GIB may also be 2/2 phlebotomy from patient's prolonged hospital stay. As such, if cbc is stable today can likely defer any further labs until discharge to acute rehab.   - Cont current regimen of BB and uptitrate as BP allows.   - Pt remains in house awaiting acute rehab placement. Tentatively Monday, 8/13/18.

## 2018-08-12 NOTE — PROGRESS NOTE ADULT - PROBLEM SELECTOR PLAN 2
Hx sCHF x 7years per wife. EF reportedly 25% in the past.  Echo performed 7/20/18 w/ EF 15-20%. Was volume overloaded on initial exam, now improved. On CXR, evidence of cardiomegaly but significant decrease in pulmonary vascular congestion.   - c/w Lasix 20mg qd   - Cont Metoprolol tartrate 25mg Q8H, plan to transition to Toprol XL  - Fitted for Lifevest on 8/8. As pt is awaiting outpt ICD consideration, as EF <30% and not revascularized s/p STEMI, unable optimal medical therapy x 3mos 2/2 HoTN/ Lif vest on in house   - strict I/Os, daily weights Hx sCHF x 7years per wife. EF reportedly 25% in the past.  Echo performed 7/20/18 w/ EF 15-20%. Was volume overloaded on initial exam, now improved. On CXR, evidence of cardiomegaly but significant decrease in pulmonary vascular congestion.   - c/w Lasix 20mg qd   - Cont Metoprolol tartrate 25mg Q8H, plan to transition to Toprol XL  - Fitted for Lifevest on 8/8. As pt is awaiting outpt ICD consideration, as EF <30% and not revascularized s/p STEMI, will cont optimal medical therapy x 3mos 2/2 HoTN/ Lif vest on in house   - strict I/Os, daily weights

## 2018-08-13 LAB
ANION GAP SERPL CALC-SCNC: 16 MMOL/L — SIGNIFICANT CHANGE UP (ref 5–17)
BLD GP AB SCN SERPL QL: NEGATIVE — SIGNIFICANT CHANGE UP
BUN SERPL-MCNC: 21 MG/DL — SIGNIFICANT CHANGE UP (ref 7–23)
CALCIUM SERPL-MCNC: 9.2 MG/DL — SIGNIFICANT CHANGE UP (ref 8.4–10.5)
CHLORIDE SERPL-SCNC: 101 MMOL/L — SIGNIFICANT CHANGE UP (ref 96–108)
CO2 SERPL-SCNC: 24 MMOL/L — SIGNIFICANT CHANGE UP (ref 22–31)
CREAT SERPL-MCNC: 1.85 MG/DL — HIGH (ref 0.5–1.3)
GLUCOSE BLDC GLUCOMTR-MCNC: 123 MG/DL — HIGH (ref 70–99)
GLUCOSE BLDC GLUCOMTR-MCNC: 148 MG/DL — HIGH (ref 70–99)
GLUCOSE BLDC GLUCOMTR-MCNC: 172 MG/DL — HIGH (ref 70–99)
GLUCOSE BLDC GLUCOMTR-MCNC: 198 MG/DL — HIGH (ref 70–99)
GLUCOSE SERPL-MCNC: 136 MG/DL — HIGH (ref 70–99)
HCT VFR BLD CALC: 27.5 % — LOW (ref 39–50)
HGB BLD-MCNC: 8.5 G/DL — LOW (ref 13–17)
MAGNESIUM SERPL-MCNC: 1.7 MG/DL — SIGNIFICANT CHANGE UP (ref 1.6–2.6)
MCHC RBC-ENTMCNC: 29.1 PG — SIGNIFICANT CHANGE UP (ref 27–34)
MCHC RBC-ENTMCNC: 30.9 G/DL — LOW (ref 32–36)
MCV RBC AUTO: 94.2 FL — SIGNIFICANT CHANGE UP (ref 80–100)
PLATELET # BLD AUTO: 133 K/UL — LOW (ref 150–400)
POTASSIUM SERPL-MCNC: 4.2 MMOL/L — SIGNIFICANT CHANGE UP (ref 3.5–5.3)
POTASSIUM SERPL-SCNC: 4.2 MMOL/L — SIGNIFICANT CHANGE UP (ref 3.5–5.3)
RBC # BLD: 2.92 M/UL — LOW (ref 4.2–5.8)
RBC # FLD: 14.7 % — SIGNIFICANT CHANGE UP (ref 10.3–16.9)
RH IG SCN BLD-IMP: POSITIVE — SIGNIFICANT CHANGE UP
SODIUM SERPL-SCNC: 141 MMOL/L — SIGNIFICANT CHANGE UP (ref 135–145)
WBC # BLD: 4.5 K/UL — SIGNIFICANT CHANGE UP (ref 3.8–10.5)
WBC # FLD AUTO: 4.5 K/UL — SIGNIFICANT CHANGE UP (ref 3.8–10.5)

## 2018-08-13 PROCEDURE — 99232 SBSQ HOSP IP/OBS MODERATE 35: CPT

## 2018-08-13 PROCEDURE — 71045 X-RAY EXAM CHEST 1 VIEW: CPT | Mod: 26

## 2018-08-13 PROCEDURE — 99233 SBSQ HOSP IP/OBS HIGH 50: CPT

## 2018-08-13 RX ORDER — METOPROLOL TARTRATE 50 MG
50 TABLET ORAL
Qty: 0 | Refills: 0 | Status: DISCONTINUED | OUTPATIENT
Start: 2018-08-13 | End: 2018-08-13

## 2018-08-13 RX ORDER — PANTOPRAZOLE SODIUM 20 MG/1
1 TABLET, DELAYED RELEASE ORAL
Qty: 0 | Refills: 0 | COMMUNITY
Start: 2018-08-13

## 2018-08-13 RX ORDER — ESCITALOPRAM OXALATE 10 MG/1
1 TABLET, FILM COATED ORAL
Qty: 0 | Refills: 0 | COMMUNITY
Start: 2018-08-13

## 2018-08-13 RX ORDER — METOPROLOL TARTRATE 50 MG
25 TABLET ORAL ONCE
Qty: 0 | Refills: 0 | Status: COMPLETED | OUTPATIENT
Start: 2018-08-13 | End: 2018-08-13

## 2018-08-13 RX ORDER — MAGNESIUM SULFATE 500 MG/ML
2 VIAL (ML) INJECTION ONCE
Qty: 0 | Refills: 0 | Status: COMPLETED | OUTPATIENT
Start: 2018-08-13 | End: 2018-08-13

## 2018-08-13 RX ORDER — SUCRALFATE 1 G
1 TABLET ORAL
Qty: 0 | Refills: 0 | COMMUNITY
Start: 2018-08-13

## 2018-08-13 RX ORDER — SIMVASTATIN 20 MG/1
1 TABLET, FILM COATED ORAL
Qty: 0 | Refills: 0 | COMMUNITY

## 2018-08-13 RX ORDER — FUROSEMIDE 40 MG
1 TABLET ORAL
Qty: 0 | Refills: 0 | COMMUNITY
Start: 2018-08-13

## 2018-08-13 RX ORDER — DOCUSATE SODIUM 100 MG
1 CAPSULE ORAL
Qty: 0 | Refills: 0 | COMMUNITY
Start: 2018-08-13

## 2018-08-13 RX ORDER — LOSARTAN POTASSIUM 100 MG/1
1 TABLET, FILM COATED ORAL
Qty: 0 | Refills: 0 | COMMUNITY

## 2018-08-13 RX ORDER — POLYETHYLENE GLYCOL 3350 17 G/17G
17 POWDER, FOR SOLUTION ORAL
Qty: 0 | Refills: 0 | COMMUNITY
Start: 2018-08-13

## 2018-08-13 RX ORDER — SENNA PLUS 8.6 MG/1
1 TABLET ORAL
Qty: 0 | Refills: 0 | COMMUNITY
Start: 2018-08-13

## 2018-08-13 RX ORDER — METOPROLOL TARTRATE 50 MG
50 TABLET ORAL
Qty: 0 | Refills: 0 | Status: DISCONTINUED | OUTPATIENT
Start: 2018-08-13 | End: 2018-08-14

## 2018-08-13 RX ORDER — ONDANSETRON 8 MG/1
4 TABLET, FILM COATED ORAL EVERY 6 HOURS
Qty: 0 | Refills: 0 | Status: DISCONTINUED | OUTPATIENT
Start: 2018-08-13 | End: 2018-08-15

## 2018-08-13 RX ORDER — ATORVASTATIN CALCIUM 80 MG/1
1 TABLET, FILM COATED ORAL
Qty: 0 | Refills: 0 | COMMUNITY
Start: 2018-08-13

## 2018-08-13 RX ADMIN — Medication 100 MILLIGRAM(S): at 03:04

## 2018-08-13 RX ADMIN — SENNA PLUS 1 TABLET(S): 8.6 TABLET ORAL at 11:30

## 2018-08-13 RX ADMIN — ATORVASTATIN CALCIUM 80 MILLIGRAM(S): 80 TABLET, FILM COATED ORAL at 21:25

## 2018-08-13 RX ADMIN — Medication 100 MILLIGRAM(S): at 11:30

## 2018-08-13 RX ADMIN — Medication 50 MILLIGRAM(S): at 18:12

## 2018-08-13 RX ADMIN — Medication 81 MILLIGRAM(S): at 11:30

## 2018-08-13 RX ADMIN — CEFTRIAXONE 100 GRAM(S): 500 INJECTION, POWDER, FOR SOLUTION INTRAMUSCULAR; INTRAVENOUS at 09:23

## 2018-08-13 RX ADMIN — Medication 1 GRAM(S): at 18:12

## 2018-08-13 RX ADMIN — PANTOPRAZOLE SODIUM 40 MILLIGRAM(S): 20 TABLET, DELAYED RELEASE ORAL at 18:12

## 2018-08-13 RX ADMIN — POLYETHYLENE GLYCOL 3350 17 GRAM(S): 17 POWDER, FOR SOLUTION ORAL at 11:30

## 2018-08-13 RX ADMIN — Medication 100 MILLIGRAM(S): at 18:12

## 2018-08-13 RX ADMIN — ESCITALOPRAM OXALATE 5 MILLIGRAM(S): 10 TABLET, FILM COATED ORAL at 11:30

## 2018-08-13 RX ADMIN — ONDANSETRON 4 MILLIGRAM(S): 8 TABLET, FILM COATED ORAL at 19:22

## 2018-08-13 RX ADMIN — Medication 50 GRAM(S): at 07:32

## 2018-08-13 RX ADMIN — Medication 25 MILLIGRAM(S): at 09:23

## 2018-08-13 RX ADMIN — Medication 20 MILLIGRAM(S): at 06:02

## 2018-08-13 RX ADMIN — Medication 25 MILLIGRAM(S): at 06:02

## 2018-08-13 RX ADMIN — Medication 1 GRAM(S): at 06:03

## 2018-08-13 RX ADMIN — Medication 100 MILLIGRAM(S): at 11:36

## 2018-08-13 RX ADMIN — Medication 2: at 11:30

## 2018-08-13 RX ADMIN — PANTOPRAZOLE SODIUM 40 MILLIGRAM(S): 20 TABLET, DELAYED RELEASE ORAL at 06:03

## 2018-08-13 NOTE — PROGRESS NOTE ADULT - ASSESSMENT
67M PMHx HTN, DM, HLD, HFrEF (15-25% on echo), CAD s/p PCI 7 years ago presented 7/20 with altered mental status in the setting of one week of dyspnea and weakness admitted to the CCU for management of ACS, acute on chronic CHF, RONNIE. Hospital course c/b acute upper GI bleed 2/2 two gastric ulcers; s/p clipping of one ulcer (7/27) s/p 3U PRBCs, found to have urosepsis currently on Ceftriaxone, stepped down from CCU to [facility] for further management, now pending acute rehab placement. 67M PMHx HTN, DM, HLD, HFrEF (15-20% on echo), CAD s/p PCI 7 years ago presented 7/20 with altered mental status in the setting of one week of dyspnea and weakness admitted to the CCU for management of ACS, acute on chronic CHF, RONNIE. Hospital course c/b acute upper GI bleed 2/2 two gastric ulcers; s/p clipping of one ulcer (7/27) s/p 3U PRBCs, found to have urosepsis currently on Ceftriaxone, stepped down from [facility]U to [facility] for further management, now pending acute rehab placement.

## 2018-08-13 NOTE — PROGRESS NOTE ADULT - PROBLEM SELECTOR PLAN 10
F: no IVF  E: Replete K>4, Mg>2.   N: Mechanical soft/thin DASH/TLC/consistent carbs    Dispo: pending DC to acute rehab w/ Life vest

## 2018-08-13 NOTE — PROGRESS NOTE ADULT - ATTENDING COMMENTS
Assessment: Patient personally seen and examined myself during rounds with the Physician Assistant/House Staff/Nurse Practitioner  ON DATE 8/13/18  Physician Assistant/House Staff/Nurse Practitioner note read, including vitals, physical findings, laboratory data, and radiological reports.   Revisions included below.   Direct personal management at bed side and extensive interpretation of the data.    Plan was outlined and discussed in details with the Physician Assistant/House Staff/Nurse Practitioner.    Decision making of high complexity   Risk high of complications, morbidity, and/or mortality  Assessment and Action taken for acute disease activity to reflect the level of care provided:  -Hemodynamic evaluation and support  -Medication reconciliation  -Review laboratory data  -EKG reviewed   -    TIME SPENT in evaluation and management, reassessments, review and interpretation of labs and x-rays, and hemodynamic management, formulating a plan and coordinating care: ___25____ MIN.  Time does not include procedural time.    Isaias Fountain MD  Cardiology    Mobile: [phone]  Office: [phone]  [address] [place] [address], [id number]

## 2018-08-13 NOTE — PROGRESS NOTE ADULT - SUBJECTIVE AND OBJECTIVE BOX
Patient is a 67y Male seen and evaluated at bedside.   No active complaints    aspirin  chewable 81 daily  atorvastatin 80 at bedtime  cefTRIAXone   IVPB 1 every 24 hours  cefTRIAXone   IVPB    dextrose 40% Gel 15 once PRN  dextrose 5%. 1000 <Continuous>  dextrose 50% Injectable 12.5 once  dextrose 50% Injectable 25 once  dextrose 50% Injectable 25 once  docusate sodium 100 daily  escitalopram 5 daily  furosemide    Tablet 20 daily  glucagon  Injectable 1 once PRN  guaiFENesin    Syrup 100 every 6 hours PRN  insulin lispro (HumaLOG) corrective regimen sliding scale  three times a day before meals  insulin lispro (HumaLOG) corrective regimen sliding scale  at bedtime  metoprolol tartrate 25 once  metoprolol tartrate 50 two times a day  ondansetron Injectable 4 every 6 hours PRN  pantoprazole   Suspension 40 two times a day before meals  polyethylene glycol 3350 17 daily  senna 1 daily  sodium chloride 0.9% lock flush 10 every 1 hour PRN  sodium chloride 0.9% lock flush 10 every 12 hours PRN  sodium chloride 0.9% lock flush 20 once  sucralfate 1 two times a day      Allergies    No Known Allergies    Intolerances        T(C): , Max: 36.9 (08-12-18 @ 18:24)  T(F): , Max: 98.5 (08-12-18 @ 18:24)  HR: 108 (08-13-18 @ 08:43)  BP: 125/76 (08-13-18 @ 08:43)  BP(mean): 92 (08-13-18 @ 08:43)  RR: 18 (08-13-18 @ 08:43)  SpO2: 100% (08-13-18 @ 08:43)  Wt(kg): --    08-12 @ 07:01  -  08-13 @ 07:00  --------------------------------------------------------  IN: 480 mL / OUT: 100 mL / NET: 380 mL    08-13 @ 07:01  -  08-13 @ 08:54  --------------------------------------------------------  IN: 50 mL / OUT: 0 mL / NET: 50 mL        PHYSICAL EXAM:  GENERAL: NAD, well-developed, well nourished, alert, awake, no acute distress at present  HEAD:  Atraumatic, Normocephalic,   EYES: Bilateral conjuctiva and sclera normal   Oral cavity: Oral mucosa dry and pink  NECK: Neck supple, No JVD  CHEST/LUNG: Clear to auscultation bilaterally; No wheeze, no rales, no crepitations. + life vest on  HEART:tachycardic, irregularly irregular. WANDER II/VI at LPSB, No gallop, no rub   ABDOMEN: Soft, Nontender, BS+nt, No flank tenderness.   EXTREMITIES: No clubbing, cyanosis, or edema  Neurology: AAOx3, no focal neurological deficit  SKIN: No rashes or lesions          ACCESS:     LABS:                        8.5    4.5   )-----------( 133      ( 13 Aug 2018 06:12 )             27.5     08-13    141  |  101  |  21  ----------------------------<  136<H>  4.2   |  24  |  1.85<H>    Ca    9.2      13 Aug 2018 06:12  Mg     1.7     08-13                  RADIOLOGY & ADDITIONAL STUDIES:

## 2018-08-13 NOTE — PROGRESS NOTE ADULT - ATTENDING COMMENTS
Patient examined, fellow's hx and PE reviewed and confirmed. I find RONNIE, HTN, anemia. A/P reviewed and confirmed. Follow SCr. Continue anti-hypertensives. See full note.

## 2018-08-13 NOTE — PROGRESS NOTE ADULT - PROBLEM SELECTOR PLAN 5
RONNIE on CKD, currently Cr at 1.70, likely baseline, euvolemic on exam, saturating 98% on RA  - c/w Lasix 20mg qd   - Renal following, appreciate recs.   - no indication for RRT  - renally dose medications, avoid nephrotoxic agents   - avoid nephrotoxic agents     #elevated kappa protein  -elevated kappa and kappa/lambda ratio noted, will recommend further work up as out patient to r/u plasma cell dyscrasia.

## 2018-08-13 NOTE — PROGRESS NOTE ADULT - PROBLEM SELECTOR PLAN 3
Impression: bone marrow suppression in the setting of Abx vs nutritional deficiencies vs iatrogenic anemia   Acute Upper GI bleed 2/2 two non-bleed ulcers s/p clipping of one (7/27). Total 3U PRBC's total this admission with good Hgb response.  - s/p EGD 8/6 EGD- no active bleed; clips in situ on previous ulcer. GI to f/u outpt for colonoscopy  - CT ab 8/6: Negative for retroperitoneal or intraperitoneal hematoma. No evidence of esophageal or bowel perforation.  - continue to HOLD AC 2/2 GIB/ ASA resumed    - Hgb 8.5, No GIB or dark stools at this time, associated with leukopenia and thrombocytopenia   - Retic count 1.6  - pediatric tubes for blood draw  - Continue ASA 81 mg daily  - c/w Carafate and Protonix BID x 8 weeks (stop 9/21)  - c/w Mechanical soft/thin diet  - monitor CBCs, preferably peds using peds tube for blood draw, transfuse goal   hgb < 8   - GI following, appreciate recs.

## 2018-08-13 NOTE — PROGRESS NOTE ADULT - PROBLEM SELECTOR PLAN 7
Patient was weak, unsteady, and confused on admission. Improved significantly with decrease in BUN, now resolved. Wife reports pt back at baseline mental status. Likely toxic-metabolic 2/2 uremia, resolved.  - c/w Lexapro

## 2018-08-13 NOTE — PROGRESS NOTE ADULT - SUBJECTIVE AND OBJECTIVE BOX
CARDIOLOGY NP PROGRESS NOTE    Subjective: Pt seen and examined at bedside. Reports feeling well. Had episode of nausea yesterday after walking in hallway lasting few minutes. Denies chest pain, sob, lightheadedness, dizziness, palpitations.   Remainder ROS otherwise negative.    Overnight Events: None    TELEMETRY: -110s    EKG:      VITAL SIGNS:  T(C): 36.9 (08-13-18 @ 05:22), Max: 36.9 (08-12-18 @ 18:24)  HR: 108 (08-13-18 @ 08:43) (97 - 118)  BP: 125/76 (08-13-18 @ 08:43) (87/72 - 137/85)  RR: 18 (08-13-18 @ 08:43) (18 - 18)  SpO2: 100% (08-13-18 @ 08:43) (96% - 100%)  Wt(kg): --    I&O's Summary    12 Aug 2018 07:01  -  13 Aug 2018 07:00  --------------------------------------------------------  IN: 480 mL / OUT: 100 mL / NET: 380 mL    13 Aug 2018 07:01  -  13 Aug 2018 08:52  --------------------------------------------------------  IN: 50 mL / OUT: 0 mL / NET: 50 mL          PHYSICAL EXAM:    General: A/ox 3, No acute Distress. wearing Lifevest   Neck: Supple, NO JVD  Cardiac: S1 S2, No M/R/G  Pulmonary: CTAB, Breathing unlabored, No Rhonchi/Rales/Wheezing  Abdomen: Soft, Non -tender, +BS x 4 quads  Extremities: No Rashes, No edema  Neuro: A/o x 3, No focal deficits          LABS:                          8.5    4.5   )-----------( 133      ( 13 Aug 2018 06:12 )             27.5                              08-13    141  |  101  |  21  ----------------------------<  136<H>  4.2   |  24  |  1.85<H>    Ca    9.2      13 Aug 2018 06:12  Mg     1.7     08-13                                CAPILLARY BLOOD GLUCOSE      POCT Blood Glucose.: 123 mg/dL (13 Aug 2018 06:56)  POCT Blood Glucose.: 139 mg/dL (12 Aug 2018 21:00)  POCT Blood Glucose.: 135 mg/dL (12 Aug 2018 16:06)  POCT Blood Glucose.: 187 mg/dL (12 Aug 2018 11:07)            Allergies:  No Known Allergies    MEDICATIONS  (STANDING):  aspirin  chewable 81 milliGRAM(s) Oral daily  atorvastatin 80 milliGRAM(s) Oral at bedtime  cefTRIAXone   IVPB 1 Gram(s) IV Intermittent every 24 hours  cefTRIAXone   IVPB      dextrose 5%. 1000 milliLiter(s) (50 mL/Hr) IV Continuous <Continuous>  dextrose 50% Injectable 12.5 Gram(s) IV Push once  dextrose 50% Injectable 25 Gram(s) IV Push once  dextrose 50% Injectable 25 Gram(s) IV Push once  docusate sodium 100 milliGRAM(s) Oral daily  escitalopram 5 milliGRAM(s) Oral daily  furosemide    Tablet 20 milliGRAM(s) Oral daily  insulin lispro (HumaLOG) corrective regimen sliding scale   SubCutaneous three times a day before meals  insulin lispro (HumaLOG) corrective regimen sliding scale   SubCutaneous at bedtime  metoprolol tartrate 25 milliGRAM(s) Oral once  metoprolol tartrate 50 milliGRAM(s) Oral two times a day  pantoprazole   Suspension 40 milliGRAM(s) Oral two times a day before meals  polyethylene glycol 3350 17 Gram(s) Oral daily  senna 1 Tablet(s) Oral daily  sodium chloride 0.9% lock flush 20 milliLiter(s) IV Push once  sucralfate 1 Gram(s) Oral two times a day    MEDICATIONS  (PRN):  dextrose 40% Gel 15 Gram(s) Oral once PRN Blood Glucose LESS THAN 70 milliGRAM(s)/deciliter  glucagon  Injectable 1 milliGRAM(s) IntraMuscular once PRN Glucose LESS THAN 70 milligrams/deciliter  guaiFENesin    Syrup 100 milliGRAM(s) Oral every 6 hours PRN Cough  ondansetron Injectable 4 milliGRAM(s) IV Push every 6 hours PRN Nausea and/or Vomiting  sodium chloride 0.9% lock flush 10 milliLiter(s) IV Push every 1 hour PRN After each medication administration  sodium chloride 0.9% lock flush 10 milliLiter(s) IV Push every 12 hours PRN Lumen of catheter NOT used        DIAGNOSTIC TESTS: CARDIOLOGY NP PROGRESS NOTE    Subjective: Pt seen and examined at bedside. Reports feeling well. Had episode of nausea yesterday after walking in hallway lasting few minutes. Denies chest pain, sob, lightheadedness, dizziness, palpitations.   Remainder ROS otherwise negative.    Overnight Events: None    TELEMETRY: -110s    EKG: SR 100s, Q waves Q, aVL, V5-V6      VITAL SIGNS:  T(C): 36.9 (08-13-18 @ 05:22), Max: 36.9 (08-12-18 @ 18:24)  HR: 108 (08-13-18 @ 08:43) (97 - 118)  BP: 125/76 (08-13-18 @ 08:43) (87/72 - 137/85)  RR: 18 (08-13-18 @ 08:43) (18 - 18)  SpO2: 100% (08-13-18 @ 08:43) (96% - 100%)  Wt(kg): --    I&O's Summary    12 Aug 2018 07:01  -  13 Aug 2018 07:00  --------------------------------------------------------  IN: 480 mL / OUT: 100 mL / NET: 380 mL    13 Aug 2018 07:01  -  13 Aug 2018 08:52  --------------------------------------------------------  IN: 50 mL / OUT: 0 mL / NET: 50 mL          PHYSICAL EXAM:    General: A/ox 3, No acute Distress. wearing Lifevest   Neck: Supple, NO JVD  Cardiac: S1 S2, No M/R/G  Pulmonary: CTAB, Breathing unlabored, No Rhonchi/Rales/Wheezing  Abdomen: Soft, Non -tender, +BS x 4 quads  Extremities: No Rashes, No edema  Neuro: A/o x 3, No focal deficits          LABS:                          8.5    4.5   )-----------( 133      ( 13 Aug 2018 06:12 )             27.5                              08-13    141  |  101  |  21  ----------------------------<  136<H>  4.2   |  24  |  1.85<H>    Ca    9.2      13 Aug 2018 06:12  Mg     1.7     08-13                                CAPILLARY BLOOD GLUCOSE      POCT Blood Glucose.: 123 mg/dL (13 Aug 2018 06:56)  POCT Blood Glucose.: 139 mg/dL (12 Aug 2018 21:00)  POCT Blood Glucose.: 135 mg/dL (12 Aug 2018 16:06)  POCT Blood Glucose.: 187 mg/dL (12 Aug 2018 11:07)            Allergies:  No Known Allergies    MEDICATIONS  (STANDING):  aspirin  chewable 81 milliGRAM(s) Oral daily  atorvastatin 80 milliGRAM(s) Oral at bedtime  cefTRIAXone   IVPB 1 Gram(s) IV Intermittent every 24 hours  cefTRIAXone   IVPB      dextrose 5%. 1000 milliLiter(s) (50 mL/Hr) IV Continuous <Continuous>  dextrose 50% Injectable 12.5 Gram(s) IV Push once  dextrose 50% Injectable 25 Gram(s) IV Push once  dextrose 50% Injectable 25 Gram(s) IV Push once  docusate sodium 100 milliGRAM(s) Oral daily  escitalopram 5 milliGRAM(s) Oral daily  furosemide    Tablet 20 milliGRAM(s) Oral daily  insulin lispro (HumaLOG) corrective regimen sliding scale   SubCutaneous three times a day before meals  insulin lispro (HumaLOG) corrective regimen sliding scale   SubCutaneous at bedtime  metoprolol tartrate 25 milliGRAM(s) Oral once  metoprolol tartrate 50 milliGRAM(s) Oral two times a day  pantoprazole   Suspension 40 milliGRAM(s) Oral two times a day before meals  polyethylene glycol 3350 17 Gram(s) Oral daily  senna 1 Tablet(s) Oral daily  sodium chloride 0.9% lock flush 20 milliLiter(s) IV Push once  sucralfate 1 Gram(s) Oral two times a day    MEDICATIONS  (PRN):  dextrose 40% Gel 15 Gram(s) Oral once PRN Blood Glucose LESS THAN 70 milliGRAM(s)/deciliter  glucagon  Injectable 1 milliGRAM(s) IntraMuscular once PRN Glucose LESS THAN 70 milligrams/deciliter  guaiFENesin    Syrup 100 milliGRAM(s) Oral every 6 hours PRN Cough  ondansetron Injectable 4 milliGRAM(s) IV Push every 6 hours PRN Nausea and/or Vomiting  sodium chloride 0.9% lock flush 10 milliLiter(s) IV Push every 1 hour PRN After each medication administration  sodium chloride 0.9% lock flush 10 milliLiter(s) IV Push every 12 hours PRN Lumen of catheter NOT used        DIAGNOSTIC TESTS:

## 2018-08-13 NOTE — PROGRESS NOTE ADULT - PROBLEM SELECTOR PLAN 1
HX PCI 7 yrs ago. Reportedly STEMI in [place] (ST elevations in lateral leads and trop 2000). On admission, EKG with Q waves in leads I and aVL w/poor R wave progression.   - Cont ASA 81mg qd, Lipitor 80mg PO qd  - Uptitrate Metoprolol tartrate 25mg Q8H to 50mg BID, plan to transition to Toprol XL  - No plan for cardiac cath this admission as pt unable to tolerate AC 2/2 acute GIB     #Asymptomatic paroxysmal Afib/Aflutter  HX paroxysmal afib with paroxysmal aflutter noted in beginning of hospital course   - HOLD anti-coagulation 2/2 GIB   - Cont Metoprolol tartrate 25mg Q8H, plan to transition to Toprol XL

## 2018-08-13 NOTE — PROGRESS NOTE ADULT - PROBLEM SELECTOR PLAN 2
Hx sCHF x 7years per wife. EF reportedly 25% in the past.  Echo performed 7/20/18 w/ EF 15-20%. Was volume overloaded on initial exam, now improved. On CXR, evidence of cardiomegaly but significant decrease in pulmonary vascular congestion.   - c/w Lasix 20mg qd   - Uptitrate Metoprolol tartrate 25mg Q8H to 50mg BID, plan to transition to Toprol XL  - Fitted for Lifevest on 8/8. As pt is awaiting outpt ICD consideration, as EF <30% and not revascularized s/p STEMI, will cont optimal medical therapy x 3mos. Unable to be on full medical opt 2/2 [facility]. Lifevest on in house   - strict I/Os, daily weights

## 2018-08-13 NOTE — PROGRESS NOTE ADULT - PROBLEM SELECTOR PLAN 1
kidney function stable  cr @ 1.85  - volume status acceptable   lasix resumed   - electrolytes noted   avoid nephrotoxic agents  - renal diet  renally dose ABX

## 2018-08-13 NOTE — PROGRESS NOTE ADULT - PROBLEM SELECTOR PLAN 4
Likely urosepsis as positive blood and urine cultures klebsiella; currently on ceftriaxone   - Ceftriaxone 1g qd (day 6/14, last day 8/17/18) s/p midline placement 8/7   -Of note, Midline CXR read noting coiling at axilla, catheter able to flushed and draws back blood. Okay to use per IR.

## 2018-08-14 LAB
APPEARANCE UR: ABNORMAL
BILIRUB UR-MCNC: NEGATIVE — SIGNIFICANT CHANGE UP
COLOR SPEC: YELLOW — SIGNIFICANT CHANGE UP
DIFF PNL FLD: ABNORMAL
GLUCOSE BLDC GLUCOMTR-MCNC: 119 MG/DL — HIGH (ref 70–99)
GLUCOSE BLDC GLUCOMTR-MCNC: 138 MG/DL — HIGH (ref 70–99)
GLUCOSE BLDC GLUCOMTR-MCNC: 189 MG/DL — HIGH (ref 70–99)
GLUCOSE BLDC GLUCOMTR-MCNC: 200 MG/DL — HIGH (ref 70–99)
GLUCOSE UR QL: NEGATIVE — SIGNIFICANT CHANGE UP
KETONES UR-MCNC: NEGATIVE — SIGNIFICANT CHANGE UP
LEUKOCYTE ESTERASE UR-ACNC: NEGATIVE — SIGNIFICANT CHANGE UP
NITRITE UR-MCNC: NEGATIVE — SIGNIFICANT CHANGE UP
PH UR: 5.5 — SIGNIFICANT CHANGE UP (ref 5–8)
PROT UR-MCNC: 30 MG/DL
SP GR SPEC: >=1.03 — SIGNIFICANT CHANGE UP (ref 1–1.03)
UROBILINOGEN FLD QL: 0.2 E.U./DL — SIGNIFICANT CHANGE UP

## 2018-08-14 PROCEDURE — 99233 SBSQ HOSP IP/OBS HIGH 50: CPT

## 2018-08-14 PROCEDURE — 99232 SBSQ HOSP IP/OBS MODERATE 35: CPT

## 2018-08-14 RX ORDER — CEFTRIAXONE 500 MG/1
1 INJECTION, POWDER, FOR SOLUTION INTRAMUSCULAR; INTRAVENOUS EVERY 24 HOURS
Qty: 0 | Refills: 0 | Status: DISCONTINUED | OUTPATIENT
Start: 2018-08-15 | End: 2018-08-15

## 2018-08-14 RX ORDER — IPRATROPIUM/ALBUTEROL SULFATE 18-103MCG
3 AEROSOL WITH ADAPTER (GRAM) INHALATION ONCE
Qty: 0 | Refills: 0 | Status: COMPLETED | OUTPATIENT
Start: 2018-08-14 | End: 2018-08-14

## 2018-08-14 RX ORDER — METOPROLOL TARTRATE 50 MG
50 TABLET ORAL
Qty: 0 | Refills: 0 | Status: DISCONTINUED | OUTPATIENT
Start: 2018-08-14 | End: 2018-08-15

## 2018-08-14 RX ADMIN — Medication 100 MILLIGRAM(S): at 21:44

## 2018-08-14 RX ADMIN — Medication 100 MILLIGRAM(S): at 12:14

## 2018-08-14 RX ADMIN — Medication 50 MILLIGRAM(S): at 06:36

## 2018-08-14 RX ADMIN — Medication 1 GRAM(S): at 18:14

## 2018-08-14 RX ADMIN — Medication 1 GRAM(S): at 06:36

## 2018-08-14 RX ADMIN — POLYETHYLENE GLYCOL 3350 17 GRAM(S): 17 POWDER, FOR SOLUTION ORAL at 12:14

## 2018-08-14 RX ADMIN — PANTOPRAZOLE SODIUM 40 MILLIGRAM(S): 20 TABLET, DELAYED RELEASE ORAL at 06:53

## 2018-08-14 RX ADMIN — SENNA PLUS 1 TABLET(S): 8.6 TABLET ORAL at 12:14

## 2018-08-14 RX ADMIN — Medication 81 MILLIGRAM(S): at 12:14

## 2018-08-14 RX ADMIN — PANTOPRAZOLE SODIUM 40 MILLIGRAM(S): 20 TABLET, DELAYED RELEASE ORAL at 18:14

## 2018-08-14 RX ADMIN — Medication 2: at 12:13

## 2018-08-14 RX ADMIN — Medication 50 MILLIGRAM(S): at 18:35

## 2018-08-14 RX ADMIN — ESCITALOPRAM OXALATE 5 MILLIGRAM(S): 10 TABLET, FILM COATED ORAL at 12:14

## 2018-08-14 RX ADMIN — ATORVASTATIN CALCIUM 80 MILLIGRAM(S): 80 TABLET, FILM COATED ORAL at 21:39

## 2018-08-14 RX ADMIN — Medication 20 MILLIGRAM(S): at 06:36

## 2018-08-14 RX ADMIN — CEFTRIAXONE 100 GRAM(S): 500 INJECTION, POWDER, FOR SOLUTION INTRAMUSCULAR; INTRAVENOUS at 09:09

## 2018-08-14 RX ADMIN — Medication 3 MILLILITER(S): at 09:09

## 2018-08-14 RX ADMIN — Medication 2: at 16:52

## 2018-08-14 NOTE — PROGRESS NOTE ADULT - PROBLEM SELECTOR PLAN 1
kidney function stable  cr @ 1.85 on 8/13  - volume status acceptable   lasix resumed   - electrolytes noted   avoid nephrotoxic agents  - renal diet  renally dose ABX

## 2018-08-14 NOTE — PROGRESS NOTE ADULT - ASSESSMENT
67M PMHx HTN, DM, HLD, HFrEF (15-20% on echo), CAD s/p PCI 7 years ago presented 7/20 with altered mental status in the setting of one week of dyspnea and weakness admitted to the CCU for management of ACS, acute on chronic CHF, RONNIE. Hospital course c/b acute upper GI bleed 2/2 two gastric ulcers; s/p clipping of one ulcer (7/27) s/p 3U PRBCs, found to have urosepsis currently on Ceftriaxone, stepped down from CCU to [facility] for further management, now pending acute rehab placement.

## 2018-08-14 NOTE — PROGRESS NOTE ADULT - PROBLEM SELECTOR PLAN 2
Hx sCHF x 7years per wife. EF reportedly 25% in the past.  Echo performed 7/20/18 w/ EF 15-20%. Was volume overloaded on initial exam, now improved. On CXR, evidence of cardiomegaly but significant decrease in pulmonary vascular congestion.   - c/w Lasix 20mg qd   - Uptitrated Metoprolol tartrate 50mg BID, plan to transition to Toprol XL  - Fitted for Lifevest on 8/8. As pt is awaiting outpt ICD consideration, as EF <30% and not revascularized s/p STEMI, will cont optimal medical therapy x 3mos. Unable to be on full optimal medical therapy 2/2 [facility]. Lifevest on in house   - strict I/Os, daily weights

## 2018-08-14 NOTE — PROGRESS NOTE ADULT - ATTENDING COMMENTS
Assessment: Patient personally seen and examined myself during rounds with the Physician Assistant/House Staff/Nurse Practitioner  ON DATE 8/14/18  Physician Assistant/House Staff/Nurse Practitioner note read, including vitals, physical findings, laboratory data, and radiological reports.   Revisions included below.   Direct personal management at bed side and extensive interpretation of the data.    Plan was outlined and discussed in details with the Physician Assistant/House Staff/Nurse Practitioner.    Decision making of high complexity   Risk high of complications, morbidity, and/or mortality  Assessment and Action taken for acute disease activity to reflect the level of care provided:  -Hemodynamic evaluation and support  -Medication reconciliation  -Review laboratory data  -EKG reviewed   -    TIME SPENT in evaluation and management, reassessments, review and interpretation of labs and x-rays, and hemodynamic management, formulating a plan and coordinating care: ___25____ MIN.  Time does not include procedural time.    Isaias Fountain MD  Cardiology    Mobile: [phone]  Office: [phone]  [address] [place] [address], [phone]

## 2018-08-14 NOTE — PROGRESS NOTE ADULT - PROBLEM SELECTOR PLAN 1
HX PCI 7 yrs ago. Reportedly STEMI in [place] (ST elevations in lateral leads and trop 2000). On admission, EKG with Q waves in leads I and aVL w/poor R wave progression.   - Cont ASA 81mg qd, Lipitor 80mg PO qd  - Uptitrated Metoprolol tartrate 50mg BID, plan to transition to Toprol XL  - No plan for cardiac cath this admission as pt unable to tolerate AC 2/2 acute GIB     #Asymptomatic paroxysmal Afib/Aflutter  HX paroxysmal afib with paroxysmal aflutter noted in beginning of hospital course   - HOLD anti-coagulation 2/2 GIB   - Cont Metoprolol tartrate 50mg BID, plan to transition to Toprol XL

## 2018-08-14 NOTE — PROGRESS NOTE ADULT - SUBJECTIVE AND OBJECTIVE BOX
Patient is a 67y Male seen and evaluated at bedside.   Patient has no complaints  awaiting placement to [facility]  labs pending    aspirin  chewable 81 daily  atorvastatin 80 at bedtime  cefTRIAXone   IVPB 1 every 24 hours  cefTRIAXone   IVPB    dextrose 40% Gel 15 once PRN  dextrose 5%. 1000 <Continuous>  dextrose 50% Injectable 12.5 once  dextrose 50% Injectable 25 once  dextrose 50% Injectable 25 once  docusate sodium 100 daily  escitalopram 5 daily  furosemide    Tablet 20 daily  glucagon  Injectable 1 once PRN  guaiFENesin    Syrup 100 every 6 hours PRN  insulin lispro (HumaLOG) corrective regimen sliding scale  three times a day before meals  insulin lispro (HumaLOG) corrective regimen sliding scale  at bedtime  metoprolol tartrate 50 two times a day  ondansetron Injectable 4 every 6 hours PRN  pantoprazole   Suspension 40 two times a day before meals  polyethylene glycol 3350 17 daily  senna 1 daily  sodium chloride 0.9% lock flush 10 every 1 hour PRN  sodium chloride 0.9% lock flush 10 every 12 hours PRN  sodium chloride 0.9% lock flush 20 once  sucralfate 1 two times a day      Allergies    No Known Allergies    Intolerances        T(C): , Max: 37.2 (08-13-18 @ 14:12)  T(F): , Max: 99 (08-13-18 @ 14:12)  HR: 86 (08-14-18 @ 09:15)  BP: 83/54 (08-14-18 @ 09:15)  BP(mean): 68 (08-14-18 @ 09:15)  RR: 16 (08-14-18 @ 06:36)  SpO2: 95% (08-13-18 @ 20:56)  Wt(kg): --    08-13 @ 07:01  -  08-14 @ 07:00  --------------------------------------------------------  IN: 390 mL / OUT: 200 mL / NET: 190 mL    08-14 @ 07:01  -  08-14 @ 09:38  --------------------------------------------------------  IN: 150 mL / OUT: 100 mL / NET: 50 mL          PHYSICAL EXAM:  GENERAL: NAD, well-developed, well nourished, alert, awake, no acute distress at present  HEAD:  Atraumatic, Normocephalic,   EYES: Bilateral conjunctiva and sclera normal   Oral cavity: Oral mucosa dry and pink  NECK: Neck supple, No JVD  CHEST/LUNG: Clear to auscultation bilaterally; No wheeze, no rales, no crepitations  HEART: irRegularly irregular rate and rhythm. WANDER II/VI at LPSB, No gallop, no rub   ABDOMEN: Soft, Nontender, BS+nt, No flank tenderness.   EXTREMITIES: No clubbing, cyanosis, or edema  Neurology: AAOx3, no focal neurological deficit  SKIN: No rashes or lesions          ACCESS:     LABS:                        8.5    4.5   )-----------( 133      ( 13 Aug 2018 06:12 )             27.5     08-13    141  |  101  |  21  ----------------------------<  136<H>  4.2   |  24  |  1.85<H>    Ca    9.2      13 Aug 2018 06:12  Mg     1.7     08-13                  RADIOLOGY & ADDITIONAL STUDIES:

## 2018-08-14 NOTE — PROGRESS NOTE ADULT - ATTENDING COMMENTS
Patient examined, fellow's hx and PE reviewed and confirmed. I find RONNIE stable, anemia A/P reviewed and confirmed. Follow SCr, continue lasix. See full note.

## 2018-08-15 VITALS — TEMPERATURE: 99 F

## 2018-08-15 LAB
GLUCOSE BLDC GLUCOMTR-MCNC: 163 MG/DL — HIGH (ref 70–99)
GLUCOSE BLDC GLUCOMTR-MCNC: 97 MG/DL — SIGNIFICANT CHANGE UP (ref 70–99)

## 2018-08-15 PROCEDURE — 82728 ASSAY OF FERRITIN: CPT

## 2018-08-15 PROCEDURE — 74018 RADEX ABDOMEN 1 VIEW: CPT

## 2018-08-15 PROCEDURE — 70450 CT HEAD/BRAIN W/O DYE: CPT

## 2018-08-15 PROCEDURE — 83550 IRON BINDING TEST: CPT

## 2018-08-15 PROCEDURE — 87040 BLOOD CULTURE FOR BACTERIA: CPT

## 2018-08-15 PROCEDURE — 83036 HEMOGLOBIN GLYCOSYLATED A1C: CPT

## 2018-08-15 PROCEDURE — 85730 THROMBOPLASTIN TIME PARTIAL: CPT

## 2018-08-15 PROCEDURE — 84133 ASSAY OF URINE POTASSIUM: CPT

## 2018-08-15 PROCEDURE — 86706 HEP B SURFACE ANTIBODY: CPT

## 2018-08-15 PROCEDURE — 92523 SPEECH SOUND LANG COMPREHEN: CPT | Mod: GN

## 2018-08-15 PROCEDURE — 86900 BLOOD TYPING SEROLOGIC ABO: CPT

## 2018-08-15 PROCEDURE — 84156 ASSAY OF PROTEIN URINE: CPT

## 2018-08-15 PROCEDURE — 87186 SC STD MICRODIL/AGAR DIL: CPT

## 2018-08-15 PROCEDURE — 97110 THERAPEUTIC EXERCISES: CPT

## 2018-08-15 PROCEDURE — 82652 VIT D 1 25-DIHYDROXY: CPT

## 2018-08-15 PROCEDURE — 84466 ASSAY OF TRANSFERRIN: CPT

## 2018-08-15 PROCEDURE — 70551 MRI BRAIN STEM W/O DYE: CPT

## 2018-08-15 PROCEDURE — 85610 PROTHROMBIN TIME: CPT

## 2018-08-15 PROCEDURE — 80061 LIPID PANEL: CPT

## 2018-08-15 PROCEDURE — 82247 BILIRUBIN TOTAL: CPT

## 2018-08-15 PROCEDURE — 82330 ASSAY OF CALCIUM: CPT

## 2018-08-15 PROCEDURE — 87340 HEPATITIS B SURFACE AG IA: CPT

## 2018-08-15 PROCEDURE — 86381 MITOCHONDRIAL ANTIBODY EACH: CPT

## 2018-08-15 PROCEDURE — 97116 GAIT TRAINING THERAPY: CPT

## 2018-08-15 PROCEDURE — 94640 AIRWAY INHALATION TREATMENT: CPT

## 2018-08-15 PROCEDURE — 84295 ASSAY OF SERUM SODIUM: CPT

## 2018-08-15 PROCEDURE — 83605 ASSAY OF LACTIC ACID: CPT

## 2018-08-15 PROCEDURE — 82595 ASSAY OF CRYOGLOBULIN: CPT

## 2018-08-15 PROCEDURE — 82962 GLUCOSE BLOOD TEST: CPT

## 2018-08-15 PROCEDURE — 87086 URINE CULTURE/COLONY COUNT: CPT

## 2018-08-15 PROCEDURE — 85027 COMPLETE CBC AUTOMATED: CPT

## 2018-08-15 PROCEDURE — 92610 EVALUATE SWALLOWING FUNCTION: CPT | Mod: GN

## 2018-08-15 PROCEDURE — 71045 X-RAY EXAM CHEST 1 VIEW: CPT

## 2018-08-15 PROCEDURE — 99233 SBSQ HOSP IP/OBS HIGH 50: CPT

## 2018-08-15 PROCEDURE — 82977 ASSAY OF GGT: CPT

## 2018-08-15 PROCEDURE — 85045 AUTOMATED RETICULOCYTE COUNT: CPT

## 2018-08-15 PROCEDURE — 86923 COMPATIBILITY TEST ELECTRIC: CPT

## 2018-08-15 PROCEDURE — 82248 BILIRUBIN DIRECT: CPT

## 2018-08-15 PROCEDURE — P9016: CPT

## 2018-08-15 PROCEDURE — 97535 SELF CARE MNGMENT TRAINING: CPT

## 2018-08-15 PROCEDURE — 84132 ASSAY OF SERUM POTASSIUM: CPT

## 2018-08-15 PROCEDURE — 99238 HOSP IP/OBS DSCHRG MGMT 30/<: CPT

## 2018-08-15 PROCEDURE — 83880 ASSAY OF NATRIURETIC PEPTIDE: CPT

## 2018-08-15 PROCEDURE — 80048 BASIC METABOLIC PNL TOTAL CA: CPT

## 2018-08-15 PROCEDURE — C8929: CPT

## 2018-08-15 PROCEDURE — 93880 EXTRACRANIAL BILAT STUDY: CPT

## 2018-08-15 PROCEDURE — 82784 ASSAY IGA/IGD/IGG/IGM EACH: CPT

## 2018-08-15 PROCEDURE — 84443 ASSAY THYROID STIM HORMONE: CPT

## 2018-08-15 PROCEDURE — 93005 ELECTROCARDIOGRAM TRACING: CPT

## 2018-08-15 PROCEDURE — 97162 PT EVAL MOD COMPLEX 30 MIN: CPT

## 2018-08-15 PROCEDURE — 82585 ASSAY OF CRYOFIBRINOGEN: CPT

## 2018-08-15 PROCEDURE — 86901 BLOOD TYPING SEROLOGIC RH(D): CPT

## 2018-08-15 PROCEDURE — 86038 ANTINUCLEAR ANTIBODIES: CPT

## 2018-08-15 PROCEDURE — 83615 LACTATE (LD) (LDH) ENZYME: CPT

## 2018-08-15 PROCEDURE — 36430 TRANSFUSION BLD/BLD COMPNT: CPT

## 2018-08-15 PROCEDURE — 82390 ASSAY OF CERULOPLASMIN: CPT

## 2018-08-15 PROCEDURE — 80053 COMPREHEN METABOLIC PANEL: CPT

## 2018-08-15 PROCEDURE — 99285 EMERGENCY DEPT VISIT HI MDM: CPT | Mod: 25

## 2018-08-15 PROCEDURE — 87389 HIV-1 AG W/HIV-1&-2 AB AG IA: CPT

## 2018-08-15 PROCEDURE — 82550 ASSAY OF CK (CPK): CPT

## 2018-08-15 PROCEDURE — 70544 MR ANGIOGRAPHY HEAD W/O DYE: CPT

## 2018-08-15 PROCEDURE — 84100 ASSAY OF PHOSPHORUS: CPT

## 2018-08-15 PROCEDURE — 82436 ASSAY OF URINE CHLORIDE: CPT

## 2018-08-15 PROCEDURE — 82570 ASSAY OF URINE CREATININE: CPT

## 2018-08-15 PROCEDURE — 82310 ASSAY OF CALCIUM: CPT

## 2018-08-15 PROCEDURE — 87150 DNA/RNA AMPLIFIED PROBE: CPT

## 2018-08-15 PROCEDURE — 82553 CREATINE MB FRACTION: CPT

## 2018-08-15 PROCEDURE — 81001 URINALYSIS AUTO W/SCOPE: CPT

## 2018-08-15 PROCEDURE — 86255 FLUORESCENT ANTIBODY SCREEN: CPT

## 2018-08-15 PROCEDURE — 82803 BLOOD GASES ANY COMBINATION: CPT

## 2018-08-15 PROCEDURE — 86803 HEPATITIS C AB TEST: CPT

## 2018-08-15 PROCEDURE — 76700 US EXAM ABDOM COMPLETE: CPT

## 2018-08-15 PROCEDURE — 83010 ASSAY OF HAPTOGLOBIN QUANT: CPT

## 2018-08-15 PROCEDURE — 84300 ASSAY OF URINE SODIUM: CPT

## 2018-08-15 PROCEDURE — 84540 ASSAY OF URINE/UREA-N: CPT

## 2018-08-15 PROCEDURE — 86709 HEPATITIS A IGM ANTIBODY: CPT

## 2018-08-15 PROCEDURE — 83735 ASSAY OF MAGNESIUM: CPT

## 2018-08-15 PROCEDURE — 86850 RBC ANTIBODY SCREEN: CPT

## 2018-08-15 PROCEDURE — 85025 COMPLETE CBC W/AUTO DIFF WBC: CPT

## 2018-08-15 PROCEDURE — 97112 NEUROMUSCULAR REEDUCATION: CPT

## 2018-08-15 PROCEDURE — 84484 ASSAY OF TROPONIN QUANT: CPT

## 2018-08-15 PROCEDURE — C1889: CPT

## 2018-08-15 PROCEDURE — 82140 ASSAY OF AMMONIA: CPT

## 2018-08-15 PROCEDURE — 36415 COLL VENOUS BLD VENIPUNCTURE: CPT

## 2018-08-15 PROCEDURE — 83970 ASSAY OF PARATHORMONE: CPT

## 2018-08-15 PROCEDURE — 92526 ORAL FUNCTION THERAPY: CPT | Mod: GN

## 2018-08-15 PROCEDURE — 82306 VITAMIN D 25 HYDROXY: CPT

## 2018-08-15 PROCEDURE — 74176 CT ABD & PELVIS W/O CONTRAST: CPT

## 2018-08-15 PROCEDURE — 97530 THERAPEUTIC ACTIVITIES: CPT

## 2018-08-15 RX ORDER — CIPROFLOXACIN LACTATE 400MG/40ML
500 VIAL (ML) INTRAVENOUS EVERY 12 HOURS
Qty: 0 | Refills: 0 | Status: DISCONTINUED | OUTPATIENT
Start: 2018-08-16 | End: 2018-08-15

## 2018-08-15 RX ORDER — CIPROFLOXACIN LACTATE 400MG/40ML
1 VIAL (ML) INTRAVENOUS
Qty: 0 | Refills: 0 | COMMUNITY
Start: 2018-08-15

## 2018-08-15 RX ORDER — CEFTRIAXONE 500 MG/1
1 INJECTION, POWDER, FOR SOLUTION INTRAMUSCULAR; INTRAVENOUS
Qty: 0 | Refills: 0 | COMMUNITY

## 2018-08-15 RX ORDER — METOPROLOL TARTRATE 50 MG
1 TABLET ORAL
Qty: 0 | Refills: 0 | COMMUNITY
Start: 2018-08-15

## 2018-08-15 RX ADMIN — Medication 2: at 11:38

## 2018-08-15 RX ADMIN — CEFTRIAXONE 100 GRAM(S): 500 INJECTION, POWDER, FOR SOLUTION INTRAMUSCULAR; INTRAVENOUS at 11:07

## 2018-08-15 RX ADMIN — Medication 50 MILLIGRAM(S): at 06:49

## 2018-08-15 RX ADMIN — Medication 81 MILLIGRAM(S): at 11:07

## 2018-08-15 RX ADMIN — Medication 20 MILLIGRAM(S): at 06:49

## 2018-08-15 RX ADMIN — Medication 1 GRAM(S): at 19:54

## 2018-08-15 RX ADMIN — PANTOPRAZOLE SODIUM 40 MILLIGRAM(S): 20 TABLET, DELAYED RELEASE ORAL at 06:49

## 2018-08-15 RX ADMIN — Medication 50 MILLIGRAM(S): at 19:52

## 2018-08-15 RX ADMIN — PANTOPRAZOLE SODIUM 40 MILLIGRAM(S): 20 TABLET, DELAYED RELEASE ORAL at 19:53

## 2018-08-15 RX ADMIN — Medication 100 MILLIGRAM(S): at 11:07

## 2018-08-15 RX ADMIN — Medication 1 GRAM(S): at 06:49

## 2018-08-15 RX ADMIN — ESCITALOPRAM OXALATE 5 MILLIGRAM(S): 10 TABLET, FILM COATED ORAL at 11:07

## 2018-08-15 NOTE — PROGRESS NOTE ADULT - PROVIDER SPECIALTY LIST ADULT
CCU
Cardiology
Gastroenterology
Nephrology
CCU
Gastroenterology
Gastroenterology
CCU
Nephrology
Cardiology
CCU

## 2018-08-15 NOTE — PROGRESS NOTE ADULT - SUBJECTIVE AND OBJECTIVE BOX
Patient is a 67y Male seen and evaluated at bedside.   No complaints  labs pending    aspirin  chewable 81 daily  atorvastatin 80 at bedtime  cefTRIAXone   IVPB 1 every 24 hours  dextrose 40% Gel 15 once PRN  dextrose 5%. 1000 <Continuous>  dextrose 50% Injectable 12.5 once  dextrose 50% Injectable 25 once  dextrose 50% Injectable 25 once  docusate sodium 100 daily  escitalopram 5 daily  furosemide    Tablet 20 daily  glucagon  Injectable 1 once PRN  guaiFENesin    Syrup 100 every 6 hours PRN  insulin lispro (HumaLOG) corrective regimen sliding scale  three times a day before meals  insulin lispro (HumaLOG) corrective regimen sliding scale  at bedtime  metoprolol tartrate 50 two times a day  ondansetron Injectable 4 every 6 hours PRN  pantoprazole   Suspension 40 two times a day before meals  polyethylene glycol 3350 17 daily  senna 1 daily  sodium chloride 0.9% lock flush 10 every 1 hour PRN  sodium chloride 0.9% lock flush 10 every 12 hours PRN  sodium chloride 0.9% lock flush 20 once  sucralfate 1 two times a day      Allergies    No Known Allergies    Intolerances        T(C): , Max: 37.6 (08-14-18 @ 14:15)  T(F): , Max: 99.7 (08-14-18 @ 14:15)  HR: 80 (08-15-18 @ 08:46)  BP: 71/47 (08-15-18 @ 08:46)  BP(mean): 52 (08-15-18 @ 08:46)  RR: 19 (08-15-18 @ 08:46)  SpO2: 99% (08-15-18 @ 08:46)  Wt(kg): --    08-14 @ 07:01  -  08-15 @ 07:00  --------------------------------------------------------  IN: 827 mL / OUT: 650 mL / NET: 177 mL              PHYSICAL EXAM:  GENERAL: NAD, well-developed, well nourished, alert, awake, no acute distress at present  HEAD:  Atraumatic, Normocephalic,   EYES: Bilateral conjunctiva and sclera normal   Oral cavity: Oral mucosa dry and pink  NECK: Neck supple, No JVD  CHEST/LUNG: Clear to auscultation bilaterally; No wheeze, no rales, no crepitations, + lifevest on  HEART: Regular rate and rhythm. WANDER II/VI at LPSB, No gallop, no rub   ABDOMEN: Soft, Nontender, BS+nt, No flank tenderness.   EXTREMITIES: No clubbing, cyanosis, or edema  Neurology: AAOx3, no focal neurological deficit  SKIN: No rashes or lesions          ACCESS:     LABS:              Urinalysis Basic - ( 14 Aug 2018 15:04 )    Color: Yellow / Appearance: SL Cloudy / SG: >=1.030 / pH: x  Gluc: x / Ketone: NEGATIVE  / Bili: Negative / Urobili: 0.2 E.U./dL   Blood: x / Protein: 30 mg/dL / Nitrite: NEGATIVE   Leuk Esterase: NEGATIVE / RBC: 5-10 /HPF / WBC < 5 /HPF   Sq Epi: x / Non Sq Epi: 0-5 /HPF / Bacteria: Present /HPF            RADIOLOGY & ADDITIONAL STUDIES:

## 2018-08-15 NOTE — PROGRESS NOTE ADULT - PROBLEM SELECTOR PLAN 1
kidney function stable as of 8/13  no labs since  - volume status acceptable   lasix resumed   avoid nephrotoxic agents  - renal diet  renally dose ABX  can switch PPi to pepcid

## 2018-08-15 NOTE — PROGRESS NOTE ADULT - ATTENDING COMMENTS
Patient examined, fellow's hx and PE reviewed and confirmed. I find stable RONNIE, anemia. A/P reviewed and confirmed. Follow SCr. Continue lasix. Hold for hypotension. See full note.

## 2018-08-16 LAB
CULTURE RESULTS: NO GROWTH — SIGNIFICANT CHANGE UP
SPECIMEN SOURCE: SIGNIFICANT CHANGE UP

## 2018-08-20 DIAGNOSIS — Z79.82 LONG TERM (CURRENT) USE OF ASPIRIN: ICD-10-CM

## 2018-08-20 DIAGNOSIS — I48.92 UNSPECIFIED ATRIAL FLUTTER: ICD-10-CM

## 2018-08-20 DIAGNOSIS — K72.00 ACUTE AND SUBACUTE HEPATIC FAILURE WITHOUT COMA: ICD-10-CM

## 2018-08-20 DIAGNOSIS — K59.00 CONSTIPATION, UNSPECIFIED: ICD-10-CM

## 2018-08-20 DIAGNOSIS — E87.0 HYPEROSMOLALITY AND HYPERNATREMIA: ICD-10-CM

## 2018-08-20 DIAGNOSIS — E83.39 OTHER DISORDERS OF PHOSPHORUS METABOLISM: ICD-10-CM

## 2018-08-20 DIAGNOSIS — R78.81 BACTEREMIA: ICD-10-CM

## 2018-08-20 DIAGNOSIS — E87.6 HYPOKALEMIA: ICD-10-CM

## 2018-08-20 DIAGNOSIS — E87.1 HYPO-OSMOLALITY AND HYPONATREMIA: ICD-10-CM

## 2018-08-20 DIAGNOSIS — Z95.5 PRESENCE OF CORONARY ANGIOPLASTY IMPLANT AND GRAFT: ICD-10-CM

## 2018-08-20 DIAGNOSIS — R74.0 NONSPECIFIC ELEVATION OF LEVELS OF TRANSAMINASE AND LACTIC ACID DEHYDROGENASE [LDH]: ICD-10-CM

## 2018-08-20 DIAGNOSIS — E87.2 ACIDOSIS: ICD-10-CM

## 2018-08-20 DIAGNOSIS — I35.1 NONRHEUMATIC AORTIC (VALVE) INSUFFICIENCY: ICD-10-CM

## 2018-08-20 DIAGNOSIS — Z98.49 CATARACT EXTRACTION STATUS, UNSPECIFIED EYE: ICD-10-CM

## 2018-08-20 DIAGNOSIS — R41.82 ALTERED MENTAL STATUS, UNSPECIFIED: ICD-10-CM

## 2018-08-20 DIAGNOSIS — I50.23 ACUTE ON CHRONIC SYSTOLIC (CONGESTIVE) HEART FAILURE: ICD-10-CM

## 2018-08-20 DIAGNOSIS — N18.9 CHRONIC KIDNEY DISEASE, UNSPECIFIED: ICD-10-CM

## 2018-08-20 DIAGNOSIS — I25.2 OLD MYOCARDIAL INFARCTION: ICD-10-CM

## 2018-08-20 DIAGNOSIS — K27.4 CHRONIC OR UNSPECIFIED PEPTIC ULCER, SITE UNSPECIFIED, WITH HEMORRHAGE: ICD-10-CM

## 2018-08-20 DIAGNOSIS — Z91.14 PATIENT'S OTHER NONCOMPLIANCE WITH MEDICATION REGIMEN: ICD-10-CM

## 2018-08-20 DIAGNOSIS — K92.2 GASTROINTESTINAL HEMORRHAGE, UNSPECIFIED: ICD-10-CM

## 2018-08-20 DIAGNOSIS — I63.9 CEREBRAL INFARCTION, UNSPECIFIED: ICD-10-CM

## 2018-08-20 DIAGNOSIS — I27.20 PULMONARY HYPERTENSION, UNSPECIFIED: ICD-10-CM

## 2018-08-20 DIAGNOSIS — E78.5 HYPERLIPIDEMIA, UNSPECIFIED: ICD-10-CM

## 2018-08-20 DIAGNOSIS — I22.2 SUBSEQUENT NON-ST ELEVATION (NSTEMI) MYOCARDIAL INFARCTION: ICD-10-CM

## 2018-08-20 DIAGNOSIS — I42.0 DILATED CARDIOMYOPATHY: ICD-10-CM

## 2018-08-20 DIAGNOSIS — I47.2 VENTRICULAR TACHYCARDIA: ICD-10-CM

## 2018-08-20 DIAGNOSIS — Z79.4 LONG TERM (CURRENT) USE OF INSULIN: ICD-10-CM

## 2018-08-20 DIAGNOSIS — G92 TOXIC ENCEPHALOPATHY: ICD-10-CM

## 2018-08-20 DIAGNOSIS — I25.10 ATHEROSCLEROTIC HEART DISEASE OF NATIVE CORONARY ARTERY WITHOUT ANGINA PECTORIS: ICD-10-CM

## 2018-08-20 DIAGNOSIS — D62 ACUTE POSTHEMORRHAGIC ANEMIA: ICD-10-CM

## 2018-08-20 DIAGNOSIS — D61.818 OTHER PANCYTOPENIA: ICD-10-CM

## 2018-08-20 DIAGNOSIS — N17.0 ACUTE KIDNEY FAILURE WITH TUBULAR NECROSIS: ICD-10-CM

## 2018-08-20 DIAGNOSIS — K29.70 GASTRITIS, UNSPECIFIED, WITHOUT BLEEDING: ICD-10-CM

## 2018-08-20 DIAGNOSIS — I13.0 HYPERTENSIVE HEART AND CHRONIC KIDNEY DISEASE WITH HEART FAILURE AND STAGE 1 THROUGH STAGE 4 CHRONIC KIDNEY DISEASE, OR UNSPECIFIED CHRONIC KIDNEY DISEASE: ICD-10-CM

## 2018-08-20 DIAGNOSIS — I07.1 RHEUMATIC TRICUSPID INSUFFICIENCY: ICD-10-CM

## 2018-08-20 DIAGNOSIS — E11.9 TYPE 2 DIABETES MELLITUS WITHOUT COMPLICATIONS: ICD-10-CM

## 2018-10-10 NOTE — PROGRESS NOTE ADULT - PROBLEM SELECTOR PLAN 7
Holding home medications. Will need closer f/u as outpt.  - Hb A1c was 8  - c/w ISS, 10u lantus qHS    #Aflutter: On telemetry last night  - Will need chronic anticoagulation, now on eliquis mammogram Holding home medications. Will need closer f/u as outpt. POC >300's this am.  - Hb A1c was 8  -NPH 10Ux1, Lantus 15U qhs  - c/w ISS,

## 2019-02-22 PROBLEM — E11.9 TYPE 2 DIABETES MELLITUS WITHOUT COMPLICATIONS: Chronic | Status: ACTIVE | Noted: 2018-07-20

## 2019-02-22 PROBLEM — E78.5 HYPERLIPIDEMIA, UNSPECIFIED: Chronic | Status: ACTIVE | Noted: 2018-07-20

## 2019-02-22 PROBLEM — I25.10 ATHEROSCLEROTIC HEART DISEASE OF NATIVE CORONARY ARTERY WITHOUT ANGINA PECTORIS: Chronic | Status: ACTIVE | Noted: 2018-07-20

## 2019-02-22 PROBLEM — I10 ESSENTIAL (PRIMARY) HYPERTENSION: Chronic | Status: ACTIVE | Noted: 2018-07-20

## 2019-02-25 VITALS — WEIGHT: 136.03 LBS | HEIGHT: 66 IN

## 2019-02-25 NOTE — H&P ADULT - HISTORY OF PRESENT ILLNESS
67 yo M with PMHx of HTN, HLD, DM, dilated cardiomyopathy with decompensated HF and known CAD s/p PCI (BA to LAD and LCx in 2011, per MD note) who presented to Dr. Cunningham c/o ____. Denies _____. Pt had a NST (2/15/19) revealing severe and extensive apical, periapical, septal and anterior wall scar with jessica-infarct ischemia, severe LV enlargement and systolic dysfunction, EF 19%, LVH and borderline wall motion.    In light of pts risk factors, CCS class __ anginal symptoms and abnormal NST, pt is referred to [facility] for recommended cardiac catheterization with possible intervention if clinically indicated. ***SKELETON****    67 yo M with PMHx of HTN, HLD, DM, dilated cardiomyopathy with decompensated HF and known CAD s/p PCI (BA to LAD and LCx in 2011, per MD note) who presented to Dr. Cunningham c/o ____. Denies _____. Pt had a NST (2/15/19) revealing severe and extensive apical, periapical, septal and anterior wall scar with jessica-infarct ischemia, severe LV enlargement and systolic dysfunction, EF 19%, LVH and borderline wall motion.    In light of pts risk factors, CCS class __ anginal symptoms and abnormal NST, pt is referred to [facility] for recommended cardiac catheterization with possible intervention if clinically indicated. ***SKELETON****    67 yo M with PMHx of HTN, HLD, DM, dilated cardiomyopathy (EF 19%) and known CAD s/p PCI (BA to LAD and LCx in 2011 per MD note) who presented to Dr. Cunningham c/o ____. Denies _____. Pt had a NST (2/15/19) revealing severe and extensive apical, periapical, septal and anterior wall scar with jessica-infarct ischemia, severe LV enlargement and systolic dysfunction, EF 19%, LVH and borderline wall motion.    In light of pts risk factors, CCS class __ anginal symptoms and abnormal NST, pt is referred to [facility] for recommended cardiac catheterization with possible intervention if clinically indicated. 67 yo M with history  of HTN, HLD, DM, ischemic  cardiomyopathy (EF 19%) and known CAD s/p PCI (BA to LAD and LCx in 2011) presents for ICD implant.   Patient with long standing ischemic cardiomyopathy, on beta blockers , secondary to increased creatinine patient is unable to take ACE.  His  exercise tolerance is 5 to 6 city blocks. Patient denies chest pain, SOB, palpitations, dizziness, syncope.

## 2019-02-25 NOTE — H&P ADULT - ASSESSMENT
69 yo M with history  of HTN, HLD, DM, ischemic  cardiomyopathy (EF 19%) and known CAD s/p PCI (BA to LAD and LCx in 2011) presents for ICD implant.

## 2019-02-25 NOTE — H&P ADULT - NSICDXFAMILYHX_GEN_ALL_CORE_FT
FAMILY HISTORY:  Mother  Still living? Unknown  Family history of diabetes mellitus in mother, Age at diagnosis: Age Unknown  Family history of hypertension in mother, Age at diagnosis: Age Unknown

## 2019-02-25 NOTE — H&P ADULT - NSICDXPASTSURGICALHX_GEN_ALL_CORE_FT
PAST SURGICAL HISTORY:  CAD S/P percutaneous coronary angioplasty     History of cataract extraction, unspecified laterality

## 2019-02-25 NOTE — H&P ADULT - NSICDXPASTMEDICALHX_GEN_ALL_CORE_FT
PAST MEDICAL HISTORY:  Coronary artery disease involving native heart without angina pectoris, unspecified vessel or lesion type     Hyperlipidemia, unspecified hyperlipidemia type     Hypertension, unspecified type     Type 2 diabetes mellitus without complication, without long-term current use of insulin

## 2019-02-25 NOTE — H&P ADULT - NSICDXPROBLEM_GEN_ALL_CORE_FT
PROBLEM DIAGNOSES  Problem: DM (diabetes mellitus)  Assessment and Plan: hold Glipizied, FS with insulin coverage     Problem: Ischemic cardiomyopathy  Assessment and Plan: ICD implant today for primary prevention SCD

## 2019-03-15 ENCOUNTER — OUTPATIENT (OUTPATIENT)
Dept: OUTPATIENT SERVICES | Facility: HOSPITAL | Age: 69
LOS: 1 days | Discharge: HOME | End: 2019-03-15
Payer: MEDICARE

## 2019-03-15 DIAGNOSIS — I25.5 ISCHEMIC CARDIOMYOPATHY: ICD-10-CM

## 2019-03-15 DIAGNOSIS — I25.10 ATHEROSCLEROTIC HEART DISEASE OF NATIVE CORONARY ARTERY WITHOUT ANGINA PECTORIS: Chronic | ICD-10-CM

## 2019-03-15 DIAGNOSIS — E11.9 TYPE 2 DIABETES MELLITUS WITHOUT COMPLICATIONS: ICD-10-CM

## 2019-03-15 DIAGNOSIS — Z98.49 CATARACT EXTRACTION STATUS, UNSPECIFIED EYE: Chronic | ICD-10-CM

## 2019-03-15 LAB
GLUCOSE BLDC GLUCOMTR-MCNC: 127 MG/DL — HIGH (ref 70–99)
GLUCOSE BLDC GLUCOMTR-MCNC: 168 MG/DL — HIGH (ref 70–99)
GLUCOSE BLDC GLUCOMTR-MCNC: 245 MG/DL — HIGH (ref 70–99)

## 2019-03-15 RX ORDER — DEXTROSE 50 % IN WATER 50 %
12.5 SYRINGE (ML) INTRAVENOUS ONCE
Qty: 0 | Refills: 0 | Status: DISCONTINUED | OUTPATIENT
Start: 2019-03-15 | End: 2019-03-16

## 2019-03-15 RX ORDER — SODIUM CHLORIDE 9 MG/ML
1000 INJECTION, SOLUTION INTRAVENOUS
Qty: 0 | Refills: 0 | Status: DISCONTINUED | OUTPATIENT
Start: 2019-03-15 | End: 2019-03-16

## 2019-03-15 RX ORDER — INSULIN LISPRO 100/ML
VIAL (ML) SUBCUTANEOUS
Qty: 0 | Refills: 0 | Status: DISCONTINUED | OUTPATIENT
Start: 2019-03-15 | End: 2019-03-16

## 2019-03-15 RX ORDER — ATORVASTATIN CALCIUM 80 MG/1
80 TABLET, FILM COATED ORAL DAILY
Qty: 0 | Refills: 0 | Status: DISCONTINUED | OUTPATIENT
Start: 2019-03-15 | End: 2019-03-16

## 2019-03-15 RX ORDER — METOPROLOL TARTRATE 50 MG
50 TABLET ORAL DAILY
Qty: 0 | Refills: 0 | Status: DISCONTINUED | OUTPATIENT
Start: 2019-03-15 | End: 2019-03-16

## 2019-03-15 RX ORDER — DEXTROSE 50 % IN WATER 50 %
15 SYRINGE (ML) INTRAVENOUS ONCE
Qty: 0 | Refills: 0 | Status: DISCONTINUED | OUTPATIENT
Start: 2019-03-15 | End: 2019-03-16

## 2019-03-15 RX ORDER — GLUCAGON INJECTION, SOLUTION 0.5 MG/.1ML
1 INJECTION, SOLUTION SUBCUTANEOUS ONCE
Qty: 0 | Refills: 0 | Status: DISCONTINUED | OUTPATIENT
Start: 2019-03-15 | End: 2019-03-16

## 2019-03-15 RX ORDER — METFORMIN HYDROCHLORIDE 850 MG/1
1 TABLET ORAL
Qty: 0 | Refills: 0 | COMMUNITY

## 2019-03-15 RX ORDER — ASPIRIN/CALCIUM CARB/MAGNESIUM 324 MG
81 TABLET ORAL DAILY
Qty: 0 | Refills: 0 | Status: DISCONTINUED | OUTPATIENT
Start: 2019-03-15 | End: 2019-03-16

## 2019-03-15 RX ORDER — BUMETANIDE 0.25 MG/ML
2 INJECTION INTRAMUSCULAR; INTRAVENOUS DAILY
Qty: 0 | Refills: 0 | Status: DISCONTINUED | OUTPATIENT
Start: 2019-03-15 | End: 2019-03-16

## 2019-03-15 RX ORDER — DEXTROSE 50 % IN WATER 50 %
25 SYRINGE (ML) INTRAVENOUS ONCE
Qty: 0 | Refills: 0 | Status: DISCONTINUED | OUTPATIENT
Start: 2019-03-15 | End: 2019-03-16

## 2019-03-15 RX ORDER — CEFAZOLIN SODIUM 1 G
VIAL (EA) INJECTION
Qty: 0 | Refills: 0 | Status: COMPLETED | OUTPATIENT
Start: 2019-03-15 | End: 2019-03-15

## 2019-03-15 RX ORDER — CEFAZOLIN SODIUM 1 G
1000 VIAL (EA) INJECTION EVERY 8 HOURS
Qty: 0 | Refills: 0 | Status: COMPLETED | OUTPATIENT
Start: 2019-03-15 | End: 2019-03-15

## 2019-03-15 RX ORDER — ACETAMINOPHEN 500 MG
2 TABLET ORAL
Qty: 0 | Refills: 0 | COMMUNITY
Start: 2019-03-15

## 2019-03-15 RX ORDER — CEFAZOLIN SODIUM 1 G
1000 VIAL (EA) INJECTION ONCE
Qty: 0 | Refills: 0 | Status: COMPLETED | OUTPATIENT
Start: 2019-03-15 | End: 2019-03-15

## 2019-03-15 RX ORDER — ACETAMINOPHEN 500 MG
650 TABLET ORAL EVERY 6 HOURS
Qty: 0 | Refills: 0 | Status: DISCONTINUED | OUTPATIENT
Start: 2019-03-15 | End: 2019-03-16

## 2019-03-15 RX ADMIN — Medication 2: at 21:17

## 2019-03-15 RX ADMIN — BUMETANIDE 2 MILLIGRAM(S): 0.25 INJECTION INTRAMUSCULAR; INTRAVENOUS at 15:35

## 2019-03-15 RX ADMIN — ATORVASTATIN CALCIUM 80 MILLIGRAM(S): 80 TABLET, FILM COATED ORAL at 21:16

## 2019-03-15 RX ADMIN — Medication 50 MILLIGRAM(S): at 13:54

## 2019-03-15 RX ADMIN — Medication 1: at 16:14

## 2019-03-15 RX ADMIN — Medication 81 MILLIGRAM(S): at 13:54

## 2019-03-15 RX ADMIN — Medication 100 MILLIGRAM(S): at 09:20

## 2019-03-15 RX ADMIN — Medication 100 MILLIGRAM(S): at 16:37

## 2019-03-15 RX ADMIN — Medication 100 MILLIGRAM(S): at 21:19

## 2019-03-15 NOTE — DISCHARGE NOTE PROVIDER - NSDCCPTREATMENT_GEN_ALL_CORE_FT
PRINCIPAL PROCEDURE  Procedure: Implantation, ICD system, total  Findings and Treatment: PRINCIPAL PROCEDURE  Procedure: Implantation, ICD system, total  Findings and Treatment: ICD from [employer] on 3/15/19.

## 2019-03-15 NOTE — DISCHARGE NOTE PROVIDER - CARE PROVIDER_API CALL
Nicole Lozano)  Cardiac Electrophysiology; Cardiology  [address] [place] [address]  Phone: [phone]  Fax: [phone]  Follow Up Time:

## 2019-03-15 NOTE — DISCHARGE NOTE PROVIDER - NSDCCPCAREPLAN_GEN_ALL_CORE_FT
PRINCIPAL DISCHARGE DIAGNOSIS  Diagnosis: Ischemic cardiomyopathy  Assessment and Plan of Treatment: PRINCIPAL DISCHARGE DIAGNOSIS  Diagnosis: Ischemic cardiomyopathy  Assessment and Plan of Treatment: You have a history of cardiomyopathy, you had an ICD placed and tolerated the procedure well. You are being discharged on Toprol -------- Please follow up with Dr. Lozano on Wednesday, 03/20/19 as per your scheduled appointment. PRINCIPAL DISCHARGE DIAGNOSIS  Diagnosis: Ischemic cardiomyopathy  Assessment and Plan of Treatment: You have a history of cardiomyopathy, you had an ICD placed and tolerated the procedure well. You are being discharged on Toprol 50mg daily. Please follow up with Dr. Lozano on Wednesday, 03/20/19 as per your scheduled appointment.      SECONDARY DISCHARGE DIAGNOSES  Diagnosis: DM (diabetes mellitus)  Assessment and Plan of Treatment: Please continue taking Glipizide 10mg twice daily and follow up with your primary care doctor PRINCIPAL DISCHARGE DIAGNOSIS  Diagnosis: Ischemic cardiomyopathy  Assessment and Plan of Treatment: You have a history of cardiomyopathy, you had an ICD placed and tolerated the procedure well. You are being discharged on Toprol 50mg daily. Please follow up with Dr. Lozano on Wednesday, 03/20/19 as per your scheduled appointment.      SECONDARY DISCHARGE DIAGNOSES  Diagnosis: Hypertension  Assessment and Plan of Treatment: Please continue taking your bumex and Lopressor and follow up with your primary care provider    Diagnosis: DM (diabetes mellitus)  Assessment and Plan of Treatment: Please continue taking Glipizide 10mg twice daily and follow up with your primary care doctor PRINCIPAL DISCHARGE DIAGNOSIS  Diagnosis: Ischemic cardiomyopathy  Assessment and Plan of Treatment: You have a history of cardiomyopathy, you underwent Implantable Cardioverter Defibrillation (ICD) from [employer] on 3/15/19.    Please follow up with Dr. Lozano for wound check next Weds 3/20/19 in [place] office:   Address: [address] in [place]. Her office number is [phone].   Avoid strenuous activities such as lifting / pushing and rising left arm above shoulder for 1 month.    Call Dr. Lozano if you have question regarding the wound.  Call her if there is swelling / increasing pain or redness at the wound.  Please continue taking your Toprol 50mg daily.      SECONDARY DISCHARGE DIAGNOSES  Diagnosis: Hypertension  Assessment and Plan of Treatment: Please continue taking your bumex and Lopressor and follow up with your primary care provider    Diagnosis: DM (diabetes mellitus)  Assessment and Plan of Treatment: Please continue taking Glipizide 10mg twice daily and follow up with your primary care doctor

## 2019-03-15 NOTE — DISCHARGE NOTE PROVIDER - NSDCFUADDINST_GEN_ALL_CORE_FT
You underwent Implantable Cardioverter Defibrillation (ICD) from [employer] on 3/15/19.    Please follow up with Dr. Lozano for wound check next Weds 3/20/19 in [place] office:   Address: [address] in [place]. Her office number is [phone].   Avoid strenuous activities such as lifting / pushing and rising left arm above shoulder for 1 month.    Call Dr. Lozano if you have question regarding the wound.  Call her if there is swelling / increasing pain or redness at the wound.

## 2019-03-15 NOTE — DISCHARGE NOTE PROVIDER - HOSPITAL COURSE
67 yo M with history  of HTN, HLD, DM, ischemic  cardiomyopathy (EF 19%) and known CAD s/p PCI (BA to LAD and LCx in 2011).   Patient with long standing ischemic cardiomyopathy, on beta blockers , secondary to increased creatinine patient is unable to take ACE. His  exercise tolerance is 5 to 6 city blocks. Patient denies chest pain, SOB, palpitations, dizziness, syncope. He presented for ICD implant for primary prevention. S/P ICD implant ([employer]) on 3/15/19.          CXR ...    Device interrogation post procedure with good numbers.     Wound check (left subclavicular site) ....         Stable for discharge home 67 yo M with history  of HTN, HLD, DM, ischemic  cardiomyopathy (EF 19%) and known CAD s/p PCI (BA to LAD and LCx in 2011).   Patient with long standing ischemic cardiomyopathy, on beta blockers , secondary to increased creatinine patient is unable to take ACE. His  exercise tolerance is 5 to 6 city blocks. Patient denies chest pain, SOB, palpitations, dizziness, syncope. He presented for ICD implant for primary prevention. S/P ICD implant ([employer]) on 3/15/19.      CXR with no evidence of pneumothorax. Device interrogation post procedure with good numbers. Wound check (left subclavicular site) clean, dry, intact. Patient is stable for discharge home with follow up with Dr. Lozano on 3/20/19. 69 yo M with history  of HTN, HLD, DM, ischemic  cardiomyopathy (EF 19%) and known CAD s/p PCI (BA to LAD and LCx in 2011).   Patient with long standing ischemic cardiomyopathy, on beta blockers , secondary to increased creatinine patient is unable to take ACE. His  exercise tolerance is 5 to 6 city blocks. Patient denies chest pain, SOB, palpitations, dizziness, syncope. He presented for ICD implant for primary prevention. S/P ICD implant ([employer]) on 3/15/19.      CXR with ---------. Device interrogation post procedure with good numbers. Wound check (left subclavicular site) clean, dry, intact. Patient is stable for discharge home with follow up with Dr. Lozano on 3/20/19. 69 yo M with history  of HTN, HLD, DM, ischemic  cardiomyopathy (EF 19%) and known CAD s/p PCI (BA to LAD and LCx in 2011).   Patient with long standing ischemic cardiomyopathy, on beta blockers , secondary to increased creatinine patient is unable to take ACE. His  exercise tolerance is 5 to 6 city blocks. Patient denies chest pain, SOB, palpitations, dizziness, syncope. He presented for ICD implant for primary prevention. S/P ICD implant ([employer]) on 3/15/19.      CXR with no pneumothorax. Device interrogation post procedure with good numbers. Wound check (left subclavicular site) clean, dry, intact. Patient is stable for discharge home with follow up with Dr. Lozano on 3/20/19. 69 yo M with history  of HTN, HLD, DM, ischemic  cardiomyopathy (EF 19%) and known CAD s/p PCI (BA to LAD and LCx in 2011).   Patient with long standing ischemic cardiomyopathy, on beta blockers , secondary to increased creatinine patient is unable to take ACE. His  exercise tolerance is 5 to 6 city blocks. Patient denies chest pain, SOB, palpitations, dizziness, syncope. He presented for ICD implant for primary prevention. S/P ICD implant ([employer]) on 3/15/19. CXR with no pneumothorax. Device interrogation post procedure with good numbers. Wound check (left subclavicular site) clean, dry, intact. Patient is stable for discharge home with follow up with Dr. Lozano on 3/20/19.

## 2019-03-16 VITALS
OXYGEN SATURATION: 98 % | SYSTOLIC BLOOD PRESSURE: 94 MMHG | DIASTOLIC BLOOD PRESSURE: 65 MMHG | HEART RATE: 96 BPM | RESPIRATION RATE: 16 BRPM

## 2019-03-16 LAB
GLUCOSE BLDC GLUCOMTR-MCNC: 107 MG/DL — HIGH (ref 70–99)
GLUCOSE BLDC GLUCOMTR-MCNC: 162 MG/DL — HIGH (ref 70–99)

## 2019-03-16 PROCEDURE — 71045 X-RAY EXAM CHEST 1 VIEW: CPT | Mod: 26

## 2019-03-16 PROCEDURE — 82962 GLUCOSE BLOOD TEST: CPT

## 2019-03-16 PROCEDURE — 71045 X-RAY EXAM CHEST 1 VIEW: CPT

## 2019-03-16 PROCEDURE — C1894: CPT

## 2019-03-16 PROCEDURE — C1722: CPT

## 2019-03-16 PROCEDURE — C1777: CPT

## 2019-03-16 PROCEDURE — 33249 INSJ/RPLCMT DEFIB W/LEAD(S): CPT

## 2019-03-16 PROCEDURE — C1769: CPT

## 2019-03-16 RX ORDER — METOPROLOL TARTRATE 50 MG
1 TABLET ORAL
Qty: 0 | Refills: 0 | COMMUNITY

## 2019-03-16 RX ORDER — ASPIRIN/CALCIUM CARB/MAGNESIUM 324 MG
1 TABLET ORAL
Qty: 0 | Refills: 0 | COMMUNITY

## 2019-03-16 RX ORDER — BUMETANIDE 0.25 MG/ML
1 INJECTION INTRAMUSCULAR; INTRAVENOUS
Qty: 0 | Refills: 0 | COMMUNITY

## 2019-03-16 RX ADMIN — Medication 650 MILLIGRAM(S): at 11:10

## 2019-03-16 RX ADMIN — Medication 650 MILLIGRAM(S): at 12:05

## 2019-03-16 RX ADMIN — Medication 81 MILLIGRAM(S): at 11:10

## 2019-03-16 RX ADMIN — Medication 50 MILLIGRAM(S): at 06:13

## 2019-03-16 RX ADMIN — BUMETANIDE 2 MILLIGRAM(S): 0.25 INJECTION INTRAMUSCULAR; INTRAVENOUS at 06:52

## 2019-03-16 RX ADMIN — Medication 1: at 12:01

## 2019-03-16 NOTE — DISCHARGE NOTE NURSING/CASE MANAGEMENT/SOCIAL WORK - NSDCDPATPORTLINK_GEN_ALL_CORE
You can access the [facility] Patient Portal, offered by [facility], by registering with the following website: [email or web address]

## 2022-02-17 NOTE — PROGRESS NOTE ADULT - PROBLEM SELECTOR PLAN 1
- now with improving of the renal function, stable  - volume status acceptable   - electrolytes noted   avoid nephrotoxic agents  - renal diet  - with bacteremia - gram negative Kl. pneumonia on ceftriaxone; the repeated blood cultures negative  - hypophosphatemia - resolved, please follow up Detail Level: Zone

## 2022-06-09 NOTE — PROGRESS NOTE ADULT - PROBLEM SELECTOR PLAN 10
Nephrology F: no IVF  E: Replete K>4, Mg>2.   N: Mech soft/thin DASH/TLC/consistent carbs    Dispo: 5 [facility] F: no IVF  E: Replete K>4, Mg>2.   N: Mechnical soft/thin DASH/TLC/consistent carbs    Dispo: 5 [facility], pending DC to [facility]

## 2023-03-10 NOTE — PROGRESS NOTE ADULT - PROBLEM SELECTOR PLAN 1
- now with improving of the renal function, stable  - volume status accepatble   - electrolytes noted   - receiving blood transfusion - s/p blood transfusion on 8/4  avoid nephrotoxic agents  - renal diet  - hypophosphatemia - please replace - 15 meq of po NaPhO4 and follow up Skin normal color for race, warm, dry and intact. No evidence of rash.

## 2024-02-20 NOTE — PROGRESS NOTE ADULT - PROBLEM SELECTOR PROBLEM 6
Altered mental status, unspecified altered mental status type Risk Assessment Explanation (Does Not Render In The Note): Clinical determination of the probability and/or consequences of an event, such as surgery. Clinical assessment of the level of risk is affected by the nature of the event under consideration for the patient. Modifier 57 is used to indicate an Evaluation and Management (E/M) service resulted in the initial decision to perform surgery either the day before a major surgery (90 day global) or the day of a major surgery. Initial Decision For Surgery?: Yes Discussion: Discussed risks vs benefits of excision of skin lesion.

## 2024-09-27 NOTE — PROGRESS NOTE ADULT - PROBLEM SELECTOR PROBLEM 3
[FreeTextEntry1] : 49 year old male presents with 6 months of left nostril swelling and epistaxis. On exam, scarring inferior turb to septum L with a scar band behind, ulcerative beefy fullness lateral nasal wall into internal nasal valve - firm, R DNS caudally.  Discussed options: 1) CT scan for further evaluation  2) Nasal biopsy  - patient elected for option 2  - L nasal biopsy taken today, will call to discuss results  Acute anemia

## 2024-11-28 NOTE — SPEECH LANGUAGE PATHOLOGY EVALUATION - SPECIFY REASON(S)
to determine speech/language/communication abilities FAMILY HISTORY:  Father  Still living? Unknown  FH: HTN (hypertension), Age at diagnosis: Age Unknown  FHx: hyperlipidemia, Age at diagnosis: Age Unknown    Mother  Still living? Unknown  FH: HTN (hypertension), Age at diagnosis: Age Unknown  FHx: hyperlipidemia, Age at diagnosis: Age Unknown

## 2024-12-06 NOTE — PROGRESS NOTE ADULT - PROBLEM SELECTOR PROBLEM 7
----- Message from Marilee sent at 12/6/2024  8:10 AM CST -----  Who Called: Chelsi Walsh MD-[place]    Caller is requesting assistance/information from provider's office.    Symptoms (please be specific): n/a   How long has patient had these symptoms:  n/a  List of preferred pharmacies on file (remove unneeded): n/a    Preferred Method of Contact: Phone Call  Patient's Preferred Phone Number on File: [phone]   Best Call Back Number, if different:  Additional Information: Needs dx and ID of insurance sent over to them.  Also requesting lab work and and tests that were done for pt dx. Please advise.      Fax: [phone]  Phone: [phone]   Transaminitis